# Patient Record
Sex: FEMALE | Race: WHITE | NOT HISPANIC OR LATINO
[De-identification: names, ages, dates, MRNs, and addresses within clinical notes are randomized per-mention and may not be internally consistent; named-entity substitution may affect disease eponyms.]

---

## 2018-10-02 ENCOUNTER — HEALTH MAINTENANCE LETTER (OUTPATIENT)
Age: 43
End: 2018-10-02

## 2018-10-04 ENCOUNTER — RADIANT APPOINTMENT (OUTPATIENT)
Dept: MAMMOGRAPHY | Facility: CLINIC | Age: 43
End: 2018-10-04
Attending: OBSTETRICS & GYNECOLOGY
Payer: COMMERCIAL

## 2018-10-04 ENCOUNTER — RADIANT APPOINTMENT (OUTPATIENT)
Dept: GENERAL RADIOLOGY | Facility: CLINIC | Age: 43
End: 2018-10-04
Attending: OBSTETRICS & GYNECOLOGY
Payer: COMMERCIAL

## 2018-10-04 ENCOUNTER — ONCOLOGY VISIT (OUTPATIENT)
Dept: ONCOLOGY | Facility: CLINIC | Age: 43
End: 2018-10-04
Attending: OBSTETRICS & GYNECOLOGY
Payer: COMMERCIAL

## 2018-10-04 VITALS
HEART RATE: 70 BPM | DIASTOLIC BLOOD PRESSURE: 71 MMHG | OXYGEN SATURATION: 99 % | TEMPERATURE: 98.4 F | SYSTOLIC BLOOD PRESSURE: 123 MMHG | WEIGHT: 222.3 LBS

## 2018-10-04 DIAGNOSIS — C54.1 ENDOMETRIAL CANCER (H): ICD-10-CM

## 2018-10-04 DIAGNOSIS — C54.1 ENDOMETRIAL CANCER (H): Primary | ICD-10-CM

## 2018-10-04 LAB
ANION GAP SERPL CALCULATED.3IONS-SCNC: 7 MMOL/L (ref 3–14)
BASOPHILS # BLD AUTO: 0 10E9/L (ref 0–0.2)
BASOPHILS NFR BLD AUTO: 0.5 %
BUN SERPL-MCNC: 15 MG/DL (ref 7–30)
CALCIUM SERPL-MCNC: 8.7 MG/DL (ref 8.5–10.1)
CHLORIDE SERPL-SCNC: 106 MMOL/L (ref 94–109)
CO2 SERPL-SCNC: 26 MMOL/L (ref 20–32)
CREAT SERPL-MCNC: 0.9 MG/DL (ref 0.52–1.04)
DIFFERENTIAL METHOD BLD: NORMAL
EOSINOPHIL # BLD AUTO: 0.3 10E9/L (ref 0–0.7)
EOSINOPHIL NFR BLD AUTO: 4.5 %
ERYTHROCYTE [DISTWIDTH] IN BLOOD BY AUTOMATED COUNT: 12.2 % (ref 10–15)
GFR SERPL CREATININE-BSD FRML MDRD: 69 ML/MIN/1.7M2
GLUCOSE SERPL-MCNC: 102 MG/DL (ref 70–99)
HCT VFR BLD AUTO: 42.7 % (ref 35–47)
HGB BLD-MCNC: 14.2 G/DL (ref 11.7–15.7)
IMM GRANULOCYTES # BLD: 0 10E9/L (ref 0–0.4)
IMM GRANULOCYTES NFR BLD: 0.3 %
LYMPHOCYTES # BLD AUTO: 2.2 10E9/L (ref 0.8–5.3)
LYMPHOCYTES NFR BLD AUTO: 33.8 %
MCH RBC QN AUTO: 30.1 PG (ref 26.5–33)
MCHC RBC AUTO-ENTMCNC: 33.3 G/DL (ref 31.5–36.5)
MCV RBC AUTO: 91 FL (ref 78–100)
MONOCYTES # BLD AUTO: 0.4 10E9/L (ref 0–1.3)
MONOCYTES NFR BLD AUTO: 5.4 %
NEUTROPHILS # BLD AUTO: 3.6 10E9/L (ref 1.6–8.3)
NEUTROPHILS NFR BLD AUTO: 55.5 %
NRBC # BLD AUTO: 0 10*3/UL
NRBC BLD AUTO-RTO: 0 /100
PLATELET # BLD AUTO: 280 10E9/L (ref 150–450)
POTASSIUM SERPL-SCNC: 4.2 MMOL/L (ref 3.4–5.3)
RBC # BLD AUTO: 4.72 10E12/L (ref 3.8–5.2)
SODIUM SERPL-SCNC: 138 MMOL/L (ref 133–144)
WBC # BLD AUTO: 6.5 10E9/L (ref 4–11)

## 2018-10-04 PROCEDURE — G0463 HOSPITAL OUTPT CLINIC VISIT: HCPCS | Mod: ZF

## 2018-10-04 PROCEDURE — 80048 BASIC METABOLIC PNL TOTAL CA: CPT | Performed by: OBSTETRICS & GYNECOLOGY

## 2018-10-04 PROCEDURE — 85025 COMPLETE CBC W/AUTO DIFF WBC: CPT | Performed by: OBSTETRICS & GYNECOLOGY

## 2018-10-04 PROCEDURE — 99205 OFFICE O/P NEW HI 60 MIN: CPT | Mod: ZP | Performed by: OBSTETRICS & GYNECOLOGY

## 2018-10-04 RX ORDER — ALBUTEROL SULFATE 90 UG/1
1-2 AEROSOL, METERED RESPIRATORY (INHALATION)
COMMUNITY
Start: 2018-04-04 | End: 2020-02-11

## 2018-10-04 RX ORDER — CLINDAMYCIN PHOSPHATE 900 MG/50ML
900 INJECTION, SOLUTION INTRAVENOUS
Status: CANCELLED | OUTPATIENT
Start: 2018-10-04

## 2018-10-04 RX ORDER — METOPROLOL SUCCINATE 25 MG/1
25 TABLET, EXTENDED RELEASE ORAL
COMMUNITY
Start: 2018-08-15 | End: 2019-04-25

## 2018-10-04 RX ORDER — SPIRONOLACTONE 50 MG/1
50 TABLET, FILM COATED ORAL DAILY
COMMUNITY
Start: 2018-08-14

## 2018-10-04 RX ORDER — BUPROPION HYDROCHLORIDE 300 MG/1
TABLET ORAL
COMMUNITY
Start: 2018-09-13 | End: 2019-08-08 | Stop reason: DRUGHIGH

## 2018-10-04 RX ORDER — ACETAMINOPHEN 325 MG/1
975 TABLET ORAL ONCE
Status: CANCELLED | OUTPATIENT
Start: 2018-10-04 | End: 2018-10-04

## 2018-10-04 RX ORDER — LORATADINE 10 MG/1
10 TABLET ORAL
COMMUNITY
Start: 2018-06-06 | End: 2021-01-29

## 2018-10-04 RX ORDER — PHENAZOPYRIDINE HYDROCHLORIDE 200 MG/1
200 TABLET, FILM COATED ORAL ONCE
Status: CANCELLED | OUTPATIENT
Start: 2018-10-04 | End: 2018-10-04

## 2018-10-04 RX ORDER — MULTIVITAMIN
1 CAPSULE ORAL
COMMUNITY
Start: 2018-04-04

## 2018-10-04 RX ORDER — GABAPENTIN 600 MG/1
100 TABLET ORAL PRN
COMMUNITY
Start: 2018-07-24 | End: 2021-02-19

## 2018-10-04 ASSESSMENT — PAIN SCALES - GENERAL: PAINLEVEL: NO PAIN (0)

## 2018-10-04 NOTE — PROGRESS NOTES
Consult Notes on Referred Patient    Date: 10/04/2018     Dr. 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 Given these findings she was subsequently sent to the [facility] Cancer Clinic for new patient consultation.     Brief History:  Meagan originally presented to clinic due to continuous vaginal bleeding, very light. She thought it was just her menses being continuous. US was done which found thickened endometrial lining. IUD was placed. Second US was done and IUD did not affect endometrial lining and IUD was malpositioned. Since then, she has noted continuous spotting. IUD was removed. Also of note, ovarian cysts were noted on second US. Subsequent endometrial biopsy was done which revealed grade 1 endometrial cancer. She has always had irregular menses and has never been able to get pregnant. Patient's  first and only pap smear on 10/10/2017. Had mammogram in 20's, will order additional mammogram for baseline. No history of prior colonoscopy. Started Wellbutrin this last year, mood has improved. Prior cigarette smoker, quit 2 months ago and switched to vape. Started again smoking cigarettes again but has not had one for 10 days. History of drug use, has been sober for 11 months. History of meth and alcohol abuse. No history of prior abdominal surgeries. No history of heart disease, lung disease or diabetes. Has never been able to get pregnant and dose not desire children.    18: pelvic US   IMPRESSION:  Peripheral follicular arrangement high within the ovaries suggesting polycystic ovarian syndrome.   Slightly thickened heterogeneous endometrium raising a concern of potential hyperplastic change.  18: pelvic US   1. Uterus is not  normal in appearance. The endometrium appears thickened   and vascular, and the IUD appears to be malpositioned.  Clinical   correlation is recommended.    2. Bilateral complex ovarian cysts are noted.  Follow up ultrasound in 4-6   weeks is recommended, consider saline infusion sonohysterogram for further   imaging of the endometrium.     9/17/18: endometrial biopsy  ENDOMETRIUM, BIOPSY:  1. FIGO Grade 1 (well differentiated) endometrioid carcinoma of      the endometrium  2. DNA mismatch repair enzyme immunohistochemistry studies:     All intact (see Amendment note and synoptic reporting)    Review of Systems:  Systemic           no weight changes; no fever; no chills; no night sweats; no appetite changes  Skin           no rashes, or lesions  Eye           no irritation; no changes in vision  Deny-Laryngeal           no dysphagia; no hoarseness   Pulmonary    no cough; no shortness of breath  Cardiovascular    no chest pain; no palpitations  Gastrointestinal    no diarrhea; no constipation; no abdominal pain; no changes in bowel  habits; no blood in stool  Genitourinary   no urinary frequency; no urinary urgency; no dysuria; no pain; no abnormal vaginal discharge; + abnormal vaginal bleeding  Breast   no breast discharge; no breast changes; no breast pain  Musculoskeletal    no myalgias; no arthralgias; no back pain  Psychiatric           no depressed mood; no anxiety    Hematologic           no tender lymph nodes; no noticeable swellings or lumps   Endocrine    no hot flashes; no heat/cold intolerance         Neurological   no tremor; no numbness and tingling; no headaches; no difficulty  sleeping    I have reviewed and addressed the patient's review of symptoms for today's visit.     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     Health Maintenance:  Health Maintenance Due   Topic Date Due     PHQ-2 Q1 YR  01/20/1989     TETANUS IMMUNIZATION (SYSTEM ASSIGNED)  01/20/1995     HIV SCREEN (SYSTEM ASSIGNED)  01/20/1995     PAP SCREENING Q3 YR (SYSTEM ASSIGNED)  01/20/1998     INFLUENZA VACCINE (1) 09/01/2018     Last Pap Smear: 10/10/17 Result: NIL, negative HPV  This was her first pap smear.     Last Mammogram: Done in 20's, will order mammogram for new baseline.     Last Colonoscopy: Not done    Current Medications:   has a current medication list which includes the following prescription(s): albuterol, bupropion, gabapentin, loratadine, melatonin, metoprolol succinate, multivitamin  s, and spironolactone.     Allergies:   Allergies   Allergen Reactions     Amoxicillin      Penicillin G       Social History:  Social History   Substance Use Topics     Smoking status: Former Smoker     Packs/day: 0.50     Types: Cigarettes     Smokeless tobacco: Never Used      Comment: uses vape pen about 3-4 times daily     Alcohol use No      Comment: 11 months sober      History   Drug Use No     Comment: 11 months sober     Family History:   The patient's family history is notable for:  Paternal aunts x2 - breast cancer, age less than 50  Mother's cousin on fathers side  Maternal aunt - breast cancer  No uterine or colon cancer.     Physical Exam:   /71  Pulse 70  Temp 98.4  F (36.9  C) (Oral)  Wt 100.8 kg (222 lb 4.8 oz)  SpO2 99%  There is no height or weight on file to calculate BMI.    General Appearance: healthy and alert, no distress     HEENT:  no thyromegaly, no palpable nodules or masses        Cardiovascular: regular rate and rhythm, no gallops, rubs or murmurs     Respiratory: lungs clear, no rales, rhonchi or wheezes, normal diaphragmatic excursion    Musculoskeletal: extremities non tender and without  edema    Skin: no lesions or rashes     Neurological: normal gait, no gross defects     Psychiatric: appropriate mood and affect                               Hematological: normal cervical, supraclavicular and inguinal lymph nodes     Gastrointestinal:       abdomen soft, non-tender, non-distended, no organomegaly or masses    Genitourinary: Lesion on L vulva, measuring 0.5x0.5 cm, did not remove lesions today, I will remove this in the OR and send to lab then. Vagina is smooth without nodularity or masses, small amount of brownish discharge.  Cervix appears normal and without lesions.  Bimanual exam reveal no masses, nodularity or fullness.  Recto-vaginal exam confirms these findings.      Assessment:  Meagan Morales is a 43 year old woman with a diagnosis of grade 1 endometrial cancer.      Plan:    1.)   Will plan for Plan for DaVinci assisted total laparoscopic hysterectomy, bilateral oophorectomy and salpingectomy, possible pelvic and paraaortic lymph node dissection. [place] Lymph nodes.  - surgical menopause reviewed with patient. Importance of weight bearing exercise, calcium and vitamin D. HRT can be an option if symptoms are unbearable.   - Reviewed signs and symptoms for when she should contact the clinic or seek additional care. Patient to contact the clinic with any questions or concerns in the interim.     2.) Genetic risk factors were assessed and the patient does not meet the qualifications for a referral.      3.) Labs and/or tests ordered include:  Chest xray. Mammogram.      4.) Health maintenance issues addressed today include: will order mammogram for baseline.     5.) Pre-op teaching was completed today.  Risks of surgery were discussed to include: bleeding, transfusion, infection, unintentional injury to surrounding organs/structures.    6.) Of note, patient has history of drug and alcohol abuse. (meth) She is 11 months sober, living in sober housing. She has discussed use of narcotics with  family and those she lives with. She understands risk for addiction when using narcotics.    Thank you for allowing us to participate in the care of your patient.       Sincerely,    Linh De Souza MD    Department of Ob/Gyn and Women's Health  Division of Gynecologic Oncology  [facility]  [phone]      No care team member to ANITA Hart Morgan Faulkner, am serving as a scribe to document services personally performed by Linh De Souza MD, based upon my observations and the provider's statements to me. All documentation has been reviewed by the aforementioned doctor prior to being entered into the official medical record.     I have reviewed the above note and agree with the scribe's notation as written.

## 2018-10-04 NOTE — MR AVS SNAPSHOT
After Visit Summary   10/4/2018    Meagan Morales    MRN: [id number]           Patient Information     Date Of Birth          1975        Visit Information        Provider Department      10/4/2018 9:00 AM Linh De Souza MD [facility]        Today's Diagnoses     Endometrial cancer (H)    -  1       Follow-ups after your visit        Your next 10 appointments already scheduled     Oct 24, 2018   Procedure with Linh De Souza MD   [facility], [place], Same Day Surgery (--)    [address] [id number] [phone]            Nov 08, 2018  2:00 PM CST   (Arrive by 1:45 PM)   Return Visit with Linh De Souza MD   [facility] ([facility])    [address]  [address]  [facility] [facility] [address] [phone]              Who to contact     If you have questions or need follow up information about today's clinic visit or your schedule please contact [facility] directly at [phone].  Normal or non-critical lab and imaging results will be communicated to you by Mobilisafehart, letter or phone within 4 business days after the clinic has received the results. If you do not hear from us within 7 days, please contact the clinic through Mobilisafehart or phone. If you have a critical or abnormal lab result, we will notify you by phone as soon as possible.  Submit refill requests through NearVerse or call your pharmacy and they will forward the refill request to us. Please allow 3 business days for your refill to be completed.          Additional Information About Your Visit        Mobilisafehart Information     NearVerse gives you secure access to your electronic health record. If you see a primary care provider, you can also send messages to your care team and make appointments. If you have questions, please call your primary care clinic.  If you do not have a primary care provider, please call [phone] and they will  assist you.        Care EveryWhere ID     This is your Care EveryWhere ID. This could be used by other organizations to access your [facility] medical records  [id number]        Your Vitals Were     Pulse Temperature Pulse Oximetry             70 98.4  F (36.9  C) (Oral) 99%          Blood Pressure from Last 3 Encounters:   10/04/18 123/71   02/20/13 134/69   05/29/12 135/87    Weight from Last 3 Encounters:   10/04/18 100.8 kg (222 lb 4.8 oz)              We Performed the Following     Mammo Screening digital (bilat)     Alexa-Operative Worksheet - DaVinci Total Laparoscopic Hysterectomy, bilateral, Salpingo-Oophorectomy, cystoscopy, possible open, possible lymph node dissection        Primary Care Provider    None Specified       No primary provider on file.        Equal Access to Services     ENE WHITAKER : Brendan Vásquez, waaxnicki luqadaha, qaybta kaalmada jeremy, lidia santillan . So [facility] [phone].    ATENCIÓN: Si habla español, tiene a maya disposición servicios gratuitos de asistencia lingüística. Llame al [phone].    We comply with applicable federal civil rights laws and [place] laws. We do not discriminate on the basis of race, color, national origin, age, disability, sex, sexual orientation, or gender identity.            Thank you!     Thank you for choosing [facility]  for your care. Our goal is always to provide you with excellent care. Hearing back from our patients is one way we can continue to improve our services. Please take a few minutes to complete the written survey that you may receive in the mail after your visit with us. Thank you!             Your Updated Medication List - Protect others around you: Learn how to safely use, store and throw away your medicines at www.disposemymeds.org.          This list is accurate as of 10/4/18 11:59 PM.  Always use your most recent med list.                   Brand Name Dispense  Instructions for use Diagnosis    albuterol 108 (90 Base) MCG/ACT inhaler    PROAIR HFA/PROVENTIL HFA/VENTOLIN HFA     Inhale 1-2 puffs into the lungs    Endometrial cancer (H)       buPROPion 300 MG 24 hr tablet    WELLBUTRIN XL     Take 300 mg by mouth    Endometrial cancer (H)       gabapentin 600 MG tablet    NEURONTIN     Take 600 mg by mouth    Endometrial cancer (H)       loratadine 10 MG tablet    CLARITIN     Take 10 mg by mouth    Endometrial cancer (H)       MELATONIN PO      Take 10 mg by mouth nightly as needed    Endometrial cancer (H)       metoprolol succinate 25 MG 24 hr tablet    TOPROL-XL     Take 25 mg by mouth    Endometrial cancer (H)       MULTIvitamin  S Caps      Take 1 capsule by mouth    Endometrial cancer (H)       spironolactone 50 MG tablet    ALDACTONE     Take 50 mg by mouth    Endometrial cancer (H)

## 2018-10-04 NOTE — LETTER
10/4/2018       RE: Meagan Morales  [address]  [facility] [address]     Dear Colleague,    Thank you for referring your patient, Meagan Morales, to the [facility]. Please see a copy of my visit note below.                            Consult Notes on Referred Patient    Date: 10/04/2018     Dr. 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 Given these findings she was subsequently sent to the Gynecologic Cancer Clinic for new patient consultation.     Brief History:  Meagan originally presented to clinic due to continuous vaginal bleeding, very light. She thought it was just her menses being continuous. US was done which found thickened endometrial lining. IUD was placed. Second US was done and IUD did not affect endometrial lining and IUD was malpositioned. Since then, she has noted continuous spotting. IUD was removed. Also of note, ovarian cysts were noted on second US. Subsequent endometrial biopsy was done which revealed grade 1 endometrial cancer. She has always had irregular menses and has never been able to get pregnant. Patient's  first and only pap smear on 10/10/2017. Had mammogram in 20's, will order additional mammogram for baseline. No history of prior colonoscopy. Started Wellbutrin this last year, mood has improved. Prior cigarette smoker, quit 2 months ago and switched to vape. Started again smoking cigarettes again but has not had one for 10 days. History of drug use, has been sober for 11 months. History of meth and alcohol abuse. No history of prior abdominal surgeries. No history of heart disease, lung disease or diabetes. Has never been able to get pregnant and dose not desire children.    18: pelvic US    IMPRESSION:  Peripheral follicular arrangement high within the ovaries suggesting polycystic ovarian syndrome.   Slightly thickened heterogeneous endometrium raising a concern of potential hyperplastic change.  9/17/18: pelvic US   1. Uterus is not normal in appearance. The endometrium appears thickened   and vascular, and the IUD appears to be malpositioned.  Clinical   correlation is recommended.    2. Bilateral complex ovarian cysts are noted.  Follow up ultrasound in 4-6   weeks is recommended, consider saline infusion sonohysterogram for further   imaging of the endometrium.     9/17/18: endometrial biopsy  ENDOMETRIUM, BIOPSY:  1. FIGO Grade 1 (well differentiated) endometrioid carcinoma of      the endometrium  2. DNA mismatch repair enzyme immunohistochemistry studies:     All intact (see Amendment note and synoptic reporting)    Review of Systems:  Systemic           no weight changes; no fever; no chills; no night sweats; no appetite changes  Skin           no rashes, or lesions  Eye           no irritation; no changes in vision  Deny-Laryngeal           no dysphagia; no hoarseness   Pulmonary    no cough; no shortness of breath  Cardiovascular    no chest pain; no palpitations  Gastrointestinal    no diarrhea; no constipation; no abdominal pain; no changes in bowel  habits; no blood in stool  Genitourinary   no urinary frequency; no urinary urgency; no dysuria; no pain; no abnormal vaginal discharge; + abnormal vaginal bleeding  Breast   no breast discharge; no breast changes; no breast pain  Musculoskeletal    no myalgias; no arthralgias; no back pain  Psychiatric           no depressed mood; no anxiety    Hematologic           no tender lymph nodes; no noticeable swellings or lumps   Endocrine    no hot flashes; no heat/cold intolerance         Neurological   no tremor; no numbness and tingling; no headaches; no difficulty  sleeping    I have reviewed and addressed the patient's review of symptoms for  today's visit.     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     Health Maintenance:  Health Maintenance Due   Topic Date Due     PHQ-2 Q1 YR  01/20/1989     TETANUS IMMUNIZATION (SYSTEM ASSIGNED)  01/20/1995     HIV SCREEN (SYSTEM ASSIGNED)  01/20/1995     PAP SCREENING Q3 YR (SYSTEM ASSIGNED)  01/20/1998     INFLUENZA VACCINE (1) 09/01/2018     Last Pap Smear: 10/10/17 Result: NIL, negative HPV  This was her first pap smear.     Last Mammogram: Done in 20's, will order mammogram for new baseline.     Last Colonoscopy: Not done    Current Medications:   has a current medication list which includes the following prescription(s): albuterol, bupropion, gabapentin, loratadine, melatonin, metoprolol succinate, multivitamin  s, and spironolactone.     Allergies:   Allergies   Allergen Reactions     Amoxicillin      Penicillin G       Social History:  Social History   Substance Use Topics     Smoking status: Former Smoker     Packs/day: 0.50     Types: Cigarettes     Smokeless tobacco: Never Used      Comment: uses vape pen about 3-4 times daily     Alcohol use No      Comment: 11 months sober      History   Drug Use No     Comment: 11 months sober     Family History:   The patient's family history is notable for:  Paternal aunts x2 - breast cancer, age less than 50  Mother's cousin on fathers side  Maternal aunt - breast cancer  No uterine or colon cancer.     Physical Exam:   /71  Pulse 70  Temp 98.4  F (36.9  C) (Oral)  Wt 100.8 kg (222 lb 4.8 oz)  SpO2 99%  There is no height or weight on file to calculate BMI.    General Appearance: healthy and alert, no distress     HEENT:  no thyromegaly, no palpable  nodules or masses        Cardiovascular: regular rate and rhythm, no gallops, rubs or murmurs     Respiratory: lungs clear, no rales, rhonchi or wheezes, normal diaphragmatic excursion    Musculoskeletal: extremities non tender and without edema    Skin: no lesions or rashes     Neurological: normal gait, no gross defects     Psychiatric: appropriate mood and affect                               Hematological: normal cervical, supraclavicular and inguinal lymph nodes     Gastrointestinal:       abdomen soft, non-tender, non-distended, no organomegaly or masses    Genitourinary: Lesion on L vulva, measuring 0.5x0.5 cm, did not remove lesions today, I will remove this in the OR and send to lab then. Vagina is smooth without nodularity or masses, small amount of brownish discharge.  Cervix appears normal and without lesions.  Bimanual exam reveal no masses, nodularity or fullness.  Recto-vaginal exam confirms these findings.      Assessment:  Meagan Morales is a 43 year old woman with a diagnosis of grade 1 endometrial cancer.      Plan:    1.)   Will plan for Plan for DaVinci assisted total laparoscopic hysterectomy, bilateral oophorectomy and salpingectomy, possible pelvic and paraaortic lymph node dissection. [place] Lymph nodes.  - surgical menopause reviewed with patient. Importance of weight bearing exercise, calcium and vitamin D. HRT can be an option if symptoms are unbearable.   - Reviewed signs and symptoms for when she should contact the clinic or seek additional care. Patient to contact the clinic with any questions or concerns in the interim.     2.) Genetic risk factors were assessed and the patient does not meet the qualifications for a referral.      3.) Labs and/or tests ordered include:  Chest xray. Mammogram.      4.) Health maintenance issues addressed today include: will order mammogram for baseline.     5.) Pre-op teaching was completed today.  Risks of surgery were discussed to include: bleeding,  transfusion, infection, unintentional injury to surrounding organs/structures.    6.) Of note, patient has history of drug and alcohol abuse. (meth) She is 11 months sober, living in sober housing. She has discussed use of narcotics with family and those she lives with. She understands risk for addiction when using narcotics.      I, Moreno Summers, am serving as a scribe to document services personally performed by Linh De Souza MD, based upon my observations and the provider's statements to me. All documentation has been reviewed by the aforementioned doctor prior to being entered into the official medical record.     I have reviewed the above note and agree with the scribe's notation as written.      MD HENRI Palumbo JUDITH

## 2018-10-04 NOTE — NURSING NOTE
Oncology Rooming Note    October 4, 2018 8:27 AM   Meagan Morales is a 43 year old female who presents for:    Chief Complaint   Patient presents with     Oncology Clinic Visit     New; Endometrial CA, newly dx     Initial Vitals: /71  Pulse 70  Temp 98.4  F (36.9  C) (Oral)  Wt 100.8 kg (222 lb 4.8 oz)  SpO2 99% There is no height or weight on file to calculate BMI. There is no height or weight on file to calculate BSA.  No Pain (0) Comment: Data Unavailable   No LMP recorded. Patient is not currently having periods (Reason: Irregular Periods).  Allergies reviewed: Yes  Medications reviewed: Yes    Medications: Medication refills not needed today.  Pharmacy name entered into Exterity: [facility] [phone] [facility] [address] [address] [facility] [address]    Clinical concerns:      10 minutes for nursing intake (face to face time)     Siri Guillaume CMA

## 2018-10-05 NOTE — NURSING NOTE
Pre Op Nurse Teaching Template    Relevant Diagnosis: endometrial cancer    Teaching Topic:  davinci removal of uterus tubes ovaries cervix, sentinel lymph node, possible lymph node dissection and open abdomen, cystoscopy, removal of left groin lesion    Person(s) involved in teaching :  Patient    Motivation Level:  Asks Questions:    Yes      Eager to Learn:     Yes     Cooperative:          Yes    Receptive (willing. Able to accept information):    Yes      Patient and those who are listed above demonstrates understanding of the following:   Reason for the appointment, diagnosis and treatment plan:   Yes   Knowledge of proper use of medications and conditions for which they are ordered (with special attention to potential side effects or drug interactions): Yes   Which situations necessitate calling provider and whom to contact: Yes         Nutritional needs and diet plan:  Yes      Pain management techniques:     Yes, Pain Scale   Diet:   Yes, [facility] Diet Instructions    Teaching Concerns addressed: Yes    Infection Prevention:  Patient and those who are listed above demonstrate understanding of the following:  Pre-Op CHG Bathing Instructions: Yes  Surgical procedure site care taught:   Yes   Signs and symptoms of infection taught: Yes       Instructional Materials Used/Given:  [facility] Before You Surgery Booklet  Showering or Bathing before Surgery Instructions & CHG Product  Hysterectomy Guidelines  Pain Assessment Tool   Home Care after Major Abdominal or Vaginal Surgery  Map  Accommodations Brochure  Phone numbers for [facility] and [facility]  Copy of Surgical Consent    Done Today:   Preop Visitnot needed   Tests Ordered:  Post Op Visit Scheduled:11/8  Comments:    Surgery date/time:10/24    Consent to file in medical records  .

## 2018-10-23 ENCOUNTER — ANESTHESIA EVENT (OUTPATIENT)
Dept: SURGERY | Facility: CLINIC | Age: 43
DRG: 740 | End: 2018-10-23
Payer: COMMERCIAL

## 2018-10-24 ENCOUNTER — HOSPITAL ENCOUNTER (INPATIENT)
Facility: CLINIC | Age: 43
LOS: 1 days | Discharge: HOME | DRG: 740 | End: 2018-10-24
Attending: OBSTETRICS & GYNECOLOGY | Admitting: OBSTETRICS & GYNECOLOGY
Payer: COMMERCIAL

## 2018-10-24 ENCOUNTER — SURGERY (OUTPATIENT)
Age: 43
End: 2018-10-24
Payer: COMMERCIAL

## 2018-10-24 ENCOUNTER — ANESTHESIA (OUTPATIENT)
Dept: SURGERY | Facility: CLINIC | Age: 43
DRG: 740 | End: 2018-10-24
Payer: COMMERCIAL

## 2018-10-24 VITALS
DIASTOLIC BLOOD PRESSURE: 64 MMHG | RESPIRATION RATE: 16 BRPM | OXYGEN SATURATION: 98 % | SYSTOLIC BLOOD PRESSURE: 118 MMHG | WEIGHT: 225.53 LBS | TEMPERATURE: 97.4 F | BODY MASS INDEX: 37.58 KG/M2 | HEIGHT: 65 IN

## 2018-10-24 DIAGNOSIS — C54.1 ENDOMETRIAL CANCER (H): ICD-10-CM

## 2018-10-24 DIAGNOSIS — Z90.710 S/P LAPAROSCOPIC HYSTERECTOMY: Primary | ICD-10-CM

## 2018-10-24 LAB
ABO + RH BLD: NORMAL
ABO + RH BLD: NORMAL
B-HCG SERPL-ACNC: <1 IU/L (ref 0–5)
BLD GP AB SCN SERPL QL: NORMAL
BLOOD BANK CMNT PATIENT-IMP: NORMAL
GLUCOSE BLDC GLUCOMTR-MCNC: 99 MG/DL (ref 70–99)
HCG UR QL: NEGATIVE
SPECIMEN EXP DATE BLD: NORMAL

## 2018-10-24 PROCEDURE — 0UT74ZZ RESECTION OF BILATERAL FALLOPIAN TUBES, PERCUTANEOUS ENDOSCOPIC APPROACH: ICD-10-PCS | Performed by: OBSTETRICS & GYNECOLOGY

## 2018-10-24 PROCEDURE — 25000125 ZZHC RX 250: Performed by: OBSTETRICS & GYNECOLOGY

## 2018-10-24 PROCEDURE — 25000565 ZZH ISOFLURANE, EA 15 MIN: Performed by: OBSTETRICS & GYNECOLOGY

## 2018-10-24 PROCEDURE — 25000128 H RX IP 250 OP 636: Performed by: OBSTETRICS & GYNECOLOGY

## 2018-10-24 PROCEDURE — 36000088 ZZH SURGERY LEVEL 8 EA 15 ADDTL MIN - UMMC: Performed by: OBSTETRICS & GYNECOLOGY

## 2018-10-24 PROCEDURE — 27210794 ZZH OR GENERAL SUPPLY STERILE: Performed by: OBSTETRICS & GYNECOLOGY

## 2018-10-24 PROCEDURE — 0UBMXZZ EXCISION OF VULVA, EXTERNAL APPROACH: ICD-10-PCS | Performed by: OBSTETRICS & GYNECOLOGY

## 2018-10-24 PROCEDURE — 00000155 ZZHCL STATISTIC H-CELL BLOCK W/STAIN: Performed by: OBSTETRICS & GYNECOLOGY

## 2018-10-24 PROCEDURE — 8E0W4CZ ROBOTIC ASSISTED PROCEDURE OF TRUNK REGION, PERCUTANEOUS ENDOSCOPIC APPROACH: ICD-10-PCS | Performed by: OBSTETRICS & GYNECOLOGY

## 2018-10-24 PROCEDURE — 88305 TISSUE EXAM BY PATHOLOGIST: CPT | Performed by: OBSTETRICS & GYNECOLOGY

## 2018-10-24 PROCEDURE — 25000128 H RX IP 250 OP 636: Performed by: ANESTHESIOLOGY

## 2018-10-24 PROCEDURE — 25000125 ZZHC RX 250: Performed by: NURSE ANESTHETIST, CERTIFIED REGISTERED

## 2018-10-24 PROCEDURE — 88331 PATH CONSLTJ SURG 1 BLK 1SPC: CPT | Performed by: OBSTETRICS & GYNECOLOGY

## 2018-10-24 PROCEDURE — 88341 IMHCHEM/IMCYTCHM EA ADD ANTB: CPT | Performed by: OBSTETRICS & GYNECOLOGY

## 2018-10-24 PROCEDURE — 88112 CYTOPATH CELL ENHANCE TECH: CPT | Performed by: OBSTETRICS & GYNECOLOGY

## 2018-10-24 PROCEDURE — 88332 PATH CONSLTJ SURG EA ADD BLK: CPT | Performed by: OBSTETRICS & GYNECOLOGY

## 2018-10-24 PROCEDURE — 86850 RBC ANTIBODY SCREEN: CPT | Performed by: OBSTETRICS & GYNECOLOGY

## 2018-10-24 PROCEDURE — 25000128 H RX IP 250 OP 636: Performed by: NURSE ANESTHETIST, CERTIFIED REGISTERED

## 2018-10-24 PROCEDURE — 25000128 H RX IP 250 OP 636: Performed by: STUDENT IN AN ORGANIZED HEALTH CARE EDUCATION/TRAINING PROGRAM

## 2018-10-24 PROCEDURE — 37000009 ZZH ANESTHESIA TECHNICAL FEE, EACH ADDTL 15 MIN: Performed by: OBSTETRICS & GYNECOLOGY

## 2018-10-24 PROCEDURE — 0UT94ZZ RESECTION OF UTERUS, PERCUTANEOUS ENDOSCOPIC APPROACH: ICD-10-PCS | Performed by: OBSTETRICS & GYNECOLOGY

## 2018-10-24 PROCEDURE — 25000132 ZZH RX MED GY IP 250 OP 250 PS 637: Performed by: STUDENT IN AN ORGANIZED HEALTH CARE EDUCATION/TRAINING PROGRAM

## 2018-10-24 PROCEDURE — 40000171 ZZH STATISTIC PRE-PROCEDURE ASSESSMENT III: Performed by: OBSTETRICS & GYNECOLOGY

## 2018-10-24 PROCEDURE — 71000027 ZZH RECOVERY PHASE 2 EACH 15 MINS: Performed by: OBSTETRICS & GYNECOLOGY

## 2018-10-24 PROCEDURE — 11400 EXC TR-EXT B9+MARG 0.5 CM<: CPT | Mod: GC | Performed by: OBSTETRICS & GYNECOLOGY

## 2018-10-24 PROCEDURE — C9290 INJ, BUPIVACAINE LIPOSOME: HCPCS | Performed by: STUDENT IN AN ORGANIZED HEALTH CARE EDUCATION/TRAINING PROGRAM

## 2018-10-24 PROCEDURE — 36415 COLL VENOUS BLD VENIPUNCTURE: CPT | Performed by: OBSTETRICS & GYNECOLOGY

## 2018-10-24 PROCEDURE — 88309 TISSUE EXAM BY PATHOLOGIST: CPT | Performed by: OBSTETRICS & GYNECOLOGY

## 2018-10-24 PROCEDURE — 81025 URINE PREGNANCY TEST: CPT | Performed by: ANESTHESIOLOGY

## 2018-10-24 PROCEDURE — 38572 LAPAROSCOPY LYMPHADENECTOMY: CPT | Mod: GC | Performed by: OBSTETRICS & GYNECOLOGY

## 2018-10-24 PROCEDURE — 12000001 ZZH R&B MED SURG/OB UMMC

## 2018-10-24 PROCEDURE — 88342 IMHCHEM/IMCYTCHM 1ST ANTB: CPT | Performed by: OBSTETRICS & GYNECOLOGY

## 2018-10-24 PROCEDURE — 86901 BLOOD TYPING SEROLOGIC RH(D): CPT | Performed by: OBSTETRICS & GYNECOLOGY

## 2018-10-24 PROCEDURE — 71000014 ZZH RECOVERY PHASE 1 LEVEL 2 FIRST HR: Performed by: OBSTETRICS & GYNECOLOGY

## 2018-10-24 PROCEDURE — 88307 TISSUE EXAM BY PATHOLOGIST: CPT | Performed by: OBSTETRICS & GYNECOLOGY

## 2018-10-24 PROCEDURE — 84702 CHORIONIC GONADOTROPIN TEST: CPT | Performed by: OBSTETRICS & GYNECOLOGY

## 2018-10-24 PROCEDURE — 0UT24ZZ RESECTION OF BILATERAL OVARIES, PERCUTANEOUS ENDOSCOPIC APPROACH: ICD-10-PCS | Performed by: OBSTETRICS & GYNECOLOGY

## 2018-10-24 PROCEDURE — 25000132 ZZH RX MED GY IP 250 OP 250 PS 637: Performed by: OBSTETRICS & GYNECOLOGY

## 2018-10-24 PROCEDURE — 71000015 ZZH RECOVERY PHASE 1 LEVEL 2 EA ADDTL HR: Performed by: OBSTETRICS & GYNECOLOGY

## 2018-10-24 PROCEDURE — 36000086 ZZH SURGERY LEVEL 8 1ST 30 MIN UMMC: Performed by: OBSTETRICS & GYNECOLOGY

## 2018-10-24 PROCEDURE — 86900 BLOOD TYPING SEROLOGIC ABO: CPT | Performed by: OBSTETRICS & GYNECOLOGY

## 2018-10-24 PROCEDURE — 37000008 ZZH ANESTHESIA TECHNICAL FEE, 1ST 30 MIN: Performed by: OBSTETRICS & GYNECOLOGY

## 2018-10-24 PROCEDURE — 07BD4ZZ EXCISION OF AORTIC LYMPHATIC, PERCUTANEOUS ENDOSCOPIC APPROACH: ICD-10-PCS | Performed by: OBSTETRICS & GYNECOLOGY

## 2018-10-24 PROCEDURE — 58573 TLH W/T/O UTERUS OVER 250 G: CPT | Mod: GC | Performed by: OBSTETRICS & GYNECOLOGY

## 2018-10-24 PROCEDURE — 00000146 ZZHCL STATISTIC GLUCOSE BY METER IP

## 2018-10-24 RX ORDER — ONDANSETRON 4 MG/1
4 TABLET, ORALLY DISINTEGRATING ORAL EVERY 30 MIN PRN
Status: DISCONTINUED | OUTPATIENT
Start: 2018-10-24 | End: 2018-10-24 | Stop reason: HOSPADM

## 2018-10-24 RX ORDER — PROPOFOL 10 MG/ML
INJECTION, EMULSION INTRAVENOUS PRN
Status: DISCONTINUED | OUTPATIENT
Start: 2018-10-24 | End: 2018-10-24

## 2018-10-24 RX ORDER — HEPARIN SODIUM 5000 [USP'U]/.5ML
INJECTION, SOLUTION INTRAVENOUS; SUBCUTANEOUS PRN
Status: DISCONTINUED | OUTPATIENT
Start: 2018-10-24 | End: 2018-10-24

## 2018-10-24 RX ORDER — ONDANSETRON 2 MG/ML
4 INJECTION INTRAMUSCULAR; INTRAVENOUS EVERY 30 MIN PRN
Status: DISCONTINUED | OUTPATIENT
Start: 2018-10-24 | End: 2018-10-24 | Stop reason: HOSPADM

## 2018-10-24 RX ORDER — BUPIVACAINE HYDROCHLORIDE 2.5 MG/ML
INJECTION, SOLUTION EPIDURAL; INFILTRATION; INTRACAUDAL PRN
Status: DISCONTINUED | OUTPATIENT
Start: 2018-10-24 | End: 2018-10-24

## 2018-10-24 RX ORDER — CLINDAMYCIN PHOSPHATE 900 MG/50ML
900 INJECTION, SOLUTION INTRAVENOUS
Status: COMPLETED | OUTPATIENT
Start: 2018-10-24 | End: 2018-10-24

## 2018-10-24 RX ORDER — OXYCODONE HYDROCHLORIDE 5 MG/1
5 TABLET ORAL
Status: DISCONTINUED | OUTPATIENT
Start: 2018-10-24 | End: 2018-10-24 | Stop reason: HOSPADM

## 2018-10-24 RX ORDER — IBUPROFEN 600 MG/1
600 TABLET, FILM COATED ORAL EVERY 6 HOURS PRN
Qty: 30 TABLET | Refills: 0 | Status: SHIPPED | OUTPATIENT
Start: 2018-10-24 | End: 2019-01-21

## 2018-10-24 RX ORDER — ONDANSETRON 2 MG/ML
INJECTION INTRAMUSCULAR; INTRAVENOUS PRN
Status: DISCONTINUED | OUTPATIENT
Start: 2018-10-24 | End: 2018-10-24

## 2018-10-24 RX ORDER — FENTANYL CITRATE 50 UG/ML
INJECTION, SOLUTION INTRAMUSCULAR; INTRAVENOUS PRN
Status: DISCONTINUED | OUTPATIENT
Start: 2018-10-24 | End: 2018-10-24

## 2018-10-24 RX ORDER — FLUMAZENIL 0.1 MG/ML
0.2 INJECTION, SOLUTION INTRAVENOUS
Status: DISCONTINUED | OUTPATIENT
Start: 2018-10-24 | End: 2018-10-24 | Stop reason: HOSPADM

## 2018-10-24 RX ORDER — HYDROMORPHONE HYDROCHLORIDE 1 MG/ML
.3-.5 INJECTION, SOLUTION INTRAMUSCULAR; INTRAVENOUS; SUBCUTANEOUS EVERY 5 MIN PRN
Status: DISCONTINUED | OUTPATIENT
Start: 2018-10-24 | End: 2018-10-24 | Stop reason: HOSPADM

## 2018-10-24 RX ORDER — ACETAMINOPHEN 325 MG/1
325 TABLET ORAL ONCE
Status: COMPLETED | OUTPATIENT
Start: 2018-10-24 | End: 2018-10-24

## 2018-10-24 RX ORDER — SODIUM CHLORIDE, SODIUM LACTATE, POTASSIUM CHLORIDE, CALCIUM CHLORIDE 600; 310; 30; 20 MG/100ML; MG/100ML; MG/100ML; MG/100ML
INJECTION, SOLUTION INTRAVENOUS CONTINUOUS
Status: DISCONTINUED | OUTPATIENT
Start: 2018-10-24 | End: 2018-10-24 | Stop reason: HOSPADM

## 2018-10-24 RX ORDER — NALOXONE HYDROCHLORIDE 0.4 MG/ML
.1-.4 INJECTION, SOLUTION INTRAMUSCULAR; INTRAVENOUS; SUBCUTANEOUS
Status: DISCONTINUED | OUTPATIENT
Start: 2018-10-24 | End: 2018-10-24 | Stop reason: HOSPADM

## 2018-10-24 RX ORDER — FENTANYL CITRATE 50 UG/ML
25-50 INJECTION, SOLUTION INTRAMUSCULAR; INTRAVENOUS
Status: DISCONTINUED | OUTPATIENT
Start: 2018-10-24 | End: 2018-10-24 | Stop reason: HOSPADM

## 2018-10-24 RX ORDER — DEXAMETHASONE SODIUM PHOSPHATE 4 MG/ML
INJECTION, SOLUTION INTRA-ARTICULAR; INTRALESIONAL; INTRAMUSCULAR; INTRAVENOUS; SOFT TISSUE PRN
Status: DISCONTINUED | OUTPATIENT
Start: 2018-10-24 | End: 2018-10-24

## 2018-10-24 RX ORDER — IBUPROFEN 600 MG/1
600 TABLET, FILM COATED ORAL ONCE
Status: COMPLETED | OUTPATIENT
Start: 2018-10-24 | End: 2018-10-24

## 2018-10-24 RX ORDER — ACETAMINOPHEN 325 MG/1
975 TABLET ORAL ONCE
Status: COMPLETED | OUTPATIENT
Start: 2018-10-24 | End: 2018-10-24

## 2018-10-24 RX ORDER — OXYCODONE HYDROCHLORIDE 5 MG/1
5 TABLET ORAL ONCE
Status: COMPLETED | OUTPATIENT
Start: 2018-10-24 | End: 2018-10-24

## 2018-10-24 RX ORDER — AMOXICILLIN 250 MG
1-2 CAPSULE ORAL 2 TIMES DAILY
Qty: 30 TABLET | Refills: 0 | Status: SHIPPED | OUTPATIENT
Start: 2018-10-24 | End: 2019-01-21

## 2018-10-24 RX ORDER — IBUPROFEN 200 MG
600 TABLET ORAL
Status: DISCONTINUED | OUTPATIENT
Start: 2018-10-24 | End: 2018-10-24 | Stop reason: HOSPADM

## 2018-10-24 RX ORDER — PHENAZOPYRIDINE HYDROCHLORIDE 200 MG/1
200 TABLET, FILM COATED ORAL ONCE
Status: COMPLETED | OUTPATIENT
Start: 2018-10-24 | End: 2018-10-24

## 2018-10-24 RX ORDER — OXYCODONE AND ACETAMINOPHEN 5; 325 MG/1; MG/1
1-2 TABLET ORAL EVERY 4 HOURS PRN
Qty: 15 TABLET | Refills: 0 | Status: SHIPPED | OUTPATIENT
Start: 2018-10-24 | End: 2018-11-02

## 2018-10-24 RX ORDER — LABETALOL HYDROCHLORIDE 5 MG/ML
10 INJECTION, SOLUTION INTRAVENOUS
Status: DISCONTINUED | OUTPATIENT
Start: 2018-10-24 | End: 2018-10-24 | Stop reason: HOSPADM

## 2018-10-24 RX ORDER — GABAPENTIN 300 MG/1
300 CAPSULE ORAL ONCE
Status: COMPLETED | OUTPATIENT
Start: 2018-10-24 | End: 2018-10-24

## 2018-10-24 RX ORDER — ACETAMINOPHEN 325 MG/1
975 TABLET ORAL ONCE
Status: DISCONTINUED | OUTPATIENT
Start: 2018-10-24 | End: 2018-10-24 | Stop reason: HOSPADM

## 2018-10-24 RX ADMIN — PROPOFOL 150 MG: 10 INJECTION, EMULSION INTRAVENOUS at 08:55

## 2018-10-24 RX ADMIN — ROCURONIUM BROMIDE 20 MG: 10 INJECTION INTRAVENOUS at 11:32

## 2018-10-24 RX ADMIN — BUPIVACAINE 20 ML: 13.3 INJECTION, SUSPENSION, LIPOSOMAL INFILTRATION at 08:45

## 2018-10-24 RX ADMIN — FENTANYL CITRATE 50 MCG: 50 INJECTION, SOLUTION INTRAMUSCULAR; INTRAVENOUS at 09:35

## 2018-10-24 RX ADMIN — OXYCODONE HYDROCHLORIDE 5 MG: 5 TABLET ORAL at 15:19

## 2018-10-24 RX ADMIN — ROCURONIUM BROMIDE 50 MG: 10 INJECTION INTRAVENOUS at 08:55

## 2018-10-24 RX ADMIN — FENTANYL CITRATE 50 MCG: 50 INJECTION, SOLUTION INTRAMUSCULAR; INTRAVENOUS at 08:55

## 2018-10-24 RX ADMIN — DEXAMETHASONE SODIUM PHOSPHATE 8 MG: 4 INJECTION, SOLUTION INTRA-ARTICULAR; INTRALESIONAL; INTRAMUSCULAR; INTRAVENOUS; SOFT TISSUE at 09:00

## 2018-10-24 RX ADMIN — SODIUM CHLORIDE, POTASSIUM CHLORIDE, SODIUM LACTATE AND CALCIUM CHLORIDE: 600; 310; 30; 20 INJECTION, SOLUTION INTRAVENOUS at 13:06

## 2018-10-24 RX ADMIN — PHENAZOPYRIDINE HYDROCHLORIDE 200 MG: 200 TABLET, FILM COATED ORAL at 07:10

## 2018-10-24 RX ADMIN — GENTAMICIN SULFATE 120 MG: 40 INJECTION, SOLUTION INTRAMUSCULAR; INTRAVENOUS at 09:00

## 2018-10-24 RX ADMIN — GABAPENTIN 300 MG: 300 CAPSULE ORAL at 07:10

## 2018-10-24 RX ADMIN — ONDANSETRON 4 MG: 2 INJECTION INTRAMUSCULAR; INTRAVENOUS at 15:18

## 2018-10-24 RX ADMIN — HEPARIN SODIUM 5000 UNITS: 10000 INJECTION, SOLUTION INTRAVENOUS; SUBCUTANEOUS at 09:15

## 2018-10-24 RX ADMIN — ROCURONIUM BROMIDE 20 MG: 10 INJECTION INTRAVENOUS at 10:05

## 2018-10-24 RX ADMIN — INDOCYANINE GREEN 4 ML: KIT INTRAVENOUS at 09:28

## 2018-10-24 RX ADMIN — FENTANYL CITRATE 50 MCG: 50 INJECTION, SOLUTION INTRAMUSCULAR; INTRAVENOUS at 12:39

## 2018-10-24 RX ADMIN — MIDAZOLAM 1 MG: 1 INJECTION INTRAMUSCULAR; INTRAVENOUS at 08:49

## 2018-10-24 RX ADMIN — CLINDAMYCIN PHOSPHATE 900 MG: 18 INJECTION, SOLUTION INTRAVENOUS at 09:00

## 2018-10-24 RX ADMIN — ROCURONIUM BROMIDE 20 MG: 10 INJECTION INTRAVENOUS at 09:28

## 2018-10-24 RX ADMIN — ACETAMINOPHEN 975 MG: 325 TABLET, FILM COATED ORAL at 07:09

## 2018-10-24 RX ADMIN — BUPIVACAINE HYDROCHLORIDE 20 ML: 2.5 INJECTION, SOLUTION EPIDURAL; INFILTRATION; INTRACAUDAL; PERINEURAL at 08:45

## 2018-10-24 RX ADMIN — HYDROMORPHONE HYDROCHLORIDE 0.5 MG: 1 INJECTION, SOLUTION INTRAMUSCULAR; INTRAVENOUS; SUBCUTANEOUS at 11:32

## 2018-10-24 RX ADMIN — ACETAMINOPHEN 325 MG: 325 TABLET, FILM COATED ORAL at 15:20

## 2018-10-24 RX ADMIN — Medication 20 MG: at 10:48

## 2018-10-24 RX ADMIN — ONDANSETRON 4 MG: 2 INJECTION INTRAMUSCULAR; INTRAVENOUS at 09:00

## 2018-10-24 RX ADMIN — FENTANYL CITRATE 50 MCG: 50 INJECTION INTRAMUSCULAR; INTRAVENOUS at 08:40

## 2018-10-24 RX ADMIN — Medication 0.4 MG: at 14:06

## 2018-10-24 RX ADMIN — Medication 0.3 MG: at 13:49

## 2018-10-24 RX ADMIN — IBUPROFEN 600 MG: 600 TABLET ORAL at 15:18

## 2018-10-24 RX ADMIN — SUGAMMADEX 200 MG: 100 INJECTION, SOLUTION INTRAVENOUS at 12:33

## 2018-10-24 RX ADMIN — MIDAZOLAM HYDROCHLORIDE 1 MG: 2 INJECTION, SOLUTION INTRAMUSCULAR; INTRAVENOUS at 08:40

## 2018-10-24 RX ADMIN — SODIUM CHLORIDE, POTASSIUM CHLORIDE, SODIUM LACTATE AND CALCIUM CHLORIDE: 600; 310; 30; 20 INJECTION, SOLUTION INTRAVENOUS at 08:49

## 2018-10-24 RX ADMIN — FENTANYL CITRATE 50 MCG: 50 INJECTION, SOLUTION INTRAMUSCULAR; INTRAVENOUS at 09:25

## 2018-10-24 RX ADMIN — FENTANYL CITRATE 50 MCG: 50 INJECTION, SOLUTION INTRAMUSCULAR; INTRAVENOUS at 10:53

## 2018-10-24 ASSESSMENT — LIFESTYLE VARIABLES: TOBACCO_USE: 1

## 2018-10-24 ASSESSMENT — PAIN DESCRIPTION - DESCRIPTORS: DESCRIPTORS: ACHING;DISCOMFORT

## 2018-10-24 ASSESSMENT — ENCOUNTER SYMPTOMS: SEIZURES: 0

## 2018-10-24 NOTE — OR NURSING
Alie Padron CRNA stated in the brief Dr De Souza stated she would like heparin or lovenox after block.  Alie stated she would take care of this after patient in the OR.  She also stated she would start the gentamycin.

## 2018-10-24 NOTE — IP AVS SNAPSHOT
MRN:[id number]                      After Visit Summary   10/24/2018    Meagan Morales    MRN: [id number]           Thank you!     Thank you for choosing [facility] for your care. Our goal is always to provide you with excellent care. Hearing back from our patients is one way we can continue to improve our services. Please take a few minutes to complete the written survey that you may receive in the mail after you visit with us. Thank you!        Patient Information     Date Of Birth          1975        About your hospital stay     You were admitted on:  October 24, 2018 You last received care in the:  Same Day Surgery [facility]    You were discharged on:  October 24, 2018       Who to Call     For medical emergencies, please call 911.  For non-urgent questions about your medical care, please call your primary care provider or clinic, [phone]  For questions related to your surgery, please call your surgery clinic        Attending Provider     Provider Specialty    Linh De Souza MD Oncology       Primary Care Provider Office Phone # Fax #    Aundrea Max [phone]      After Care Instructions     Discharge Instructions       GENERAL POST-OPERATIVE  PATIENT INSTRUCTIONS    ?    FOLLOW-UP:    Call Surgeon if you have:    Temperature greater than 100.4    Persistent nausea and vomiting    Severe uncontrolled pain    Redness, tenderness, or signs of infection (pain, swelling, redness, odor or green/yellow discharge around the site)    Difficulty breathing, headache or visual disturbances    Hives    Persistent dizziness or light-headedness    Extreme fatigue    Any other questions or concerns you may have after discharge    In an emergency, call 911 or go to an Emergency Department at a nearby hospital    WOUND CARE INSTRUCTIONS: Keep a dry clean dressing on the wound if there is drainage. The initial bandage may be removed after 24 hours. Once the wound has quit  draining you may leave it open to air. If clothing rubs against the wound or causes irritation and the wound is not draining you may cover it with a dry dressing during the daytime. Try to keep the wound dry and avoid ointments on the wound unless directed to do so. If the wound becomes bright red and painful or starts to drain infected material that is not clear, please contact your physician immediately.    1. You may shower 48 hrs after surgery    2. No soaking in the tub    DIET: There are no dietary restrictions. You may eat any foods that you can tolerate. It is a good idea to eat a high fiber diet and take in plenty of fluids to prevent constipation. If you do become constipated you may want to take a mild laxative or take ducolax tablets on a daily basis until your bowel habits are regular. Constipation can be very uncomfortable, along with straining, after recent surgery.    ACTIVITY: You are encouraged to cough and deep breath or use your incentive spirometer if you were given one, every 15-30 minutes when awake. This will help prevent respiratory complications and low grade fevers post-operatively if you had a general anesthetic. You may want to hug a pillow when coughing and sneezing to add additional support to the surgical area, if you had abdominal or chest surgery, which will decrease pain during these times.    1. No heavy lifting >20lbs or strenuous exercise for six-eight weeks. No exercise in which you are using core muscles (yoga, pilates, swimming, weight lifting)    2. You may walk as much as you wish. You are encouraged to increase your activity each day after surgery. Stairs are okay.    3. Nothing per vagina for eight weeks. No tampons, no intercourse, no douching. You can expect some light vaginal spotting and discharge for up to six weeks. If bleeding becomes heavy, please contact the office.    MEDICATIONS: Try to take narcotic medications and anti-inflammatory medications, such as  tylenol, ibuprofen, naprosyn, etc., with food. This will minimize stomach upset from the medication. Should you develop nausea and vomiting from the pain medication, or develop a rash, please discontinue the medication and contact your physician. You should not drive, make important decisions, or operate machinery when taking narcotic pain medication.    OTHER: Patients are often constipated after general anesthesia and surgery. The patient should continue to take stool softeners (for example, Senokot-S) for the next six weeks and consume adequate amounts of water. If the patient remains constipated or unable to pass stool, please try one or all of the following measures:    1. Milk of Magnesia 30cc twice a day as needed by mouth    2. Metamucil 2 tablespoons in 12 ounces of fluid    3. Dulcolax oral or suppositories    4. Prunes or prune juice    5. Miralax daily    ?    QUESTIONS: Please feel free to call your physician or the hospital  if you have any questions, and they will be glad to assist you.            Discharge Instructions       Pelvic Rest. No tampons, douching or intercourse for  6  weeks.                  Your next 10 appointments already scheduled     Nov 08, 2018  2:00 PM CST   (Arrive by 1:45 PM)   Return Visit with Linh De Souza MD   [facility] ([facility])    [address] [place] [address]  [address] [place] [id number] [phone]              Further instructions from your care team       [facility]  Same-Day Surgery   Adult Discharge Orders & Instructions     For 24 hours after surgery    1. Get plenty of rest.  A responsible adult must stay with you for at least 24 hours after you leave the hospital.   2. Do not drive or use heavy equipment.  If you have weakness or tingling, don't drive or use heavy equipment until this feeling goes away.  3. Do not drink alcohol.  4. Avoid strenuous or risky  activities.  Ask for help when climbing stairs.   5. You may feel lightheaded.  IF so, sit for a few minutes before standing.  Have someone help you get up.   6. If you have nausea (feel sick to your stomach): Drink only clear liquids such as apple juice, ginger ale, broth or 7-Up.  Rest may also help.  Be sure to drink enough fluids.  Move to a regular diet as you feel able.  7. You may have a slight fever. Call the doctor if your fever is over 100 F (37.7 C) (taken under the tongue) or lasts longer than 24 hours.  8. You may have a dry mouth, a sore throat, muscle aches or trouble sleeping.  These should go away after 24 hours.  9. Do not make important or legal decisions.   Call your doctor for any of the followin.  Signs of infection (fever, growing tenderness at the surgery site, a large amount of drainage or bleeding, severe pain, foul-smelling drainage, redness, swelling).    2. It has been over 8 to 10 hours since surgery and you are still not able to urinate (pass water).    3.  Headache for over 24 hours.      To contact a doctor, call Dr De Souza's office at [phone]-- Gynecology-Oncology or:    x   [phone] and ask for the resident on call for   Oncology/GYN (answered 24 hours a day)  x   Emergency Department:    [facility]: [phone]       (TTY for hearing impaired: [phone])             Tips for taking pain medications  To get the best pain relief possible , remember these points:      Take pain medications as directed, before pain becomes severe      Pain medication can upset your stomach: taking it with food may help      Constipation is a common side effect of pain medication. Drink plenty of  Fluids      Eat foods high in fiber. Take a stool softener  if recommended by your doctor or  Pharmacist.        Do not drink alcohol, drive or operate machinery while taking pain medications.      Ask about other ways to control pain, such as with heat, ice or  "relaxation.                Additional Information     If you use hormonal birth control (such as the pill, patch, ring or implants): You'll need a second form of birth control for 7 days (condoms, a diaphragm or contraceptive foam). While in the hospital, you received a medicine called Bridion. Your normal birth control will not work as well for a week after taking this medicine.          Pending Results     Date and Time Order Name Status Description    10/24/2018 0947 Cytology non gyn In process     10/24/2018 0927 Surgical pathology exam Preliminary             Admission Information     Date & Time Provider Department Dept. Phone    10/24/2018 Linh De Souza MD Same Day Surgery [facility] [phone]      Your Vitals Were     Blood Pressure Temperature Respirations Height Weight Pulse Oximetry    118/71 98.5  F (36.9  C) (Oral) 16 1.645 m (5' 4.75\") 102.3 kg (225 lb 8.5 oz) 97%    BMI (Body Mass Index)                   37.82 kg/m2           [employer] Information     [facility] gives you secure access to your electronic health record. If you see a primary care provider, you can also send messages to your care team and make appointments. If you have questions, please call your primary care clinic.  If you do not have a primary care provider, please call [phone] and they will assist you.        Care EveryWhere ID     This is your Care EveryWhere ID. This could be used by other organizations to access your [facility] medical records  [id number]        Equal Access to Services     ENE WHITAKER : Hadii elisabeth Vásquez, waaxda luqadaha, qaybta kaalmada jeremy, lidia talley. So [facility] [phone].    ATENCIÓN: Si habla español, tiene a maya disposición servicios gratuitos de asistencia lingüística. Llame al [phone].    We comply with applicable federal civil rights laws and [place] laws. We do not discriminate on the basis of race, color, national origin, age, " disability, sex, sexual orientation, or gender identity.               Review of your medicines      START taking        Dose / Directions    ibuprofen 600 MG tablet   Commonly known as:  ADVIL/MOTRIN   Used for:  S/P laparoscopic hysterectomy        Dose:  600 mg   Take 1 tablet (600 mg) by mouth every 6 hours as needed for pain (mild)   Quantity:  30 tablet   Refills:  0       oxyCODONE-acetaminophen 5-325 MG per tablet   Commonly known as:  PERCOCET   Used for:  S/P laparoscopic hysterectomy        Dose:  1-2 tablet   Take 1-2 tablets by mouth every 4 hours as needed for pain (moderate to severe)   Quantity:  15 tablet   Refills:  0       senna-docusate 8.6-50 MG per tablet   Commonly known as:  SENOKOT-S;PERICOLACE   Used for:  S/P laparoscopic hysterectomy        Dose:  1-2 tablet   Take 1-2 tablets by mouth 2 times daily Take while on oral narcotics to prevent or treat constipation.   Quantity:  30 tablet   Refills:  0         CONTINUE these medicines which have NOT CHANGED        Dose / Directions    albuterol 108 (90 Base) MCG/ACT inhaler   Commonly known as:  PROAIR HFA/PROVENTIL HFA/VENTOLIN HFA   Used for:  Endometrial cancer (H)        Dose:  1-2 puff   Inhale 1-2 puffs into the lungs   Refills:  0       buPROPion 300 MG 24 hr tablet   Commonly known as:  WELLBUTRIN XL   Used for:  Endometrial cancer (H)        Dose:  300 mg   Take 300 mg by mouth   Refills:  0       gabapentin 600 MG tablet   Commonly known as:  NEURONTIN   Used for:  Endometrial cancer (H)        Dose:  600 mg   Take 600 mg by mouth   Refills:  0       loratadine 10 MG tablet   Commonly known as:  CLARITIN   Used for:  Endometrial cancer (H)        Dose:  10 mg   Take 10 mg by mouth   Refills:  0       MELATONIN PO   Used for:  Endometrial cancer (H)        Dose:  10 mg   Take 10 mg by mouth nightly as needed   Refills:  0       metoprolol succinate 25 MG 24 hr tablet   Commonly known as:  TOPROL-XL   Used for:  Endometrial cancer (H)  "       Dose:  25 mg   Take 25 mg by mouth   Refills:  0       MULTIvitamin  S Caps   Used for:  Endometrial cancer (H)        Dose:  1 capsule   Take 1 capsule by mouth   Refills:  0       spironolactone 50 MG tablet   Commonly known as:  ALDACTONE   Used for:  Endometrial cancer (H)        Dose:  50 mg   Take 50 mg by mouth   Refills:  0            Where to get your medicines      These medications were sent to [facility] - [place], [place] - [address]  [address] [address]     Phone:  [phone]     ibuprofen 600 MG tablet    senna-docusate 8.6-50 MG per tablet         Some of these will need a paper prescription and others can be bought over the counter. Ask your nurse if you have questions.     Bring a paper prescription for each of these medications     oxyCODONE-acetaminophen 5-325 MG per tablet                Protect others around you: Learn how to safely use, store and throw away your medicines at www.disposemymeds.org.        Information about your nerve block     Today you received a block to numb the nerves near your surgery site.    This is a block using local anesthetic or \"numbing\" medication injected around the nerves to anesthetize or \"numb\" the area supplied by those nerves. This block is injected into the muscle layer near your surgical site. The type of anesthesia (Exparel) your anesthesia team used to numb your abdomen may give you relief for up to 72 hours.     Diet: There are no diet restrictions, but you should drink plenty of fluids, unless you are on a fluid-restricted diet.     Activity: If your surgical site is an arm or leg you should be careful with your affected limb, since it is possible to injure your limb without being aware of it due to the numbing. Until full feeling returns, you should guard against bumping or hitting your limb, and avoid extreme hot or cold temperatures on the skin.    Pain Medication: As the block wears off, the " feeling will return as a tingling or prickly sensation near your surgical site. You will experience more discomfort from your incisions as the feeling returns. You may want to take a pain pill (a narcotic or Tylenol if this was prescribed by your surgeon) when you start to experience mild pain, before the pain becomes more severe. If your pain medications do not control your pain, you should notify your surgeon. If you are taking narcotics for pain management, do not drink alcohol, drive a car, or perform hazardous activities.  If you have questions or concerns you may call your surgeon at the number provided with your discharge instructions.     Call your surgeon if you experience blurry vision, ringing in the ears or metallic taste in your mouth.         Information about OPIOIDS     PRESCRIPTION OPIOIDS: WHAT YOU NEED TO KNOW   We gave you an opioid (narcotic) pain medicine. It is important to manage your pain, but opioids are not always the best choice. You should first try all the other options your care team gave you. Take this medicine for as short a time (and as few doses) as possible.    Some activities can increase your pain, such as bandage changes or therapy sessions. It may help to take your pain medicine 30 to 60 minutes before these activities. Reduce your stress by getting enough sleep, working on hobbies you enjoy and practicing relaxation or meditation. Talk to your care team about ways to manage your pain beyond prescription opioids.    These medicines have risks:    DO NOT drive when on new or higher doses of pain medicine. These medicines can affect your alertness and reaction times, and you could be arrested for driving under the influence (DUI). If you need to use opioids long-term, talk to your care team about driving.    DO NOT operate heavy machinery    DO NOT do any other dangerous activities while taking these medicines.    DO NOT drink any alcohol while taking these medicines.     If the  opioid prescribed includes acetaminophen, DO NOT take with any other medicines that contain acetaminophen. Read all labels carefully. Look for the word  acetaminophen  or  Tylenol.  Ask your pharmacist if you have questions or are unsure.    You can get addicted to pain medicines, especially if you have a history of addiction (chemical, alcohol or substance dependence). Talk to your care team about ways to reduce this risk.    All opioids tend to cause constipation. Drink plenty of water and eat foods that have a lot of fiber, such as fruits, vegetables, prune juice, apple juice and high-fiber cereal. Take a laxative (Miralax, milk of magnesia, Colace, Senna) if you don t move your bowels at least every other day. Other side effects include upset stomach, sleepiness, dizziness, throwing up, tolerance (needing more of the medicine to have the same effect), physical dependence and slowed breathing.    Store your pills in a secure place, locked if possible. We will not replace any lost or stolen medicine. If you don t finish your medicine, please throw away (dispose) as directed by your pharmacist. The [place] Pollution Control Agency has more information about safe disposal: https://www.pca.[facility].[place].us/living-green/managing-unwanted-medications             Medication List: This is a list of all your medications and when to take them. Check marks below indicate your daily home schedule. Keep this list as a reference.      Medications           Morning Afternoon Evening Bedtime As Needed    albuterol 108 (90 Base) MCG/ACT inhaler   Commonly known as:  PROAIR HFA/PROVENTIL HFA/VENTOLIN HFA   Inhale 1-2 puffs into the lungs                                buPROPion 300 MG 24 hr tablet   Commonly known as:  WELLBUTRIN XL   Take 300 mg by mouth                                gabapentin 600 MG tablet   Commonly known as:  NEURONTIN   Take 600 mg by mouth                                ibuprofen 600 MG tablet   Commonly  known as:  ADVIL/MOTRIN   Take 1 tablet (600 mg) by mouth every 6 hours as needed for pain (mild)   Last time this was given:  600 mg on 10/24/2018  3:18 PM                                loratadine 10 MG tablet   Commonly known as:  CLARITIN   Take 10 mg by mouth                                MELATONIN PO   Take 10 mg by mouth nightly as needed                                metoprolol succinate 25 MG 24 hr tablet   Commonly known as:  TOPROL-XL   Take 25 mg by mouth                                MULTIvitamin  S Caps   Take 1 capsule by mouth                                oxyCODONE-acetaminophen 5-325 MG per tablet   Commonly known as:  PERCOCET   Take 1-2 tablets by mouth every 4 hours as needed for pain (moderate to severe)                                senna-docusate 8.6-50 MG per tablet   Commonly known as:  SENOKOT-S;PERICOLACE   Take 1-2 tablets by mouth 2 times daily Take while on oral narcotics to prevent or treat constipation.                                spironolactone 50 MG tablet   Commonly known as:  ALDACTONE   Take 50 mg by mouth

## 2018-10-24 NOTE — ANESTHESIA PROCEDURE NOTES
Peripheral Nerve Block Procedure Note    Staff:     Anesthesiologist:  SANFORD EVANS    Resident/CRNA:  WILFREDO YI    Block performed by resident/CRNA in the presence of a teaching physician    Location: Pre-op  Procedure Start/Stop TImes:      10/24/2018 8:40 AM     10/24/2018 8:47 AM    patient identified, IV checked, site marked, risks and benefits discussed, informed consent, monitors and equipment checked, pre-op evaluation, at physician/surgeon's request and post-op pain management      Correct Patient: Yes      Correct Position: Yes      Correct Site: Yes      Correct Procedure: Yes      Correct Laterality:  Yes    Site Marked:  Yes  Procedure details:     Procedure:  Quadratus lumborum    ASA:  2    Diagnosis:  Post op pain control    Laterality:  Bilateral    Position:  Supine    Sterile Prep: chloraprep, patient draped, mask and sterile gloves      Needle:  Short bevel    Needle gauge:  21    Needle length (inches):  3.13    Ultrasound: Yes      Ultrasound used to identify targeted nerve, plexus, or vascular structure and placed a needle adjacent to it      Permanent Image entered into patiient's record      Abnormal pain on injection: No      Blood Aspirated: No      Paresthesias:  No    Bleeding at site: No      Test dose negative for signs of intravascular injection: Yes      Bolus via:  Needle    Infusion Method:  Single Shot    Complications:  None  Assessment/Narrative:     Injection made incrementally with aspirations every (mL):  5

## 2018-10-24 NOTE — ANESTHESIA POSTPROCEDURE EVALUATION
Anesthesia POST Procedure Evaluation    Patient: Meagan Morales   MRN:     [id number] Gender:   female   Age:    43 year old :      1975        Preoperative Diagnosis: Endometrial Cancer    Procedure(s):  DaVinci Assisted Total Laparoscopic Hysterectomy, Bilateral Salpingo-Oophorectomy,   [place] Pelvic Lymph Node Dissection  Cystoscopy  Excision of Left Vulvar Lesion  Dissection of Pelvic Lymph Nodes   Postop Comments: No value filed.       Anesthesia Type:  General    Reportable Event: NO     PAIN: Uncomplicated   Sign Out status: Comfortable, Well controlled pain     PONV: No PONV   Sign Out status:  No Nausea or Vomiting     Neuro/Psych: Uneventful perioperative course   Sign Out Status: Preoperative baseline; Age appropriate mentation     Airway/Resp.: Uneventful perioperative course   Sign Out Status: Non labored breathing, age appropriate RR; Resp. Status within EXPECTED Parameters     CV: Uneventful perioperative course   Sign Out status: Appropriate BP and perfusion indices; Appropriate HR/Rhythm     Disposition:   Sign Out in:  PACU  Disposition:  Phase II; Home  Recovery Course: Uneventful  Follow-Up: Not required           Last Anesthesia Record Vitals:  CRNA VITALS  10/24/2018 1231 - 10/24/2018 1331      10/24/2018             NIBP: 133/71    Pulse: 80    SpO2: 98 %    Resp Rate (observed): 16          Last PACU/Preop Vitals:  Vitals:    10/24/18 1424 10/24/18 1442 10/24/18 1500   BP: 104/65 111/68 118/71   Resp: 16 16 16   Temp: 36.9  C (98.5  F)     SpO2: 97%           Electronically Signed By: Karina Santiago MD, 2018, 3:11 PM

## 2018-10-24 NOTE — BRIEF OP NOTE
Brief Operative Note   Name: Meagan Morales  MRN: [id number]  : 1975  Date of Surgery: 10/24/2018    Pre-operative Diagnosis: grade 1 endometrial cancer   Post-operative Diagnosis: Same  Procedure(s): Removal of vulvar nodule. [place] lymph node dissection, robotic assisted laparoscopic total hysterectomy, bilateral salpingoophorectomy, cystoscopy; bilateral pelvic and paraaortic lymph node dissection.     Surgeon: Linh De Souza MD   Assistants: Bienvenido Morrell MD Fellow, Tiffanie Sinha MD PGY4, Rosaline Burden MS4    Anesthesia: GETA, TAPS block  EBL: 100 mL   Urine Output: 200 mL clear urine   Fluids: 1000 mL crystalloid    Specimens:   Vulvar nodule.   Bilateral sentinel lymph nodes   Uterus, cervix, bilateral fallopian tubes and ovaries  Bilateral pelvic and paraaortic lymph nodes    Complications: None apparent.  Indications: Ms Morales is a 43 year old G0 who has had about 1 year of abnormal uterine bleeding. Initial attempts to control this with an IUD failed, so EMB was performed, showing grade 1 endometrial adenocarcinoma. Hysterectomy with BSO and staging as needed was recommended.   Findings: On bimanual exam, small mobile uterus without adnexal masses. At laparoscopic entry no trauma to underlying structures. 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 Uterus had significant adhesions posteriorly as well as between both adnexae, the pelvic side wall, and the posterior cul de sac. No small bowel adhesions. The ovaries, especially on the right, were adherent to the deep pelvic sidewall requiring significant lysis of adhesions. Both ureters were identified in the retroperitoneum prior to hysterectomy. On cystoscopy bilateral ureteral jets were seen, no evidence of any bladder lesions or injury.

## 2018-10-24 NOTE — OR NURSING
Paged GYN/Onc to se the pt. She voided 150 ml urine with no bladder scannable residual. GYN ONC discharged the pt

## 2018-10-24 NOTE — DISCHARGE INSTRUCTIONS
[facility]  Same-Day Surgery   Adult Discharge Orders & Instructions     For 24 hours after surgery    1. Get plenty of rest.  A responsible adult must stay with you for at least 24 hours after you leave the hospital.   2. Do not drive or use heavy equipment.  If you have weakness or tingling, don't drive or use heavy equipment until this feeling goes away.  3. Do not drink alcohol.  4. Avoid strenuous or risky activities.  Ask for help when climbing stairs.   5. You may feel lightheaded.  IF so, sit for a few minutes before standing.  Have someone help you get up.   6. If you have nausea (feel sick to your stomach): Drink only clear liquids such as apple juice, ginger ale, broth or 7-Up.  Rest may also help.  Be sure to drink enough fluids.  Move to a regular diet as you feel able.  7. You may have a slight fever. Call the doctor if your fever is over 100 F (37.7 C) (taken under the tongue) or lasts longer than 24 hours.  8. You may have a dry mouth, a sore throat, muscle aches or trouble sleeping.  These should go away after 24 hours.  9. Do not make important or legal decisions.   Call your doctor for any of the followin.  Signs of infection (fever, growing tenderness at the surgery site, a large amount of drainage or bleeding, severe pain, foul-smelling drainage, redness, swelling).    2. It has been over 8 to 10 hours since surgery and you are still not able to urinate (pass water).    3.  Headache for over 24 hours.      To contact a doctor, call Dr De Souza's office at [phone]-- Gynecology-Oncology or:    x   [phone] and ask for the resident on call for   Oncology/GYN (answered 24 hours a day)  x   Emergency Department:    [facility]: [phone]       (TTY for hearing impaired: [phone])             Tips for taking pain medications  To get the best pain relief possible , remember these points:      Take pain medications as directed, before pain  becomes severe      Pain medication can upset your stomach: taking it with food may help      Constipation is a common side effect of pain medication. Drink plenty of  Fluids      Eat foods high in fiber. Take a stool softener  if recommended by your doctor or  Pharmacist.        Do not drink alcohol, drive or operate machinery while taking pain medications.      Ask about other ways to control pain, such as with heat, ice or relaxation.

## 2018-10-24 NOTE — IP AVS SNAPSHOT
Same Day Surgery [address] [address]    Phone:  [phone]                                       After Visit Summary   10/24/2018    Meagan Morales    MRN: [id number]           After Visit Summary Signature Page     I have received my discharge instructions, and my questions have been answered. I have discussed any challenges I see with this plan with the nurse or doctor.    ..........................................................................................................................................  Patient/Patient Representative Signature      ..........................................................................................................................................  Patient Representative Print Name and Relationship to Patient    ..................................................               ................................................  Date                                   Time    ..........................................................................................................................................  Reviewed by Signature/Title    ...................................................              ..............................................  Date                                               Time          22EPIC Rev 08/18

## 2018-10-24 NOTE — ANESTHESIA CARE TRANSFER NOTE
Patient: Meagan Morales    Procedure(s):  DaVinci Assisted Total Laparoscopic Hysterectomy, Bilateral Salpingo-Oophorectomy,   [place] Pelvic Lymph Node Dissection  Cystoscopy  Excision of Left Vulvar Lesion  Dissection of Pelvic Lymph Nodes    Diagnosis: Endometrial Cancer   Diagnosis Additional Information: No value filed.    Anesthesia Type:   No value filed.     Note:  Airway :Face Mask  Patient transferred to:PACU  Comments: Pt denies pain or nausea. Report given to RN. Handoff Report: Identifed the Patient, Identified the Reponsible Provider, Reviewed the pertinent medical history, Discussed the surgical course, Reviewed Intra-OP anesthesia mangement and issues during anesthesia, Set expectations for post-procedure period and Allowed opportunity for questions and acknowledgement of understanding      Vitals: (Last set prior to Anesthesia Care Transfer)    CRNA VITALS  10/24/2018 1231 - 10/24/2018 1305      10/24/2018             NIBP: 133/71    Pulse: 80    SpO2: 98 %    Resp Rate (observed): 16                Electronically Signed By: CORTES Orozco CRNA  October 24, 2018  1:05 PM

## 2018-10-24 NOTE — ANESTHESIA PREPROCEDURE EVALUATION
)Anesthesia Pre-Procedure Evaluation    Patient: Meagan Morales   MRN:     [id number] Gender:   female   Age:    43 year old :      1975        Preoperative Diagnosis: Endometrial Cancer    Procedure(s):  DaVinci Assisted Total Laparoscopic Hysterectomy, Bilateral Salpingo-Oophorectomy,   Possible Open, Possible Lymph Node Dissection  Cystoscopy       Past Medical History:   Diagnosis Date     Chickenpox     as a child     Depression      Endometrial cancer (H)      Hypertension     since her 30's     Infertility, female      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)      PCOS (polycystic ovarian syndrome)      STD (female)     in her 20's     No past surgical history on file.    Anesthesia Evaluation     . Pt has not had prior anesthetic            ROS/MED HX    ENT/Pulmonary:     (+)tobacco use, Past use , . .    Neurologic:  - neg neurologic ROS    (-) seizures and CVA   Cardiovascular:     (+) hypertension----. : . . . :. .       METS/Exercise Tolerance:     Hematologic:         Musculoskeletal:         GI/Hepatic:  - neg GI/hepatic ROS      (-) liver disease   Renal/Genitourinary:  - ROS Renal section negative    (-) renal disease   Endo: Comment: PCOS        Psychiatric: Comment: H/o methamphetamine abuse, none for several years        Infectious Disease:         Malignancy:   (+) Malignancy History of Other  Other CA endometrial cancer status post         Other:                         PHYSICAL EXAM:   Mental Status/Neuro: A/A/O   Airway: Mallampati: I  Mouth/Opening: Full  TM distance: > 6 cm  Neck ROM: Full   Respiratory: Auscultation: CTAB     Resp. Rate: Normal     Resp. Effort: Normal      CV: Rhythm: Regular  Rate: Age appropriate  Heart: Normal Sounds   Comments:      Dental: Normal                Lab Results   Component Value Date    WBC 6.5 10/04/2018    HGB 14.2 10/04/2018    HCT 42.7 10/04/2018     10/04/2018     10/04/2018    POTASSIUM 4.2 10/04/2018    CHLORIDE 106 10/04/2018     "CO2 26 10/04/2018    BUN 15 10/04/2018    CR 0.90 10/04/2018     (H) 10/04/2018    SIDDHARTHA 8.7 10/04/2018    HCG Negative 10/24/2018       Preop Vitals  BP Readings from Last 3 Encounters:   10/24/18 120/56   10/04/18 123/71   02/20/13 134/69    Pulse Readings from Last 3 Encounters:   10/04/18 70   02/20/13 87   05/29/12 91      Resp Readings from Last 3 Encounters:   10/24/18 20    SpO2 Readings from Last 3 Encounters:   10/24/18 98%   10/04/18 99%   02/20/13 99%      Temp Readings from Last 1 Encounters:   10/24/18 36.6  C (97.8  F) (Oral)    Ht Readings from Last 1 Encounters:   10/24/18 1.645 m (5' 4.75\")      Wt Readings from Last 1 Encounters:   10/24/18 102.3 kg (225 lb 8.5 oz)    Estimated body mass index is 37.82 kg/(m^2) as calculated from the following:    Height as of this encounter: 1.645 m (5' 4.75\").    Weight as of this encounter: 102.3 kg (225 lb 8.5 oz).     LDA:       Assessment:   ASA SCORE: 2    NPO Status: > 6 hours since completed Solid Foods; > 2 hours since completed Clear Liquids   Documentation: H&P complete; Preop Testing complete; Consents complete   Proceeding: Proceed without further delay  Tobacco Use:  NO Active use of Tobacco/UNKNOWN Tobacco use status     Plan:   Anes. Type:  General   Pre-Induction: Midazolam IV; Acetaminophen PO   Induction:  IV (Standard)   Airway: Oral ETT   Access/Monitoring: PIV; 2nd PIV   Maintenance: Balanced   Emergence: Procedure Site   Logistics: Observation/Admission     Postop Pain/Sedation Strategy:  Standard-Options: Opioids PRN     PONV Management:  Adult Risk Factors: Female, Non-Smoker, Postop Opioids  Prevention: Ondansetron; Dexamethasone     CONSENT: Direct conversation   Plan and risks discussed with: Patient                            Karina Santiago MD  "

## 2018-10-25 ENCOUNTER — CARE COORDINATION (OUTPATIENT)
Dept: ONCOLOGY | Facility: CLINIC | Age: 43
End: 2018-10-25

## 2018-10-29 ENCOUNTER — TELEPHONE (OUTPATIENT)
Dept: ANESTHESIOLOGY | Facility: CLINIC | Age: 43
End: 2018-10-29

## 2018-10-29 NOTE — TELEPHONE ENCOUNTER
"[facility] PHONE CALL NOTE   Patient POD #5 s/p Davinci-assisted total laparoscopic hysterectomy, BSO, LN disection, excision left vulvar lesion, cystoscopy on 10/24/2018 . Preoperatively bilateral quadratus lumborum single shot nerve block injections performed with bupivacaine 0.25% 20 mL + long acting bupivacaine (Exparel) 1.3% 20 mL administration.   Interval History: Pt called today to report right thigh numbness that may have been present since surgery, but that she really noticed last night due to her restless leg syndrome \"acting up.\" This morning she has noticed persistent numbness affecting anterior, medial and lateral right thigh, so she decided to call.  Patient reports adequate postop pain control.  Denies LE weakness, paresthesia, and is ambulating without difficulty.        ASSESSMENT/PLAN  Meagan Morales is a 43 year old female POD #5 s/p Davinci-assisted total laparoscopic hysterectomy, BSO, LN disection, excision left vulvar lesion, cystoscopy on 10/24/2018 and single- shot injection bilateralquadratus lumborum (QL) nerve block single shot injections for postop pain control. Pt discharged from hospital on day of surgery 10/24/2018.  Currently reporting persistent right anterolateral and medial thigh numbness, not affecting posterior thigh.  Numbness not affecting ability to weight-bear/ambulate per patient report. Most likely side effect related to QL blockade with long-acting local anesthetic L2-3 distribution.    Recommendation:  - Patient instructed to continue to monitor numbness and not to hesitate contacting [facility] immediately if any progression of numbness or if numbness affects her leg strength.    - patient has phone number to call [facility] and should not hesitate to call if questions or concerns  - discussed plan with attending anesthesiologist (LATHA Jang MD)  For questions or concerns please contact the [facility] ([facility]).  [facility] Contact Info  Call " [phone], to page enter the ID# [id number], then leave call back number.  Someone will call you back.  If your call is not returned, call [phone] and ask the hospital  to contact the backup staff MD for CORTES Villafana CNP  October 29, 2018  11:22 AM

## 2018-10-31 ENCOUNTER — TELEPHONE (OUTPATIENT)
Dept: ONCOLOGY | Facility: CLINIC | Age: 43
End: 2018-10-31

## 2018-10-31 DIAGNOSIS — G47.9 SLEEP DISORDER: Primary | ICD-10-CM

## 2018-10-31 RX ORDER — TRAZODONE HYDROCHLORIDE 50 MG/1
50 TABLET, FILM COATED ORAL
Qty: 14 TABLET | Refills: 0 | Status: SHIPPED | OUTPATIENT
Start: 2018-10-31 | End: 2018-12-06

## 2018-11-01 ENCOUNTER — TELEPHONE (OUTPATIENT)
Dept: ONCOLOGY | Facility: CLINIC | Age: 43
End: 2018-11-01

## 2018-11-01 LAB — COPATH REPORT: NORMAL

## 2018-11-02 ENCOUNTER — ONCOLOGY VISIT (OUTPATIENT)
Dept: ONCOLOGY | Facility: CLINIC | Age: 43
End: 2018-11-02
Attending: NURSE PRACTITIONER
Payer: COMMERCIAL

## 2018-11-02 VITALS
SYSTOLIC BLOOD PRESSURE: 118 MMHG | HEIGHT: 65 IN | HEART RATE: 86 BPM | OXYGEN SATURATION: 98 % | DIASTOLIC BLOOD PRESSURE: 78 MMHG | RESPIRATION RATE: 16 BRPM | WEIGHT: 226 LBS | TEMPERATURE: 97.7 F | BODY MASS INDEX: 37.65 KG/M2

## 2018-11-02 DIAGNOSIS — N30.00 ACUTE CYSTITIS WITHOUT HEMATURIA: ICD-10-CM

## 2018-11-02 DIAGNOSIS — R10.32 ABDOMINAL PAIN, LEFT LOWER QUADRANT: ICD-10-CM

## 2018-11-02 DIAGNOSIS — N93.9 VAGINAL BLEEDING: ICD-10-CM

## 2018-11-02 DIAGNOSIS — R10.31 RLQ ABDOMINAL PAIN: ICD-10-CM

## 2018-11-02 DIAGNOSIS — Z90.710 S/P LAPAROSCOPIC HYSTERECTOMY: Primary | ICD-10-CM

## 2018-11-02 LAB
ALBUMIN UR-MCNC: 30 MG/DL
APPEARANCE UR: ABNORMAL
BACTERIA #/AREA URNS HPF: ABNORMAL /HPF
BILIRUB UR QL STRIP: NEGATIVE
CAOX CRY #/AREA URNS HPF: ABNORMAL /HPF
COLOR UR AUTO: YELLOW
GLUCOSE UR STRIP-MCNC: NEGATIVE MG/DL
HGB UR QL STRIP: NEGATIVE
HYALINE CASTS #/AREA URNS LPF: 1 /LPF (ref 0–2)
KETONES UR STRIP-MCNC: NEGATIVE MG/DL
LEUKOCYTE ESTERASE UR QL STRIP: ABNORMAL
MUCOUS THREADS #/AREA URNS LPF: PRESENT /LPF
NITRATE UR QL: POSITIVE
PH UR STRIP: 5 PH (ref 5–7)
RBC #/AREA URNS AUTO: 2 /HPF (ref 0–2)
SOURCE: ABNORMAL
SP GR UR STRIP: 1.03 (ref 1–1.03)
SQUAMOUS #/AREA URNS AUTO: 1 /HPF (ref 0–1)
UROBILINOGEN UR STRIP-MCNC: 0 MG/DL (ref 0–2)
WBC #/AREA URNS AUTO: 15 /HPF (ref 0–5)

## 2018-11-02 PROCEDURE — 87186 SC STD MICRODIL/AGAR DIL: CPT | Performed by: NURSE PRACTITIONER

## 2018-11-02 PROCEDURE — G0463 HOSPITAL OUTPT CLINIC VISIT: HCPCS | Mod: ZF

## 2018-11-02 PROCEDURE — 81001 URINALYSIS AUTO W/SCOPE: CPT | Performed by: NURSE PRACTITIONER

## 2018-11-02 PROCEDURE — 87088 URINE BACTERIA CULTURE: CPT | Performed by: NURSE PRACTITIONER

## 2018-11-02 PROCEDURE — 87086 URINE CULTURE/COLONY COUNT: CPT | Performed by: NURSE PRACTITIONER

## 2018-11-02 PROCEDURE — 99214 OFFICE O/P EST MOD 30 MIN: CPT | Mod: ZP | Performed by: NURSE PRACTITIONER

## 2018-11-02 RX ORDER — OXYCODONE AND ACETAMINOPHEN 5; 325 MG/1; MG/1
1 TABLET ORAL EVERY 6 HOURS PRN
Qty: 5 TABLET | Refills: 0 | Status: SHIPPED | OUTPATIENT
Start: 2018-11-02 | End: 2018-11-07

## 2018-11-02 RX ORDER — SULFAMETHOXAZOLE/TRIMETHOPRIM 800-160 MG
1 TABLET ORAL 2 TIMES DAILY
Qty: 14 TABLET | Refills: 0 | Status: SHIPPED | OUTPATIENT
Start: 2018-11-02 | End: 2018-12-06

## 2018-11-02 ASSESSMENT — PAIN SCALES - GENERAL: PAINLEVEL: NO PAIN (0)

## 2018-11-02 NOTE — NURSING NOTE
"Oncology Rooming Note    November 2, 2018 2:45 PM   Meagan Morales is a 43 year old female who presents for:    Chief Complaint   Patient presents with     Oncology Clinic Visit     Return Endometrial Ca     Initial Vitals: /78  Pulse 86  Temp 97.7  F (36.5  C) (Oral)  Resp 16  Ht 1.645 m (5' 4.75\")  Wt 102.5 kg (226 lb)  SpO2 98%  Breastfeeding? No  BMI 37.9 kg/m2 Estimated body mass index is 37.9 kg/(m^2) as calculated from the following:    Height as of this encounter: 1.645 m (5' 4.75\").    Weight as of this encounter: 102.5 kg (226 lb). Body surface area is 2.16 meters squared.  No Pain (0) Comment: Data Unavailable   No LMP recorded. Patient has had a hysterectomy.  Allergies reviewed: Yes  Medications reviewed: Yes    Medications: Medication refills not needed today.  Pharmacy name entered into [facility]: [facility] [address] [place] [address] [address] AT [address]    Clinical concerns: sharp pain below belly button     6 minutes for nursing intake (face to face time)     Dionne Schultz CMA              "

## 2018-11-02 NOTE — MR AVS SNAPSHOT
After Visit Summary   11/2/2018    Meagan Morales    MRN: [id number]           Patient Information     Date Of Birth          1975        Visit Information        Provider Department      11/2/2018 2:40 PM Shoshana David APRN CNP [facility]        Today's Diagnoses     S/P laparoscopic hysterectomy    -  1    RLQ abdominal pain        Abdominal pain, left lower quadrant        Acute cystitis without hematuria        Vaginal bleeding           Follow-ups after your visit        Your next 10 appointments already scheduled     Nov 08, 2018  2:00 PM CST   (Arrive by 1:45 PM)   Return Visit with Linh De Souza MD   [facility] ([facility])    [address]  [address]  [place] [address] [phone]              Who to contact     If you have questions or need follow up information about today's clinic visit or your schedule please contact [facility] directly at [phone].  Normal or non-critical lab and imaging results will be communicated to you by Sapling Learning[employer], letter or phone within 4 business days after the clinic has received the results. If you do not hear from us within 7 days, please contact the clinic through Stubmatict or phone. If you have a critical or abnormal lab result, we will notify you by phone as soon as possible.  Submit refill requests through iFollo or call your pharmacy and they will forward the refill request to us. Please allow 3 business days for your refill to be completed.          Additional Information About Your Visit        MyChart Information     iFollo gives you secure access to your electronic health record. If you see a primary care provider, you can also send messages to your care team and make appointments. If you have questions, please call your primary care clinic.  If you do not have a primary care provider, please call [phone] and they will assist  "you.        Care EveryWhere ID     This is your Care EveryWhere ID. This could be used by other organizations to access your [facility] medical records  [id number]        Your Vitals Were     Pulse Temperature Respirations Height Pulse Oximetry Breastfeeding?    86 97.7  F (36.5  C) (Oral) 16 1.645 m (5' 4.75\") 98% No    BMI (Body Mass Index)                   37.9 kg/m2            Blood Pressure from Last 3 Encounters:   11/02/18 118/78   10/24/18 118/64   10/04/18 123/71    Weight from Last 3 Encounters:   11/02/18 102.5 kg (226 lb)   10/24/18 102.3 kg (225 lb 8.5 oz)   10/04/18 100.8 kg (222 lb 4.8 oz)              We Performed the Following     UA with Microscopic reflex to Culture     Urine Culture Aerobic Bacterial          Today's Medication Changes          These changes are accurate as of 11/2/18  4:46 PM.  If you have any questions, ask your nurse or doctor.               Start taking these medicines.        Dose/Directions    sulfamethoxazole-trimethoprim 800-160 MG per tablet   Commonly known as:  BACTRIM DS/SEPTRA DS   Used for:  Acute cystitis without hematuria   Started by:  Shoshana David APRN CNP        Dose:  1 tablet   Take 1 tablet by mouth 2 times daily   Quantity:  14 tablet   Refills:  0         These medicines have changed or have updated prescriptions.        Dose/Directions    oxyCODONE-acetaminophen 5-325 MG per tablet   Commonly known as:  PERCOCET   This may have changed:    - how much to take  - when to take this   Used for:  S/P laparoscopic hysterectomy   Changed by:  Shoshana David APRN CNP        Dose:  1 tablet   Take 1 tablet by mouth every 6 hours as needed for pain (moderate to severe)   Quantity:  5 tablet   Refills:  0            Where to get your medicines      These medications [facility] to [id number] - [facility] [place] [address] AT [address]  [address], [facility] [address]     Phone:  [phone] "     sulfamethoxazole-trimethoprim 800-160 MG per tablet         Some of these will need a paper prescription and others can be bought over the counter.  Ask your nurse if you have questions.     Bring a paper prescription for each of these medications     oxyCODONE-acetaminophen 5-325 MG per tablet               Information about OPIOIDS     PRESCRIPTION OPIOIDS: WHAT YOU NEED TO KNOW   We gave you an opioid (narcotic) pain medicine. It is important to manage your pain, but opioids are not always the best choice. You should first try all the other options your care team gave you. Take this medicine for as short a time (and as few doses) as possible.    Some activities can increase your pain, such as bandage changes or therapy sessions. It may help to take your pain medicine 30 to 60 minutes before these activities. Reduce your stress by getting enough sleep, working on hobbies you enjoy and practicing relaxation or meditation. Talk to your care team about ways to manage your pain beyond prescription opioids.    These medicines have risks:    DO NOT drive when on new or higher doses of pain medicine. These medicines can affect your alertness and reaction times, and you could be arrested for driving under the influence (DUI). If you need to use opioids long-term, talk to your care team about driving.    DO NOT operate heavy machinery    DO NOT do any other dangerous activities while taking these medicines.    DO NOT drink any alcohol while taking these medicines.     If the opioid prescribed includes acetaminophen, DO NOT take with any other medicines that contain acetaminophen. Read all labels carefully. Look for the word  acetaminophen  or  Tylenol.  Ask your pharmacist if you have questions or are unsure.    You can get addicted to pain medicines, especially if you have a history of addiction (chemical, alcohol or substance dependence). Talk to your care team about ways to reduce this risk.    All opioids tend to  cause constipation. Drink plenty of water and eat foods that have a lot of fiber, such as fruits, vegetables, prune juice, apple juice and high-fiber cereal. Take a laxative (Miralax, milk of magnesia, Colace, Senna) if you don t move your bowels at least every other day. Other side effects include upset stomach, sleepiness, dizziness, throwing up, tolerance (needing more of the medicine to have the same effect), physical dependence and slowed breathing.    Store your pills in a secure place, locked if possible. We will not replace any lost or stolen medicine. If you don t finish your medicine, please throw away (dispose) as directed by your pharmacist. The [place] Pollution Control Agency has more information about safe disposal: [email or web address]/living-[email or web address]-unwanted-medications         Primary Care Provider Office Phone # Fax #    Aundrea Max [phone]       [facility] [address] [address]        Equal Access to Services     ENE WHITAKER : Hadii aad ku hadasho Soomaali, waaxda luqadaha, qaybta kaalmada adeegyada, lidia santillan . So [facility] [phone].    ATENCIÓN: Si habla español, tiene a maya disposición servicios gratuitos de asistencia lingüística. [facility] [phone].    We comply with applicable federal civil rights laws and [place] laws. We do not discriminate on the basis of race, color, national origin, age, disability, sex, sexual orientation, or gender identity.            Thank you!     Thank you for choosing [facility]  for your care. Our goal is always to provide you with excellent care. Hearing back from our patients is one way we can continue to improve our services. Please take a few minutes to complete the written survey that you may receive in the mail after your visit with us. Thank you!             Your Updated Medication List - Protect others around you: Learn how to safely  use, store and throw away your medicines at www.disposemymeds.org.          This list is accurate as of 11/2/18  4:46 PM.  Always use your most recent med list.                   Brand Name Dispense Instructions for use Diagnosis    albuterol 108 (90 Base) MCG/ACT inhaler    PROAIR HFA/PROVENTIL HFA/VENTOLIN HFA     Inhale 1-2 puffs into the lungs    Endometrial cancer (H)       buPROPion 300 MG 24 hr tablet    WELLBUTRIN XL     Take 300 mg by mouth    Endometrial cancer (H)       gabapentin 600 MG tablet    NEURONTIN     Take 600 mg by mouth    Endometrial cancer (H)       ibuprofen 600 MG tablet    ADVIL/MOTRIN    30 tablet    Take 1 tablet (600 mg) by mouth every 6 hours as needed for pain (mild)    S/P laparoscopic hysterectomy       loratadine 10 MG tablet    CLARITIN     Take 10 mg by mouth    Endometrial cancer (H)       MELATONIN PO      Take 10 mg by mouth nightly as needed    Endometrial cancer (H)       metoprolol succinate 25 MG 24 hr tablet    TOPROL-XL     Take 25 mg by mouth    Endometrial cancer (H)       MULTIvitamin  S Caps      Take 1 capsule by mouth    Endometrial cancer (H)       oxyCODONE-acetaminophen 5-325 MG per tablet    PERCOCET    5 tablet    Take 1 tablet by mouth every 6 hours as needed for pain (moderate to severe)    S/P laparoscopic hysterectomy       senna-docusate 8.6-50 MG per tablet    SENOKOT-S;PERICOLACE    30 tablet    Take 1-2 tablets by mouth 2 times daily Take while on oral narcotics to prevent or treat constipation.    S/P laparoscopic hysterectomy       spironolactone 50 MG tablet    ALDACTONE     Take 50 mg by mouth    Endometrial cancer (H)       sulfamethoxazole-trimethoprim 800-160 MG per tablet    BACTRIM DS/SEPTRA DS    14 tablet    Take 1 tablet by mouth 2 times daily    Acute cystitis without hematuria       traZODone 50 MG tablet    DESYREL    14 tablet    Take 1 tablet (50 mg) by mouth nightly as needed for sleep    Sleep disorder

## 2018-11-02 NOTE — PROGRESS NOTES
Gynecologic Oncology Follow-Up Visit    RE: Meagan Morales  MRN: [id number]  : 1975  Date of Visit: 2018    CC: Meagan Morales is a 43 year old  female with endometrial cancer s/p robotic assisted TLH-BSO, sentinel lymph node dissection, bilateral pelvic and para-aortic lymph node dissection, and cystoscopy on 10/24/18. She presents today for an acute visit regarding abdominal pain.    HPI: Meagan comes to the clinic accompanied by her mother Claribel. She is POD #9 and reports she has been following post-op restrictions. She had been doing well until  two days ago- she was walking up the stairs and stopped to twist at the waist suddenly- she felt a severe pain inferior to her umbilicus that she describes as a tearing, ripping, burning sensation. It resolved, but since then she has had discomfort in this region with position changes, worse with sitting. The pain was severe enough that she did take a tablet of Percocet once two days ago and then once again yesterday- has otherwise been scheduling ibuprofen and Tylenol. She has been watching her position changes carefully and feels this has been helpful as well. Her pain has been improving daily since it began two days ago, has not taken any Percocet today. She also notes that over the past two days she has noticed a drop or two of red blood in the toilet with voiding and has felt a strange vaginal sensation and pressure. Notes mild pink vaginal drainage only with wiping, not needing a pad. Denies fevers, chills, dysuria, flank pain, vomiting, large gushes of vaginal fluid, problems with her bowels, a bulge in her abdomen, concerns about her incision sites, shortness of breath, or chest pain.    Oncology History:  Meagan originally presented to clinic due to continuous vaginal bleeding, very light. She thought it was just her menses being continuous. US was done which found thickened endometrial lining. IUD was placed. Second US was done and IUD did not affect  endometrial lining and IUD was malpositioned. Since then, she has noted continuous spotting. IUD was removed. Also of note, ovarian cysts were noted on second US. Subsequent endometrial biopsy was done which revealed grade 1 endometrial cancer. She has always had irregular menses and has never been able to get pregnant. Patient's  first and only pap smear on 10/10/2017. Had mammogram in 20's, will order additional mammogram for baseline. No history of prior colonoscopy. Started Wellbutrin this last year, mood has improved. Prior cigarette smoker, quit 2 months ago and switched to vape. Started again smoking cigarettes again but has not had one for 10 days. History of drug use, has been sober for 11 months. History of meth and alcohol abuse. No history of prior abdominal surgeries. No history of heart disease, lung disease or diabetes. Has never been able to get pregnant and dose not desire children.     6/29/18: pelvic US   IMPRESSION:  Peripheral follicular arrangement high within the ovaries suggesting polycystic ovarian syndrome.   Slightly thickened heterogeneous endometrium raising a concern of potential hyperplastic change.  9/17/18: pelvic US   1. Uterus is not normal in appearance. The endometrium appears thickened   and vascular, and the IUD appears to be malpositioned.  Clinical   correlation is recommended.    2. Bilateral complex ovarian cysts are noted.  Follow up ultrasound in 4-6   weeks is recommended, consider saline infusion sonohysterogram for further   imaging of the endometrium.      9/17/18: endometrial biopsy  ENDOMETRIUM, BIOPSY:  1. FIGO Grade 1 (well differentiated) endometrioid carcinoma of      the endometrium  2. DNA mismatch repair enzyme immunohistochemistry studies:     All intact (see Amendment note and synoptic reporting)    10/24/18: Robotic assisted TLH-BSO, sentinel lymph node dissection, BPPALND, cystoscopy        Past Medical History:   Diagnosis Date     Chickenpox     as a  child     Depression      Endometrial cancer (H)      Hypertension     since her 30's     Infertility, female      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)      PCOS (polycystic ovarian syndrome)      STD (female)     in her 20's     Past Surgical History:   Procedure Laterality Date     CYSTOSCOPY N/A 10/24/2018    Procedure: Cystoscopy;  Surgeon: Linh De Souza MD;  Location: [facility] OR     DAVINCI HYSTERECTOMY TOTAL, BILATERAL SALPINGO-OOPHORECTOMY, NODE DISSECTION, COMBINED N/A 10/24/2018    Procedure: DaVinci Assisted Total Laparoscopic Hysterectomy, Bilateral Salpingo-Oophorectomy, lymph node dissection;  Surgeon: Linh De Souza MD;  Location: [facility] OR     EXCISE LESION VULVA Left 10/24/2018    Procedure: Excision of Left Vulvar Lesion;  Surgeon: Linh De Souza MD;  Location[facility]     No family history on file.  Social History     Social History     Marital status: Single     Spouse name: N/A     Number of children: N/A     Years of education: N/A     Social History Main Topics     Smoking status: Former Smoker     Packs/day: 0.50     Types: Cigarettes     Smokeless tobacco: Never Used      Comment: uses vape pen about 3-4 times daily     Alcohol use No      Comment: 11 months sober      Drug use: No      Comment: 11 months sober. lives at sober housing     Sexual activity: Not on file     Other Topics Concern     Not on file     Social History Narrative       Current Outpatient Prescriptions   Medication     albuterol (PROAIR HFA/PROVENTIL HFA/VENTOLIN HFA) 108 (90 Base) MCG/ACT inhaler     buPROPion (WELLBUTRIN XL) 300 MG 24 hr tablet     gabapentin (NEURONTIN) 600 MG tablet     ibuprofen (ADVIL/MOTRIN) 600 MG tablet     loratadine (CLARITIN) 10 MG tablet     MELATONIN PO     metoprolol succinate (TOPROL-XL) 25 MG 24 hr tablet     Multiple Vitamin (MULTIVITAMIN  S) CAPS     oxyCODONE-acetaminophen (PERCOCET) 5-325 MG per tablet     senna-docusate (SENOKOT-S;PERICOLACE) 8.6-50 MG per tablet      "spironolactone (ALDACTONE) 50 MG tablet     traZODone (DESYREL) 50 MG tablet     No current facility-administered medications for this visit.      Allergies   Allergen Reactions     Amoxicillin      Penicillin G        ROS  10 point ROS negative other than the symptoms noted above in the HPI.      Physical Exam:    /78  Pulse 86  Temp 97.7  F (36.5  C) (Oral)  Resp 16  Ht 1.645 m (5' 4.75\")  Wt 102.5 kg (226 lb)  SpO2 98%  Breastfeeding? No  BMI 37.9 kg/m2    CONSTITUTIONAL: Alert non-toxic appearing female in no acute distress  HEAD: Normocephalic, atraumatic  RESPIRATORY: Lungs clear to auscultation, respiratory effort unlabored  CV: Regular rate and rhythm, S1S2, no clicks, murmurs, rubs, or gallops; bilateral lower extremities without edema, dorsalis pedis pulses 2+ bilaterally  GASTROINTESTINAL: Normoactive bowel sounds x4 quadrants, abdomen soft, non-distended, and non-tender to palpation without masses or organomegaly; unable to reproduce her pain on exam; laparoscopy sites CDI without erythema, edema, warmth, or drainage; no palpable or visible hernia with valsalva  GENITOURINARY: No CVA tenderness. External genitalia and urethral meatus pink without lesions, masses, or excoriation. Gentle speculum exam reveals vagina pink with scant amount of serosanguineous fluid in vaginal vault, vaginal cuff appears intact. On gentle digital exam vaginal cuff is intact to palpation.   LYMPHATIC: Inguinal lymph nodes without lymphadenopathy  NEUROLOGIC: Grossly intact, normal gait  MUSCULOSKELETAL: Moves all extremities, no obvious muscle wasting  SKIN: Appropriate color for race, warm and dry, no rashes or lesions to unclothed skin  PSYCHIATRIC: Pleasant and interactive, affect bright, makes appropriate eye contact, thought process linear      Data:  UA pending    Assessment/Plan:  1.) Acute post-operative abdominal pain in RLQ and LLQ: Unclear etiology, potentially muscular strain vs hernia vs UTI. UA " pending. Reviewed case with Dr. Eastman- recommends CT of the abdomen and pelvis to assess for herniation if her pain persists or fails to improve. She was fitted for an abdominal binder and after placement she reported that her discomfort had improved. She is to monitor her symptoms over the weekend- given that her pain has been improving, she would like to hold off on imaging at this time which I think is reasonable. If her pain worsens or if she develops fevers, chills, vomiting, worsening pain, worsening bleeding, or concerns she should be seen over the weekend. She will call on Monday 11/5/18 to update our team on how she is feeling. To wear abdominal binder. Percocet 1 tab q6h PRN, #5, refilled- she is sober and would like to avoid taking further Percocet if needed, however, she only has half a tab left for the weekend. [place]  reviewed. Continue to follow post-operative restrictions. To follow up via phone call on 11/5 and for post op with Dr. De Souza on 11/8.  2.) Patient verbalized understanding of and agreement with plan.    CORTES Arteaga, VERA-C  Division of Gynecologic Oncology  [facility]  Pager: [phone]    ADDENDUM:  UA resulted, concerning for infection with positive nitrites, LE, and WBC. Culture pending. Given recent surgery, will treat with Bactrim DS 1 tab BID x7 days. Called Meagan to discuss medication and side effects. We also discussed s/sxs pyelonephritis and when to seek further care.    CORTES Arteaga, VERA-C  Gynecologic Oncology  [facility]  Pager: [phone]

## 2018-11-02 NOTE — LETTER
2018       RE: Meagan Morales  [address] [facility] [address]     Dear Colleague,    Thank you for referring your patient, Meagan Morales, to the [facility]. Please see a copy of my visit note below.    Gynecologic Oncology Follow-Up Visit    RE: Meagan Morales  MRN: [id number]  : 1975  Date of Visit: 2018    CC: Meagan Morales is a 43 year old  female with endometrial cancer s/p robotic assisted TLH-BSO, sentinel lymph node dissection, bilateral pelvic and para-aortic lymph node dissection, and cystoscopy on 10/24/18. She presents today for an acute visit regarding abdominal pain.    HPI: Meagan comes to the clinic accompanied by her mother Claribel. She is POD #9 and reports she has been following post-op restrictions. She had been doing well until  two days ago- she was walking up the stairs and stopped to twist at the waist suddenly- she felt a severe pain inferior to her umbilicus that she describes as a tearing, ripping, burning sensation. It resolved, but since then she has had discomfort in this region with position changes, worse with sitting. The pain was severe enough that she did take a tablet of Percocet once two days ago and then once again yesterday- has otherwise been scheduling ibuprofen and Tylenol. She has been watching her position changes carefully and feels this has been helpful as well. Her pain has been improving daily since it began two days ago, has not taken any Percocet today. She also notes that over the past two days she has noticed a drop or two of red blood in the toilet with voiding and has felt a strange vaginal sensation and pressure. Notes mild pink vaginal drainage only with wiping, not needing a pad. Denies fevers, chills, dysuria, flank pain, vomiting, large gushes of vaginal fluid, problems with her bowels, a bulge in her abdomen, concerns about her incision sites, shortness of breath, or chest pain.    Oncology History:  Meagan originally  presented to clinic due to continuous vaginal bleeding, very light. She thought it was just her menses being continuous. US was done which found thickened endometrial lining. IUD was placed. Second US was done and IUD did not affect endometrial lining and IUD was malpositioned. Since then, she has noted continuous spotting. IUD was removed. Also of note, ovarian cysts were noted on second US. Subsequent endometrial biopsy was done which revealed grade 1 endometrial cancer. She has always had irregular menses and has never been able to get pregnant. Patient's  first and only pap smear on 10/10/2017. Had mammogram in 20's, will order additional mammogram for baseline. No history of prior colonoscopy. Started Wellbutrin this last year, mood has improved. Prior cigarette smoker, quit 2 months ago and switched to vape. Started again smoking cigarettes again but has not had one for 10 days. History of drug use, has been sober for 11 months. History of meth and alcohol abuse. No history of prior abdominal surgeries. No history of heart disease, lung disease or diabetes. Has never been able to get pregnant and dose not desire children.     6/29/18: pelvic US   IMPRESSION:  Peripheral follicular arrangement high within the ovaries suggesting polycystic ovarian syndrome.   Slightly thickened heterogeneous endometrium raising a concern of potential hyperplastic change.  9/17/18: pelvic US   1. Uterus is not normal in appearance. The endometrium appears thickened   and vascular, and the IUD appears to be malpositioned.  Clinical   correlation is recommended.    2. Bilateral complex ovarian cysts are noted.  Follow up ultrasound in 4-6   weeks is recommended, consider saline infusion sonohysterogram for further   imaging of the endometrium.      9/17/18: endometrial biopsy  ENDOMETRIUM, BIOPSY:  1. FIGO Grade 1 (well differentiated) endometrioid carcinoma of      the endometrium  2. DNA mismatch repair enzyme  immunohistochemistry studies:     All intact (see Amendment note and synoptic reporting)    10/24/18: Robotic assisted TLH-BSO, sentinel lymph node dissection, BPPALND, cystoscopy        Past Medical History:   Diagnosis Date     Chickenpox     as a child     Depression      Endometrial cancer (H)      Hypertension     since her 30's     Infertility, female      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)      PCOS (polycystic ovarian syndrome)      STD (female)     in her 20's     Past Surgical History:   Procedure Laterality Date     CYSTOSCOPY N/A 10/24/2018    Procedure: Cystoscopy;  Surgeon: Linh De Souza MD;  Location: [facility] OR     DAVINCI HYSTERECTOMY TOTAL, BILATERAL SALPINGO-OOPHORECTOMY, NODE DISSECTION, COMBINED N/A 10/24/2018    Procedure: DaVinci Assisted Total Laparoscopic Hysterectomy, Bilateral Salpingo-Oophorectomy, lymph node dissection;  Surgeon: Linh De Souza MD;  Location: [facility] OR     EXCISE LESION VULVA Left 10/24/2018    Procedure: Excision of Left Vulvar Lesion;  Surgeon: Linh De Souza MD;  Location: [facility] OR     No family history on file.  Social History     Social History     Marital status: Single     Spouse name: N/A     Number of children: N/A     Years of education: N/A     Social History Main Topics     Smoking status: Former Smoker     Packs/day: 0.50     Types: Cigarettes     Smokeless tobacco: Never Used      Comment: uses vape pen about 3-4 times daily     Alcohol use No      Comment: 11 months sober      Drug use: No      Comment: 11 months sober. lives at sober housing     Sexual activity: Not on file     Other Topics Concern     Not on file     Social History Narrative       Current Outpatient Prescriptions   Medication     albuterol (PROAIR HFA/PROVENTIL HFA/VENTOLIN HFA) 108 (90 Base) MCG/ACT inhaler     buPROPion (WELLBUTRIN XL) 300 MG 24 hr tablet     gabapentin (NEURONTIN) 600 MG tablet     ibuprofen (ADVIL/MOTRIN) 600 MG tablet     loratadine (CLARITIN) 10 MG  "tablet     MELATONIN PO     metoprolol succinate (TOPROL-XL) 25 MG 24 hr tablet     Multiple Vitamin (MULTIVITAMIN  S) CAPS     oxyCODONE-acetaminophen (PERCOCET) 5-325 MG per tablet     senna-docusate (SENOKOT-S;PERICOLACE) 8.6-50 MG per tablet     spironolactone (ALDACTONE) 50 MG tablet     traZODone (DESYREL) 50 MG tablet     No current facility-administered medications for this visit.      Allergies   Allergen Reactions     Amoxicillin      Penicillin G        ROS  10 point ROS negative other than the symptoms noted above in the HPI.      Physical Exam:    /78  Pulse 86  Temp 97.7  F (36.5  C) (Oral)  Resp 16  Ht 1.645 m (5' 4.75\")  Wt 102.5 kg (226 lb)  SpO2 98%  Breastfeeding? No  BMI 37.9 kg/m2    CONSTITUTIONAL: Alert non-toxic appearing female in no acute distress  HEAD: Normocephalic, atraumatic  RESPIRATORY: Lungs clear to auscultation, respiratory effort unlabored  CV: Regular rate and rhythm, S1S2, no clicks, murmurs, rubs, or gallops; bilateral lower extremities without edema, dorsalis pedis pulses 2+ bilaterally  GASTROINTESTINAL: Normoactive bowel sounds x4 quadrants, abdomen soft, non-distended, and non-tender to palpation without masses or organomegaly; unable to reproduce her pain on exam; laparoscopy sites CDI without erythema, edema, warmth, or drainage; no palpable or visible hernia with valsalva  GENITOURINARY: No CVA tenderness. External genitalia and urethral meatus pink without lesions, masses, or excoriation. Gentle speculum exam reveals vagina pink with scant amount of serosanguineous fluid in vaginal vault, vaginal cuff appears intact. On gentle digital exam vaginal cuff is intact to palpation.   LYMPHATIC: Inguinal lymph nodes without lymphadenopathy  NEUROLOGIC: Grossly intact, normal gait  MUSCULOSKELETAL: Moves all extremities, no obvious muscle wasting  SKIN: Appropriate color for race, warm and dry, no rashes or lesions to unclothed skin  PSYCHIATRIC: Pleasant and " interactive, affect bright, makes appropriate eye contact, thought process linear      Data:  UA pending    Assessment/Plan:  1.) Acute post-operative abdominal pain in RLQ and LLQ: Unclear etiology, potentially muscular strain vs hernia vs UTI. UA pending. Reviewed case with Dr. Eastman- recommends CT of the abdomen and pelvis to assess for herniation if her pain persists or fails to improve. She was fitted for an abdominal binder and after placement she reported that her discomfort had improved. She is to monitor her symptoms over the weekend- given that her pain has been improving, she would like to hold off on imaging at this time which I think is reasonable. If her pain worsens or if she develops fevers, chills, vomiting, worsening pain, worsening bleeding, or concerns she should be seen over the weekend. She will call on Monday 11/5/18 to update our team on how she is feeling. To wear abdominal binder. Percocet 1 tab q6h PRN, #5, refilled- she is sober and would like to avoid taking further Percocet if needed, however, she only has half a tab left for the weekend. MN  reviewed. Continue to follow post-operative restrictions. To follow up via phone call on 11/5 and for post op with Dr. De Souza on 11/8.  2.) Patient verbalized understanding of and agreement with plan.    CORTES Arteaga, VERA-ABBEY  Division of Gynecologic Oncology  [facility]  Pager: [phone]    ADDENDUM:  UA resulted, concerning for infection with positive nitrites, LE, and WBC. Culture pending. Given recent surgery, will treat with Bactrim DS 1 tab BID x7 days. Called eMagan to discuss medication and side effects. We also discussed s/sxs pyelonephritis and when to seek further care.    CORTES Arteaga, VERA-C  Gynecologic Oncology  [facility]  Pager: [phone]

## 2018-11-03 LAB
BACTERIA SPEC CULT: ABNORMAL
Lab: ABNORMAL
SPECIMEN SOURCE: ABNORMAL

## 2018-11-05 ENCOUNTER — TELEPHONE (OUTPATIENT)
Dept: ONCOLOGY | Facility: CLINIC | Age: 43
End: 2018-11-05

## 2018-11-05 DIAGNOSIS — N93.9 VAGINAL BLEEDING: ICD-10-CM

## 2018-11-05 DIAGNOSIS — R10.32 ABDOMINAL PAIN, LEFT LOWER QUADRANT: ICD-10-CM

## 2018-11-05 DIAGNOSIS — R10.31 ABDOMINAL PAIN, RIGHT LOWER QUADRANT: ICD-10-CM

## 2018-11-05 DIAGNOSIS — C54.1 ENDOMETRIAL CANCER (H): Primary | ICD-10-CM

## 2018-11-05 LAB — COPATH REPORT: NORMAL

## 2018-11-05 NOTE — TELEPHONE ENCOUNTER
Called to check in on Meagan- she started her abx for UTI. Had been feeling better the past two days, but then started to be more active since her pain improved, now having more abdominal pain and notes episodes of vaginal bleeding (no clots, not saturating pads). Given recent robotic TLH-BSO, will obtain CT AP to rule out hematoma/herniation/fluid collection/dehiscence. Meagan verbalized understanding of and agreement with plan.  CORTES Arteaga, FNP-C  Gynecologic Oncology  [facility]  Pager: [phone]

## 2018-11-05 NOTE — OP NOTE
Gynecologic Oncology Operative Report    2018  Meagan Morales  [id number]  Name: Meagan Morales  MRN: [id number]  : 1975  Date of Surgery: 10/24/2018     Pre-operative Diagnosis: grade 1 endometrial cancer   Post-operative Diagnosis: Same, 5.7 cm tumor  Procedure(s): Removal of vulvar nodule. [place] lymph node dissection, robotic assisted laparoscopic total hysterectomy, bilateral salpingoophorectomy, cystoscopy; bilateral pelvic and paraaortic lymph node dissection.      Surgeon: Linh De Souza MD   Assistants: Bienvenido Morrell MD Fellow, Tiffanie Sinha MD PGY4, Rosaline Burden MS4     Anesthesia: GETA, TAPS block  EBL: 100 mL   Urine Output: 200 mL clear urine   Fluids: 1000 mL crystalloid     Specimens:   Vulvar nodule.   Bilateral sentinel lymph nodes   Uterus, cervix, bilateral fallopian tubes and ovaries  Bilateral pelvic and paraaortic lymph nodes     Complications: None apparent.  Indications: Ms Morales is a 43 year old G0 who has had about 1 year of abnormal uterine bleeding. Initial attempts to control this with an IUD failed, so EMB was performed, showing grade 1 endometrial adenocarcinoma. Hysterectomy with BSO and staging as needed was recommended.   Findings: On bimanual exam, small mobile uterus without adnexal masses. At laparoscopic entry no trauma to underlying structures. 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 Uterus had significant adhesions posteriorly as well as between both adnexae, the pelvic side wall, and the posterior cul de sac. No small bowel adhesions. The ovaries, especially on the right, were adherent to the deep pelvic sidewall requiring significant lysis of adhesions. Both ureters were identified in the retroperitoneum prior to hysterectomy. On cystoscopy bilateral ureteral jets were seen, no evidence of any bladder lesions or injury. Right vulva with small raised lesion on labia majora that patient wanted  removed to increase in size.     PROCEDURE:   Consent was reviewed with the patient in the preoperative setting and confirmed. She received prophylactic antibiotics. In addition, she received heparin 5000 U for venous thrombosis prevention. The patient was transferred to the operating room and placed in dorsal supine position. General anesthetic was obtained in the usual manner without noted difficulties. The patient was then positioned onto Curtis stirrups and an exam under anesthesia was performed with findings as described above.  The patient was prepped and draped for the above-mentioned procedure and Rush catheter was then placed under sterile techniques.  Timeout was called at which point the patient's name, procedure and operative site was confirmed by the operative team. Initially, the instruments for the vaginal manipulator were positioned, a speculum was placed in the vagina. The anterior lip of the cervix was grasped, the uterus sounded to  9 cm and cervix was serially dilated.  ICG 4 ml total was injected into the 3 oclock and 9 oclock cervix position. The VCare vaginal manipulator was then inserted and the vaginal balloon was then inserted to obtain intraabdominal pressure.     Attention was then turned to the upper abdomen. Initially, a stab incision was made into the umbilicus. The abdomen was insufflated with an opening pressure of 5 mmHg. The Veress needle was removed. Sites for the da Giselle laparoscopic ports were demarcated, total of 5, initiating with this midline incision above the umbilicus and positioned in a semicircular fashion around the upper abdomen. The initial midline 8 mm port was inserted without difficulties, the left 2 lateral 8 mm da Giselle ports and the right 8 mm assistant port were all under direct visualization without any injury noted to underlying structures. At this point, the patient was placed into steep Trendelenburg. The pelvis was inspected as well as the upper abdomen  with findings as noted above. Pelvic washings were obtained and sent for cytology. Bowels were packed up into the upper abdomen with gentle traction. At this point, the da Giselle was docked onto the ports, all appropriate instruments were inserted.     Attention was then turned to the pelvis. Sharp dissection was required to identify the adenxa bilaterally due to dense adhesions between the posterior uterus and the pelvis and the adnexa to the pelvic sidewalls. Meticulous dissection was performed to free the ovaries and FT's up due to the dense adhesions. The round ligaments were identified bilaterally. They were divided using cautery and the retroperitoneal space was entered by dissecting the peritoneum lateral and cephalad to the IP ligaments. The ureters were identified and a defect was made underneath the IP ligament and above the ureter. The IP ligament was serially cauterized and then divided sharply.  The spaces were then opened and the sentinel lymph nodes identified along the external iliac artery and these were sent as separate specimens. The rest of the peritoneum was skeletonized down to the level of the uterine arteries which were then cauterized and divided.  The bladder flap was created using cautery down to just below the level of the uterine manipulator cup. The rest of the parametrial tissue was dissected off of the uterus serially cauterized and divided sharply. A colpotomy was made on the anterior side and carried around to the posterior side of the uterine manipulator cup using the electrocautery. The uterus was removed through the vagina and sent for frozen section pathology which revealed  A 5.7 cm tumor, grade 1 therefore the decision was made to proceed with a full pelvic and paraaortic lymph node dissection.      Given the frozen section results, we proceeded with a lymph node disesction.  The peritoneum over the common iliac artery was identified on the right hand side. It was tented up. An  incision was made over this to just above the bifurcation of the aorta. The ureter was moved laterally and the lilliam tissue overlying the IVC was elevated. The lilliam tissue was dissected away from the artery and blunt dissection was used to clear the lilliam tissue from the vein. Vascular bridges were broken using cautery. The most caudad portion of the lilliam packet was elevated towards cephalad, and vascular and lymphatic bridges were broken using cautery. The dissection was carried further cephalad to approximately 2 cm above the bifurcation of the IVC. The tissue at this point was thinned bluntly and then bridges broken using cautery. Attention was then turned to the para-aortic lymph node dissection on the left hand side. The ureter was identified and swept laterally. The BOGDAN was also identified and preserved. The lilliam tissue was dissected away from the ureter bluntly and away from the left common iliac and aorta bluntly and with cautery. The lilliam tissue was lifted towards the anterior abdominal wall and vascular and lymphatic pedicles were broken using cautery. The nodes were placed in separate EndoCatch bags and placed in the abdomen for retrieval later.     Next, we performed the right pelvic lymph node dissection. At this point, peritoneum overlying the right external iliac artery was incised. The borders of the pelvic lymph node dissection included cephalad bifurcation of the common iliac artery, caudad to the circumflex iliac vein, medially the ureter and lateral to genitofemoral nerve. Lymph nodes overlying the iliac vessels towards the external iliac artery and vein, were elevated and resected and additional lymph nodes within the obturator space were removed above the level of the nerve without injury to the nerve. Hemostasis was obtained. Attention was turned towards the left. In a similar manner, the left pelvic lymph node dissection was performed with the borders extending from the common iliac  artery to the deep circumflex iliac vein, medially the ureter and laterally genitofemoral nerve. We were able to retract the ureter again medially out of harm's way.  In addition, we removed the lymph nodes within the obturator space above the level of the nerve without injury to the nerve.  The right and left pelvic lymph nodes were placed in separate EndoCatch bags and marked for retrieval later.    The vaginal cuff was then closed using 2 overlapping v lock sutures of 2-0 vicryl.   Next, we performed cystoscopy using 30-degree angled scope and noted normal bladder mucosa, no evidence of foreign body and brisk efflux from the bilateral ureteral orifices. At this point, the vaginal balloon was removed from the vagina. We closed the fascia on the 12 mm incisions using the Rodolfo-Bryant device. All laparoscopic instruments and ports were now removed and CO2 allowed to escape from the abdomen. Skin was reapproximated with 4-0 Vicryl sutures and Dermabond applied.     Sponge, lap and needle counts were reported as correct x2 and instrument count was correct x1.     Linh De Souza MD    Department of Ob/Gyn and Women's Health  Division of Gynecologic Oncology  [facility]  [phone]

## 2018-11-06 ENCOUNTER — RADIANT APPOINTMENT (OUTPATIENT)
Dept: CT IMAGING | Facility: CLINIC | Age: 43
End: 2018-11-06
Attending: NURSE PRACTITIONER
Payer: COMMERCIAL

## 2018-11-06 DIAGNOSIS — N93.9 VAGINAL BLEEDING: ICD-10-CM

## 2018-11-06 DIAGNOSIS — R10.32 ABDOMINAL PAIN, LEFT LOWER QUADRANT: ICD-10-CM

## 2018-11-06 DIAGNOSIS — R10.31 ABDOMINAL PAIN, RIGHT LOWER QUADRANT: ICD-10-CM

## 2018-11-06 DIAGNOSIS — C54.1 ENDOMETRIAL CANCER (H): ICD-10-CM

## 2018-11-06 RX ORDER — IOPAMIDOL 755 MG/ML
135 INJECTION, SOLUTION INTRAVASCULAR ONCE
Status: COMPLETED | OUTPATIENT
Start: 2018-11-06 | End: 2018-11-06

## 2018-11-06 RX ADMIN — IOPAMIDOL 135 ML: 755 INJECTION, SOLUTION INTRAVASCULAR at 14:23

## 2018-11-06 NOTE — DISCHARGE INSTRUCTIONS

## 2018-11-06 NOTE — TELEPHONE ENCOUNTER
Pt left message on voice mail  Update on condition per pt  Feeling better on Friday and Saturday but pain was bad again on Sunday though not as bad as it was last Wednesday and Thursday.    Pt wonders on plan

## 2018-11-07 ENCOUNTER — RADIANT APPOINTMENT (OUTPATIENT)
Dept: CT IMAGING | Facility: CLINIC | Age: 43
End: 2018-11-07
Attending: OBSTETRICS & GYNECOLOGY
Payer: COMMERCIAL

## 2018-11-07 ENCOUNTER — TELEPHONE (OUTPATIENT)
Dept: ONCOLOGY | Facility: CLINIC | Age: 43
End: 2018-11-07

## 2018-11-07 DIAGNOSIS — R93.89 ABNORMAL CAT SCAN: ICD-10-CM

## 2018-11-07 DIAGNOSIS — C54.1 ENDOMETRIAL CANCER (H): ICD-10-CM

## 2018-11-07 DIAGNOSIS — Z90.710 S/P LAPAROSCOPIC HYSTERECTOMY: ICD-10-CM

## 2018-11-07 DIAGNOSIS — R93.89 ABNORMAL CAT SCAN: Primary | ICD-10-CM

## 2018-11-07 LAB
ALBUMIN SERPL-MCNC: 2.6 G/DL (ref 3.4–5)
ALP SERPL-CCNC: 59 U/L (ref 40–150)
ALT SERPL W P-5'-P-CCNC: 40 U/L (ref 0–50)
ANION GAP SERPL CALCULATED.3IONS-SCNC: 7 MMOL/L (ref 3–14)
AST SERPL W P-5'-P-CCNC: 20 U/L (ref 0–45)
BASOPHILS # BLD AUTO: 0 10E9/L (ref 0–0.2)
BASOPHILS NFR BLD AUTO: 0.4 %
BILIRUB SERPL-MCNC: 0.1 MG/DL (ref 0.2–1.3)
BUN SERPL-MCNC: 13 MG/DL (ref 7–30)
CALCIUM SERPL-MCNC: 8.3 MG/DL (ref 8.5–10.1)
CHLORIDE SERPL-SCNC: 103 MMOL/L (ref 94–109)
CO2 SERPL-SCNC: 27 MMOL/L (ref 20–32)
CREAT SERPL-MCNC: 1.07 MG/DL (ref 0.52–1.04)
DIFFERENTIAL METHOD BLD: ABNORMAL
EOSINOPHIL # BLD AUTO: 0.2 10E9/L (ref 0–0.7)
EOSINOPHIL NFR BLD AUTO: 4.2 %
ERYTHROCYTE [DISTWIDTH] IN BLOOD BY AUTOMATED COUNT: 11.5 % (ref 10–15)
GFR SERPL CREATININE-BSD FRML MDRD: 56 ML/MIN/1.7M2
GLUCOSE SERPL-MCNC: 70 MG/DL (ref 70–99)
HCT VFR BLD AUTO: 34.1 % (ref 35–47)
HGB BLD-MCNC: 11.1 G/DL (ref 11.7–15.7)
IMM GRANULOCYTES # BLD: 0 10E9/L (ref 0–0.4)
IMM GRANULOCYTES NFR BLD: 0.2 %
LYMPHOCYTES # BLD AUTO: 1.7 10E9/L (ref 0.8–5.3)
LYMPHOCYTES NFR BLD AUTO: 31.9 %
MCH RBC QN AUTO: 29.4 PG (ref 26.5–33)
MCHC RBC AUTO-ENTMCNC: 32.6 G/DL (ref 31.5–36.5)
MCV RBC AUTO: 90 FL (ref 78–100)
MONOCYTES # BLD AUTO: 0.5 10E9/L (ref 0–1.3)
MONOCYTES NFR BLD AUTO: 8.7 %
NEUTROPHILS # BLD AUTO: 2.9 10E9/L (ref 1.6–8.3)
NEUTROPHILS NFR BLD AUTO: 54.6 %
NRBC # BLD AUTO: 0 10*3/UL
NRBC BLD AUTO-RTO: 0 /100
PLATELET # BLD AUTO: 340 10E9/L (ref 150–450)
POTASSIUM SERPL-SCNC: 4 MMOL/L (ref 3.4–5.3)
PROT SERPL-MCNC: 6.4 G/DL (ref 6.8–8.8)
RBC # BLD AUTO: 3.78 10E12/L (ref 3.8–5.2)
SODIUM SERPL-SCNC: 136 MMOL/L (ref 133–144)
WBC # BLD AUTO: 5.3 10E9/L (ref 4–11)

## 2018-11-07 RX ORDER — OXYCODONE AND ACETAMINOPHEN 5; 325 MG/1; MG/1
1 TABLET ORAL EVERY 6 HOURS PRN
Qty: 5 TABLET | Refills: 0 | Status: SHIPPED | OUTPATIENT
Start: 2018-11-07 | End: 2018-12-06

## 2018-11-07 RX ORDER — IOPAMIDOL 755 MG/ML
135 INJECTION, SOLUTION INTRAVASCULAR ONCE
Status: COMPLETED | OUTPATIENT
Start: 2018-11-07 | End: 2018-11-07

## 2018-11-07 RX ADMIN — IOPAMIDOL 135 ML: 755 INJECTION, SOLUTION INTRAVASCULAR at 15:39

## 2018-11-07 NOTE — PROGRESS NOTES
Consult Notes on Referred Patient    Date: 2018     Dr. 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    Today: Pain below umbilicus, not constant but pain has been present since last week. Eating well but full fast, drinking fine. Urinating frequently. Had UCx with >100,000 col E. Coli. Having mild bleeding with wiping. Did not want to take pain meds, given percocet for pain with movement. Out of ibuprofen 600 mg tablets. Right leg numbness on thigh    Brief History:  Meagan originally presented to clinic due to continuous vaginal bleeding, very light. She thought it was just her menses being continuous. US was done which found thickened endometrial lining. IUD was placed. Second US was done and IUD did not affect endometrial lining and IUD was malpositioned. Since then, she has noted continuous spotting. IUD was removed. Also of note, ovarian cysts were noted on second US. Subsequent endometrial biopsy was done which revealed grade 1 endometrial cancer. She has always had irregular menses and has never been able to get pregnant. Patient's  first and only pap smear on 10/10/2017. Had mammogram in 20's, will order additional mammogram for baseline. No history of prior colonoscopy. Started Wellbutrin this last year, mood has improved. Prior cigarette smoker, quit 2 months ago and switched to vape. Started again smoking cigarettes again but has not had one for 10 days. History of drug use, has been sober for 11 months. History of meth and alcohol abuse. No history of prior abdominal surgeries. No history of heart disease, lung disease or diabetes. Has never been able  to get pregnant and dose not desire children.    6/29/18: pelvic US   IMPRESSION:  Peripheral follicular arrangement high within the ovaries suggesting polycystic ovarian syndrome.   Slightly thickened heterogeneous endometrium raising a concern of potential hyperplastic change.  9/17/18: pelvic US   1. Uterus is not normal in appearance. The endometrium appears thickened   and vascular, and the IUD appears to be malpositioned.  Clinical   correlation is recommended.    2. Bilateral complex ovarian cysts are noted.  Follow up ultrasound in 4-6   weeks is recommended, consider saline infusion sonohysterogram for further   imaging of the endometrium.     9/17/18: endometrial biopsy  ENDOMETRIUM, BIOPSY:  1. FIGO Grade 1 (well differentiated) endometrioid carcinoma of      the endometrium  2. DNA mismatch repair enzyme immunohistochemistry studies:     All intact (see Amendment note and synoptic reporting)      10/24/2018: DaVinci Assisted Total Laparoscopic Hysterectomy, Bilateral Salpingo-Oophorectomy, lymph node dissection, Excision of Left Vulvar Lesion   Pelvic Washing:   Rare atypical cells present.   Patient Name: REJI DEJESUS   MR#: [id number]   Specimen #: [id number]   Collected: 10/24/2018   Received: 10/24/2018   Reported: 11/1/2018 14:10   Ordering Phy(s): HEMANTH SAMANIEGO     For improved result formatting, select 'View Enhanced Report Format' under    Linked Documents section.     Original Report Follows Addendum    IMMUNOPATHOLOGY-Addendum   Status: Signed Out   Date Ordered:11/5/2018   Date Reported:11/5/2018 13:43   Signed Out By: Marjorie Whitman M.D., PhD, [facility]     INTERPRETATION:   D. UTERUS, CERVIX, BILATERAL OVARIES AND FALLOPIAN TUBES:   - This addendum reports on MMR IHC status. The original diagnosis remains   unchanged     Report Name: Endometrium Biomarkers        Status: Submitted   Part(s) Involved:   D: Uterus, cervix, bilateral ovaries and fallopian tubes     Synoptic Report:      TEST(S) PERFORMED     Test(s) Performed:         - Mismatch Repair       Immunohistochemistry (IHC) Testing for Mismatch Repair (MMR) Proteins:           - MLH1           - MSH2           - MSH6           - PMS2           - Background nonneoplastic tissue / internal control with intact   nuclear           expression         MLH1 Expression:             - Intact nuclear expression         MSH2 Expression:             - Intact nuclear expression         MSH6 Expression:             - Intact nuclear expression         PMS2 Expression:             - Intact nuclear expression       Immunohistochemistry (IHC) Interpretation:           - No loss of nuclear expression of MMR proteins: low probability   of           microsatellite instability-high (MSI-H)     COMMENTS    Testing was performed on block D9     2017 Dia AJCC 8th Edition CAP Annual Release     ORIGINAL REPORT:     SPECIMEN(S):   A: Left vulvar lesion   B: Left pelvic sentinel lymph node   C: Right pelvic sentinel lymph node   D: Uterus, cervix, bilateral ovaries and fallopian tubes   E: Portion of left ovary   F: Para-aortic lymph nodes   G: Left pelvic lymph nodes   H: Right pelvic lymph nodes     FINAL DIAGNOSIS:   A. LEFT VULVAR LESION, BIOPSY:   - Intradermal nevus     B. LEFT PELVIC SENTINEL LYMPH NODE, EXCISION:   - One reactive lymph node, negative for malignancy (0/1)     C. RIGHT PELVIC SENTINEL LYMPH NODE, EXCISION:   - Six reactive lymph nodes, negative for malignancy (0/6)     D. UTERUS, CERVIX, BILATERAL OVARIES AND FALLOPIAN TUBES, HYSTERECTOMY   WITH BILATERAL SALPINGO-OOPHORECTOMY:   - Endometrial endometrioid adenocarcinoma, FIGO grade 1   - Atypical endometrial hyperplasia   - Myometrium with no significant histologic abnormality   - Chronic cervicitis with microglandular hyperplasia   - Uterine serosal fibrous adhesions   - Ovaries with cortical hyperplasia   - Metastatic endometrial adenocarcinoma to the left fallopian tube   - Right  fallopian tube with no significant histologic abnormality     E. PORTION OF LEFT OVARY, EXCISION:   - Benign cortical inclusion cysts     F. PARA-AORTIC LYMPH NODES, EXCISION:   - Four reactive lymph nodes, negative for malignancy (0/4)   - Benign glandular inclusions consistent with endosalpingiosis     G. LEFT PELVIC LYMPH NODES, EXCISION:   - Eight reactive lymph nodes, negative for malignancy (0/8)     H. RIGHT PELVIC LYMPH NODES, EXCISION:   - Nine reactive lymph nodes, negative for malignancy (0/9)   - Benign glandular inclusions consistent with endosalpingiosis     COMMENT:   The final diagnosis confirms the interpretation provided intraoperatively.   The tumor seen in the left fallopian tube involves the wall (invading the   mucosa and submucosa) and appears free floating in the lumen.   Tumor Site:         - Endometrium - Anterior and posterior     Histologic Type:         - Endometrioid carcinoma, NOS     Histologic Grade:         - FIGO grade 1     Tumor Size: 5.7 Centimeters (cm)     Tumor Extent       Myometrial Invasion:           - Not identified       Adenomyosis:           - Not identified       Uterine Serosa Involvement:           - Not identified       Lower Uterine Segment Involvement:           - Not identified       Cervical Stromal Involvement:           - Not identified       Other Tissue / Organ Involvement:           - Left fallopian tube       Peritoneal Ascitic Fluid:           - Results pending     Accessory Findings       Lymphovascular Invasion:           - Not identified    11/6/2018  IMPRESSION:   1. Surgical changes of hysterectomy, bilateral salpingo-oophorectomy,  and pelvic lymph node dissection with extensive fat stranding and  fluid within the low pelvis extending superiorly along the iliac  vasculature, right greater than left, potentially within normal  postsurgical limits, however, slightly more than expected for  postsurgical change.   1a. The ureters are not well  evaluated on this single phase exam and  there is no hydronephrosis or hydroureter, however, the amount of  fluid in the pelvis does raise the question of ureteral injury. If  there is clinical concern, consider obtaining a CT urogram.  1b. An area of irregular rim enhancement in the low pelvis may  represent a normal postsurgical vaginal cuff, though, again, this is  slightly more conspicuous than usual and dehiscence or a developing  abscess may have a similar appearance.  2. 6.8 x 4.1 x 5.6 cm right pelvic sidewall seroma versus lymphocele.  3. Soft tissue nodular thickening anteriorly to the right psoas muscle  and along the right lateral conal fascia, indeterminate, cannot rule  out metastatic foci. Attention on follow-up studies.  4. Cholelithiasis without evidence of cholecystitis.     11/7/2018  CT abd with contrast CT urogram  IMPRESSION:   1. Postsurgical changes of hysterectomy, bilateral  salpingo-oophorectomy, and iliac lymph node dissection with fluid  collections within the pelvis extending along the iliac vasculature.  a. The right pelvic sidewall fluid collection is not significantly  changed while the left pelvic sidewall fluid collection has enlarged  in comparison to the 11/6/2018 CT. The differential remains a seroma  versus a lymphocele.  b. No evidence of contrast extravasation to suggest a ureteral injury.  c. Redemonstration of soft tissue nodular thickening along the right  lateral conal fascia and anteriorly to the right psoas muscle and  right psoas muscle. Attention on follow-up is recommended.  2. Cholelithiasis without evidence of cholecystitis.     Review of Systems:  Systemic           no weight changes; no fever; no chills; no night sweats; no appetite changes  Skin           no rashes, or lesions  Eye           no irritation; no changes in vision  Deny-Laryngeal           no dysphagia; no hoarseness   Pulmonary    no cough; no shortness of breath  Cardiovascular    no chest pain; no  palpitations  Gastrointestinal    no diarrhea; no constipation; no abdominal pain; no changes in bowel  habits; no blood in stool  Genitourinary   no urinary frequency; no urinary urgency; no dysuria; no pain; no abnormal vaginal discharge; + abnormal vaginal bleeding  Breast   no breast discharge; no breast changes; no breast pain  Musculoskeletal    no myalgias; no arthralgias; no back pain  Psychiatric           no depressed mood; no anxiety    Hematologic           no tender lymph nodes; no noticeable swellings or lumps   Endocrine    no hot flashes; no heat/cold intolerance         Neurological   no tremor; no numbness and tingling; no headaches; no difficulty  sleeping    I have reviewed and addressed the patient's review of symptoms for today's visit.     Past Medical History:  Past Medical History:   Diagnosis Date     Chickenpox     as a child     Depression      Endometrial cancer (H)      Hypertension     since her 30's     Infertility, female      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)      PCOS (polycystic ovarian syndrome)      STD (female)     in her 20's      Past Surgical History:  No history of prior surgeries.     Health Maintenance:  Health Maintenance Due   Topic Date Due     PHQ-2 Q1 YR  01/20/1989     HIV SCREEN (SYSTEM ASSIGNED)  01/20/1995     PAP SCREENING Q3 YR (SYSTEM ASSIGNED)  01/20/1998     Last Pap Smear: 10/10/17 Result: NIL, negative HPV  This was her first pap smear.     Last Mammogram: Done in 20's, will order mammogram for new baseline.     Last Colonoscopy: Not done    Current Medications:   has a current medication list which includes the following prescription(s): albuterol, bupropion, gabapentin, ibuprofen, loratadine, melatonin, metoprolol succinate, multivitamin  s, oxycodone-acetaminophen, senna-docusate, spironolactone, sulfamethoxazole-trimethoprim, and trazodone.     Allergies:   Allergies   Allergen Reactions     Amoxicillin      Penicillin G       Social  "History:  Social History   Substance Use Topics     Smoking status: Former Smoker     Packs/day: 0.50     Types: Cigarettes     Smokeless tobacco: Never Used      Comment: uses vape pen about 3-4 times daily     Alcohol use No      Comment: 11 months sober      History   Drug Use No     Comment: 11 months sober. lives at sober housing     Family History:   The patient's family history is notable for:  Paternal aunts x2 - breast cancer, age less than 50  Mother's cousin on fathers side  Maternal aunt - breast cancer  No uterine or colon cancer.     Physical Exam:   /56  Pulse 63  Temp 97.6  F (36.4  C) (Oral)  Ht 1.645 m (5' 4.76\")  Wt 102.6 kg (226 lb 4.8 oz)  SpO2 98%  BMI 37.93 kg/m2  Body mass index is 37.93 kg/(m^2).    General Appearance: healthy and alert, no distress     HEENT:  no thyromegaly, no palpable nodules or masses        Cardiovascular: regular rate and rhythm, no gallops, rubs or murmurs     Respiratory: lungs clear, no rales, rhonchi or wheezes, normal diaphragmatic excursion    Musculoskeletal: extremities non tender and without edema    Skin: no lesions or rashes     Neurological: normal gait, no gross defects     Psychiatric: appropriate mood and affect                               Hematological: normal cervical, supraclavicular and inguinal lymph nodes     Gastrointestinal:       abdomen soft, non-tender, non-distended, no organomegaly or masses    Genitourinary:   -Nl BUS, left vulvar lesion excision site healing well, vaginal vault well lubricated, no evidence of cuff dehiscence, minimal sanguinous drainage. BME: cuff intact, no nodules or masses.    Assessment:  Meagan Morales is a 43 year old woman with a diagnosis of Stage IIIa, grade 1 endometrioid adenocarcinoma of the endometrium due to fallopian tube metastasis.     Plan:    1.)   Stage IIIa endometrioid adenocarcinoma of the endometrium: Based on final pathology being Stage IIIa I would recommend adjuvant therapy to " include 6 cycles of taxol and carboplatin. I would not add radiation as all lymph nodes are negative, she does not have LVSI or deep invasion and this is a low grade tumor. Risks, benefits, and alternatives to chemotherapy discussed with the patient.  Rx ibuprofen 600 mg tablets. Rx percocet 5 tabs due to continued abdominal pain. Will not prescribe any more narcotics and patient is aware of this.  Maren to talk with patient re chemotherapy and side effects in detail  Risks, benefits, and alternatives to chemotherapy discussed with the patient.  Discussed right thigh numbness and most likely due to LND around genitofemoral nerve and should improved with time.     2.) Genetic risk factors were assessed and the patient does not meet the qualifications for a referral-[facility] for [place] negative     3.) Labs and/or tests ordered include:   Pre chemotherapy     4.) Health maintenance issues addressed today include: will order mammogram for baseline.     5.) Of note, patient has history of drug and alcohol abuse. (meth) She is 11 months sober, living in sober housing. She has discussed use of narcotics with family and those she lives with. She understands risk for addiction when using narcotics.    Thank you for allowing us to participate in the care of your patient.       Sincerely,    Linh De Souza MD    Department of Ob/Gyn and Women's Health  Division of Gynecologic Oncology  [facility]  [phone]    Of a 40 minute appointment, more than 50% was spent in counseling the patient.    CC  Patient Care Team:  Aundrea Max MD as PCP - ANITA Torres

## 2018-11-07 NOTE — TELEPHONE ENCOUNTER
11/7  144pm  Spoke with pt pain is no better but no worse.  Pain is tolerable as long as she is not moving a lot.  Pain is more severe with position changes  Discussed with pt the need for blood work and CT urogram.  Reviewed CT results with pt and why follow up tests were needed    Pt reports understanding, appt scheduled today at 420 pm      Pt is also requesting a refill of #5 more pain pills  Will check with NP and drop off at pharmacy if possible

## 2018-11-07 NOTE — DISCHARGE INSTRUCTIONS

## 2018-11-08 ENCOUNTER — CARE COORDINATION (OUTPATIENT)
Dept: ONCOLOGY | Facility: CLINIC | Age: 43
End: 2018-11-08

## 2018-11-08 ENCOUNTER — ONCOLOGY VISIT (OUTPATIENT)
Dept: ONCOLOGY | Facility: CLINIC | Age: 43
End: 2018-11-08
Attending: OBSTETRICS & GYNECOLOGY
Payer: COMMERCIAL

## 2018-11-08 VITALS
HEART RATE: 63 BPM | BODY MASS INDEX: 37.7 KG/M2 | WEIGHT: 226.3 LBS | SYSTOLIC BLOOD PRESSURE: 109 MMHG | TEMPERATURE: 97.6 F | HEIGHT: 65 IN | DIASTOLIC BLOOD PRESSURE: 56 MMHG | OXYGEN SATURATION: 98 %

## 2018-11-08 DIAGNOSIS — C54.1 ENDOMETRIAL CANCER (H): Primary | ICD-10-CM

## 2018-11-08 DIAGNOSIS — G89.18 ACUTE POST-OPERATIVE PAIN: ICD-10-CM

## 2018-11-08 DIAGNOSIS — R79.89 LOW SERUM CALCIUM: ICD-10-CM

## 2018-11-08 DIAGNOSIS — E88.09 SERUM ALBUMIN DECREASED: ICD-10-CM

## 2018-11-08 DIAGNOSIS — L65.9 ALOPECIA: Primary | ICD-10-CM

## 2018-11-08 PROCEDURE — 99214 OFFICE O/P EST MOD 30 MIN: CPT | Mod: 24 | Performed by: OBSTETRICS & GYNECOLOGY

## 2018-11-08 PROCEDURE — G0463 HOSPITAL OUTPT CLINIC VISIT: HCPCS | Mod: ZF

## 2018-11-08 RX ORDER — IBUPROFEN 600 MG/1
600 TABLET, FILM COATED ORAL EVERY 6 HOURS PRN
Qty: 30 TABLET | Refills: 1 | Status: SHIPPED | OUTPATIENT
Start: 2018-11-08 | End: 2018-11-16

## 2018-11-08 ASSESSMENT — PAIN SCALES - GENERAL: PAINLEVEL: NO PAIN (0)

## 2018-11-08 NOTE — PATIENT INSTRUCTIONS
Call your Care Coordinator with chills and/or temperature greater then or equal to 100.4, uncontrolled nausea and vomiting, diarrhea, constipation, dizziness, light headedness, shortness of breath, chest pain. unexplained bruising, bleeding that does not stop with 10 minutes of pressure or any new/concerning symptoms and questions or concerns.  Monday- Friday    If after hours, weekends or holidays call [phone] or  the [facility] at [phone] and ask for (GYN/ONC) resident on call.    Your  Care Coordinator is  Maren HERNANDEZ RN, OCN  Gynecologic Cancer   Office [phone]  Pager [phone] #[phone]    Plan 6 cycles if chemotherapy  Taxol/Carboplatin once every 3 weeks for 6 cycles.

## 2018-11-08 NOTE — NURSING NOTE
"Oncology Rooming Note    November 8, 2018 2:26 PM   Meagan Morales is a 43 year old female who presents for:    No chief complaint on file.    Initial Vitals: /56  Pulse 63  Temp 97.6  F (36.4  C) (Oral)  Ht 1.645 m (5' 4.76\")  Wt 102.6 kg (226 lb 4.8 oz)  SpO2 98%  BMI 37.93 kg/m2 Estimated body mass index is 37.93 kg/(m^2) as calculated from the following:    Height as of this encounter: 1.645 m (5' 4.76\").    Weight as of this encounter: 102.6 kg (226 lb 4.8 oz). Body surface area is 2.17 meters squared.  No Pain (0) Comment: pain in bladder, with certain movements   No LMP recorded. Patient has had a hysterectomy.  Allergies reviewed: Yes  Medications reviewed: Yes    Medications: Medication refills not needed today.  Pharmacy name entered into [facility]: [facility] [address] [place] [address] [address] AT [address]    Clinical concerns: Patient requested a prescription for Ibuprofen yesterday, and was told it would be put taken care of today. Nolvia was notified.    7 minutes for nursing intake (face to face time)     Rosa Leon MA                "

## 2018-11-08 NOTE — LETTER
2018       RE: Meagan Morales  [address]  [facility][note 3] [id number]     Dear Colleague,    Thank you for referring your patient, Meagan Morales, to the [facility]. Please see a copy of my visit note below.                            Consult Notes on Referred Patient    Date: 2018     Dr. 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    Today: Pain below umbilicus, not constant but pain has been present since last week. Eating well but full fast, drinking fine. Urinating frequently. Had UCx with >100,000 col E. Coli. Having mild bleeding with wiping. Did not want to take pain meds, given percocet for pain with movement. Out of ibuprofen 600 mg tablets. Right leg numbness on thigh    Brief History:  Meagan originally presented to clinic due to continuous vaginal bleeding, very light. She thought it was just her menses being continuous. US was done which found thickened endometrial lining. IUD was placed. Second US was done and IUD did not affect endometrial lining and IUD was malpositioned. Since then, she has noted continuous spotting. IUD was removed. Also of note, ovarian cysts were noted on second US. Subsequent endometrial biopsy was done which revealed grade 1 endometrial cancer. She has always had irregular menses and has never been able to get pregnant. Patient's  first and only pap smear on 10/10/2017. Had mammogram in 20's, will order additional mammogram for baseline. No history of prior colonoscopy. Started Wellbutrin this last year, mood has improved. Prior cigarette smoker, quit 2 months ago and switched to vape. Started again smoking cigarettes again but  has not had one for 10 days. History of drug use, has been sober for 11 months. History of meth and alcohol abuse. No history of prior abdominal surgeries. No history of heart disease, lung disease or diabetes. Has never been able to get pregnant and dose not desire children.    6/29/18: pelvic US   IMPRESSION:  Peripheral follicular arrangement high within the ovaries suggesting polycystic ovarian syndrome.   Slightly thickened heterogeneous endometrium raising a concern of potential hyperplastic change.  9/17/18: pelvic US   1. Uterus is not normal in appearance. The endometrium appears thickened   and vascular, and the IUD appears to be malpositioned.  Clinical   correlation is recommended.    2. Bilateral complex ovarian cysts are noted.  Follow up ultrasound in 4-6   weeks is recommended, consider saline infusion sonohysterogram for further   imaging of the endometrium.     9/17/18: endometrial biopsy  ENDOMETRIUM, BIOPSY:  1. FIGO Grade 1 (well differentiated) endometrioid carcinoma of      the endometrium  2. DNA mismatch repair enzyme immunohistochemistry studies:     All intact (see Amendment note and synoptic reporting)      10/24/2018: DaVinci Assisted Total Laparoscopic Hysterectomy, Bilateral Salpingo-Oophorectomy, lymph node dissection, Excision of Left Vulvar Lesion   Pelvic Washing:   Rare atypical cells present.   Patient Name: REJI DEJESUS   MR#: [id number]   Specimen #: [id number]   Collected: 10/24/2018   Received: 10/24/2018   Reported: 11/1/2018 14:10   Ordering Phy(s): HEMANTH SAMANIEGO     For improved result formatting, select 'View Enhanced Report Format' under    Linked Documents section.     Original Report Follows Addendum    IMMUNOPATHOLOGY-Addendum   Status: Signed Out   Date Ordered:11/5/2018   Date Reported:11/5/2018 13:43   Signed Out By: Marjorie Whitman M.D., PhD, [facility]     INTERPRETATION:   D. UTERUS, CERVIX, BILATERAL OVARIES AND FALLOPIAN TUBES:   - This addendum  reports on MMR IHC status. The original diagnosis remains   unchanged     Report Name: Endometrium Biomarkers        Status: Submitted   Part(s) Involved:   D: Uterus, cervix, bilateral ovaries and fallopian tubes     Synoptic Report:     TEST(S) PERFORMED     Test(s) Performed:         - Mismatch Repair       Immunohistochemistry (IHC) Testing for Mismatch Repair (MMR) Proteins:           - MLH1           - MSH2           - MSH6           - PMS2           - Background nonneoplastic tissue / internal control with intact   nuclear           expression         MLH1 Expression:             - Intact nuclear expression         MSH2 Expression:             - Intact nuclear expression         MSH6 Expression:             - Intact nuclear expression         PMS2 Expression:             - Intact nuclear expression       Immunohistochemistry (IHC) Interpretation:           - No loss of nuclear expression of MMR proteins: low probability   of           microsatellite instability-high (MSI-H)     COMMENTS    Testing was performed on block D9 2017 June AJCC 8th Edition CAP Annual Release     ORIGINAL REPORT:     SPECIMEN(S):   A: Left vulvar lesion   B: Left pelvic sentinel lymph node   C: Right pelvic sentinel lymph node   D: Uterus, cervix, bilateral ovaries and fallopian tubes   E: Portion of left ovary   F: Para-aortic lymph nodes   G: Left pelvic lymph nodes   H: Right pelvic lymph nodes     FINAL DIAGNOSIS:   A. LEFT VULVAR LESION, BIOPSY:   - Intradermal nevus     B. LEFT PELVIC SENTINEL LYMPH NODE, EXCISION:   - One reactive lymph node, negative for malignancy (0/1)     C. RIGHT PELVIC SENTINEL LYMPH NODE, EXCISION:   - Six reactive lymph nodes, negative for malignancy (0/6)     D. UTERUS, CERVIX, BILATERAL OVARIES AND FALLOPIAN TUBES, HYSTERECTOMY   WITH BILATERAL SALPINGO-OOPHORECTOMY:   - Endometrial endometrioid adenocarcinoma, FIGO grade 1   - Atypical endometrial hyperplasia   - Myometrium with no significant  histologic abnormality   - Chronic cervicitis with microglandular hyperplasia   - Uterine serosal fibrous adhesions   - Ovaries with cortical hyperplasia   - Metastatic endometrial adenocarcinoma to the left fallopian tube   - Right fallopian tube with no significant histologic abnormality     E. PORTION OF LEFT OVARY, EXCISION:   - Benign cortical inclusion cysts     F. PARA-AORTIC LYMPH NODES, EXCISION:   - Four reactive lymph nodes, negative for malignancy (0/4)   - Benign glandular inclusions consistent with endosalpingiosis     G. LEFT PELVIC LYMPH NODES, EXCISION:   - Eight reactive lymph nodes, negative for malignancy (0/8)     H. RIGHT PELVIC LYMPH NODES, EXCISION:   - Nine reactive lymph nodes, negative for malignancy (0/9)   - Benign glandular inclusions consistent with endosalpingiosis     COMMENT:   The final diagnosis confirms the interpretation provided intraoperatively.   The tumor seen in the left fallopian tube involves the wall (invading the   mucosa and submucosa) and appears free floating in the lumen.   Tumor Site:         - Endometrium - Anterior and posterior     Histologic Type:         - Endometrioid carcinoma, NOS     Histologic Grade:         - FIGO grade 1     Tumor Size: 5.7 Centimeters (cm)     Tumor Extent       Myometrial Invasion:           - Not identified       Adenomyosis:           - Not identified       Uterine Serosa Involvement:           - Not identified       Lower Uterine Segment Involvement:           - Not identified       Cervical Stromal Involvement:           - Not identified       Other Tissue / Organ Involvement:           - Left fallopian tube       Peritoneal Ascitic Fluid:           - Results pending     Accessory Findings       Lymphovascular Invasion:           - Not identified    11/6/2018  IMPRESSION:   1. Surgical changes of hysterectomy, bilateral salpingo-oophorectomy,  and pelvic lymph node dissection with extensive fat stranding and  fluid within the low  pelvis extending superiorly along the iliac  vasculature, right greater than left, potentially within normal  postsurgical limits, however, slightly more than expected for  postsurgical change.   1a. The ureters are not well evaluated on this single phase exam and  there is no hydronephrosis or hydroureter, however, the amount of  fluid in the pelvis does raise the question of ureteral injury. If  there is clinical concern, consider obtaining a CT urogram.  1b. An area of irregular rim enhancement in the low pelvis may  represent a normal postsurgical vaginal cuff, though, again, this is  slightly more conspicuous than usual and dehiscence or a developing  abscess may have a similar appearance.  2. 6.8 x 4.1 x 5.6 cm right pelvic sidewall seroma versus lymphocele.  3. Soft tissue nodular thickening anteriorly to the right psoas muscle  and along the right lateral conal fascia, indeterminate, cannot rule  out metastatic foci. Attention on follow-up studies.  4. Cholelithiasis without evidence of cholecystitis.     11/7/2018  CT abd with contrast CT urogram  IMPRESSION:   1. Postsurgical changes of hysterectomy, bilateral  salpingo-oophorectomy, and iliac lymph node dissection with fluid  collections within the pelvis extending along the iliac vasculature.  a. The right pelvic sidewall fluid collection is not significantly  changed while the left pelvic sidewall fluid collection has enlarged  in comparison to the 11/6/2018 CT. The differential remains a seroma  versus a lymphocele.  b. No evidence of contrast extravasation to suggest a ureteral injury.  c. Redemonstration of soft tissue nodular thickening along the right  lateral conal fascia and anteriorly to the right psoas muscle and  right psoas muscle. Attention on follow-up is recommended.  2. Cholelithiasis without evidence of cholecystitis.     Review of Systems:  Systemic           no weight changes; no fever; no chills; no night sweats; no appetite  changes  Skin           no rashes, or lesions  Eye           no irritation; no changes in vision  Deny-Laryngeal           no dysphagia; no hoarseness   Pulmonary    no cough; no shortness of breath  Cardiovascular    no chest pain; no palpitations  Gastrointestinal    no diarrhea; no constipation; no abdominal pain; no changes in bowel  habits; no blood in stool  Genitourinary   no urinary frequency; no urinary urgency; no dysuria; no pain; no abnormal vaginal discharge; + abnormal vaginal bleeding  Breast   no breast discharge; no breast changes; no breast pain  Musculoskeletal    no myalgias; no arthralgias; no back pain  Psychiatric           no depressed mood; no anxiety    Hematologic           no tender lymph nodes; no noticeable swellings or lumps   Endocrine    no hot flashes; no heat/cold intolerance         Neurological   no tremor; no numbness and tingling; no headaches; no difficulty  sleeping    I have reviewed and addressed the patient's review of symptoms for today's visit.     Past Medical History:  Past Medical History:   Diagnosis Date     Chickenpox     as a child     Depression      Endometrial cancer (H)      Hypertension     since her 30's     Infertility, female      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)      PCOS (polycystic ovarian syndrome)      STD (female)     in her 20's      Past Surgical History:  No history of prior surgeries.     Health Maintenance:  Health Maintenance Due   Topic Date Due     PHQ-2 Q1 YR  01/20/1989     HIV SCREEN (SYSTEM ASSIGNED)  01/20/1995     PAP SCREENING Q3 YR (SYSTEM ASSIGNED)  01/20/1998     Last Pap Smear: 10/10/17 Result: NIL, negative HPV  This was her first pap smear.     Last Mammogram: Done in 20's, will order mammogram for new baseline.     Last Colonoscopy: Not done    Current Medications:   has a current medication list which includes the following prescription(s): albuterol, bupropion, gabapentin, ibuprofen, loratadine, melatonin, metoprolol  "succinate, multivitamin  s, oxycodone-acetaminophen, senna-docusate, spironolactone, sulfamethoxazole-trimethoprim, and trazodone.     Allergies:   Allergies   Allergen Reactions     Amoxicillin      Penicillin G       Social History:  Social History   Substance Use Topics     Smoking status: Former Smoker     Packs/day: 0.50     Types: Cigarettes     Smokeless tobacco: Never Used      Comment: uses vape pen about 3-4 times daily     Alcohol use No      Comment: 11 months sober      History   Drug Use No     Comment: 11 months sober. lives at sober housing     Family History:   The patient's family history is notable for:  Paternal aunts x2 - breast cancer, age less than 50  Mother's cousin on fathers side  Maternal aunt - breast cancer  No uterine or colon cancer.     Physical Exam:   /56  Pulse 63  Temp 97.6  F (36.4  C) (Oral)  Ht 1.645 m (5' 4.76\")  Wt 102.6 kg (226 lb 4.8 oz)  SpO2 98%  BMI 37.93 kg/m2  Body mass index is 37.93 kg/(m^2).    General Appearance: healthy and alert, no distress     HEENT:  no thyromegaly, no palpable nodules or masses        Cardiovascular: regular rate and rhythm, no gallops, rubs or murmurs     Respiratory: lungs clear, no rales, rhonchi or wheezes, normal diaphragmatic excursion    Musculoskeletal: extremities non tender and without edema    Skin: no lesions or rashes     Neurological: normal gait, no gross defects     Psychiatric: appropriate mood and affect                               Hematological: normal cervical, supraclavicular and inguinal lymph nodes     Gastrointestinal:       abdomen soft, non-tender, non-distended, no organomegaly or masses    Genitourinary:   -Nl BUS, left vulvar lesion excision site healing well, vaginal vault well lubricated, no evidence of cuff dehiscence, minimal sanguinous drainage. BME: cuff intact, no nodules or masses.    Assessment:  Meagan Morales is a 43 year old woman with a diagnosis of Stage IIIa, grade 1 endometrioid " adenocarcinoma of the endometrium due to fallopian tube metastasis.     Plan:    1.)   Stage IIIa endometrioid adenocarcinoma of the endometrium: Based on final pathology being Stage IIIa I would recommend adjuvant therapy to include 6 cycles of taxol and carboplatin. I would not add radiation as all lymph nodes are negative, she does not have LVSI or deep invasion and this is a low grade tumor. Risks, benefits, and alternatives to chemotherapy discussed with the patient.  Rx ibuprofen 600 mg tablets. Rx percocet 5 tabs due to continued abdominal pain. Will not prescribe any more narcotics and patient is aware of this.  Maren to talk with patient re chemotherapy and side effects in detail  Risks, benefits, and alternatives to chemotherapy discussed with the patient.  Discussed right thigh numbness and most likely due to LND around genitofemoral nerve and should improved with time.     2.) Genetic risk factors were assessed and the patient does not meet the qualifications for a referral-[facility] for [place] negative     3.) Labs and/or tests ordered include:   Pre chemotherapy     4.) Health maintenance issues addressed today include: will order mammogram for baseline.     5.) Of note, patient has history of drug and alcohol abuse. (meth) She is 11 months sober, living in sober housing. She has discussed use of narcotics with family and those she lives with. She understands risk for addiction when using narcotics.    Thank you for allowing us to participate in the care of your patient.       Sincerely,    Linh De Souza MD    Department of Ob/Gyn and Women's Health  Division of Gynecologic Oncology  [facility]  [phone]    Of a 40 minute appointment, more than 50% was spent in counseling the patient.    CC  Patient Care Team:  Aundrea Max MD as PCP - ANITA Torres

## 2018-11-08 NOTE — MR AVS SNAPSHOT
After Visit Summary   11/8/2018    Meagan Morales    MRN: [id number]           Patient Information     Date Of Birth          1975        Visit Information        Provider Department      11/8/2018 2:00 PM Linh De Souza MD [facility]        Today's Diagnoses     Endometrial cancer (H)    -  1    Acute post-operative pain        Low serum calcium        Serum albumin decreased          Care Instructions        Call your Care Coordinator with chills and/or temperature greater then or equal to 100.4, uncontrolled nausea and vomiting, diarrhea, constipation, dizziness, light headedness, shortness of breath, chest pain. unexplained bruising, bleeding that does not stop with 10 minutes of pressure or any new/concerning symptoms and questions or concerns.  Monday- Friday    If after hours, weekends or holidays call [phone] or  the [facility] at [phone] and ask for (GYN/ONC) resident on call.    Your  Care Coordinator is  Maren HERNANDEZ RN, OCN  Gynecologic Cancer   Office [phone]  Pager [phone] #[phone]    Plan 6 cycles if chemotherapy  Taxol/Carboplatin once every 3 weeks for 6 cycles.          Follow-ups after your visit        Who to contact     If you have questions or need follow up information about today's clinic visit or your schedule please contact [facility] directly at [phone].  Normal or non-critical lab and imaging results will be communicated to you by MyChart, letter or phone within 4 business days after the clinic has received the results. If you do not hear from us within 7 days, please contact the clinic through MyChart or phone. If you have a critical or abnormal lab result, we will notify you by phone as soon as possible.  Submit refill requests through Next Jump or call your pharmacy and they will forward the refill request to us. Please allow 3 business days for your refill to be completed.          Additional  "Information About Your Visit        MyChart Information     [email or web address] gives you secure access to your electronic health record. If you see a primary care provider, you can also send messages to your care team and make appointments. If you have questions, please call your primary care clinic.  If you do not have a primary care provider, please call [phone] and they will assist you.        Care EveryWhere ID     This is your Care EveryWhere ID. This could be used by other organizations to access your [facility] medical records  [id number]        Your Vitals Were     Pulse Temperature Height Pulse Oximetry BMI (Body Mass Index)       63 97.6  F (36.4  C) (Oral) 1.645 m (5' 4.76\") 98% 37.93 kg/m2        Blood Pressure from Last 3 Encounters:   11/08/18 109/56   11/02/18 118/78   10/24/18 118/64    Weight from Last 3 Encounters:   11/08/18 102.6 kg (226 lb 4.8 oz)   11/02/18 102.5 kg (226 lb)   10/24/18 102.3 kg (225 lb 8.5 oz)              We Performed the Following     Calcium ionized          Today's Medication Changes          These changes are accurate as of 11/8/18  4:42 PM.  If you have any questions, ask your nurse or doctor.               Start taking these medicines.        Dose/Directions    oxyCODONE 5 mg   Commonly known as:  ROXICODONE   Used for:  Acute post-operative pain   Started by:  Linh De Souza MD        Dose:  5 mg   Take 1 tablet (5 mg) by mouth every 4 hours as needed for moderate to severe pain   Quantity:  5 tablet   Refills:  0         These medicines have changed or have updated prescriptions.        Dose/Directions    * ibuprofen 600 MG tablet   Commonly known as:  ADVIL/MOTRIN   This may have changed:  Another medication with the same name was added. Make sure you understand how and when to take each.   Used for:  S/P laparoscopic hysterectomy   Changed by:  Linh De Souza MD        Dose:  600 mg   Take 1 tablet (600 mg) by mouth every 6 hours as needed for pain (mild) "   Quantity:  30 tablet   Refills:  0       * ibuprofen 600 MG tablet   Commonly known as:  ADVIL/MOTRIN   This may have changed:  You were already taking a medication with the same name, and this prescription was added. Make sure you understand how and when to take each.   Used for:  Endometrial cancer (H), Acute post-operative pain   Changed by:  Linh De Souza MD        Dose:  600 mg   Take 1 tablet (600 mg) by mouth every 6 hours as needed for moderate pain   Quantity:  30 tablet   Refills:  1       * Notice:  This list has 2 medication(s) that are the same as other medications prescribed for you. Read the directions carefully, and ask your doctor or other care provider to review them with you.         Where to get your medicines      These medications were sent to [facility] [address] [facility] [address] AT [address]  [address] [facility] [address]     Phone:  [phone]     ibuprofen 600 MG tablet         Some of these will need a paper prescription and others can be bought over the counter.  Ask your nurse if you have questions.     Bring a paper prescription for each of these medications     oxyCODONE 5 mg               Information about OPIOIDS     PRESCRIPTION OPIOIDS: WHAT YOU NEED TO KNOW   We gave you an opioid (narcotic) pain medicine. It is important to manage your pain, but opioids are not always the best choice. You should first try all the other options your care team gave you. Take this medicine for as short a time (and as few doses) as possible.    Some activities can increase your pain, such as bandage changes or therapy sessions. It may help to take your pain medicine 30 to 60 minutes before these activities. Reduce your stress by getting enough sleep, working on hobbies you enjoy and practicing relaxation or meditation. Talk to your care team about ways to manage your pain beyond prescription opioids.    These medicines  have risks:    DO NOT drive when on new or higher doses of pain medicine. These medicines can affect your alertness and reaction times, and you could be arrested for driving under the influence (DUI). If you need to use opioids long-term, talk to your care team about driving.    DO NOT operate heavy machinery    DO NOT do any other dangerous activities while taking these medicines.    DO NOT drink any alcohol while taking these medicines.     If the opioid prescribed includes acetaminophen, DO NOT take with any other medicines that contain acetaminophen. Read all labels carefully. Look for the word  acetaminophen  or  Tylenol.  Ask your pharmacist if you have questions or are unsure.    You can get addicted to pain medicines, especially if you have a history of addiction (chemical, alcohol or substance dependence). Talk to your care team about ways to reduce this risk.    All opioids tend to cause constipation. Drink plenty of water and eat foods that have a lot of fiber, such as fruits, vegetables, prune juice, apple juice and high-fiber cereal. Take a laxative (Miralax, milk of magnesia, Colace, Senna) if you don t move your bowels at least every other day. Other side effects include upset stomach, sleepiness, dizziness, throwing up, tolerance (needing more of the medicine to have the same effect), physical dependence and slowed breathing.    Store your pills in a secure place, locked if possible. We will not replace any lost or stolen medicine. If you don t finish your medicine, please throw away (dispose) as directed by your pharmacist. The [place] Pollution Control Agency has more information about safe disposal: https://www.pca.[facility].[place]/living-green/managing-unwanted-medications         Primary Care Provider Office Phone # Fax #    Aundrea Max [phone]       [facility] [address] [facility] [address]        Equal Access to Services     ENE WHITAKER AH: Brendan stanley  melina Vásquez, teri nicholsonsheliaha, beckie kaloida luna, lidia sunilin hayaamiri raineybrendon lanifredo laSundeepviviana mayank. So [facility] [phone].    ATENCIÓN: Si yesenia roberts, tiene a maya disposición servicios gratuitos de asistencia lingüística. Sapna al [phone].    We comply with applicable federal civil rights laws and [place] laws. We do not discriminate on the basis of race, color, national origin, age, disability, sex, sexual orientation, or gender identity.            Thank you!     Thank you for choosing [facility]  for your care. Our goal is always to provide you with excellent care. Hearing back from our patients is one way we can continue to improve our services. Please take a few minutes to complete the written survey that you may receive in the mail after your visit with us. Thank you!             Your Updated Medication List - Protect others around you: Learn how to safely use, store and throw away your medicines at www.disposemymeds.org.          This list is accurate as of 11/8/18  4:42 PM.  Always use your most recent med list.                   Brand Name Dispense Instructions for use Diagnosis    albuterol 108 (90 Base) MCG/ACT inhaler    PROAIR HFA/PROVENTIL HFA/VENTOLIN HFA     Inhale 1-2 puffs into the lungs    Endometrial cancer (H)       buPROPion 300 MG 24 hr tablet    WELLBUTRIN XL     Take 300 mg by mouth    Endometrial cancer (H)       gabapentin 600 MG tablet    NEURONTIN     Take 600 mg by mouth    Endometrial cancer (H)       * ibuprofen 600 MG tablet    ADVIL/MOTRIN    30 tablet    Take 1 tablet (600 mg) by mouth every 6 hours as needed for pain (mild)    S/P laparoscopic hysterectomy       * ibuprofen 600 MG tablet    ADVIL/MOTRIN    30 tablet    Take 1 tablet (600 mg) by mouth every 6 hours as needed for moderate pain    Endometrial cancer (H), Acute post-operative pain       loratadine 10 MG tablet    CLARITIN     Take 10 mg by mouth    Endometrial cancer (H)        MELATONIN PO      Take 10 mg by mouth nightly as needed    Endometrial cancer (H)       metoprolol succinate 25 MG 24 hr tablet    TOPROL-XL     Take 25 mg by mouth    Endometrial cancer (H)       MULTIvitamin  S Caps      Take 1 capsule by mouth    Endometrial cancer (H)       oxyCODONE 5 mg    ROXICODONE    5 tablet    Take 1 tablet (5 mg) by mouth every 4 hours as needed for moderate to severe pain    Acute post-operative pain       oxyCODONE-acetaminophen 5-325 MG per tablet    PERCOCET    5 tablet    Take 1 tablet by mouth every 6 hours as needed for pain (moderate to severe)    S/P laparoscopic hysterectomy       senna-docusate 8.6-50 MG per tablet    SENOKOT-S;PERICOLACE    30 tablet    Take 1-2 tablets by mouth 2 times daily Take while on oral narcotics to prevent or treat constipation.    S/P laparoscopic hysterectomy       spironolactone 50 MG tablet    ALDACTONE     Take 50 mg by mouth    Endometrial cancer (H)       sulfamethoxazole-trimethoprim 800-160 MG per tablet    BACTRIM DS/SEPTRA DS    14 tablet    Take 1 tablet by mouth 2 times daily    Acute cystitis without hematuria       traZODone 50 MG tablet    DESYREL    14 tablet    Take 1 tablet (50 mg) by mouth nightly as needed for sleep    Sleep disorder       * Notice:  This list has 2 medication(s) that are the same as other medications prescribed for you. Read the directions carefully, and ask your doctor or other care provider to review them with you.

## 2018-11-09 NOTE — PROGRESS NOTES
[facility] GYN-Oncology Teaching Note    Relevant Diagnosis:  Endometrial cancer IIIA     Teaching Topic: Taxol carboplatin for 6 cycles.  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  Would like to start next week late Friday or early the following week.       Patient and Family demonstrates understanding of the following:  Reason for the appointment, diagnosis and treatment plan:  Yes  Knowledge of proper use of medications and conditions for which they are ordered (with special attention to potential side effects or drug interactions):  Yes  Which situations necessitate calling provider and whom to contact:  Yes    Teaching Concerns:  Yes.    Instructional Materials Used/Given:  Phone numbers for [facility] Given to Patient  Implanted Port Booklet  Chemotherapy Packet and Cards  Disease Packet  cancer information Handbook, Eating HInts, Chemo and You and TLC booklet.     Time spent teaching with patient:  60 minutes  Maren HERNANDEZ RN, OCN  Care Coordinator   Gynecologic Cancer   Office [phone]  Pager [phone] #[phone]

## 2018-11-13 NOTE — TELEPHONE ENCOUNTER
Spoke with NP who reviewed her medication record and history   NP would consider prescribing trazadone if it was ok with her psychiatrist  NP does not want to give pt anything that will interact with her psychiatry medications  Suggested that pt call psychiatrist and ask if trazadone would be ok and cause no problems or interactions and have that MS call our office. In the meantime NP suggests pt try OTC unisom or benadryl    Spoke with pt informed on the above she will call psychiatrist,  Pt will try the OTC prep, pt asked if hydroxyzine would work in place of benadryl and NP agreed this would be ok also

## 2018-11-13 NOTE — TELEPHONE ENCOUNTER
10/31  Pt calling with questions  Pt was given percocet at discharge from surgery, is taking this only at night (1 tab) to help her sleep.  Pt states she is taking OTC tylenol and ibuprofen during the day and this is working ok.  Does not really want to take a narcotic given pt history of dependence and the fact that she lives in a sober house.  Would prefer not to even have these meds available in the house in case someone might steal them.  Pt is really looking for something that could help her sleep, legs are very restless,right leg still a little numb feeling( talked to anesthesia about this).    Pt states she spoke with therapist and sponsor and they agree that if  Pt could get something else for sleep and avoid the narcotic this would be best.  Pt states she does have melatonin at home but had stopped this before surgery as it was not helping and she was taking more than prescribed so she would prefer not to go back to that.   I will check with NP and get back to pt

## 2018-11-13 NOTE — TELEPHONE ENCOUNTER
209 pm  Psychiatrist called office and stated that trazadone would be ok with pt current medication.    Pt sees psychiatrist next week and will discuss with that doctor next week if the pt should continue on the trazadone  NP will prescribe 1 weeks worth to get her to this appt    Pt notified and reports understanding    Pt also notes that she went out yesterday and today now has occassional stabbing pain in low abdomen when she bends over   This is not constant and really only happens wnen bending  Pt will monitor and call if this worsens

## 2018-11-14 NOTE — TELEPHONE ENCOUNTER
11/1  Pt left message on voice mail  Pain has continued and more constant  Stabbing and tearing type feeling below umbilicus and goes down from there  Feels like a pulled muscle  Pt did note pink discharge when wiping   Pain happens on any sort of movement now  The only time pain does not have pain is when she is sitting or standing but not moving  Any kinds of movement causes sharp pain  Offered appt with NP for evaluation on 11/2 n at 240 pm

## 2018-11-15 RX ORDER — PROCHLORPERAZINE MALEATE 10 MG
10 TABLET ORAL EVERY 6 HOURS PRN
Qty: 30 TABLET | Refills: 5 | Status: SHIPPED | OUTPATIENT
Start: 2018-11-15 | End: 2019-06-04

## 2018-11-15 RX ORDER — MEPERIDINE HYDROCHLORIDE 25 MG/ML
25 INJECTION INTRAMUSCULAR; INTRAVENOUS; SUBCUTANEOUS EVERY 30 MIN PRN
Status: CANCELLED | OUTPATIENT
Start: 2018-11-16

## 2018-11-15 RX ORDER — DIPHENHYDRAMINE HYDROCHLORIDE 50 MG/ML
50 INJECTION INTRAMUSCULAR; INTRAVENOUS
Status: CANCELLED
Start: 2018-11-16

## 2018-11-15 RX ORDER — EPINEPHRINE 0.3 MG/.3ML
0.3 INJECTION SUBCUTANEOUS EVERY 5 MIN PRN
Status: CANCELLED | OUTPATIENT
Start: 2018-11-16

## 2018-11-15 RX ORDER — DIPHENHYDRAMINE HCL 25 MG
50 CAPSULE ORAL ONCE
Status: CANCELLED
Start: 2018-11-16

## 2018-11-15 RX ORDER — EPINEPHRINE 1 MG/ML
0.3 INJECTION, SOLUTION INTRAMUSCULAR; SUBCUTANEOUS EVERY 5 MIN PRN
Status: CANCELLED | OUTPATIENT
Start: 2018-11-16

## 2018-11-15 RX ORDER — LORAZEPAM 2 MG/ML
1 INJECTION INTRAMUSCULAR EVERY 6 HOURS PRN
Status: CANCELLED
Start: 2018-11-16

## 2018-11-15 RX ORDER — ALBUTEROL SULFATE 90 UG/1
1-2 AEROSOL, METERED RESPIRATORY (INHALATION)
Status: CANCELLED
Start: 2018-11-16

## 2018-11-15 RX ORDER — PALONOSETRON 0.05 MG/ML
0.25 INJECTION, SOLUTION INTRAVENOUS ONCE
Status: CANCELLED
Start: 2018-11-16

## 2018-11-15 RX ORDER — ONDANSETRON 8 MG/1
8 TABLET, FILM COATED ORAL EVERY 8 HOURS PRN
Qty: 20 TABLET | Refills: 1 | Status: SHIPPED | OUTPATIENT
Start: 2018-11-15 | End: 2019-06-04

## 2018-11-15 RX ORDER — SODIUM CHLORIDE 9 MG/ML
1000 INJECTION, SOLUTION INTRAVENOUS CONTINUOUS PRN
Status: CANCELLED
Start: 2018-11-16

## 2018-11-15 RX ORDER — ALBUTEROL SULFATE 0.83 MG/ML
2.5 SOLUTION RESPIRATORY (INHALATION)
Status: CANCELLED | OUTPATIENT
Start: 2018-11-16

## 2018-11-15 RX ORDER — METHYLPREDNISOLONE SODIUM SUCCINATE 125 MG/2ML
125 INJECTION, POWDER, LYOPHILIZED, FOR SOLUTION INTRAMUSCULAR; INTRAVENOUS
Status: CANCELLED
Start: 2018-11-16

## 2018-11-16 ENCOUNTER — TELEPHONE (OUTPATIENT)
Dept: ONCOLOGY | Facility: CLINIC | Age: 43
End: 2018-11-16

## 2018-11-16 ENCOUNTER — APPOINTMENT (OUTPATIENT)
Dept: LAB | Facility: CLINIC | Age: 43
End: 2018-11-16
Attending: OBSTETRICS & GYNECOLOGY
Payer: COMMERCIAL

## 2018-11-16 ENCOUNTER — INFUSION THERAPY VISIT (OUTPATIENT)
Dept: ONCOLOGY | Facility: CLINIC | Age: 43
End: 2018-11-16
Attending: OBSTETRICS & GYNECOLOGY
Payer: COMMERCIAL

## 2018-11-16 VITALS
BODY MASS INDEX: 37.95 KG/M2 | HEART RATE: 84 BPM | WEIGHT: 226.4 LBS | DIASTOLIC BLOOD PRESSURE: 83 MMHG | SYSTOLIC BLOOD PRESSURE: 131 MMHG | OXYGEN SATURATION: 96 % | TEMPERATURE: 98.2 F | RESPIRATION RATE: 16 BRPM

## 2018-11-16 DIAGNOSIS — C54.1 ENDOMETRIAL CANCER (H): Primary | ICD-10-CM

## 2018-11-16 LAB
ALBUMIN SERPL-MCNC: 3.5 G/DL (ref 3.4–5)
ALP SERPL-CCNC: 61 U/L (ref 40–150)
ALT SERPL W P-5'-P-CCNC: 42 U/L (ref 0–50)
ANION GAP SERPL CALCULATED.3IONS-SCNC: 7 MMOL/L (ref 3–14)
AST SERPL W P-5'-P-CCNC: 25 U/L (ref 0–45)
BASOPHILS # BLD AUTO: 0 10E9/L (ref 0–0.2)
BASOPHILS NFR BLD AUTO: 0.5 %
BILIRUB SERPL-MCNC: 0.2 MG/DL (ref 0.2–1.3)
BUN SERPL-MCNC: 26 MG/DL (ref 7–30)
CA-I BLD-MCNC: 4.9 MG/DL (ref 4.4–5.2)
CALCIUM SERPL-MCNC: 9 MG/DL (ref 8.5–10.1)
CANCER AG125 SERPL-ACNC: 35 U/ML (ref 0–30)
CHLORIDE SERPL-SCNC: 105 MMOL/L (ref 94–109)
CO2 SERPL-SCNC: 26 MMOL/L (ref 20–32)
CREAT SERPL-MCNC: 0.79 MG/DL (ref 0.52–1.04)
DIFFERENTIAL METHOD BLD: NORMAL
EOSINOPHIL # BLD AUTO: 0.4 10E9/L (ref 0–0.7)
EOSINOPHIL NFR BLD AUTO: 5.9 %
ERYTHROCYTE [DISTWIDTH] IN BLOOD BY AUTOMATED COUNT: 11.6 % (ref 10–15)
GFR SERPL CREATININE-BSD FRML MDRD: 80 ML/MIN/1.7M2
GLUCOSE SERPL-MCNC: 102 MG/DL (ref 70–99)
HCT VFR BLD AUTO: 38 % (ref 35–47)
HGB BLD-MCNC: 12.6 G/DL (ref 11.7–15.7)
IMM GRANULOCYTES # BLD: 0 10E9/L (ref 0–0.4)
IMM GRANULOCYTES NFR BLD: 0.2 %
LYMPHOCYTES # BLD AUTO: 1.8 10E9/L (ref 0.8–5.3)
LYMPHOCYTES NFR BLD AUTO: 29.6 %
MAGNESIUM SERPL-MCNC: 2.2 MG/DL (ref 1.6–2.3)
MCH RBC QN AUTO: 29.1 PG (ref 26.5–33)
MCHC RBC AUTO-ENTMCNC: 33.2 G/DL (ref 31.5–36.5)
MCV RBC AUTO: 88 FL (ref 78–100)
MONOCYTES # BLD AUTO: 0.5 10E9/L (ref 0–1.3)
MONOCYTES NFR BLD AUTO: 7.4 %
NEUTROPHILS # BLD AUTO: 3.4 10E9/L (ref 1.6–8.3)
NEUTROPHILS NFR BLD AUTO: 56.4 %
NRBC # BLD AUTO: 0 10*3/UL
NRBC BLD AUTO-RTO: 0 /100
PLATELET # BLD AUTO: 348 10E9/L (ref 150–450)
POTASSIUM SERPL-SCNC: 4 MMOL/L (ref 3.4–5.3)
PROT SERPL-MCNC: 7.6 G/DL (ref 6.8–8.8)
RBC # BLD AUTO: 4.33 10E12/L (ref 3.8–5.2)
SODIUM SERPL-SCNC: 138 MMOL/L (ref 133–144)
WBC # BLD AUTO: 6.1 10E9/L (ref 4–11)

## 2018-11-16 PROCEDURE — 25000125 ZZHC RX 250: Mod: ZF | Performed by: OBSTETRICS & GYNECOLOGY

## 2018-11-16 PROCEDURE — 96415 CHEMO IV INFUSION ADDL HR: CPT

## 2018-11-16 PROCEDURE — 82330 ASSAY OF CALCIUM: CPT | Performed by: OBSTETRICS & GYNECOLOGY

## 2018-11-16 PROCEDURE — 96413 CHEMO IV INFUSION 1 HR: CPT

## 2018-11-16 PROCEDURE — 96375 TX/PRO/DX INJ NEW DRUG ADDON: CPT

## 2018-11-16 PROCEDURE — 96417 CHEMO IV INFUS EACH ADDL SEQ: CPT

## 2018-11-16 PROCEDURE — G0463 HOSPITAL OUTPT CLINIC VISIT: HCPCS | Mod: 25

## 2018-11-16 PROCEDURE — 85025 COMPLETE CBC W/AUTO DIFF WBC: CPT | Performed by: OBSTETRICS & GYNECOLOGY

## 2018-11-16 PROCEDURE — 80053 COMPREHEN METABOLIC PANEL: CPT | Performed by: OBSTETRICS & GYNECOLOGY

## 2018-11-16 PROCEDURE — 83735 ASSAY OF MAGNESIUM: CPT | Performed by: OBSTETRICS & GYNECOLOGY

## 2018-11-16 PROCEDURE — 25000128 H RX IP 250 OP 636: Mod: ZF | Performed by: OBSTETRICS & GYNECOLOGY

## 2018-11-16 PROCEDURE — 86304 IMMUNOASSAY TUMOR CA 125: CPT | Performed by: OBSTETRICS & GYNECOLOGY

## 2018-11-16 PROCEDURE — 25000132 ZZH RX MED GY IP 250 OP 250 PS 637: Mod: ZF | Performed by: OBSTETRICS & GYNECOLOGY

## 2018-11-16 PROCEDURE — 40000556 ZZH STATISTIC PERIPHERAL IV START W US GUIDANCE: Mod: ZF

## 2018-11-16 RX ORDER — PALONOSETRON 0.05 MG/ML
0.25 INJECTION, SOLUTION INTRAVENOUS ONCE
Status: COMPLETED | OUTPATIENT
Start: 2018-11-16 | End: 2018-11-16

## 2018-11-16 RX ORDER — DIPHENHYDRAMINE HCL 25 MG
50 CAPSULE ORAL ONCE
Status: COMPLETED | OUTPATIENT
Start: 2018-11-16 | End: 2018-11-16

## 2018-11-16 RX ADMIN — DEXAMETHASONE SODIUM PHOSPHATE 20 MG: 10 INJECTION, SOLUTION INTRAMUSCULAR; INTRAVENOUS at 09:55

## 2018-11-16 RX ADMIN — PACLITAXEL 380 MG: 6 INJECTION, SOLUTION INTRAVENOUS at 10:31

## 2018-11-16 RX ADMIN — DIPHENHYDRAMINE HYDROCHLORIDE 50 MG: 25 CAPSULE ORAL at 09:42

## 2018-11-16 RX ADMIN — FAMOTIDINE 40 MG: 10 INJECTION INTRAVENOUS at 09:45

## 2018-11-16 RX ADMIN — PALONOSETRON HYDROCHLORIDE 0.25 MG: 0.25 INJECTION INTRAVENOUS at 09:43

## 2018-11-16 RX ADMIN — CARBOPLATIN 815 MG: 10 INJECTION, SOLUTION INTRAVENOUS at 13:58

## 2018-11-16 RX ADMIN — SODIUM CHLORIDE 250 ML: 9 INJECTION, SOLUTION INTRAVENOUS at 09:42

## 2018-11-16 ASSESSMENT — PAIN SCALES - GENERAL
PAINLEVEL: NO PAIN (0)
PAINLEVEL: NO PAIN (0)

## 2018-11-16 NOTE — MR AVS SNAPSHOT
After Visit Summary   11/16/2018    Meagan Morales    MRN: [id number]           Patient Information     Date Of Birth          1975        Visit Information        Provider Department      11/16/2018 9:00 AM  23 ATC;  ONCOLOGY INFUSION [facility]        Today's Diagnoses     Endometrial cancer (H)    -  1      Care Instructions    Contact Numbers    [facility] Main Line: [phone]  [facility] Triage and After Hours Nurse Line:  [phone]    Please call the [facility] nurse triage or the after hours nurse line if you experience a temperature greater than or equal to 100.5, shaking chills, have uncontrolled nausea, vomiting and/or diarrhea, dizziness, lightheadedness, shortness of breath, chest pain, bleeding, unexplained bruising, or if you have any other new/concerning symptoms, questions or concerns.     If you are having any concerning symptoms or wish to speak to a provider before your next infusion visit, please call your care coordinator or triage to notify them so we can adequately serve you.     If you need a refill on a narcotic prescription or other medication, please call triage before your infusion appointment.           November 2018 Sunday Monday Tuesday Wednesday Thursday Friday Saturday                       1     2     [facility] RETURN    2:25 PM   (40 min.)   Shoshana David APRN CNP   [facility] 3       4     5     6     CT ABDOMEN PELVIS WWO    2:00 PM   (20 min.)   CT1   [facility] CT 7     CT UROGRAM WWO    3:30 PM   (20 min.)   CT2   [facility] CT     LAB    3:45 PM   (15 min.)    LAB   [facility] Lab 8     [facility] RETURN    1:45 PM   (20 min.)   Linh De Souza MD   [facility] 9     10       11     12     13     14     15     16     [facility] DRAW    8:15 AM   (15 min.)    [facility] LAB DRAW   [facility] Lab Draw     [facility] ONC INFUSION 360    9:00 AM   (360 min.)    ONCOLOGY  INFUSION   [facility] 17       18     19     20     21     22     23     24       25     26     27     28     29     30                     December 2018 Sunday Monday Tuesday Wednesday Thursday Friday Saturday                                 1       2     3     4     5     6     [facility] LAB DRAW    6:30 AM   (15 min.)   [facility] DRAW   [facility] Lab Draw     [facility] RETURN ACTIVE TREATMENT    6:45 AM   (40 min.)   Virgen Gallagher [facility]N CNP   [facility]    8:00 AM   (360 min.)   [facility] ONCOLOGY INFUSION   [facility] 7     8       9     10     11     12     13     14     15       16     17     18     19     20     21     22       23     24     25     26     27     [facility] DRAW    8:30 AM   (15 min.)   [facility] DRAW   [facility] Lab Draw     [facility] RETURN ACTIVE TREATMENT    8:45 AM   (40 min.)   Virgen Gallagher [facility]N CNP   [facility]   10:00 AM   (360 min.)   [facility] ONCOLOGY INFUSION   [facility] 28     29       30     31                                           Lab Results:  Recent Results (from the past 12 hour(s))   Calcium ionized whole blood    Collection Time: 11/16/18  9:02 AM   Result Value Ref Range    Calcium Ionized Whole Blood 4.9 4.4 - 5.2 mg/dL   CBC with platelets differential    Collection Time: 11/16/18  9:02 AM   Result Value Ref Range    WBC 6.1 4.0 - 11.0 10e9/L    RBC Count 4.33 3.8 - 5.2 10e12/L    Hemoglobin 12.6 11.7 - 15.7 g/dL    Hematocrit 38.0 35.0 - 47.0 %    MCV 88 78 - 100 fl    MCH 29.1 26.5 - 33.0 pg    MCHC 33.2 31.5 - 36.5 g/dL    RDW 11.6 10.0 - 15.0 %    Platelet Count 348 150 - 450 10e9/L    Diff Method Automated Method     % Neutrophils 56.4 %    % Lymphocytes 29.6 %    % Monocytes 7.4 %    % Eosinophils 5.9 %    % Basophils 0.5 %    % Immature Granulocytes 0.2 %    Nucleated RBCs 0 0 /100     Absolute Neutrophil 3.4 1.6 - 8.3 10e9/L    Absolute Lymphocytes 1.8 0.8 - 5.3 10e9/L    Absolute Monocytes 0.5 0.0 - 1.3 10e9/L    Absolute Eosinophils 0.4 0.0 - 0.7 10e9/L    Absolute Basophils 0.0 0.0 - 0.2 10e9/L    Abs Immature Granulocytes 0.0 0 - 0.4 10e9/L    Absolute Nucleated RBC 0.0    Comprehensive metabolic panel    Collection Time: 11/16/18  9:02 AM   Result Value Ref Range    Sodium 138 133 - 144 mmol/L    Potassium 4.0 3.4 - 5.3 mmol/L    Chloride 105 94 - 109 mmol/L    Carbon Dioxide 26 20 - 32 mmol/L    Anion Gap 7 3 - 14 mmol/L    Glucose 102 (H) 70 - 99 mg/dL    Urea Nitrogen 26 7 - 30 mg/dL    Creatinine 0.79 0.52 - 1.04 mg/dL    GFR Estimate 80 >60 mL/min/1.7m2    GFR Estimate If Black >90 >60 mL/min/1.7m2    Calcium 9.0 8.5 - 10.1 mg/dL    Bilirubin Total 0.2 0.2 - 1.3 mg/dL    Albumin 3.5 3.4 - 5.0 g/dL    Protein Total 7.6 6.8 - 8.8 g/dL    Alkaline Phosphatase 61 40 - 150 U/L    ALT 42 0 - 50 U/L    AST 25 0 - 45 U/L   Magnesium    Collection Time: 11/16/18  9:02 AM   Result Value Ref Range    Magnesium 2.2 1.6 - 2.3 mg/dL               Follow-ups after your visit        Your next 10 appointments already scheduled     Dec 06, 2018  6:30 AM CST   [facility] LAB DRAW   [facility] Lab Draw ([facility])    [address]  [address]  [place] [address] [phone]            Dec 06, 2018  7:00 AM CST   (Arrive by 6:45 AM)   Return Active Treatment with CORTES Prater CNP   [facility] ([facility])    [address]  [address]  [place] [address] [phone]            Dec 06, 2018  8:00 AM CST   Infusion 360 with  ONCOLOGY INFUSION,  [facility] ([facility])    [address]  [address]  [place] [address] [phone]            Dec 27, 2018  8:30 AM [facility] Lab Draw with PERRY  [facility] LAB DRAW   [facility] Lab Draw ([facility])    [address]  [address]  [place] [address] [phone]            Dec 27, 2018  9:00 AM CST   (Arrive by 8:45 AM)   Return Active Treatment with CORTES Prater CNP   [facility] ([facility])    [address]  [address]  [place] [address] [phone]            Dec 27, 2018 10:00 AM CST   Infusion 360 with UC ONCOLOGY INFUSION, UC 12 ATC   [facility] ([facility])    [address]  [address]  [place] [address] [phone]              Who to contact     If you have questions or need follow up information about today's clinic visit or your schedule please contact [facility] CANCER [facility] directly at [phone].  Normal or non-critical lab and imaging results will be communicated to you by [employer], letter or phone within 4 business days after the clinic has received the results. If you do not hear from us within 7 days, please contact the clinic through [employer] or phone. If you have a critical or abnormal lab result, we will notify you by phone as soon as possible.  Submit refill requests through [employer] or call your pharmacy and they will forward the refill request to us. Please allow 3 business days for your refill to be completed.          Additional Information About Your Visit        [employer] Information     [employer] gives you secure access to your electronic health record. If you see a primary care provider, you can also send messages to your care team and make appointments. If you have questions, please call your primary care clinic.  If you do not have a primary care provider, please call [phone] and they will assist you.        Care EveryWhere ID     This is your Care EveryWhere ID. This could be used by other organizations to access your [facility]  records  [id number]        Your Vitals Were     Pulse Temperature Respirations Pulse Oximetry BMI (Body Mass Index)       77 98.1  F (36.7  C) (Oral) 18 95% 37.95 kg/m2        Blood Pressure from Last 3 Encounters:   11/16/18 117/66   11/08/18 109/56   11/02/18 118/78    Weight from Last 3 Encounters:   11/16/18 102.7 kg (226 lb 6.4 oz)   11/08/18 102.6 kg (226 lb 4.8 oz)   11/02/18 102.5 kg (226 lb)              We Performed the Following          Calcium ionized whole blood     CBC with platelets differential     Comprehensive metabolic panel     Magnesium          Today's Medication Changes          These changes are accurate as of 11/16/18 10:58 AM.  If you have any questions, ask your nurse or doctor.               Start taking these medicines.        Dose/Directions    ondansetron 8 MG tablet   Commonly known as:  ZOFRAN   Used for:  Endometrial cancer (H)        Dose:  8 mg   Take 1 tablet (8 mg) by mouth every 8 hours as needed for nausea (Did not start until 3 days after chemo.)   Quantity:  20 tablet   Refills:  1       prochlorperazine 10 MG tablet   Commonly known as:  COMPAZINE   Used for:  Endometrial cancer (H)        Dose:  10 mg   Take 1 tablet (10 mg) by mouth every 6 hours as needed (nausea/vomiting)   Quantity:  30 tablet   Refills:  5            Where to get your medicines      These medications were sent to [facility] [address] [facility]  [address] [address]    Hours:  TRANSPLANT PHONE NUMBER [phone] Phone:  [phone]     ondansetron 8 MG tablet    prochlorperazine 10 MG tablet                Primary Care Provider Office Phone # Fax #    Aundrea Max [phone]       [address] [address]        Equal Access to Services     ENE WHITAKER AH: Brendan Vásquez, teri mason, qaismael luna, lidia palumbo  laghada talley. So [facility] [phone].    ATENCIÓN: Si stephla armando, tiene a maya disposición servicios gratuitos de asistencia lingüística. Sapna cohen [phone].    We comply with applicable federal civil rights laws and [place] laws. We do not discriminate on the basis of race, color, national origin, age, disability, sex, sexual orientation, or gender identity.            Thank you!     Thank you for choosing [facility]  for your care. Our goal is always to provide you with excellent care. Hearing back from our patients is one way we can continue to improve our services. Please take a few minutes to complete the written survey that you may receive in the mail after your visit with us. Thank you!             Your Updated Medication List - Protect others around you: Learn how to safely use, store and throw away your medicines at www.disposemymeds.org.          This list is accurate as of 11/16/18 10:58 AM.  Always use your most recent med list.                   Brand Name Dispense Instructions for use Diagnosis    albuterol 108 (90 Base) MCG/ACT inhaler    PROAIR HFA/PROVENTIL HFA/VENTOLIN HFA     Inhale 1-2 puffs into the lungs    Endometrial cancer (H)       buPROPion 300 MG 24 hr tablet    WELLBUTRIN XL     Take 300 mg by mouth    Endometrial cancer (H)       gabapentin 600 MG tablet    NEURONTIN     Take 600 mg by mouth    Endometrial cancer (H)       ibuprofen 600 MG tablet    ADVIL/MOTRIN    30 tablet    Take 1 tablet (600 mg) by mouth every 6 hours as needed for pain (mild)    S/P laparoscopic hysterectomy       loratadine 10 MG tablet    CLARITIN     Take 10 mg by mouth    Endometrial cancer (H)       MELATONIN PO      Take 10 mg by mouth nightly as needed    Endometrial cancer (H)       metoprolol succinate 25 MG 24 hr tablet    TOPROL-XL     Take 25 mg by mouth    Endometrial cancer (H)       MULTIvitamin  S Caps      Take 1 capsule by mouth    Endometrial cancer (H)       ondansetron 8 MG  tablet    ZOFRAN    20 tablet    Take 1 tablet (8 mg) by mouth every 8 hours as needed for nausea (Did not start until 3 days after chemo.)    Endometrial cancer (H)       oxyCODONE 5 mg    ROXICODONE    5 tablet    Take 1 tablet (5 mg) by mouth every 4 hours as needed for moderate to severe pain    Acute post-operative pain       oxyCODONE-acetaminophen 5-325 MG per tablet    PERCOCET    5 tablet    Take 1 tablet by mouth every 6 hours as needed for pain (moderate to severe)    S/P laparoscopic hysterectomy       prochlorperazine 10 MG tablet    COMPAZINE    30 tablet    Take 1 tablet (10 mg) by mouth every 6 hours as needed (nausea/vomiting)    Endometrial cancer (H)       senna-docusate 8.6-50 MG per tablet    SENOKOT-S;PERICOLACE    30 tablet    Take 1-2 tablets by mouth 2 times daily Take while on oral narcotics to prevent or treat constipation.    S/P laparoscopic hysterectomy       spironolactone 50 MG tablet    ALDACTONE     Take 50 mg by mouth    Endometrial cancer (H)       sulfamethoxazole-trimethoprim 800-160 MG per tablet    BACTRIM DS/SEPTRA DS    14 tablet    Take 1 tablet by mouth 2 times daily    Acute cystitis without hematuria       traZODone 50 MG tablet    DESYREL    14 tablet    Take 1 tablet (50 mg) by mouth nightly as needed for sleep    Sleep disorder

## 2018-11-16 NOTE — PROGRESS NOTES
Infusion Nursing Note:  Meagan Morales presents today for Cycle 1 Day 1 Taxol/Carboplatin.    Patient seen by provider today: No   present during visit today: Not Applicable.    Note: Patient new to oncology infusion room and is receiving Taxol/Carboplatin for the first time. Pt oriented to infusion room and call light. New patient teaching done previously. Pt received new pt folder prior to coming to infusion. Writer reinforced chemotherapy teaching/side effects and schedule. Copy of AVS reviewed with patient. Pt instructed to call care coordinator, triage (or MD on call if after hours/weekends) with chills/temp >=100.5, questions/concerns. Pt stated understanding of plan.     Patient in good spirits today and ready to start chemo. Spent adequate time answering all questions regarding side effects of each chemo. Pt has been experiencing some mild intermittent lower abdominal pain post DEL. Dr. Ann aware per note 11/8/18.    Taxol rates as follows:  999cc/hr for 13ccs  10cc/hr for 5 min  25cc/hr for 5 min  50cc/hr for 5 min  100cc/hr for 5 min  204cc/hr for remainder of infusion (Rate stated on bag/MAR)      Intravenous Access:  Peripheral IV placed.    Treatment Conditions:  Lab Results   Component Value Date    HGB 12.6 11/16/2018     Lab Results   Component Value Date    WBC 6.1 11/16/2018      Lab Results   Component Value Date    ANEU 3.4 11/16/2018     Lab Results   Component Value Date     11/16/2018      Lab Results   Component Value Date     11/16/2018                   Lab Results   Component Value Date    POTASSIUM 4.0 11/16/2018           Lab Results   Component Value Date    MAG 2.2 11/16/2018            Lab Results   Component Value Date    CR 0.79 11/16/2018                   Lab Results   Component Value Date    SIDDHARTHA 9.0 11/16/2018                Lab Results   Component Value Date    BILITOTAL 0.2 11/16/2018           Lab Results   Component Value Date    ALBUMIN 3.5  11/16/2018                    Lab Results   Component Value Date    ALT 42 11/16/2018           Lab Results   Component Value Date    AST 25 11/16/2018       Results reviewed, labs MET treatment parameters, ok to proceed with treatment.  AUC Carboplatin dose double-checked with Alondra Naranjo. Discrepancy in dosing noted. Pharmacist paged BETTIE and orders reviewed and verified. Note placed by Alondra Naranjo in orders.    Post Infusion Assessment:  Patient tolerated infusion without incident. ~1155 when checking on patient, she stated she had when felt like a hot flash 5 minutes prior. Stopped the infusion and notified Araceli Cobos RN. Pt stated she has been experiencing hot flashes off and on since her surgery. Due to the delayed response, lack of facial flushing observed, and short duration of symptom, explained to pt that his was probably due to her recent DEL. Restarted chemo after assessing patient. Vs taken - stable.  Patient observed for first 5 minutes into Taxol infusion.  Blood return noted pre and post infusion.  Site patent and intact, free from redness, edema or discomfort.  No evidence of extravasations.  Access discontinued per protocol.    Discharge Plan:   Prescription refills given for Compazine and Zofran (Take-Home meds). Pharmacy brought meds to patient along with teaching.  Discharge instructions reviewed with: Patient and mother.  Patient and/or family verbalized understanding of discharge instructions and all questions answered.  Copy of AVS reviewed with patient and/or family.  Patient will return 12/6/18 for next appointment.  Patient discharged in stable condition accompanied by: mother.  Departure Mode: Ambulatory.  Face to Face time: 10 minutes.    Yuliya Collins RN

## 2018-11-16 NOTE — NURSING NOTE
Chief Complaint   Patient presents with     Blood Draw     IV placement with blood draw and vitals     Labs drawn via IV placed by Shannon Villagran in left arm

## 2018-11-16 NOTE — PATIENT INSTRUCTIONS
Contact Numbers    [facility] Main Line: [phone]  [facility] Triage and After Hours Nurse Line:  [phone]    Please call the [facility] nurse triage or the after hours nurse line if you experience a temperature greater than or equal to 100.5, shaking chills, have uncontrolled nausea, vomiting and/or diarrhea, dizziness, lightheadedness, shortness of breath, chest pain, bleeding, unexplained bruising, or if you have any other new/concerning symptoms, questions or concerns.     If you are having any concerning symptoms or wish to speak to a provider before your next infusion visit, please call your care coordinator or triage to notify them so we can adequately serve you.     If you need a refill on a narcotic prescription or other medication, please call triage before your infusion appointment.           November 2018 Sunday Monday Tuesday Wednesday Thursday Friday Saturday                       1     2     [facility] RETURN    2:25 PM   (40 min.)   Shoshana David APRN CNP   [facility] 3       4     5     6     CT ABDOMEN PELVIS WWO    2:00 PM   (20 min.)   UCCT1   [facility] CT 7     CT UROGRAM WWO    3:30 PM   (20 min.)   UCCT2   [facility] CT     LAB    3:45 PM   (15 min.)    LAB   [facility] Lab 8     UMP RETURN    1:45 PM   (20 min.)   Linh De Souza MD   [facility] 9     10       11     12     13     14     15     16     [facility] LAB DRAW    8:15 AM   (15 min.)    [facility] LAB DRAW   [facility] Lab Draw     [facility] ONC INFUSION 360    9:00 AM   (360 min.)    ONCOLOGY INFUSION   [facility] 17       18     19     20     21     22     23     24       25     26     27     28     29 30 December 2018 Sunday Monday Tuesday Wednesday Thursday Friday Saturday                                 1       2     3     4     5     6     [facility] LAB DRAW    6:30 AM   (15 min.)   [facility] DRAW   [facility]  [facility] Lab Draw     [facility] RETURN ACTIVE TREATMENT    6:45 AM   (40 min.)   Virgen Gallagher APRN CNP   [facility] ONC INFUSION 360    8:00 AM   (360 min.)    ONCOLOGY INFUSION   [facility] 7     8       9     10     11     12     13     14     15       16     17     18     19     20     21     22       23     24     25     26     27     [facility] LAB DRAW    8:30 AM   (15 min.)    [facility] LAB DRAW   [facility] Lab Draw     [facility] RETURN ACTIVE TREATMENT    8:45 AM   (40 min.)   Virgen Gallagher APRN CNP   [facility] ONC INFUSION 360   10:00 AM   (360 min.)    ONCOLOGY INFUSION   [facility] 28     29       30     31                                           Lab Results:  Recent Results (from the past 12 hour(s))   Calcium ionized whole blood    Collection Time: 11/16/18  9:02 AM   Result Value Ref Range    Calcium Ionized Whole Blood 4.9 4.4 - 5.2 mg/dL   CBC with platelets differential    Collection Time: 11/16/18  9:02 AM   Result Value Ref Range    WBC 6.1 4.0 - 11.0 10e9/L    RBC Count 4.33 3.8 - 5.2 10e12/L    Hemoglobin 12.6 11.7 - 15.7 g/dL    Hematocrit 38.0 35.0 - 47.0 %    MCV 88 78 - 100 fl    MCH 29.1 26.5 - 33.0 pg    MCHC 33.2 31.5 - 36.5 g/dL    RDW 11.6 10.0 - 15.0 %    Platelet Count 348 150 - 450 10e9/L    Diff Method Automated Method     % Neutrophils 56.4 %    % Lymphocytes 29.6 %    % Monocytes 7.4 %    % Eosinophils 5.9 %    % Basophils 0.5 %    % Immature Granulocytes 0.2 %    Nucleated RBCs 0 0 /100    Absolute Neutrophil 3.4 1.6 - 8.3 10e9/L    Absolute Lymphocytes 1.8 0.8 - 5.3 10e9/L    Absolute Monocytes 0.5 0.0 - 1.3 10e9/L    Absolute Eosinophils 0.4 0.0 - 0.7 10e9/L    Absolute Basophils 0.0 0.0 - 0.2 10e9/L    Abs Immature Granulocytes 0.0 0 - 0.4 10e9/L    Absolute Nucleated RBC 0.0    Comprehensive metabolic panel    Collection Time: 11/16/18  9:02 AM   Result Value  Ref Range    Sodium 138 133 - 144 mmol/L    Potassium 4.0 3.4 - 5.3 mmol/L    Chloride 105 94 - 109 mmol/L    Carbon Dioxide 26 20 - 32 mmol/L    Anion Gap 7 3 - 14 mmol/L    Glucose 102 (H) 70 - 99 mg/dL    Urea Nitrogen 26 7 - 30 mg/dL    Creatinine 0.79 0.52 - 1.04 mg/dL    GFR Estimate 80 >60 mL/min/1.7m2    GFR Estimate If Black >90 >60 mL/min/1.7m2    Calcium 9.0 8.5 - 10.1 mg/dL    Bilirubin Total 0.2 0.2 - 1.3 mg/dL    Albumin 3.5 3.4 - 5.0 g/dL    Protein Total 7.6 6.8 - 8.8 g/dL    Alkaline Phosphatase 61 40 - 150 U/L    ALT 42 0 - 50 U/L    AST 25 0 - 45 U/L   Magnesium    Collection Time: 11/16/18  9:02 AM   Result Value Ref Range    Magnesium 2.2 1.6 - 2.3 mg/dL

## 2018-11-21 NOTE — TELEPHONE ENCOUNTER
MEDICATION APPEAL APPROVED    Medication: Ondansetron - approved  Authorization Effective Date: 10/20/2018  Authorization Expiration Date: 11/20/2019  Approved Dose/Quantity: 8mg 20/7  Reference #: [id number]   Insurance Company: [employer] [phone] Fax [phone]  Expected CoPay: $1     CoPay Card Available:      Foundation Assistance Needed:    Which Pharmacy is filling the prescription (Not needed for infusion/clinic administered):

## 2018-12-05 ASSESSMENT — ENCOUNTER SYMPTOMS
FEVER: 0
NERVOUS/ANXIOUS: 0
BRUISES/BLEEDS EASILY: 0
TROUBLE SWALLOWING: 0
COUGH: 0
INSOMNIA: 0
MUSCLE CRAMPS: 0
PANIC: 0
HEMOPTYSIS: 0
STIFFNESS: 0
POOR WOUND HEALING: 0
SPEECH CHANGE: 0
BREAST MASS: 0
WHEEZING: 0
BLOOD IN STOOL: 0
DIFFICULTY URINATING: 0
SKIN CHANGES: 0
INCREASED ENERGY: 0
SLEEP DISTURBANCES DUE TO BREATHING: 0
NAUSEA: 0
COUGH DISTURBING SLEEP: 0
JAUNDICE: 0
HOT FLASHES: 0
CONSTIPATION: 0
EXERCISE INTOLERANCE: 0
BOWEL INCONTINENCE: 0
VOMITING: 0
EYE WATERING: 0
CHILLS: 0
MUSCLE WEAKNESS: 0
SORE THROAT: 0
HALLUCINATIONS: 0
POSTURAL DYSPNEA: 0
DISTURBANCES IN COORDINATION: 0
NUMBNESS: 0
FATIGUE: 0
WEAKNESS: 0
LIGHT-HEADEDNESS: 0
WEIGHT GAIN: 0
BACK PAIN: 0
SYNCOPE: 0
WEIGHT LOSS: 0
TREMORS: 0
HYPOTENSION: 0
ABDOMINAL PAIN: 0
NECK MASS: 0
NECK PAIN: 0
NIGHT SWEATS: 0
DIARRHEA: 0
EYE REDNESS: 0
TINGLING: 0
PALPITATIONS: 0
RECTAL PAIN: 0
DIZZINESS: 0
SMELL DISTURBANCE: 0
EXTREMITY NUMBNESS: 0
JOINT SWELLING: 0
MYALGIAS: 0
SNORES LOUDLY: 0
PARALYSIS: 0
DYSURIA: 0
ALTERED TEMPERATURE REGULATION: 0
SINUS CONGESTION: 0
HEARTBURN: 0
DEPRESSION: 0
SPUTUM PRODUCTION: 0
HEADACHES: 0
LEG PAIN: 0
TASTE DISTURBANCE: 0
FLANK PAIN: 0
TACHYCARDIA: 0
SINUS PAIN: 0
SWOLLEN GLANDS: 0
DYSPNEA ON EXERTION: 0
BREAST PAIN: 0
SHORTNESS OF BREATH: 0
HOARSE VOICE: 0
LEG SWELLING: 0
BLOATING: 0
MEMORY LOSS: 0
DECREASED APPETITE: 0
POLYDIPSIA: 0
DOUBLE VISION: 0
EYE PAIN: 0
LOSS OF CONSCIOUSNESS: 0
SEIZURES: 0
ORTHOPNEA: 0
POLYPHAGIA: 0
RESPIRATORY PAIN: 0
ARTHRALGIAS: 0
HEMATURIA: 0
NAIL CHANGES: 0
DECREASED LIBIDO: 0
RECTAL BLEEDING: 0
DECREASED CONCENTRATION: 0
EYE IRRITATION: 0
CLAUDICATION: 0

## 2018-12-05 NOTE — PROGRESS NOTES
Follow Up Notes on Referred Patient    Date: 2018         RE: Meagan Morales  : 1975  VY: 2018      Meagan Morales is a 43 year old woman with a diagnosis of stage IIIA grade 1 endometrioid adenocarcinoma.   She is here today for follow up and disease management.     Brief History:  Meagan originally presented to clinic due to continuous vaginal bleeding, very light. She thought it was just her menses being continuous. US was done which found thickened endometrial lining. IUD was placed. Second US was done and IUD did not affect endometrial lining and IUD was malpositioned. Since then, she has noted continuous spotting. IUD was removed. Also of note, ovarian cysts were noted on second US. Subsequent endometrial biopsy was done which revealed grade 1 endometrial cancer. She has always had irregular menses and has never been able to get pregnant. Patient's  first and only pap smear on 10/10/2017. Had mammogram in 20's, will order additional mammogram for baseline. No history of prior colonoscopy. Started Wellbutrin this last year, mood has improved. Prior cigarette smoker, quit 2 months ago and switched to vape. Started again smoking cigarettes again but has not had one for 10 days. History of drug use, has been sober for 11 months. History of meth and alcohol abuse. No history of prior abdominal surgeries. No history of heart disease, lung disease or diabetes. Has never been able to get pregnant and dose not desire children.     18: pelvic US IMPRESSION:  Peripheral follicular arrangement high within the ovaries suggesting polycystic ovarian syndrome. Slightly thickened heterogeneous endometrium raising a concern of potential hyperplastic change.  18: pelvic US   1. Uterus is not normal in appearance. The endometrium appears thickened and vascular, and the IUD appears to be malpositioned.  Clinical correlation is recommended.    2. Bilateral complex ovarian cysts are noted.  Follow  up ultrasound in 4-6 weeks is recommended, consider saline infusion sonohysterogram for further imaging of the endometrium.      9/17/18: endometrial biopsy  ENDOMETRIUM, BIOPSY:  1. FIGO Grade 1 (well differentiated) endometrioid carcinoma of      the endometrium  2. DNA mismatch repair enzyme immunohistochemistry studies:     All intact (see Amendment note and synoptic reporting)     10/24/2018: DaVinci Assisted Total Laparoscopic Hysterectomy, Bilateral Salpingo-Oophorectomy, lymph node dissection, Excision of Left Vulvar Lesion  Pelvic Washing:   Rare atypical cells present.     IMMUNOPATHOLOGY-Addendum     TEST(S) PERFORMED     Test(s) Performed:         - Mismatch Repair Immunohistochemistry (IHC) Testing for Mismatch Repair (MMR) Proteins:         MLH1 Expression:             - Intact nuclear expression         MSH2 Expression:             - Intact nuclear expression         MSH6 Expression:             - Intact nuclear expression         PMS2 Expression:             - Intact nuclear expression       Immunohistochemistry (IHC) Interpretation:           - No loss of nuclear expression of MMR proteins: low probability of microsatellite instability-high (MSI-H)     ORIGINAL REPORT:     SPECIMEN(S):   A: Left vulvar lesion   B: Left pelvic sentinel lymph node   C: Right pelvic sentinel lymph node   D: Uterus, cervix, bilateral ovaries and fallopian tubes   E: Portion of left ovary   F: Para-aortic lymph nodes   G: Left pelvic lymph nodes   H: Right pelvic lymph nodes     FINAL DIAGNOSIS:   A. LEFT VULVAR LESION, BIOPSY:   - Intradermal nevus     B. LEFT PELVIC SENTINEL LYMPH NODE, EXCISION:   - One reactive lymph node, negative for malignancy (0/1)     C. RIGHT PELVIC SENTINEL LYMPH NODE, EXCISION:   - Six reactive lymph nodes, negative for malignancy (0/6)     D. UTERUS, CERVIX, BILATERAL OVARIES AND FALLOPIAN TUBES, HYSTERECTOMY WITH BILATERAL SALPINGO-OOPHORECTOMY:   - Endometrial endometrioid adenocarcinoma,  FIGO grade 1   - Atypical endometrial hyperplasia   - Myometrium with no significant histologic abnormality   - Chronic cervicitis with microglandular hyperplasia   - Uterine serosal fibrous adhesions   - Ovaries with cortical hyperplasia   - Metastatic endometrial adenocarcinoma to the left fallopian tube   - Right fallopian tube with no significant histologic abnormality     E. PORTION OF LEFT OVARY, EXCISION:   - Benign cortical inclusion cysts     F. PARA-AORTIC LYMPH NODES, EXCISION:   - Four reactive lymph nodes, negative for malignancy (0/4)   - Benign glandular inclusions consistent with endosalpingiosis     G. LEFT PELVIC LYMPH NODES, EXCISION:   - Eight reactive lymph nodes, negative for malignancy (0/8)     H. RIGHT PELVIC LYMPH NODES, EXCISION:   - Nine reactive lymph nodes, negative for malignancy (0/9)   - Benign glandular inclusions consistent with endosalpingiosis     COMMENT:   The final diagnosis confirms the interpretation provided intraoperatively. The tumor seen in the left fallopian tube involves the wall (invading the mucosa and submucosa) and appears free floating in the lumen.   Tumor Site:         - Endometrium - Anterior and posterior     Histologic Type:         - Endometrioid carcinoma, NOS     Histologic Grade:         - FIGO grade 1     Tumor Size: 5.7 Centimeters (cm)     Tumor Extent       Myometrial Invasion:           - Not identified       Adenomyosis:           - Not identified       Uterine Serosa Involvement:           - Not identified       Lower Uterine Segment Involvement:           - Not identified       Cervical Stromal Involvement:           - Not identified       Other Tissue / Organ Involvement:           - Left fallopian tube       Peritoneal Ascitic Fluid:           - Results pending     Accessory Findings       Lymphovascular Invasion:           - Not identified     11/6/18: CT ap IMPRESSION:   1. Surgical changes of hysterectomy, bilateral salpingo-oophorectomy, and  pelvic lymph node dissection with extensive fat stranding and fluid within the low pelvis extending superiorly along the iliac vasculature, right greater than left, potentially within normal postsurgical limits, however, slightly more than expected for postsurgical change.   1a. The ureters are not well evaluated on this single phase exam and there is no hydronephrosis or hydroureter, however, the amount of fluid in the pelvis does raise the question of ureteral injury. If there is clinical concern, consider obtaining a CT urogram.  1b. An area of irregular rim enhancement in the low pelvis may represent a normal postsurgical vaginal cuff, though, again, this is slightly more conspicuous than usual and dehiscence or a developing abscess may have a similar appearance.  2. 6.8 x 4.1 x 5.6 cm right pelvic sidewall seroma versus lymphocele.  3. Soft tissue nodular thickening anteriorly to the right psoas muscle and along the right lateral conal fascia, indeterminate, cannot rule out metastatic foci. Attention on follow-up studies.  4. Cholelithiasis without evidence of cholecystitis.      11/7/18: CT abd with contrast CT urogram IMPRESSION:   1. Postsurgical changes of hysterectomy, bilateral salpingo-oophorectomy, and iliac lymph node dissection with fluid  collections within the pelvis extending along the iliac vasculature.  a. The right pelvic sidewall fluid collection is not significantly changed while the left pelvic sidewall fluid collection has enlarged  in comparison to the 11/6/2018 CT. The differential remains a seroma versus a lymphocele.  b. No evidence of contrast extravasation to suggest a ureteral injury.  c. Redemonstration of soft tissue nodular thickening along the right lateral conal fascia and anteriorly to the right psoas muscle and right psoas muscle. Attention on follow-up is recommended.  2. Cholelithiasis without evidence of cholecystitis.    Plan: adjuvant therapy to include 6 cycles of taxol  "and carboplatin; would not add radiation as all lymph nodes are negative, she does not have LVSI or deep invasion and this is a low grade tumor. Rx percocet 5 tabs due to continued abdominal pain. Will not prescribe any more narcotics and patient is aware of this.    11/16/18: Cycle #1 Paclitaxel/Carboplatin.   35.  12/6/18: Cycle #2 Paclitaxel/Carboplatin.  pending.           Today she comes to clinic and reports she tolerated her first cycle well; she denies nausea and was taking miralax for about 3 days \"just in case\". She denies any vaginal bleeding, no changes in her bowel or bladder habits, no nausea/emesis, no lower extremity edema, and no difficulties eating or sleeping. She denies any abdominal discomfort/bloating, no fevers or chills, and no chest pain or shortness of breath. She continues to take Gabapentin 2100 mg for her restless legs. She reports the numbness/pain she was having in her right leg is improving. The discomfort she had in her abdomen has also resolved; she does still notice swelling. She has been living in a sober house for the past 1.5 years and was recently asked if she would be interested in being the  in a few months when the current one leaves. She will be out of town 1/17-1/20 for her birthday. She does not need any medications refilled and feels ready for her infusion today.           Review of Systems     Constitutional:  Negative for fever, chills, weight loss, weight gain, fatigue, decreased appetite, night sweats, recent stressors, height gain, height loss, post-operative complications, incisional pain, hallucinations, increased energy, hyperactivity and confused.   HENT:  Negative for ear pain, hearing loss, tinnitus, nosebleeds, trouble swallowing, hoarse voice, mouth sores, sore throat, ear discharge, tooth pain, gum tenderness, taste disturbance, smell disturbance, hearing aid, bleeding gums, dry mouth, sinus pain, sinus congestion and neck mass.  "   Eyes:  Negative for double vision, pain, redness, eye pain, decreased vision, eye watering, eye bulging, eye dryness, flashing lights, spots, floaters, strabismus, tunnel vision, jaundice and eye irritation.   Respiratory:   Negative for cough, hemoptysis, sputum production, shortness of breath, wheezing, sleep disturbances due to breathing, snores loudly, respiratory pain, dyspnea on exertion, cough disturbing sleep and postural dyspnea.    Cardiovascular:  Positive for hypertension. Negative for chest pain, dyspnea on exertion, palpitations, orthopnea, claudication, leg swelling, fingers/toes turn blue, hypotension, syncope, history of heart murmur, chest pain on exertion, chest pain at rest, pacemaker, few scattered varicosities, leg pain, sleep disturbances due to breathing, tachycardia, light-headedness, exercise intolerance and edema.   Gastrointestinal:  Negative for heartburn, nausea, vomiting, abdominal pain, diarrhea, constipation, blood in stool, melena, rectal pain, bloating, hemorrhoids, bowel incontinence, jaundice, rectal bleeding, coffee ground emesis and change in stool.   Genitourinary:  Negative for bladder incontinence, dysuria, urgency, hematuria, flank pain, vaginal discharge, difficulty urinating, genital sores, dyspareunia, decreased libido, nocturia, voiding less frequently, arousal difficulty, abnormal vaginal bleeding, excessive menstruation, menstrual changes, hot flashes, vaginal dryness and postmenopausal bleeding.   Musculoskeletal:  Negative for myalgias, back pain, joint swelling, arthralgias, stiffness, muscle cramps, neck pain, bone pain, muscle weakness and fracture.   Skin:  Negative for nail changes, itching, poor wound healing, rash, hair changes, skin changes, acne, warts, poor wound healing, scarring, flaky skin, Raynaud's phenomenon, sensitivity to sunlight and skin thickening.   Neurological:  Negative for dizziness, tingling, tremors, speech change, seizures, loss of  consciousness, weakness, light-headedness, numbness, headaches, disturbances in coordination, extremity numbness, memory loss, difficulty walking and paralysis.   Endo/Heme:  Negative for anemia, swollen glands and bruises/bleeds easily.   Psychiatric/Behavioral:  Negative for depression, hallucinations, memory loss, decreased concentration, mood swings and panic attacks.    Breast:  Negative for breast discharge, breast mass, breast pain and nipple retraction.   Endocrine:  Negative for altered temperature regulation, polyphagia, polydipsia, unwanted hair growth and change in facial hair.          Past Medical History:    Past Medical History:   Diagnosis Date     Chickenpox     as a child     Depression      Endometrial cancer (H)      Hypertension     since her 30's     Infertility, female      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)      PCOS (polycystic ovarian syndrome)      STD (female)     in her 20's         Past Surgical History:    Past Surgical History:   Procedure Laterality Date     CYSTOSCOPY N/A 10/24/2018    Procedure: Cystoscopy;  Surgeon: Linh De Souza MD;  Location: [facility] OR     DAVINCI HYSTERECTOMY TOTAL, BILATERAL SALPINGO-OOPHORECTOMY, NODE DISSECTION, COMBINED N/A 10/24/2018    Procedure: DaVinci Assisted Total Laparoscopic Hysterectomy, Bilateral Salpingo-Oophorectomy, lymph node dissection;  Surgeon: Linh De Souza MD;  Location: [facility] OR     EXCISE LESION VULVA Left 10/24/2018    Procedure: Excision of Left Vulvar Lesion;  Surgeon: Linh De Souza MD;  Location: [facility] OR         Health Maintenance Due   Topic Date Due     PHQ-2 Q1 YR  01/20/1989     HIV SCREEN (SYSTEM ASSIGNED)  01/20/1995     PAP SCREENING Q3 YR (SYSTEM ASSIGNED)  01/20/1998       Current Medications:     Current Outpatient Prescriptions   Medication Sig Dispense Refill     buPROPion (WELLBUTRIN XL) 300 MG 24 hr tablet Take 300 mg by mouth       gabapentin (NEURONTIN) 600 MG tablet Take 600 mg by mouth        ibuprofen (ADVIL/MOTRIN) 600 MG tablet Take 1 tablet (600 mg) by mouth every 6 hours as needed for pain (mild) 30 tablet 0     loratadine (CLARITIN) 10 MG tablet Take 10 mg by mouth       MELATONIN PO Take 10 mg by mouth nightly as needed       metoprolol succinate (TOPROL-XL) 25 MG 24 hr tablet Take 25 mg by mouth       Multiple Vitamin (MULTIVITAMIN  S) CAPS Take 1 capsule by mouth       prochlorperazine (COMPAZINE) 10 MG tablet Take 1 tablet (10 mg) by mouth every 6 hours as needed (nausea/vomiting) 30 tablet 5     senna-docusate (SENOKOT-S;PERICOLACE) 8.6-50 MG per tablet Take 1-2 tablets by mouth 2 times daily Take while on oral narcotics to prevent or treat constipation. 30 tablet 0     spironolactone (ALDACTONE) 50 MG tablet Take 50 mg by mouth       albuterol (PROAIR HFA/PROVENTIL HFA/VENTOLIN HFA) 108 (90 Base) MCG/ACT inhaler Inhale 1-2 puffs into the lungs       ondansetron (ZOFRAN) 8 MG tablet Take 1 tablet (8 mg) by mouth every 8 hours as needed for nausea (Did not start until 3 days after chemo.) (Patient not taking: Reported on 12/6/2018) 20 tablet 1     oxyCODONE (ROXICODONE) 5 mg Take 1 tablet (5 mg) by mouth every 4 hours as needed for moderate to severe pain (Patient not taking: Reported on 11/16/2018) 5 tablet 0     oxyCODONE-acetaminophen (PERCOCET) 5-325 MG per tablet Take 1 tablet by mouth every 6 hours as needed for pain (moderate to severe) (Patient not taking: Reported on 11/16/2018) 5 tablet 0     sulfamethoxazole-trimethoprim (BACTRIM DS/SEPTRA DS) 800-160 MG per tablet Take 1 tablet by mouth 2 times daily (Patient not taking: Reported on 11/16/2018) 14 tablet 0     traZODone (DESYREL) 50 MG tablet Take 1 tablet (50 mg) by mouth nightly as needed for sleep (Patient not taking: Reported on 11/16/2018) 14 tablet 0         Allergies:        Allergies   Allergen Reactions     Amoxicillin      Penicillin G         Social History:     Social History   Substance Use Topics     Smoking status:  Former Smoker     Packs/day: 0.50     Types: Cigarettes     Smokeless tobacco: Never Used      Comment: uses vape pen about 3-4 times daily     Alcohol use No      Comment: 11 months sober        History   Drug Use No     Comment: 11 months sober. lives at sober housing         Family History:     The patient's family history is notable for:    No family history on file.      Physical Exam:     /85 (BP Location: Right arm, Patient Position: Sitting, Cuff Size: Adult Regular)  Pulse 69  Temp 98.3  F (36.8  C) (Oral)  Resp 16  Wt 102.9 kg (226 lb 12.8 oz)  SpO2 97%  BMI 38.02 kg/m2  Body mass index is 38.02 kg/(m^2).    General Appearance: healthy and alert, no distress     HEENT: no thyromegaly, no palpable nodules or masses        Cardiovascular: regular rate and rhythm, no gallops, rubs or murmurs     Respiratory: lungs clear, no rales, rhonchi or wheezes, normal diaphragmatic excursion    Musculoskeletal: extremities non tender and without edema    Skin: no lesions or rashes     Neurological: normal gait, no gross defects     Psychiatric: appropriate mood and affect                               Hematological: normal cervical, supraclavicular and inguinal lymph nodes     Gastrointestinal:       abdomen soft, non-tender, non-distended    Genitourinary: deferred      Assessment:    Meagan Morales is a 43 year old woman with a diagnosis of stage IIIA grade 1 endometrioid adenocarcinoma.   She is here today for follow up and disease management.     30 minutes were spent with this patient, over 50% of that time was spent in symptom management, treatment planning and in counseling and coordination of care.      Plan:     1.)        Ok to proceed with planned chemotherapy pending labs are WNL. She will return in 3 weeks for her next cycle. Discussed importance of eating smaller meals more often, adequate hydration, and pacing activities. Reviewed signs and symptoms for when she should contact the clinic or seek  additional care. Patient to contact the clinic with any questions or concerns in the interim. Answered all of her questions to the best of my ability.      2.) Genetic risk factors were assessed and her MMR was intact.    3.) Labs and/or tests ordered include:  Chemo labs. .      4.) Health maintenance issues addressed today include annual health maintenance and non-gynecologic issues with PCP.    CORTES Gant, WHNP-BC, ANP-BC  Women's Health Nurse Practitioner  Adult Nurse Pracitioner  Division of Gynecologic Oncology          CC  Patient Care Team:  Aundrea Max MD as PCP - General  SELF, REFERRED

## 2018-12-06 ENCOUNTER — INFUSION THERAPY VISIT (OUTPATIENT)
Dept: ONCOLOGY | Facility: CLINIC | Age: 43
End: 2018-12-06
Attending: NURSE PRACTITIONER
Payer: COMMERCIAL

## 2018-12-06 VITALS
HEART RATE: 69 BPM | WEIGHT: 226.8 LBS | SYSTOLIC BLOOD PRESSURE: 132 MMHG | BODY MASS INDEX: 38.02 KG/M2 | TEMPERATURE: 98.3 F | DIASTOLIC BLOOD PRESSURE: 85 MMHG | RESPIRATION RATE: 16 BRPM | OXYGEN SATURATION: 97 %

## 2018-12-06 DIAGNOSIS — G25.81 RESTLESS LEGS SYNDROME: ICD-10-CM

## 2018-12-06 DIAGNOSIS — C54.1 ENDOMETRIAL CANCER (H): Primary | ICD-10-CM

## 2018-12-06 DIAGNOSIS — Z51.11 ENCOUNTER FOR ANTINEOPLASTIC CHEMOTHERAPY: ICD-10-CM

## 2018-12-06 DIAGNOSIS — I15.9 SECONDARY HYPERTENSION: ICD-10-CM

## 2018-12-06 LAB
ALBUMIN SERPL-MCNC: 3.4 G/DL (ref 3.4–5)
ALP SERPL-CCNC: 66 U/L (ref 40–150)
ALT SERPL W P-5'-P-CCNC: 25 U/L (ref 0–50)
ANION GAP SERPL CALCULATED.3IONS-SCNC: 9 MMOL/L (ref 3–14)
AST SERPL W P-5'-P-CCNC: 13 U/L (ref 0–45)
BASOPHILS # BLD AUTO: 0 10E9/L (ref 0–0.2)
BASOPHILS NFR BLD AUTO: 0.7 %
BILIRUB SERPL-MCNC: 0.2 MG/DL (ref 0.2–1.3)
BUN SERPL-MCNC: 20 MG/DL (ref 7–30)
CALCIUM SERPL-MCNC: 8.8 MG/DL (ref 8.5–10.1)
CANCER AG125 SERPL-ACNC: 8 U/ML (ref 0–30)
CHLORIDE SERPL-SCNC: 108 MMOL/L (ref 94–109)
CO2 SERPL-SCNC: 25 MMOL/L (ref 20–32)
CREAT SERPL-MCNC: 0.85 MG/DL (ref 0.52–1.04)
DIFFERENTIAL METHOD BLD: NORMAL
EOSINOPHIL # BLD AUTO: 0.2 10E9/L (ref 0–0.7)
EOSINOPHIL NFR BLD AUTO: 3.4 %
ERYTHROCYTE [DISTWIDTH] IN BLOOD BY AUTOMATED COUNT: 12.3 % (ref 10–15)
GFR SERPL CREATININE-BSD FRML MDRD: 74 ML/MIN/1.7M2
GLUCOSE SERPL-MCNC: 120 MG/DL (ref 70–99)
HCT VFR BLD AUTO: 36.4 % (ref 35–47)
HGB BLD-MCNC: 12.3 G/DL (ref 11.7–15.7)
IMM GRANULOCYTES # BLD: 0 10E9/L (ref 0–0.4)
IMM GRANULOCYTES NFR BLD: 0.4 %
LYMPHOCYTES # BLD AUTO: 1.8 10E9/L (ref 0.8–5.3)
LYMPHOCYTES NFR BLD AUTO: 32.1 %
MAGNESIUM SERPL-MCNC: 2.1 MG/DL (ref 1.6–2.3)
MCH RBC QN AUTO: 29.6 PG (ref 26.5–33)
MCHC RBC AUTO-ENTMCNC: 33.8 G/DL (ref 31.5–36.5)
MCV RBC AUTO: 88 FL (ref 78–100)
MONOCYTES # BLD AUTO: 0.5 10E9/L (ref 0–1.3)
MONOCYTES NFR BLD AUTO: 8.5 %
NEUTROPHILS # BLD AUTO: 3 10E9/L (ref 1.6–8.3)
NEUTROPHILS NFR BLD AUTO: 54.9 %
NRBC # BLD AUTO: 0 10*3/UL
NRBC BLD AUTO-RTO: 0 /100
PLATELET # BLD AUTO: 247 10E9/L (ref 150–450)
POTASSIUM SERPL-SCNC: 3.9 MMOL/L (ref 3.4–5.3)
PROT SERPL-MCNC: 7.4 G/DL (ref 6.8–8.8)
RBC # BLD AUTO: 4.16 10E12/L (ref 3.8–5.2)
SODIUM SERPL-SCNC: 142 MMOL/L (ref 133–144)
WBC # BLD AUTO: 5.5 10E9/L (ref 4–11)

## 2018-12-06 PROCEDURE — 25000128 H RX IP 250 OP 636: Mod: ZF | Performed by: NURSE PRACTITIONER

## 2018-12-06 PROCEDURE — 40000556 ZZH STATISTIC PERIPHERAL IV START W US GUIDANCE: Mod: ZF

## 2018-12-06 PROCEDURE — 99214 OFFICE O/P EST MOD 30 MIN: CPT | Mod: ZP | Performed by: NURSE PRACTITIONER

## 2018-12-06 PROCEDURE — 25000132 ZZH RX MED GY IP 250 OP 250 PS 637: Mod: ZF | Performed by: NURSE PRACTITIONER

## 2018-12-06 PROCEDURE — G0463 HOSPITAL OUTPT CLINIC VISIT: HCPCS | Mod: ZF

## 2018-12-06 PROCEDURE — 86304 IMMUNOASSAY TUMOR CA 125: CPT | Performed by: NURSE PRACTITIONER

## 2018-12-06 PROCEDURE — 96417 CHEMO IV INFUS EACH ADDL SEQ: CPT

## 2018-12-06 PROCEDURE — 83735 ASSAY OF MAGNESIUM: CPT | Performed by: NURSE PRACTITIONER

## 2018-12-06 PROCEDURE — 80053 COMPREHEN METABOLIC PANEL: CPT | Performed by: NURSE PRACTITIONER

## 2018-12-06 PROCEDURE — 85025 COMPLETE CBC W/AUTO DIFF WBC: CPT | Performed by: NURSE PRACTITIONER

## 2018-12-06 PROCEDURE — 96415 CHEMO IV INFUSION ADDL HR: CPT

## 2018-12-06 PROCEDURE — 96413 CHEMO IV INFUSION 1 HR: CPT

## 2018-12-06 PROCEDURE — 36415 COLL VENOUS BLD VENIPUNCTURE: CPT

## 2018-12-06 PROCEDURE — 25000125 ZZHC RX 250: Mod: ZF | Performed by: NURSE PRACTITIONER

## 2018-12-06 PROCEDURE — 96375 TX/PRO/DX INJ NEW DRUG ADDON: CPT

## 2018-12-06 RX ORDER — DIPHENHYDRAMINE HCL 25 MG
50 CAPSULE ORAL ONCE
Status: COMPLETED | OUTPATIENT
Start: 2018-12-06 | End: 2018-12-06

## 2018-12-06 RX ORDER — DIPHENHYDRAMINE HCL 25 MG
50 CAPSULE ORAL ONCE
Status: CANCELLED
Start: 2018-12-06

## 2018-12-06 RX ORDER — ALBUTEROL SULFATE 90 UG/1
1-2 AEROSOL, METERED RESPIRATORY (INHALATION)
Status: CANCELLED
Start: 2018-12-06

## 2018-12-06 RX ORDER — PALONOSETRON 0.05 MG/ML
0.25 INJECTION, SOLUTION INTRAVENOUS ONCE
Status: COMPLETED | OUTPATIENT
Start: 2018-12-06 | End: 2018-12-06

## 2018-12-06 RX ORDER — EPINEPHRINE 1 MG/ML
0.3 INJECTION, SOLUTION INTRAMUSCULAR; SUBCUTANEOUS EVERY 5 MIN PRN
Status: CANCELLED | OUTPATIENT
Start: 2018-12-06

## 2018-12-06 RX ORDER — LORAZEPAM 2 MG/ML
1 INJECTION INTRAMUSCULAR EVERY 6 HOURS PRN
Status: CANCELLED
Start: 2018-12-06

## 2018-12-06 RX ORDER — DIPHENHYDRAMINE HYDROCHLORIDE 50 MG/ML
50 INJECTION INTRAMUSCULAR; INTRAVENOUS
Status: CANCELLED
Start: 2018-12-06

## 2018-12-06 RX ORDER — PALONOSETRON 0.05 MG/ML
0.25 INJECTION, SOLUTION INTRAVENOUS ONCE
Status: CANCELLED
Start: 2018-12-06

## 2018-12-06 RX ORDER — SODIUM CHLORIDE 9 MG/ML
1000 INJECTION, SOLUTION INTRAVENOUS CONTINUOUS PRN
Status: CANCELLED
Start: 2018-12-06

## 2018-12-06 RX ORDER — METHYLPREDNISOLONE SODIUM SUCCINATE 125 MG/2ML
125 INJECTION, POWDER, LYOPHILIZED, FOR SOLUTION INTRAMUSCULAR; INTRAVENOUS
Status: CANCELLED
Start: 2018-12-06

## 2018-12-06 RX ORDER — EPINEPHRINE 0.3 MG/.3ML
0.3 INJECTION SUBCUTANEOUS EVERY 5 MIN PRN
Status: CANCELLED | OUTPATIENT
Start: 2018-12-06

## 2018-12-06 RX ORDER — MEPERIDINE HYDROCHLORIDE 25 MG/ML
25 INJECTION INTRAMUSCULAR; INTRAVENOUS; SUBCUTANEOUS EVERY 30 MIN PRN
Status: CANCELLED | OUTPATIENT
Start: 2018-12-06

## 2018-12-06 RX ORDER — ALBUTEROL SULFATE 0.83 MG/ML
2.5 SOLUTION RESPIRATORY (INHALATION)
Status: CANCELLED | OUTPATIENT
Start: 2018-12-06

## 2018-12-06 RX ADMIN — DIPHENHYDRAMINE HYDROCHLORIDE 50 MG: 25 CAPSULE ORAL at 07:54

## 2018-12-06 RX ADMIN — SODIUM CHLORIDE 250 ML: 9 INJECTION, SOLUTION INTRAVENOUS at 08:22

## 2018-12-06 RX ADMIN — CARBOPLATIN 770 MG: 10 INJECTION, SOLUTION INTRAVENOUS at 12:12

## 2018-12-06 RX ADMIN — PACLITAXEL 380 MG: 6 INJECTION, SOLUTION INTRAVENOUS at 08:52

## 2018-12-06 RX ADMIN — FAMOTIDINE 40 MG: 10 INJECTION INTRAVENOUS at 08:24

## 2018-12-06 RX ADMIN — PALONOSETRON HYDROCHLORIDE 0.25 MG: 0.25 INJECTION INTRAVENOUS at 08:23

## 2018-12-06 RX ADMIN — DEXAMETHASONE SODIUM PHOSPHATE 20 MG: 10 INJECTION, SOLUTION INTRAMUSCULAR; INTRAVENOUS at 08:22

## 2018-12-06 ASSESSMENT — PAIN SCALES - GENERAL: PAINLEVEL: NO PAIN (0)

## 2018-12-06 ASSESSMENT — ENCOUNTER SYMPTOMS: HYPERTENSION: 1

## 2018-12-06 NOTE — MR AVS SNAPSHOT
After Visit Summary   12/6/2018    Meagan Morales    MRN: [id number]           Patient Information     Date Of Birth          1975        Visit Information        Provider Department      12/6/2018 7:00 AM Virgen Gallagher APRN CNP [facility]        Today's Diagnoses     Endometrial cancer (H)    -  1    Encounter for antineoplastic chemotherapy        Restless legs syndrome        Secondary hypertension           Follow-ups after your visit        Your next 10 appointments already scheduled     Dec 27, 2018  8:30 AM CST   [facility] with [facility]   [facility] ([facility])    [address] [place] [address]  [address]  [facility] [id number] [phone]            Dec 27, 2018  9:00 AM CST   (Arrive by 8:45 AM)   Return Active Treatment with CORTES Prater CNP   [facility] ([facility])    [address] [place] [address]  [address]  [facility] [id number] [phone]            Dec 27, 2018 10:00 AM CST   Infusion 360 with  ONCOLOGY INFUSION, UC 12 ATC   [facility] ([facility])    [address] [place] [address]  [address]  [facility] [id number] [phone]              Who to contact     If you have questions or need follow up information about today's clinic visit or your schedule please contact [facility] directly at [phone].  Normal or non-critical lab and imaging results will be communicated to you by MyChart, letter or phone within 4 business days after the clinic has received the results. If you do not hear from us within 7 days, please contact the clinic through MyChart or phone. If you have a critical or abnormal lab result, we will notify you by phone as soon as possible.  Submit refill requests through videoNEXT or call your pharmacy and they will forward the refill request to us. Please allow 3  business days for your refill to be completed.          Additional Information About Your Visit        MyChart Information     [facility] gives you secure access to your electronic health record. If you see a primary care provider, you can also send messages to your care team and make appointments. If you have questions, please call your primary care clinic.  If you do not have a primary care provider, please call [phone] and they will assist you.        Care EveryWhere ID     This is your Care EveryWhere ID. This could be used by other organizations to access your [facility] medical records  [id number]        Your Vitals Were     Pulse Temperature Respirations Pulse Oximetry BMI (Body Mass Index)       69 98.3  F (36.8  C) (Oral) 16 97% 38.02 kg/m2        Blood Pressure from Last 3 Encounters:   12/06/18 132/85   11/16/18 131/83   11/08/18 109/56    Weight from Last 3 Encounters:   12/06/18 102.9 kg (226 lb 12.8 oz)   11/16/18 102.7 kg (226 lb 6.4 oz)   11/08/18 102.6 kg (226 lb 4.8 oz)              Today, you had the following     No orders found for display       Primary Care Provider Office Phone # Fax #    Aundrea Max [phone]       [address] [address]        Equal Access to Services     ENE WHITAKER : Hadii elisabeth stanley hadasho Sojoe, waaxda luqadaha, qaybta kaalmada adeegyada, lidia santillan . So [facility] [phone].    ATENCIÓN: Si habla español, tiene a maya disposición servicios gratuitos de asistencia lingüística. Llame al [phone].    We comply with applicable federal civil rights laws and [place] laws. We do not discriminate on the basis of race, color, national origin, age, disability, sex, sexual orientation, or gender identity.            Thank you!     Thank you for choosing [facility]  for your care. Our goal is always to provide you with excellent care. Hearing back from our patients is one  way we can continue to improve our services. Please take a few minutes to complete the written survey that you may receive in the mail after your visit with us. Thank you!             Your Updated Medication List - Protect others around you: Learn how to safely use, store and throw away your medicines at www.disposemymeds.org.          This list is accurate as of 12/6/18  8:11 AM.  Always use your most recent med list.                   Brand Name Dispense Instructions for use Diagnosis    albuterol 108 (90 Base) MCG/ACT inhaler    PROAIR HFA/PROVENTIL HFA/VENTOLIN HFA     Inhale 1-2 puffs into the lungs    Endometrial cancer (H)       buPROPion 300 MG 24 hr tablet    WELLBUTRIN XL     Take 300 mg by mouth    Endometrial cancer (H)       gabapentin 600 MG tablet    NEURONTIN     Take 600 mg by mouth    Endometrial cancer (H)       ibuprofen 600 MG tablet    ADVIL/MOTRIN    30 tablet    Take 1 tablet (600 mg) by mouth every 6 hours as needed for pain (mild)    S/P laparoscopic hysterectomy       loratadine 10 MG tablet    CLARITIN     Take 10 mg by mouth    Endometrial cancer (H)       MELATONIN PO      Take 10 mg by mouth nightly as needed    Endometrial cancer (H)       metoprolol succinate ER 25 MG 24 hr tablet    TOPROL-XL     Take 25 mg by mouth    Endometrial cancer (H)       multivitamin capsule      Take 1 capsule by mouth    Endometrial cancer (H)       ondansetron 8 MG tablet    ZOFRAN    20 tablet    Take 1 tablet (8 mg) by mouth every 8 hours as needed for nausea (Did not start until 3 days after chemo.)    Endometrial cancer (H)       prochlorperazine 10 MG tablet    COMPAZINE    30 tablet    Take 1 tablet (10 mg) by mouth every 6 hours as needed (nausea/vomiting)    Endometrial cancer (H)       senna-docusate 8.6-50 MG tablet    SENOKOT-S/PERICOLACE    30 tablet    Take 1-2 tablets by mouth 2 times daily Take while on oral narcotics to prevent or treat constipation.    S/P laparoscopic hysterectomy        spironolactone 50 MG tablet    ALDACTONE     Take 50 mg by mouth    Endometrial cancer (H)

## 2018-12-06 NOTE — LETTER
2018       RE: Meagan Morales  [address] [place] [id number]     Dear Colleague,    Thank you for referring your patient, Meagan Morales, to the [facility]. Please see a copy of my visit note below.                Follow Up Notes on Referred Patient    Date: 2018         RE: Meagan Morales  : 1975  VY: 2018      Meagan Morales is a 43 year old woman with a diagnosis of stage IIIA grade 1 endometrioid adenocarcinoma.   She is here today for follow up and disease management.     Brief History:  Meagan originally presented to clinic due to continuous vaginal bleeding, very light. She thought it was just her menses being continuous. US was done which found thickened endometrial lining. IUD was placed. Second US was done and IUD did not affect endometrial lining and IUD was malpositioned. Since then, she has noted continuous spotting. IUD was removed. Also of note, ovarian cysts were noted on second US. Subsequent endometrial biopsy was done which revealed grade 1 endometrial cancer. She has always had irregular menses and has never been able to get pregnant. Patient's  first and only pap smear on 10/10/2017. Had mammogram in 20's, will order additional mammogram for baseline. No history of prior colonoscopy. Started Wellbutrin this last year, mood has improved. Prior cigarette smoker, quit 2 months ago and switched to vape. Started again smoking cigarettes again but has not had one for 10 days. History of drug use, has been sober for 11 months. History of meth and alcohol abuse. No history of prior abdominal surgeries. No history of heart disease, lung disease or diabetes. Has never been able to get pregnant and dose not desire children.     18: pelvic US IMPRESSION:  Peripheral follicular arrangement high within the ovaries suggesting polycystic ovarian syndrome. Slightly thickened heterogeneous endometrium raising a concern of potential hyperplastic change.  18:  pelvic US   1. Uterus is not normal in appearance. The endometrium appears thickened and vascular, and the IUD appears to be malpositioned.  Clinical correlation is recommended.    2. Bilateral complex ovarian cysts are noted.  Follow up ultrasound in 4-6 weeks is recommended, consider saline infusion sonohysterogram for further imaging of the endometrium.      9/17/18: endometrial biopsy  ENDOMETRIUM, BIOPSY:  1. FIGO Grade 1 (well differentiated) endometrioid carcinoma of      the endometrium  2. DNA mismatch repair enzyme immunohistochemistry studies:     All intact (see Amendment note and synoptic reporting)     10/24/2018: DaVinci Assisted Total Laparoscopic Hysterectomy, Bilateral Salpingo-Oophorectomy, lymph node dissection, Excision of Left Vulvar Lesion  Pelvic Washing:   Rare atypical cells present.     IMMUNOPATHOLOGY-Addendum     TEST(S) PERFORMED     Test(s) Performed:         - Mismatch Repair Immunohistochemistry (IHC) Testing for Mismatch Repair (MMR) Proteins:         MLH1 Expression:             - Intact nuclear expression         MSH2 Expression:             - Intact nuclear expression         MSH6 Expression:             - Intact nuclear expression         PMS2 Expression:             - Intact nuclear expression       Immunohistochemistry (IHC) Interpretation:           - No loss of nuclear expression of MMR proteins: low probability of microsatellite instability-high (MSI-H)     ORIGINAL REPORT:     SPECIMEN(S):   A: Left vulvar lesion   B: Left pelvic sentinel lymph node   C: Right pelvic sentinel lymph node   D: Uterus, cervix, bilateral ovaries and fallopian tubes   E: Portion of left ovary   F: Para-aortic lymph nodes   G: Left pelvic lymph nodes   H: Right pelvic lymph nodes     FINAL DIAGNOSIS:   A. LEFT VULVAR LESION, BIOPSY:   - Intradermal nevus     B. LEFT PELVIC SENTINEL LYMPH NODE, EXCISION:   - One reactive lymph node, negative for malignancy (0/1)     C. RIGHT PELVIC SENTINEL LYMPH  NODE, EXCISION:   - Six reactive lymph nodes, negative for malignancy (0/6)     D. UTERUS, CERVIX, BILATERAL OVARIES AND FALLOPIAN TUBES, HYSTERECTOMY WITH BILATERAL SALPINGO-OOPHORECTOMY:   - Endometrial endometrioid adenocarcinoma, FIGO grade 1   - Atypical endometrial hyperplasia   - Myometrium with no significant histologic abnormality   - Chronic cervicitis with microglandular hyperplasia   - Uterine serosal fibrous adhesions   - Ovaries with cortical hyperplasia   - Metastatic endometrial adenocarcinoma to the left fallopian tube   - Right fallopian tube with no significant histologic abnormality     E. PORTION OF LEFT OVARY, EXCISION:   - Benign cortical inclusion cysts     F. PARA-AORTIC LYMPH NODES, EXCISION:   - Four reactive lymph nodes, negative for malignancy (0/4)   - Benign glandular inclusions consistent with endosalpingiosis     G. LEFT PELVIC LYMPH NODES, EXCISION:   - Eight reactive lymph nodes, negative for malignancy (0/8)     H. RIGHT PELVIC LYMPH NODES, EXCISION:   - Nine reactive lymph nodes, negative for malignancy (0/9)   - Benign glandular inclusions consistent with endosalpingiosis     COMMENT:   The final diagnosis confirms the interpretation provided intraoperatively. The tumor seen in the left fallopian tube involves the wall (invading the mucosa and submucosa) and appears free floating in the lumen.   Tumor Site:         - Endometrium - Anterior and posterior     Histologic Type:         - Endometrioid carcinoma, NOS     Histologic Grade:         - FIGO grade 1     Tumor Size: 5.7 Centimeters (cm)     Tumor Extent       Myometrial Invasion:           - Not identified       Adenomyosis:           - Not identified       Uterine Serosa Involvement:           - Not identified       Lower Uterine Segment Involvement:           - Not identified       Cervical Stromal Involvement:           - Not identified       Other Tissue / Organ Involvement:           - Left fallopian tube        Peritoneal Ascitic Fluid:           - Results pending     Accessory Findings       Lymphovascular Invasion:           - Not identified     11/6/18: CT ap IMPRESSION:   1. Surgical changes of hysterectomy, bilateral salpingo-oophorectomy, and pelvic lymph node dissection with extensive fat stranding and fluid within the low pelvis extending superiorly along the iliac vasculature, right greater than left, potentially within normal postsurgical limits, however, slightly more than expected for postsurgical change.   1a. The ureters are not well evaluated on this single phase exam and there is no hydronephrosis or hydroureter, however, the amount of fluid in the pelvis does raise the question of ureteral injury. If there is clinical concern, consider obtaining a CT urogram.  1b. An area of irregular rim enhancement in the low pelvis may represent a normal postsurgical vaginal cuff, though, again, this is slightly more conspicuous than usual and dehiscence or a developing abscess may have a similar appearance.  2. 6.8 x 4.1 x 5.6 cm right pelvic sidewall seroma versus lymphocele.  3. Soft tissue nodular thickening anteriorly to the right psoas muscle and along the right lateral conal fascia, indeterminate, cannot rule out metastatic foci. Attention on follow-up studies.  4. Cholelithiasis without evidence of cholecystitis.      11/7/18: CT abd with contrast CT urogram IMPRESSION:   1. Postsurgical changes of hysterectomy, bilateral salpingo-oophorectomy, and iliac lymph node dissection with fluid  collections within the pelvis extending along the iliac vasculature.  a. The right pelvic sidewall fluid collection is not significantly changed while the left pelvic sidewall fluid collection has enlarged  in comparison to the 11/6/2018 CT. The differential remains a seroma versus a lymphocele.  b. No evidence of contrast extravasation to suggest a ureteral injury.  c. Redemonstration of soft tissue nodular thickening along the  "right lateral conal fascia and anteriorly to the right psoas muscle and right psoas muscle. Attention on follow-up is recommended.  2. Cholelithiasis without evidence of cholecystitis.    Plan: adjuvant therapy to include 6 cycles of taxol and carboplatin; would not add radiation as all lymph nodes are negative, she does not have LVSI or deep invasion and this is a low grade tumor. Rx percocet 5 tabs due to continued abdominal pain. Will not prescribe any more narcotics and patient is aware of this.    11/16/18: Cycle #1 Paclitaxel/Carboplatin.   35.  12/6/18: Cycle #2 Paclitaxel/Carboplatin.  pending.           Today she comes to clinic and reports she tolerated her first cycle well; she denies nausea and was taking miralax for about 3 days \"just in case\". She denies any vaginal bleeding, no changes in her bowel or bladder habits, no nausea/emesis, no lower extremity edema, and no difficulties eating or sleeping. She denies any abdominal discomfort/bloating, no fevers or chills, and no chest pain or shortness of breath. She continues to take Gabapentin 2100 mg for her restless legs. She reports the numbness/pain she was having in her right leg is improving. The discomfort she had in her abdomen has also resolved; she does still notice swelling. She has been living in a sober house for the past 1.5 years and was recently asked if she would be interested in being the  in a few months when the current one leaves. She will be out of town 1/17-1/20 for her birthday. She does not need any medications refilled and feels ready for her infusion today.           Review of Systems     Constitutional:  Negative for fever, chills, weight loss, weight gain, fatigue, decreased appetite, night sweats, recent stressors, height gain, height loss, post-operative complications, incisional pain, hallucinations, increased energy, hyperactivity and confused.   HENT:  Negative for ear pain, hearing loss, tinnitus, " nosebleeds, trouble swallowing, hoarse voice, mouth sores, sore throat, ear discharge, tooth pain, gum tenderness, taste disturbance, smell disturbance, hearing aid, bleeding gums, dry mouth, sinus pain, sinus congestion and neck mass.    Eyes:  Negative for double vision, pain, redness, eye pain, decreased vision, eye watering, eye bulging, eye dryness, flashing lights, spots, floaters, strabismus, tunnel vision, jaundice and eye irritation.   Respiratory:   Negative for cough, hemoptysis, sputum production, shortness of breath, wheezing, sleep disturbances due to breathing, snores loudly, respiratory pain, dyspnea on exertion, cough disturbing sleep and postural dyspnea.    Cardiovascular:  Positive for hypertension. Negative for chest pain, dyspnea on exertion, palpitations, orthopnea, claudication, leg swelling, fingers/toes turn blue, hypotension, syncope, history of heart murmur, chest pain on exertion, chest pain at rest, pacemaker, few scattered varicosities, leg pain, sleep disturbances due to breathing, tachycardia, light-headedness, exercise intolerance and edema.   Gastrointestinal:  Negative for heartburn, nausea, vomiting, abdominal pain, diarrhea, constipation, blood in stool, melena, rectal pain, bloating, hemorrhoids, bowel incontinence, jaundice, rectal bleeding, coffee ground emesis and change in stool.   Genitourinary:  Negative for bladder incontinence, dysuria, urgency, hematuria, flank pain, vaginal discharge, difficulty urinating, genital sores, dyspareunia, decreased libido, nocturia, voiding less frequently, arousal difficulty, abnormal vaginal bleeding, excessive menstruation, menstrual changes, hot flashes, vaginal dryness and postmenopausal bleeding.   Musculoskeletal:  Negative for myalgias, back pain, joint swelling, arthralgias, stiffness, muscle cramps, neck pain, bone pain, muscle weakness and fracture.   Skin:  Negative for nail changes, itching, poor wound healing, rash, hair  changes, skin changes, acne, warts, poor wound healing, scarring, flaky skin, Raynaud's phenomenon, sensitivity to sunlight and skin thickening.   Neurological:  Negative for dizziness, tingling, tremors, speech change, seizures, loss of consciousness, weakness, light-headedness, numbness, headaches, disturbances in coordination, extremity numbness, memory loss, difficulty walking and paralysis.   Endo/Heme:  Negative for anemia, swollen glands and bruises/bleeds easily.   Psychiatric/Behavioral:  Negative for depression, hallucinations, memory loss, decreased concentration, mood swings and panic attacks.    Breast:  Negative for breast discharge, breast mass, breast pain and nipple retraction.   Endocrine:  Negative for altered temperature regulation, polyphagia, polydipsia, unwanted hair growth and change in facial hair.          Past Medical History:    Past Medical History:   Diagnosis Date     Chickenpox     as a child     Depression      Endometrial cancer (H)      Hypertension     since her 30's     Infertility, female      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)      PCOS (polycystic ovarian syndrome)      STD (female)     in her 20's         Past Surgical History:    Past Surgical History:   Procedure Laterality Date     CYSTOSCOPY N/A 10/24/2018    Procedure: Cystoscopy;  Surgeon: Linh De Souza MD;  Location: [facility] OR     DAVINCI HYSTERECTOMY TOTAL, BILATERAL SALPINGO-OOPHORECTOMY, NODE DISSECTION, COMBINED N/A 10/24/2018    Procedure: DaVinci Assisted Total Laparoscopic Hysterectomy, Bilateral Salpingo-Oophorectomy, lymph node dissection;  Surgeon: Linh De Souza MD;  [facility]: [facility]     EXCISE LESION VULVA Left 10/24/2018    Procedure: Excision of Left Vulvar Lesion;  Surgeon: Linh De Souza MD;  Location:  OR         Health Maintenance Due   Topic Date Due     PHQ-2 Q1 YR  01/20/1989     HIV SCREEN (SYSTEM ASSIGNED)  01/20/1995     PAP SCREENING Q3 YR (SYSTEM ASSIGNED)  01/20/1998        Current Medications:     Current Outpatient Prescriptions   Medication Sig Dispense Refill     buPROPion (WELLBUTRIN XL) 300 MG 24 hr tablet Take 300 mg by mouth       gabapentin (NEURONTIN) 600 MG tablet Take 600 mg by mouth       ibuprofen (ADVIL/MOTRIN) 600 MG tablet Take 1 tablet (600 mg) by mouth every 6 hours as needed for pain (mild) 30 tablet 0     loratadine (CLARITIN) 10 MG tablet Take 10 mg by mouth       MELATONIN PO Take 10 mg by mouth nightly as needed       metoprolol succinate (TOPROL-XL) 25 MG 24 hr tablet Take 25 mg by mouth       Multiple Vitamin (MULTIVITAMIN  S) CAPS Take 1 capsule by mouth       prochlorperazine (COMPAZINE) 10 MG tablet Take 1 tablet (10 mg) by mouth every 6 hours as needed (nausea/vomiting) 30 tablet 5     senna-docusate (SENOKOT-S;PERICOLACE) 8.6-50 MG per tablet Take 1-2 tablets by mouth 2 times daily Take while on oral narcotics to prevent or treat constipation. 30 tablet 0     spironolactone (ALDACTONE) 50 MG tablet Take 50 mg by mouth       albuterol (PROAIR HFA/PROVENTIL HFA/VENTOLIN HFA) 108 (90 Base) MCG/ACT inhaler Inhale 1-2 puffs into the lungs       ondansetron (ZOFRAN) 8 MG tablet Take 1 tablet (8 mg) by mouth every 8 hours as needed for nausea (Did not start until 3 days after chemo.) (Patient not taking: Reported on 12/6/2018) 20 tablet 1     oxyCODONE (ROXICODONE) 5 mg Take 1 tablet (5 mg) by mouth every 4 hours as needed for moderate to severe pain (Patient not taking: Reported on 11/16/2018) 5 tablet 0     oxyCODONE-acetaminophen (PERCOCET) 5-325 MG per tablet Take 1 tablet by mouth every 6 hours as needed for pain (moderate to severe) (Patient not taking: Reported on 11/16/2018) 5 tablet 0     sulfamethoxazole-trimethoprim (BACTRIM DS/SEPTRA DS) 800-160 MG per tablet Take 1 tablet by mouth 2 times daily (Patient not taking: Reported on 11/16/2018) 14 tablet 0     traZODone (DESYREL) 50 MG tablet Take 1 tablet (50 mg) by mouth nightly as needed for  sleep (Patient not taking: Reported on 11/16/2018) 14 tablet 0         Allergies:        Allergies   Allergen Reactions     Amoxicillin      Penicillin G         Social History:     Social History   Substance Use Topics     Smoking status: Former Smoker     Packs/day: 0.50     Types: Cigarettes     Smokeless tobacco: Never Used      Comment: uses vape pen about 3-4 times daily     Alcohol use No      Comment: 11 months sober        History   Drug Use No     Comment: 11 months sober. lives at sober housing         Family History:     The patient's family history is notable for:    No family history on file.      Physical Exam:     /85 (BP Location: Right arm, Patient Position: Sitting, Cuff Size: Adult Regular)  Pulse 69  Temp 98.3  F (36.8  C) (Oral)  Resp 16  Wt 102.9 kg (226 lb 12.8 oz)  SpO2 97%  BMI 38.02 kg/m2  Body mass index is 38.02 kg/(m^2).    General Appearance: healthy and alert, no distress     HEENT: no thyromegaly, no palpable nodules or masses        Cardiovascular: regular rate and rhythm, no gallops, rubs or murmurs     Respiratory: lungs clear, no rales, rhonchi or wheezes, normal diaphragmatic excursion    Musculoskeletal: extremities non tender and without edema    Skin: no lesions or rashes     Neurological: normal gait, no gross defects     Psychiatric: appropriate mood and affect                               Hematological: normal cervical, supraclavicular and inguinal lymph nodes     Gastrointestinal:       abdomen soft, non-tender, non-distended    Genitourinary: deferred      Assessment:    Meagan Morales is a 43 year old woman with a diagnosis of stage IIIA grade 1 endometrioid adenocarcinoma.   She is here today for follow up and disease management.     30 minutes were spent with this patient, over 50% of that time was spent in symptom management, treatment planning and in counseling and coordination of care.      Plan:     1.)        Ok to proceed with planned chemotherapy  pending labs are WNL. She will return in 3 weeks for her next cycle. Discussed importance of eating smaller meals more often, adequate hydration, and pacing activities. Reviewed signs and symptoms for when she should contact the clinic or seek additional care. Patient to contact the clinic with any questions or concerns in the interim. Answered all of her questions to the best of my ability.      2.) Genetic risk factors were assessed and her MMR was intact.    3.) Labs and/or tests ordered include:  Chemo labs. .      4.) Health maintenance issues addressed today include annual health maintenance and non-gynecologic issues with PCP.    CORTES Gant, WHNP-BC, ANP-BC  Women's Health Nurse Practitioner  Adult Nurse Pracitioner  Division of Gynecologic Oncology          CC  Patient Care Team:  Aundrea Max MD as PCP - General  SELF, REFERRED    Again, thank you for allowing me to participate in the care of your patient.      Sincerely,    CORTES Prater CNP

## 2018-12-06 NOTE — PROGRESS NOTES
Infusion Nursing Note:  Meagan Morales presents today for C2 Taxol-Carboplatin.    Patient seen by provider today: Yes: Virgen Gallagher NP    Treatment Conditions:  Lab Results   Component Value Date    HGB 12.3 12/06/2018     Lab Results   Component Value Date    WBC 5.5 12/06/2018      Lab Results   Component Value Date    ANEU 3.0 12/06/2018     Lab Results   Component Value Date     12/06/2018      Lab Results   Component Value Date     12/06/2018                   Lab Results   Component Value Date    POTASSIUM 3.9 12/06/2018           Lab Results   Component Value Date    MAG 2.1 12/06/2018            Lab Results   Component Value Date    CR 0.85 12/06/2018                   Lab Results   Component Value Date    SIDDHARTHA 8.8 12/06/2018                Lab Results   Component Value Date    BILITOTAL 0.2 12/06/2018           Lab Results   Component Value Date    ALBUMIN 3.4 12/06/2018                    Lab Results   Component Value Date    ALT 25 12/06/2018           Lab Results   Component Value Date    AST 13 12/06/2018       Results reviewed, labs MET treatment parameters, ok to proceed with treatment.    Intravenous Access:  Peripheral IV placed by Vascular Access.  Access dc'd at time of discharge.      Note:   Results reviewed, copy given to patient.  Proceed with treatment.    Copy of AVS given to patient. + Blood return from PIV pre and post infusion.  Tolerated infusion without incident. No Prescriptions filled today.   D/C in care of self.  Pt will return C3 Taxol-Carboplatin/provider.       Ramona Ness RN

## 2018-12-06 NOTE — PATIENT INSTRUCTIONS
Contact Numbers  [facility]: [phone]    After Hours:  [phone]  Triage: [phone]    Please call the [facility] Triage line if you experience a temperature greater than or equal to 100.5, shaking chills, have uncontrolled nausea, vomiting and/or diarrhea, dizziness, shortness of breath, chest pain, bleeding, unexplained bruising, or if you have any other new/concerning symptoms, questions or concerns.     If it is after hours, weekends, or holidays, please call the main hospital  at  [phone] and ask to speak to the Oncology doctor on call.     If you are having any concerning symptoms or wish to speak to a provider before your next infusion visit, please call your care coordinator or triage to notify them so we can adequately serve you.     If you need a refill on a narcotic prescription or other medication, please call triage before your infusion appointment.

## 2018-12-06 NOTE — NURSING NOTE
"Oncology Rooming Note    December 6, 2018 6:59 AM   Meagan Morales is a 43 year old female who presents for:    Chief Complaint   Patient presents with     Blood Draw     Labs drawn via  by RN. VS taken.      Oncology Clinic Visit     Return; Endometrial CA,Active Tx     Initial Vitals: /85 (BP Location: Right arm, Patient Position: Sitting, Cuff Size: Adult Regular)  Pulse 69  Temp 98.3  F (36.8  C) (Oral)  Resp 16  Wt 102.9 kg (226 lb 12.8 oz)  SpO2 97%  BMI 38.02 kg/m2 Estimated body mass index is 38.02 kg/(m^2) as calculated from the following:    Height as of 11/8/18: 1.645 m (5' 4.76\").    Weight as of this encounter: 102.9 kg (226 lb 12.8 oz). Body surface area is 2.17 meters squared.  No Pain (0) Comment: Data Unavailable   No LMP recorded. Patient has had a hysterectomy.  Allergies reviewed: Yes  Medications reviewed: Yes    Medications: Medication refills not needed today.  Pharmacy name entered into [employer]: [id number] - [place] [phone] AT [facility] [address] [facility] [address] [facility] [address]    Clinical concerns: Patient is concerned about Lymphedema, and her Right leg is still numb. Patient would like to discuss options for treating/helping this.  Elliott was notified.     7 minutes for nursing intake (face to face time)     Rosa Leon MA              "

## 2018-12-06 NOTE — NURSING NOTE
Chief Complaint   Patient presents with     Blood Draw     Labs drawn via  by RN. VS taken.      Labs drawn via venipuncture. Vital signs taken. Checked into next appointment.     Taylor Juarez RN

## 2018-12-06 NOTE — MR AVS SNAPSHOT
After Visit Summary   12/6/2018    Meagan Morales    MRN: [id number]           Patient Information     Date Of Birth          1975        Visit Information        Provider Department      12/6/2018 8:00 AM UC 14 ATC;  ONCOLOGY INFUSION [facility]        Today's Diagnoses     Endometrial cancer (H)    -  1      Care Instructions    Contact Numbers  [facility]: [phone]    After Hours:  [phone]  Triage: [phone]    Please call the [facility] Triage line if you experience a temperature greater than or equal to 100.5, shaking chills, have uncontrolled nausea, vomiting and/or diarrhea, dizziness, shortness of breath, chest pain, bleeding, unexplained bruising, or if you have any other new/concerning symptoms, questions or concerns.     If it is after hours, weekends, or holidays, please call the main hospital  at  [phone] and ask to speak to the Oncology doctor on call.     If you are having any concerning symptoms or wish to speak to a provider before your next infusion visit, please call your care coordinator or triage to notify them so we can adequately serve you.     If you need a refill on a narcotic prescription or other medication, please call triage before your infusion appointment.             Follow-ups after your visit        Your next 10 appointments already scheduled     Dec 27, 2018  8:30 AM CST   [facility]   [facility] Lab Draw ([facility])    [address] [place] [address]  [address]  [facility] [id number] [phone]            Dec 27, 2018  9:00 AM CST   (Arrive by 8:45 AM)   Return Active Treatment with CORTES Prater CNP   [facility] ([facility])    [address]  [address]  [facility] [address] [phone]            Dec 27, 2018 10:00 AM CST   Infusion 360 with  ONCOLOGY INFUSION,  12 ATC   ROMAN  Problem: Swallowing Difficulty (NC-1.1)  Goal: Minimize aspiration risk  Outcome: Progressing [facility] ([facility])    [address]  [address]  [facility] [address] [phone]              Who to contact     If you have questions or need follow up information about today's clinic visit or your schedule please contact [facility] directly at [phone].  Normal or non-critical lab and imaging results will be communicated to you by MyChart, letter or phone within 4 business days after the clinic has received the results. If you do not hear from us within 7 days, please contact the clinic through Hyperichart or phone. If you have a critical or abnormal lab result, we will notify you by phone as soon as possible.  Submit refill requests through Mgv or call your pharmacy and they will forward the refill request to us. Please allow 3 business days for your refill to be completed.          Additional Information About Your Visit        HypericharWakie/Budist Information     Mgv gives you secure access to your electronic health record. If you see a primary care provider, you can also send messages to your care team and make appointments. If you have questions, please call your primary care clinic.  If you do not have a primary care provider, please call [phone] and they will assist you.        Care EveryWhere ID     This is your Care EveryWhere ID. This could be used by other organizations to access your [place] medical records  [id number]         Blood Pressure from Last 3 Encounters:   12/06/18 132/85   11/16/18 131/83   11/08/18 109/56    Weight from Last 3 Encounters:   12/06/18 102.9 kg (226 lb 12.8 oz)   11/16/18 102.7 kg (226 lb 6.4 oz)   11/08/18 102.6 kg (226 lb 4.8 oz)              We Performed the Following          CBC with platelets differential     Comprehensive metabolic panel     Magnesium        Primary Care Provider Office Phone # Fax #    Aundrea Max [phone]       [address]  [facility] [address]        Equal Access to Services     ENE WHITAKER : Hadii aad ku hadchatoyousuf Vásquez, watamda marlon, qalidia conte. So [facility] [phone].    ATENCIÓN: Si habla español, tiene a maya disposición servicios gratuitos de asistencia lingüística. Sapna al [phone].    We comply with applicable federal civil rights laws and [place] laws. We do not discriminate on the basis of race, color, national origin, age, disability, sex, sexual orientation, or gender identity.            Thank you!     Thank you for choosing [facility]  for your care. Our goal is always to provide you with excellent care. Hearing back from our patients is one way we can continue to improve our services. Please take a few minutes to complete the written survey that you may receive in the mail after your visit with us. Thank you!             Your Updated Medication List - Protect others around you: Learn how to safely use, store and throw away your medicines at www.disposemymeds.org.          This list is accurate as of 12/6/18  8:15 AM.  Always use your most recent med list.                   Brand Name Dispense Instructions for use Diagnosis    albuterol 108 (90 Base) MCG/ACT inhaler    PROAIR HFA/PROVENTIL HFA/VENTOLIN HFA     Inhale 1-2 puffs into the lungs    Endometrial cancer (H)       buPROPion 300 MG 24 hr tablet    WELLBUTRIN XL     Take 300 mg by mouth    Endometrial cancer (H)       gabapentin 600 MG tablet    NEURONTIN     Take 600 mg by mouth    Endometrial cancer (H)       ibuprofen 600 MG tablet    ADVIL/MOTRIN    30 tablet    Take 1 tablet (600 mg) by mouth every 6 hours as needed for pain (mild)    S/P laparoscopic hysterectomy       loratadine 10 MG tablet    CLARITIN     Take 10 mg by mouth    Endometrial cancer (H)       MELATONIN PO      Take 10 mg by mouth nightly as needed    Endometrial cancer (H)       metoprolol  succinate ER 25 MG 24 hr tablet    TOPROL-XL     Take 25 mg by mouth    Endometrial cancer (H)       multivitamin capsule      Take 1 capsule by mouth    Endometrial cancer (H)       ondansetron 8 MG tablet    ZOFRAN    20 tablet    Take 1 tablet (8 mg) by mouth every 8 hours as needed for nausea (Did not start until 3 days after chemo.)    Endometrial cancer (H)       prochlorperazine 10 MG tablet    COMPAZINE    30 tablet    Take 1 tablet (10 mg) by mouth every 6 hours as needed (nausea/vomiting)    Endometrial cancer (H)       senna-docusate 8.6-50 MG tablet    SENOKOT-S/PERICOLACE    30 tablet    Take 1-2 tablets by mouth 2 times daily Take while on oral narcotics to prevent or treat constipation.    S/P laparoscopic hysterectomy       spironolactone 50 MG tablet    ALDACTONE     Take 50 mg by mouth    Endometrial cancer (H)

## 2018-12-26 PROBLEM — R97.1 ELEVATED CA-125: Status: ACTIVE | Noted: 2018-12-26

## 2018-12-26 NOTE — PROGRESS NOTES
Follow Up Notes on Referred Patient    Date: 2018         RE: Meagan Morales  : 1975  VY: 2018        Meagan Morales is a 43 year old woman with a diagnosis of  stage IIIA grade 1 endometrioid adenocarcinoma.   She is here today for follow up and disease management.      Brief History:  Meagan originally presented to clinic due to continuous vaginal bleeding, very light. She thought it was just her menses being continuous. US was done which found thickened endometrial lining. IUD was placed. Second US was done and IUD did not affect endometrial lining and IUD was malpositioned. Since then, she has noted continuous spotting. IUD was removed. Also of note, ovarian cysts were noted on second US. Subsequent endometrial biopsy was done which revealed grade 1 endometrial cancer. She has always had irregular menses and has never been able to get pregnant. Patient's  first and only pap smear on 10/10/2017. Had mammogram in 20's, will order additional mammogram for baseline. No history of prior colonoscopy. Started Wellbutrin this last year, mood has improved. Prior cigarette smoker, quit 2 months ago and switched to vape. Started again smoking cigarettes again but has not had one for 10 days. History of drug use, has been sober for 11 months. History of meth and alcohol abuse. No history of prior abdominal surgeries. No history of heart disease, lung disease or diabetes. Has never been able to get pregnant and dose not desire children.     18: pelvic US IMPRESSION:  Peripheral follicular arrangement high within the ovaries suggesting polycystic ovarian syndrome. Slightly thickened heterogeneous endometrium raising a concern of potential hyperplastic change.  18: pelvic US   1. Uterus is not normal in appearance. The endometrium appears thickened and vascular, and the IUD appears to be malpositioned.  Clinical correlation is recommended.    2. Bilateral complex ovarian cysts are  noted.  Follow up ultrasound in 4-6 weeks is recommended, consider saline infusion sonohysterogram for further imaging of the endometrium.      9/17/18: endometrial biopsy  ENDOMETRIUM, BIOPSY:  1. FIGO Grade 1 (well differentiated) endometrioid carcinoma of      the endometrium  2. DNA mismatch repair enzyme immunohistochemistry studies:     All intact (see Amendment note and synoptic reporting)     10/24/2018: DaVinci Assisted Total Laparoscopic Hysterectomy, Bilateral Salpingo-Oophorectomy, lymph node dissection, Excision of Left Vulvar Lesion  Pelvic Washing:   Rare atypical cells present.     IMMUNOPATHOLOGY-Addendum     TEST(S) PERFORMED     Test(s) Performed:         - Mismatch Repair Immunohistochemistry (IHC) Testing for Mismatch Repair (MMR) Proteins:         MLH1 Expression:             - Intact nuclear expression         MSH2 Expression:             - Intact nuclear expression         MSH6 Expression:             - Intact nuclear expression         PMS2 Expression:             - Intact nuclear expression       Immunohistochemistry (IHC) Interpretation:           - No loss of nuclear expression of MMR proteins: low probability of microsatellite instability-high (MSI-H)     ORIGINAL REPORT:     SPECIMEN(S):   A: Left vulvar lesion   B: Left pelvic sentinel lymph node   C: Right pelvic sentinel lymph node   D: Uterus, cervix, bilateral ovaries and fallopian tubes   E: Portion of left ovary   F: Para-aortic lymph nodes   G: Left pelvic lymph nodes   H: Right pelvic lymph nodes     FINAL DIAGNOSIS:   A. LEFT VULVAR LESION, BIOPSY:   - Intradermal nevus     B. LEFT PELVIC SENTINEL LYMPH NODE, EXCISION:   - One reactive lymph node, negative for malignancy (0/1)     C. RIGHT PELVIC SENTINEL LYMPH NODE, EXCISION:   - Six reactive lymph nodes, negative for malignancy (0/6)     D. UTERUS, CERVIX, BILATERAL OVARIES AND FALLOPIAN TUBES, HYSTERECTOMY WITH BILATERAL SALPINGO-OOPHORECTOMY:   - Endometrial endometrioid  adenocarcinoma, FIGO grade 1   - Atypical endometrial hyperplasia   - Myometrium with no significant histologic abnormality   - Chronic cervicitis with microglandular hyperplasia   - Uterine serosal fibrous adhesions   - Ovaries with cortical hyperplasia   - Metastatic endometrial adenocarcinoma to the left fallopian tube   - Right fallopian tube with no significant histologic abnormality     E. PORTION OF LEFT OVARY, EXCISION:   - Benign cortical inclusion cysts     F. PARA-AORTIC LYMPH NODES, EXCISION:   - Four reactive lymph nodes, negative for malignancy (0/4)   - Benign glandular inclusions consistent with endosalpingiosis     G. LEFT PELVIC LYMPH NODES, EXCISION:   - Eight reactive lymph nodes, negative for malignancy (0/8)     H. RIGHT PELVIC LYMPH NODES, EXCISION:   - Nine reactive lymph nodes, negative for malignancy (0/9)   - Benign glandular inclusions consistent with endosalpingiosis     COMMENT:   The final diagnosis confirms the interpretation provided intraoperatively. The tumor seen in the left fallopian tube involves the wall (invading the mucosa and submucosa) and appears free floating in the lumen.   Tumor Site:         - Endometrium - Anterior and posterior     Histologic Type:         - Endometrioid carcinoma, NOS     Histologic Grade:         - FIGO grade 1     Tumor Size: 5.7 Centimeters (cm)     Tumor Extent       Myometrial Invasion:           - Not identified       Adenomyosis:           - Not identified       Uterine Serosa Involvement:           - Not identified       Lower Uterine Segment Involvement:           - Not identified       Cervical Stromal Involvement:           - Not identified       Other Tissue / Organ Involvement:           - Left fallopian tube       Peritoneal Ascitic Fluid:           - Results pending     Accessory Findings       Lymphovascular Invasion:           - Not identified     11/6/18: CT ap IMPRESSION:   1. Surgical changes of hysterectomy, bilateral  salpingo-oophorectomy, and pelvic lymph node dissection with extensive fat stranding and fluid within the low pelvis extending superiorly along the iliac vasculature, right greater than left, potentially within normal postsurgical limits, however, slightly more than expected for postsurgical change.   1a. The ureters are not well evaluated on this single phase exam and there is no hydronephrosis or hydroureter, however, the amount of fluid in the pelvis does raise the question of ureteral injury. If there is clinical concern, consider obtaining a CT urogram.  1b. An area of irregular rim enhancement in the low pelvis may represent a normal postsurgical vaginal cuff, though, again, this is slightly more conspicuous than usual and dehiscence or a developing abscess may have a similar appearance.  2. 6.8 x 4.1 x 5.6 cm right pelvic sidewall seroma versus lymphocele.  3. Soft tissue nodular thickening anteriorly to the right psoas muscle and along the right lateral conal fascia, indeterminate, cannot rule out metastatic foci. Attention on follow-up studies.  4. Cholelithiasis without evidence of cholecystitis.      11/7/18: CT abd with contrast CT urogram IMPRESSION:   1. Postsurgical changes of hysterectomy, bilateral salpingo-oophorectomy, and iliac lymph node dissection with fluid  collections within the pelvis extending along the iliac vasculature.  a. The right pelvic sidewall fluid collection is not significantly changed while the left pelvic sidewall fluid collection has enlarged  in comparison to the 11/6/2018 CT. The differential remains a seroma versus a lymphocele.  b. No evidence of contrast extravasation to suggest a ureteral injury.  c. Redemonstration of soft tissue nodular thickening along the right lateral conal fascia and anteriorly to the right psoas muscle and right psoas muscle. Attention on follow-up is recommended.  2. Cholelithiasis without evidence of cholecystitis.     Plan: adjuvant therapy to  "include 6 cycles of taxol and carboplatin; would not add radiation as all lymph nodes are negative, she does not have LVSI or deep invasion and this is a low grade tumor. Rx percocet 5 tabs due to continued abdominal pain. Will not prescribe any more narcotics and patient is aware of this.     11/16/18: Cycle #1 Paclitaxel/Carboplatin.   35.  12/6/18: Cycle #2 Paclitaxel/Carboplatin.  8.  12/27/18: Cycle #3 Paclitaxel/Carboplatin.  pending.           Today she comes to clinic feeling generally well. She has noticed that after walking about 7 minutes or so she develops some discomfort in her left thigh; this resolves with rest. She also reports that if she puts her legs up and crosses her legs that she will have an \"indent\" in her lower leg which lasts about 3 hours and she is concerned about lymphedema. She states she was working out prior to her treatment so she feels as though she is in shape and is wondering why she is feeling this way. She continues to take Gabapentin for her restless legs and recently had this increased to 600 mg am and afternoon and 900 mg at night. She is hoping this will help with some hot flashes and sleep. She has only needed to take her antiemetics a few times and does not need any medications refilled. She continues to reside at a sober house; She is not on probation so does not have regular drug tests done; but states they can still do one if there is cause.           Review of Systems:    Systemic           no weight changes; no fever; no chills; no night sweats; no appetite changes  Skin           no rashes, or lesions  Eye           no irritation; no changes in vision  Deny-Laryngeal           no dysphagia; no hoarseness   Pulmonary    no cough; no shortness of breath  Cardiovascular    no chest pain; no palpitations; + HTN  Gastrointestinal    no diarrhea; no constipation; no abdominal pain; no changes in bowel habits; no blood in stool  Genitourinary   no urinary " frequency; no urinary urgency; no dysuria; no pain; no abnormal vaginal discharge; no abnormal vaginal bleeding  Breast    no breast discharge; no breast changes; no breast pain  Musculoskeletal    no myalgias; no arthralgias; no back pain  Psychiatric           no depressed mood; no anxiety    Hematologic               no tender lymph nodes; no noticeable swellings or lumps   Endocrine    + hot flashes; no heat/cold intolerance         Neurological   no tremor; + numbness and tingling; no headaches; no difficulty sleeping      Past Medical History:    Past Medical History:   Diagnosis Date     Chickenpox     as a child     Depression      Endometrial cancer (H)      Hypertension     since her 30's     Infertility, female      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)      PCOS (polycystic ovarian syndrome)      STD (female)     in her 20's         Past Surgical History:    Past Surgical History:   Procedure Laterality Date     CYSTOSCOPY N/A 10/24/2018    Procedure: Cystoscopy;  Surgeon: Linh De Souza MD;  Location: [facility] OR     DAVINCI HYSTERECTOMY TOTAL, BILATERAL SALPINGO-OOPHORECTOMY, NODE DISSECTION, COMBINED N/A 10/24/2018    Procedure: DaVinci Assisted Total Laparoscopic Hysterectomy, Bilateral Salpingo-Oophorectomy, lymph node dissection;  Surgeon: Linh De Souza MD;  Location: [facility] OR     EXCISE LESION VULVA Left 10/24/2018    Procedure: Excision of Left Vulvar Lesion;  Surgeon: Linh De Souza MD;  Location: [facility] OR         Health Maintenance Due   Topic Date Due     PHQ-2 Q1 YR  01/20/1989     HIV SCREEN (SYSTEM ASSIGNED)  01/20/1995     PAP SCREENING Q3 YR (SYSTEM ASSIGNED)  01/20/1998       Current Medications:     Current Outpatient Medications   Medication Sig Dispense Refill     buPROPion (WELLBUTRIN XL) 300 MG 24 hr tablet Take 300 mg by mouth       gabapentin (NEURONTIN) 600 MG tablet Take 600 mg by mouth 600mg in AM, 600mg in the afternoon, 900mg in the evening       ibuprofen  "(ADVIL/MOTRIN) 600 MG tablet Take 1 tablet (600 mg) by mouth every 6 hours as needed for pain (mild) 30 tablet 0     loratadine (CLARITIN) 10 MG tablet Take 10 mg by mouth       MELATONIN PO Take 10 mg by mouth nightly as needed       metoprolol succinate (TOPROL-XL) 25 MG 24 hr tablet Take 25 mg by mouth       Multiple Vitamin (MULTIVITAMIN  S) CAPS Take 1 capsule by mouth       ondansetron (ZOFRAN) 8 MG tablet Take 1 tablet (8 mg) by mouth every 8 hours as needed for nausea (Did not start until 3 days after chemo.) 20 tablet 1     prochlorperazine (COMPAZINE) 10 MG tablet Take 1 tablet (10 mg) by mouth every 6 hours as needed (nausea/vomiting) 30 tablet 5     senna-docusate (SENOKOT-S;PERICOLACE) 8.6-50 MG per tablet Take 1-2 tablets by mouth 2 times daily Take while on oral narcotics to prevent or treat constipation. 30 tablet 0     spironolactone (ALDACTONE) 50 MG tablet Take 50 mg by mouth       albuterol (PROAIR HFA/PROVENTIL HFA/VENTOLIN HFA) 108 (90 Base) MCG/ACT inhaler Inhale 1-2 puffs into the lungs           Allergies:        Allergies   Allergen Reactions     Amoxicillin      Penicillin G         Social History:     Social History     Tobacco Use     Smoking status: Former Smoker     Packs/day: 0.50     Types: Cigarettes     Smokeless tobacco: Never Used     Tobacco comment: uses vape pen about 3-4 times daily   Substance Use Topics     Alcohol use: No     Comment: 11 months sober        History   Drug Use No     Comment: 11 months sober. lives at sober housing         Family History:     The patient's family history is notable for:    No family history on file.      Physical Exam:     /70 (BP Location: Left arm, Patient Position: Sitting, Cuff Size: Adult Regular)   Pulse 65   Temp 97.9  F (36.6  C) (Oral)   Resp 16   Ht 1.645 m (5' 4.76\")   Wt 103.3 kg (227 lb 11.2 oz)   SpO2 98%   BMI 38.17 kg/m    Body mass index is 38.17 kg/m .    General Appearance: healthy, no distress; restless   "   HEENT: no thyromegaly, no palpable nodules or masses        Cardiovascular: regular rate and rhythm, no gallops, rubs or murmurs     Respiratory: lungs clear, no rales, rhonchi or wheezes, normal diaphragmatic excursion    Musculoskeletal: Upper and lower extremities non tender and without edema    Skin: no lesions or rashes     Neurological: normal gait, no gross defects     Psychiatric: Restless, unable to sit still                               Hematological: normal cervical, supraclavicular lymph nodes     Gastrointestinal:       abdomen soft, non-tender, non-distended    Genitourinary: deferred      Assessment:    Meagan Morales is a 43 year old woman with a diagnosis of stage IIIA grade 1 endometrioid adenocarcinoma.   She is here today for follow up and disease management.     30 minutes were spent with this patient, over 50% of that time was spent in symptom management, treatment planning and in counseling and coordination of care.      Plan:     1.)        Ok to proceed with planned chemotherapy pending labs are WNL. She will return for her next cycle after she returns from her trip. Reviewed signs and symptoms for when she should contact the clinic or seek additional care. Patient to contact the clinic with any questions or concerns in the interim.     2.) Genetic risk factors were assessed and her MMR was intact.     3.) Labs and/or tests ordered include:  Chemo labs. .      4.) Health maintenance issues addressed today include annual health maintenance and non-gynecologic issues with PCP.    5.)        Discussed seeing Lymphedema and Cancer rehab; she will look into her insurance coverage for cancer rehab and let us know. She was given a handout on cancer rehab.     Addendum: she contacted her insurance company and would like to proceed with cancer rehab. Order placed.       CORTES Gant, WHNP-BC, ANP-BC  Women's Health Nurse Practitioner  Adult Nurse Pracitioner  Division of Gynecologic  Oncology        CC  Patient Care Team:  Aundrea Max MD as PCP - General  SELF, REFERRED

## 2018-12-27 ENCOUNTER — CARE COORDINATION (OUTPATIENT)
Dept: ONCOLOGY | Facility: CLINIC | Age: 43
End: 2018-12-27

## 2018-12-27 ENCOUNTER — ONCOLOGY VISIT (OUTPATIENT)
Dept: ONCOLOGY | Facility: CLINIC | Age: 43
End: 2018-12-27
Attending: NURSE PRACTITIONER
Payer: COMMERCIAL

## 2018-12-27 ENCOUNTER — APPOINTMENT (OUTPATIENT)
Dept: LAB | Facility: CLINIC | Age: 43
End: 2018-12-27
Attending: NURSE PRACTITIONER
Payer: COMMERCIAL

## 2018-12-27 VITALS
OXYGEN SATURATION: 98 % | WEIGHT: 227.7 LBS | DIASTOLIC BLOOD PRESSURE: 70 MMHG | BODY MASS INDEX: 37.94 KG/M2 | HEART RATE: 65 BPM | SYSTOLIC BLOOD PRESSURE: 127 MMHG | RESPIRATION RATE: 16 BRPM | TEMPERATURE: 97.9 F | HEIGHT: 65 IN

## 2018-12-27 DIAGNOSIS — C54.1 ENDOMETRIAL CANCER (H): Primary | ICD-10-CM

## 2018-12-27 DIAGNOSIS — R97.1 ELEVATED CA-125: ICD-10-CM

## 2018-12-27 DIAGNOSIS — Z51.11 ENCOUNTER FOR ANTINEOPLASTIC CHEMOTHERAPY: ICD-10-CM

## 2018-12-27 LAB
ALBUMIN SERPL-MCNC: 3.4 G/DL (ref 3.4–5)
ALP SERPL-CCNC: 65 U/L (ref 40–150)
ALT SERPL W P-5'-P-CCNC: 24 U/L (ref 0–50)
ANION GAP SERPL CALCULATED.3IONS-SCNC: 6 MMOL/L (ref 3–14)
AST SERPL W P-5'-P-CCNC: 14 U/L (ref 0–45)
BASOPHILS # BLD AUTO: 0 10E9/L (ref 0–0.2)
BASOPHILS NFR BLD AUTO: 0.7 %
BILIRUB SERPL-MCNC: 0.1 MG/DL (ref 0.2–1.3)
BUN SERPL-MCNC: 15 MG/DL (ref 7–30)
CALCIUM SERPL-MCNC: 8.9 MG/DL (ref 8.5–10.1)
CANCER AG125 SERPL-ACNC: 7 U/ML (ref 0–30)
CHLORIDE SERPL-SCNC: 108 MMOL/L (ref 94–109)
CO2 SERPL-SCNC: 26 MMOL/L (ref 20–32)
CREAT SERPL-MCNC: 0.84 MG/DL (ref 0.52–1.04)
DIFFERENTIAL METHOD BLD: NORMAL
EOSINOPHIL # BLD AUTO: 0.1 10E9/L (ref 0–0.7)
EOSINOPHIL NFR BLD AUTO: 2.3 %
ERYTHROCYTE [DISTWIDTH] IN BLOOD BY AUTOMATED COUNT: 13.2 % (ref 10–15)
GFR SERPL CREATININE-BSD FRML MDRD: 86 ML/MIN/{1.73_M2}
GLUCOSE SERPL-MCNC: 83 MG/DL (ref 70–99)
HCT VFR BLD AUTO: 37.7 % (ref 35–47)
HGB BLD-MCNC: 12.4 G/DL (ref 11.7–15.7)
IMM GRANULOCYTES # BLD: 0 10E9/L (ref 0–0.4)
IMM GRANULOCYTES NFR BLD: 0.2 %
LYMPHOCYTES # BLD AUTO: 1.6 10E9/L (ref 0.8–5.3)
LYMPHOCYTES NFR BLD AUTO: 35.1 %
MAGNESIUM SERPL-MCNC: 2.1 MG/DL (ref 1.6–2.3)
MCH RBC QN AUTO: 29.4 PG (ref 26.5–33)
MCHC RBC AUTO-ENTMCNC: 32.9 G/DL (ref 31.5–36.5)
MCV RBC AUTO: 89 FL (ref 78–100)
MONOCYTES # BLD AUTO: 0.4 10E9/L (ref 0–1.3)
MONOCYTES NFR BLD AUTO: 8.6 %
NEUTROPHILS # BLD AUTO: 2.3 10E9/L (ref 1.6–8.3)
NEUTROPHILS NFR BLD AUTO: 53.1 %
NRBC # BLD AUTO: 0 10*3/UL
NRBC BLD AUTO-RTO: 0 /100
PLATELET # BLD AUTO: 252 10E9/L (ref 150–450)
POTASSIUM SERPL-SCNC: 3.9 MMOL/L (ref 3.4–5.3)
PROT SERPL-MCNC: 7.6 G/DL (ref 6.8–8.8)
RBC # BLD AUTO: 4.22 10E12/L (ref 3.8–5.2)
SODIUM SERPL-SCNC: 141 MMOL/L (ref 133–144)
WBC # BLD AUTO: 4.4 10E9/L (ref 4–11)

## 2018-12-27 PROCEDURE — 25000132 ZZH RX MED GY IP 250 OP 250 PS 637: Mod: ZF | Performed by: NURSE PRACTITIONER

## 2018-12-27 PROCEDURE — G0463 HOSPITAL OUTPT CLINIC VISIT: HCPCS | Mod: ZF

## 2018-12-27 PROCEDURE — 96413 CHEMO IV INFUSION 1 HR: CPT

## 2018-12-27 PROCEDURE — 25000128 H RX IP 250 OP 636: Mod: ZF | Performed by: NURSE PRACTITIONER

## 2018-12-27 PROCEDURE — 86304 IMMUNOASSAY TUMOR CA 125: CPT | Performed by: NURSE PRACTITIONER

## 2018-12-27 PROCEDURE — 96375 TX/PRO/DX INJ NEW DRUG ADDON: CPT

## 2018-12-27 PROCEDURE — 99214 OFFICE O/P EST MOD 30 MIN: CPT | Mod: 24 | Performed by: NURSE PRACTITIONER

## 2018-12-27 PROCEDURE — 25000125 ZZHC RX 250: Mod: ZF | Performed by: NURSE PRACTITIONER

## 2018-12-27 PROCEDURE — 85025 COMPLETE CBC W/AUTO DIFF WBC: CPT | Performed by: NURSE PRACTITIONER

## 2018-12-27 PROCEDURE — 96415 CHEMO IV INFUSION ADDL HR: CPT

## 2018-12-27 PROCEDURE — 83735 ASSAY OF MAGNESIUM: CPT | Performed by: NURSE PRACTITIONER

## 2018-12-27 PROCEDURE — 80053 COMPREHEN METABOLIC PANEL: CPT | Performed by: NURSE PRACTITIONER

## 2018-12-27 PROCEDURE — 96417 CHEMO IV INFUS EACH ADDL SEQ: CPT

## 2018-12-27 PROCEDURE — 40000141 ZZH STATISTIC PERIPHERAL IV START W/O US GUIDANCE: Mod: ZF

## 2018-12-27 RX ORDER — MEPERIDINE HYDROCHLORIDE 25 MG/ML
25 INJECTION INTRAMUSCULAR; INTRAVENOUS; SUBCUTANEOUS EVERY 30 MIN PRN
Status: CANCELLED | OUTPATIENT
Start: 2018-12-27

## 2018-12-27 RX ORDER — DIPHENHYDRAMINE HYDROCHLORIDE 50 MG/ML
50 INJECTION INTRAMUSCULAR; INTRAVENOUS
Status: CANCELLED
Start: 2018-12-27

## 2018-12-27 RX ORDER — ALBUTEROL SULFATE 90 UG/1
1-2 AEROSOL, METERED RESPIRATORY (INHALATION)
Status: CANCELLED
Start: 2018-12-27

## 2018-12-27 RX ORDER — DIPHENHYDRAMINE HCL 25 MG
50 CAPSULE ORAL ONCE
Status: COMPLETED | OUTPATIENT
Start: 2018-12-27 | End: 2018-12-27

## 2018-12-27 RX ORDER — PALONOSETRON 0.05 MG/ML
0.25 INJECTION, SOLUTION INTRAVENOUS ONCE
Status: CANCELLED
Start: 2018-12-27

## 2018-12-27 RX ORDER — PALONOSETRON 0.05 MG/ML
0.25 INJECTION, SOLUTION INTRAVENOUS ONCE
Status: COMPLETED | OUTPATIENT
Start: 2018-12-27 | End: 2018-12-27

## 2018-12-27 RX ORDER — EPINEPHRINE 1 MG/ML
0.3 INJECTION, SOLUTION INTRAMUSCULAR; SUBCUTANEOUS EVERY 5 MIN PRN
Status: CANCELLED | OUTPATIENT
Start: 2018-12-27

## 2018-12-27 RX ORDER — SODIUM CHLORIDE 9 MG/ML
1000 INJECTION, SOLUTION INTRAVENOUS CONTINUOUS PRN
Status: CANCELLED
Start: 2018-12-27

## 2018-12-27 RX ORDER — ALBUTEROL SULFATE 0.83 MG/ML
2.5 SOLUTION RESPIRATORY (INHALATION)
Status: CANCELLED | OUTPATIENT
Start: 2018-12-27

## 2018-12-27 RX ORDER — LORAZEPAM 2 MG/ML
1 INJECTION INTRAMUSCULAR EVERY 6 HOURS PRN
Status: CANCELLED
Start: 2018-12-27

## 2018-12-27 RX ORDER — METHYLPREDNISOLONE SODIUM SUCCINATE 125 MG/2ML
125 INJECTION, POWDER, LYOPHILIZED, FOR SOLUTION INTRAMUSCULAR; INTRAVENOUS
Status: CANCELLED
Start: 2018-12-27

## 2018-12-27 RX ORDER — EPINEPHRINE 0.3 MG/.3ML
0.3 INJECTION SUBCUTANEOUS EVERY 5 MIN PRN
Status: CANCELLED | OUTPATIENT
Start: 2018-12-27

## 2018-12-27 RX ORDER — DIPHENHYDRAMINE HCL 25 MG
50 CAPSULE ORAL ONCE
Status: CANCELLED
Start: 2018-12-27

## 2018-12-27 RX ADMIN — CARBOPLATIN 780 MG: 10 INJECTION, SOLUTION INTRAVENOUS at 14:31

## 2018-12-27 RX ADMIN — PALONOSETRON HYDROCHLORIDE 0.25 MG: 0.25 INJECTION INTRAVENOUS at 10:36

## 2018-12-27 RX ADMIN — PACLITAXEL 380 MG: 6 INJECTION, SOLUTION INTRAVENOUS at 11:16

## 2018-12-27 RX ADMIN — DEXAMETHASONE SODIUM PHOSPHATE 20 MG: 10 INJECTION, SOLUTION INTRAMUSCULAR; INTRAVENOUS at 10:41

## 2018-12-27 RX ADMIN — DIPHENHYDRAMINE HYDROCHLORIDE 50 MG: 25 CAPSULE ORAL at 10:35

## 2018-12-27 RX ADMIN — FAMOTIDINE 40 MG: 10 INJECTION INTRAVENOUS at 10:39

## 2018-12-27 RX ADMIN — SODIUM CHLORIDE 250 ML: 9 INJECTION, SOLUTION INTRAVENOUS at 10:20

## 2018-12-27 ASSESSMENT — PAIN SCALES - GENERAL: PAINLEVEL: NO PAIN (0)

## 2018-12-27 ASSESSMENT — MIFFLIN-ST. JEOR: SCORE: 1694.91

## 2018-12-27 NOTE — PROGRESS NOTES
Pt here for Day 1, Cycle 3 Taxol/Carboplatin.  Met with Hannah Gallagher before infusion.    WBC: 4.4   Hgb: 12.4   Plt: 252   ANC: 2.3   K:3.9  M.1  Creatinine: 0.84    Labs within treatment parameters.  Labs reviewed with patient.     + Blood return from PIV pre and post chemotherapy.  Pt tolerated infusion without incident.  No Prescriptions filled today.      Copy of AVS given to patient.  Pt will return 19 for next appointment. D/C with self to home.     Administrations This Visit     0.9% sodium chloride BOLUS     Admin Date  2018 Action  New Bag Dose  250 mL Route  Intravenous Administered By  Alie Quesada RN          dexamethasone (DECADRON) 20 mg in sodium chloride 0.9 % intermittent infusion     Admin Date  2018 Action  New Bag Dose  20 mg Rate  200 mL/hr Route  Intravenous Administered By  Alie Quesada RN          diphenhydrAMINE (BENADRYL) capsule 50 mg     Admin Date  2018 Action  Given Dose  50 mg Route  Oral Administered By  Alie Quesada RN          famotidine (PEPCID) injection 40 mg     Admin Date  2018 Action  Given Dose  40 mg Route  Intravenous Administered By  Alie Quesada RN          PACLitaxel (TAXOL) 380 mg in sodium chloride 0.9 % 613 mL CHEMOTHERAPY     Admin Date  2018 Action  New Bag Dose  380 mg Rate  204.3 mL/hr Route  Intravenous Administered By  Alie Quesada RN          palonosetron (ALOXI) injection 0.25 mg     Admin Date  2018 Action  Given Dose  0.25 mg Route  Intravenous Administered By  Alie Quesada RN

## 2018-12-27 NOTE — PATIENT INSTRUCTIONS
Gynecologic Cancer Clinic RN Coordinators  Maren Velazquez RN   Marissa Loyola RN     [facility] [place] Main Number: [phone]  [facility] [place] Triage: [phone]    Call the main number as listed above to speak to triage if you are experiencing chills and/or temperature greater than or equal to 100.5, uncontrolled nausea/vomiting, diarrhea, constipation, dizziness, shortness of breath, chest pain, bleeding, unexplained bruising, or any new/concerning symptoms, questions/concerns.     If you are having any concerning symptoms or wish to speak to a provider before your next infusion visit, please call triage to notify them so we can adequately serve you.       If after hours, weekends, or holidays, call main hospital  at [phone] and ask for GYN  / ONC doctor on call.

## 2018-12-27 NOTE — LETTER
2018       RE: Meagan Morales  [address] [facility] [address]     Dear Colleague,    Thank you for referring your patient, Meagan Morales, to the [facility]. Please see a copy of my visit note below.                Follow Up Notes on Referred Patient    Date: 2018         RE: Meagan Morales  : 1975  VY: 2018        Meagan Morales is a 43 year old woman with a diagnosis of  stage IIIA grade 1 endometrioid adenocarcinoma.   She is here today for follow up and disease management.      Brief History:  Meagan originally presented to clinic due to continuous vaginal bleeding, very light. She thought it was just her menses being continuous. US was done which found thickened endometrial lining. IUD was placed. Second US was done and IUD did not affect endometrial lining and IUD was malpositioned. Since then, she has noted continuous spotting. IUD was removed. Also of note, ovarian cysts were noted on second US. Subsequent endometrial biopsy was done which revealed grade 1 endometrial cancer. She has always had irregular menses and has never been able to get pregnant. Patient's  first and only pap smear on 10/10/2017. Had mammogram in 20's, will order additional mammogram for baseline. No history of prior colonoscopy. Started Wellbutrin this last year, mood has improved. Prior cigarette smoker, quit 2 months ago and switched to vape. Started again smoking cigarettes again but has not had one for 10 days. History of drug use, has been sober for 11 months. History of meth and alcohol abuse. No history of prior abdominal surgeries. No history of heart disease, lung disease or diabetes. Has never been able to get pregnant and dose not desire children.     18: pelvic US IMPRESSION:  Peripheral follicular arrangement high within the ovaries suggesting polycystic ovarian syndrome. Slightly thickened heterogeneous endometrium raising a concern of potential hyperplastic  change.  9/17/18: pelvic US   1. Uterus is not normal in appearance. The endometrium appears thickened and vascular, and the IUD appears to be malpositioned.  Clinical correlation is recommended.    2. Bilateral complex ovarian cysts are noted.  Follow up ultrasound in 4-6 weeks is recommended, consider saline infusion sonohysterogram for further imaging of the endometrium.      9/17/18: endometrial biopsy  ENDOMETRIUM, BIOPSY:  1. FIGO Grade 1 (well differentiated) endometrioid carcinoma of      the endometrium  2. DNA mismatch repair enzyme immunohistochemistry studies:     All intact (see Amendment note and synoptic reporting)     10/24/2018: DaVinci Assisted Total Laparoscopic Hysterectomy, Bilateral Salpingo-Oophorectomy, lymph node dissection, Excision of Left Vulvar Lesion  Pelvic Washing:   Rare atypical cells present.     IMMUNOPATHOLOGY-Addendum     TEST(S) PERFORMED     Test(s) Performed:         - Mismatch Repair Immunohistochemistry (IHC) Testing for Mismatch Repair (MMR) Proteins:         MLH1 Expression:             - Intact nuclear expression         MSH2 Expression:             - Intact nuclear expression         MSH6 Expression:             - Intact nuclear expression         PMS2 Expression:             - Intact nuclear expression       Immunohistochemistry (IHC) Interpretation:           - No loss of nuclear expression of MMR proteins: low probability of microsatellite instability-high (MSI-H)     ORIGINAL REPORT:     SPECIMEN(S):   A: Left vulvar lesion   B: Left pelvic sentinel lymph node   C: Right pelvic sentinel lymph node   D: Uterus, cervix, bilateral ovaries and fallopian tubes   E: Portion of left ovary   F: Para-aortic lymph nodes   G: Left pelvic lymph nodes   H: Right pelvic lymph nodes     FINAL DIAGNOSIS:   A. LEFT VULVAR LESION, BIOPSY:   - Intradermal nevus     B. LEFT PELVIC SENTINEL LYMPH NODE, EXCISION:   - One reactive lymph node, negative for malignancy (0/1)     C. RIGHT  PELVIC SENTINEL LYMPH NODE, EXCISION:   - Six reactive lymph nodes, negative for malignancy (0/6)     D. UTERUS, CERVIX, BILATERAL OVARIES AND FALLOPIAN TUBES, HYSTERECTOMY WITH BILATERAL SALPINGO-OOPHORECTOMY:   - Endometrial endometrioid adenocarcinoma, FIGO grade 1   - Atypical endometrial hyperplasia   - Myometrium with no significant histologic abnormality   - Chronic cervicitis with microglandular hyperplasia   - Uterine serosal fibrous adhesions   - Ovaries with cortical hyperplasia   - Metastatic endometrial adenocarcinoma to the left fallopian tube   - Right fallopian tube with no significant histologic abnormality     E. PORTION OF LEFT OVARY, EXCISION:   - Benign cortical inclusion cysts     F. PARA-AORTIC LYMPH NODES, EXCISION:   - Four reactive lymph nodes, negative for malignancy (0/4)   - Benign glandular inclusions consistent with endosalpingiosis     G. LEFT PELVIC LYMPH NODES, EXCISION:   - Eight reactive lymph nodes, negative for malignancy (0/8)     H. RIGHT PELVIC LYMPH NODES, EXCISION:   - Nine reactive lymph nodes, negative for malignancy (0/9)   - Benign glandular inclusions consistent with endosalpingiosis     COMMENT:   The final diagnosis confirms the interpretation provided intraoperatively. The tumor seen in the left fallopian tube involves the wall (invading the mucosa and submucosa) and appears free floating in the lumen.   Tumor Site:         - Endometrium - Anterior and posterior     Histologic Type:         - Endometrioid carcinoma, NOS     Histologic Grade:         - FIGO grade 1     Tumor Size: 5.7 Centimeters (cm)     Tumor Extent       Myometrial Invasion:           - Not identified       Adenomyosis:           - Not identified       Uterine Serosa Involvement:           - Not identified       Lower Uterine Segment Involvement:           - Not identified       Cervical Stromal Involvement:           - Not identified       Other Tissue / Organ Involvement:           - Left  fallopian tube       Peritoneal Ascitic Fluid:           - Results pending     Accessory Findings       Lymphovascular Invasion:           - Not identified     11/6/18: CT ap IMPRESSION:   1. Surgical changes of hysterectomy, bilateral salpingo-oophorectomy, and pelvic lymph node dissection with extensive fat stranding and fluid within the low pelvis extending superiorly along the iliac vasculature, right greater than left, potentially within normal postsurgical limits, however, slightly more than expected for postsurgical change.   1a. The ureters are not well evaluated on this single phase exam and there is no hydronephrosis or hydroureter, however, the amount of fluid in the pelvis does raise the question of ureteral injury. If there is clinical concern, consider obtaining a CT urogram.  1b. An area of irregular rim enhancement in the low pelvis may represent a normal postsurgical vaginal cuff, though, again, this is slightly more conspicuous than usual and dehiscence or a developing abscess may have a similar appearance.  2. 6.8 x 4.1 x 5.6 cm right pelvic sidewall seroma versus lymphocele.  3. Soft tissue nodular thickening anteriorly to the right psoas muscle and along the right lateral conal fascia, indeterminate, cannot rule out metastatic foci. Attention on follow-up studies.  4. Cholelithiasis without evidence of cholecystitis.      11/7/18: CT abd with contrast CT urogram IMPRESSION:   1. Postsurgical changes of hysterectomy, bilateral salpingo-oophorectomy, and iliac lymph node dissection with fluid  collections within the pelvis extending along the iliac vasculature.  a. The right pelvic sidewall fluid collection is not significantly changed while the left pelvic sidewall fluid collection has enlarged  in comparison to the 11/6/2018 CT. The differential remains a seroma versus a lymphocele.  b. No evidence of contrast extravasation to suggest a ureteral injury.  c. Redemonstration of soft tissue nodular  "thickening along the right lateral conal fascia and anteriorly to the right psoas muscle and right psoas muscle. Attention on follow-up is recommended.  2. Cholelithiasis without evidence of cholecystitis.     Plan: adjuvant therapy to include 6 cycles of taxol and carboplatin; would not add radiation as all lymph nodes are negative, she does not have LVSI or deep invasion and this is a low grade tumor. Rx percocet 5 tabs due to continued abdominal pain. Will not prescribe any more narcotics and patient is aware of this.     11/16/18: Cycle #1 Paclitaxel/Carboplatin.   35.  12/6/18: Cycle #2 Paclitaxel/Carboplatin.  8.  12/27/18: Cycle #3 Paclitaxel/Carboplatin.  pending.           Today she comes to clinic feeling generally well. She has noticed that after walking about 7 minutes or so she develops some discomfort in her left thigh; this resolves with rest. She also reports that if she puts her legs up and crosses her legs that she will have an \"indent\" in her lower leg which lasts about 3 hours and she is concerned about lymphedema. She states she was working out prior to her treatment so she feels as though she is in shape and is wondering why she is feeling this way. She continues to take Gabapentin for her restless legs and recently had this increased to 600 mg am and afternoon and 900 mg at night. She is hoping this will help with some hot flashes and sleep. She has only needed to take her antiemetics a few times and does not need any medications refilled. She continues to reside at a sober house; She is not on probation so does not have regular drug tests done; but states they can still do one if there is cause.           Review of Systems:    Systemic           no weight changes; no fever; no chills; no night sweats; no appetite changes  Skin           no rashes, or lesions  Eye           no irritation; no changes in vision  Deny-Laryngeal           no dysphagia; no hoarseness   Pulmonary "    no cough; no shortness of breath  Cardiovascular    no chest pain; no palpitations; + HTN  Gastrointestinal    no diarrhea; no constipation; no abdominal pain; no changes in bowel habits; no blood in stool  Genitourinary   no urinary frequency; no urinary urgency; no dysuria; no pain; no abnormal vaginal discharge; no abnormal vaginal bleeding  Breast    no breast discharge; no breast changes; no breast pain  Musculoskeletal    no myalgias; no arthralgias; no back pain  Psychiatric           no depressed mood; no anxiety    Hematologic               no tender lymph nodes; no noticeable swellings or lumps   Endocrine    + hot flashes; no heat/cold intolerance         Neurological   no tremor; + numbness and tingling; no headaches; no difficulty sleeping      Past Medical History:    Past Medical History:   Diagnosis Date     Chickenpox     as a child     Depression      Endometrial cancer (H)      Hypertension     since her 30's     Infertility, female      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)      PCOS (polycystic ovarian syndrome)      STD (female)     in her 20's         Past Surgical History:    Past Surgical History:   Procedure Laterality Date     CYSTOSCOPY N/A 10/24/2018    Procedure: Cystoscopy;  Surgeon: Linh De Souza MD;  Location: [facility] OR     DAVINCI HYSTERECTOMY TOTAL, BILATERAL SALPINGO-OOPHORECTOMY, NODE DISSECTION, COMBINED N/A 10/24/2018    Procedure: DaVinci Assisted Total Laparoscopic Hysterectomy, Bilateral Salpingo-Oophorectomy, lymph node dissection;  Surgeon: Linh De Souza MD;  Location: [facility] OR     EXCISE LESION VULVA Left 10/24/2018    Procedure: Excision of Left Vulvar Lesion;  Surgeon: Linh De Souza MD;  Location: [facility] OR         Health Maintenance Due   Topic Date Due     PHQ-2 Q1 YR  01/20/1989     HIV SCREEN (SYSTEM ASSIGNED)  01/20/1995     PAP SCREENING Q3 YR (SYSTEM ASSIGNED)  01/20/1998       Current Medications:     Current Outpatient Medications   Medication Sig  Dispense Refill     buPROPion (WELLBUTRIN XL) 300 MG 24 hr tablet Take 300 mg by mouth       gabapentin (NEURONTIN) 600 MG tablet Take 600 mg by mouth 600mg in AM, 600mg in the afternoon, 900mg in the evening       ibuprofen (ADVIL/MOTRIN) 600 MG tablet Take 1 tablet (600 mg) by mouth every 6 hours as needed for pain (mild) 30 tablet 0     loratadine (CLARITIN) 10 MG tablet Take 10 mg by mouth       MELATONIN PO Take 10 mg by mouth nightly as needed       metoprolol succinate (TOPROL-XL) 25 MG 24 hr tablet Take 25 mg by mouth       Multiple Vitamin (MULTIVITAMIN  S) CAPS Take 1 capsule by mouth       ondansetron (ZOFRAN) 8 MG tablet Take 1 tablet (8 mg) by mouth every 8 hours as needed for nausea (Did not start until 3 days after chemo.) 20 tablet 1     prochlorperazine (COMPAZINE) 10 MG tablet Take 1 tablet (10 mg) by mouth every 6 hours as needed (nausea/vomiting) 30 tablet 5     senna-docusate (SENOKOT-S;PERICOLACE) 8.6-50 MG per tablet Take 1-2 tablets by mouth 2 times daily Take while on oral narcotics to prevent or treat constipation. 30 tablet 0     spironolactone (ALDACTONE) 50 MG tablet Take 50 mg by mouth       albuterol (PROAIR HFA/PROVENTIL HFA/VENTOLIN HFA) 108 (90 Base) MCG/ACT inhaler Inhale 1-2 puffs into the lungs           Allergies:        Allergies   Allergen Reactions     Amoxicillin      Penicillin G         Social History:     Social History     Tobacco Use     Smoking status: Former Smoker     Packs/day: 0.50     Types: Cigarettes     Smokeless tobacco: Never Used     Tobacco comment: uses vape pen about 3-4 times daily   Substance Use Topics     Alcohol use: No     Comment: 11 months sober        History   Drug Use No     Comment: 11 months sober. lives at sober housing         Family History:     The patient's family history is notable for:    No family history on file.      Physical Exam:     /70 (BP Location: Left arm, Patient Position: Sitting, Cuff Size: Adult Regular)   Pulse 65  "  Temp 97.9  F (36.6  C) (Oral)   Resp 16   Ht 1.645 m (5' 4.76\")   Wt 103.3 kg (227 lb 11.2 oz)   SpO2 98%   BMI 38.17 kg/m     Body mass index is 38.17 kg/m .    General Appearance: healthy, no distress; restless     HEENT: no thyromegaly, no palpable nodules or masses        Cardiovascular: regular rate and rhythm, no gallops, rubs or murmurs     Respiratory: lungs clear, no rales, rhonchi or wheezes, normal diaphragmatic excursion    Musculoskeletal: Upper and lower extremities non tender and without edema    Skin: no lesions or rashes     Neurological: normal gait, no gross defects     Psychiatric: Restless, unable to sit still                               Hematological: normal cervical, supraclavicular lymph nodes     Gastrointestinal:       abdomen soft, non-tender, non-distended    Genitourinary: deferred      Assessment:    Meagan Morales is a 43 year old woman with a diagnosis of stage IIIA grade 1 endometrioid adenocarcinoma.   She is here today for follow up and disease management.     30 minutes were spent with this patient, over 50% of that time was spent in symptom management, treatment planning and in counseling and coordination of care.      Plan:     1.)        Ok to proceed with planned chemotherapy pending labs are WNL. She will return for her next cycle after she returns from her trip. Reviewed signs and symptoms for when she should contact the clinic or seek additional care. Patient to contact the clinic with any questions or concerns in the interim.     2.) Genetic risk factors were assessed and her MMR was intact.     3.) Labs and/or tests ordered include:  Chemo labs. .      4.) Health maintenance issues addressed today include annual health maintenance and non-gynecologic issues with PCP.    5.)        Discussed seeing Lymphedema and Cancer rehab; she will look into her insurance coverage for cancer rehab and let us know. She was given a handout on cancer rehab.       [employer]" CORTES Gallagher, WHNP-BC, ANP-BC  Women's Health Nurse Practitioner  Adult Nurse Pracitioner  Division of Gynecologic Oncology        CC  Patient Care Team:  Aundrea Max MD as PCP - General

## 2018-12-27 NOTE — NURSING NOTE
"Oncology Rooming Note    December 27, 2018 9:14 AM   Meagan Morales is a 43 year old female who presents for:    Chief Complaint   Patient presents with     Blood Draw     Labs drawn via piv by RN in lab. VS taken.      Oncology Clinic Visit     Return: Endometrial cancer      Initial Vitals: /70 (BP Location: Left arm, Patient Position: Sitting, Cuff Size: Adult Regular)   Pulse 65   Temp 97.9  F (36.6  C) (Oral)   Resp 16   Ht 1.645 m (5' 4.76\")   Wt 103.3 kg (227 lb 11.2 oz)   SpO2 98%   BMI 38.17 kg/m   Estimated body mass index is 38.17 kg/m  as calculated from the following:    Height as of this encounter: 1.645 m (5' 4.76\").    Weight as of this encounter: 103.3 kg (227 lb 11.2 oz). Body surface area is 2.17 meters squared.  No Pain (0) Comment: Data Unavailable   No LMP recorded. Patient has had a hysterectomy.  Allergies reviewed: Yes  Medications reviewed: Yes    Medications: Medication refills not needed today.  Pharmacy name entered into [employer]: [facility] [id number] [id number] [address] AT [facility] [address] [facility] [address]    Clinical concerns: Pt complains pain and swelling in the left leg.  [facility] Gallagher was notified.    5 minutes for nursing intake (face to face time)     Mackenzie Flores CMA              "

## 2018-12-27 NOTE — NURSING NOTE
Chief Complaint   Patient presents with     Blood Draw     Labs drawn via piv by RN in lab. VS taken.      Labs drawn via peripheral IV. Vital signs taken. Checked into next appointment.     Taylor Juarez RN

## 2018-12-28 NOTE — PROGRESS NOTES
Care Coordinator Note  Reviewed treatment plan and appts.  Offered support faitgue is an issue will start cancer Rehab if insurance would approve.       Patient verbalized back understanding of the above information discussed.   Face to face time spent with patient 5.  Maren HERNANDEZ RN, OCN  Care Coordinator   Gynecologic Cancer   Office [phone]  Pager [phone]

## 2019-01-08 ENCOUNTER — ANCILLARY PROCEDURE (OUTPATIENT)
Dept: ULTRASOUND IMAGING | Facility: CLINIC | Age: 44
End: 2019-01-08
Payer: COMMERCIAL

## 2019-01-08 DIAGNOSIS — T81.89XA LYMPHOCELE AFTER SURGICAL PROCEDURE: ICD-10-CM

## 2019-01-08 DIAGNOSIS — C54.1 ENDOMETRIAL CANCER (H): ICD-10-CM

## 2019-01-08 DIAGNOSIS — I89.8 LYMPHOCELE AFTER SURGICAL PROCEDURE: ICD-10-CM

## 2019-01-08 DIAGNOSIS — R22.43 LOCALIZED SWELLING OF BOTH LOWER LEGS: ICD-10-CM

## 2019-01-08 DIAGNOSIS — M79.652 PAIN OF LEFT THIGH: ICD-10-CM

## 2019-01-08 DIAGNOSIS — C54.1 ENDOMETRIAL CANCER (H): Primary | ICD-10-CM

## 2019-01-09 ENCOUNTER — ANCILLARY PROCEDURE (OUTPATIENT)
Dept: CT IMAGING | Facility: CLINIC | Age: 44
End: 2019-01-09
Payer: COMMERCIAL

## 2019-01-09 DIAGNOSIS — C54.1 ENDOMETRIAL CANCER (H): ICD-10-CM

## 2019-01-09 DIAGNOSIS — I89.8 LYMPHOCELE AFTER SURGICAL PROCEDURE: ICD-10-CM

## 2019-01-09 DIAGNOSIS — T81.89XA LYMPHOCELE AFTER SURGICAL PROCEDURE: ICD-10-CM

## 2019-01-09 DIAGNOSIS — M79.652 PAIN OF LEFT THIGH: ICD-10-CM

## 2019-01-09 RX ORDER — IOPAMIDOL 755 MG/ML
135 INJECTION, SOLUTION INTRAVASCULAR ONCE
Status: COMPLETED | OUTPATIENT
Start: 2019-01-09 | End: 2019-01-09

## 2019-01-09 RX ADMIN — IOPAMIDOL 135 ML: 755 INJECTION, SOLUTION INTRAVASCULAR at 09:59

## 2019-01-09 NOTE — DISCHARGE INSTRUCTIONS

## 2019-01-10 ENCOUNTER — ONCOLOGY VISIT (OUTPATIENT)
Dept: ONCOLOGY | Facility: CLINIC | Age: 44
End: 2019-01-10
Attending: OBSTETRICS & GYNECOLOGY
Payer: COMMERCIAL

## 2019-01-10 VITALS
RESPIRATION RATE: 16 BRPM | SYSTOLIC BLOOD PRESSURE: 114 MMHG | HEIGHT: 65 IN | BODY MASS INDEX: 37.65 KG/M2 | HEART RATE: 87 BPM | DIASTOLIC BLOOD PRESSURE: 70 MMHG | OXYGEN SATURATION: 100 % | TEMPERATURE: 97.2 F | WEIGHT: 226 LBS

## 2019-01-10 DIAGNOSIS — M79.605 PAIN OF LEFT LOWER EXTREMITY: Primary | ICD-10-CM

## 2019-01-10 PROCEDURE — G0463 HOSPITAL OUTPT CLINIC VISIT: HCPCS | Mod: ZF

## 2019-01-10 PROCEDURE — 99214 OFFICE O/P EST MOD 30 MIN: CPT | Mod: 24 | Performed by: OBSTETRICS & GYNECOLOGY

## 2019-01-10 RX ORDER — IBUPROFEN 600 MG/1
600 TABLET, FILM COATED ORAL EVERY 6 HOURS PRN
Qty: 30 TABLET | Refills: 1 | Status: SHIPPED | OUTPATIENT
Start: 2019-01-10 | End: 2020-01-10

## 2019-01-10 RX ORDER — POLYETHYLENE GLYCOL 3350 17 G/17G
1 POWDER, FOR SOLUTION ORAL DAILY
COMMUNITY
End: 2019-06-04

## 2019-01-10 ASSESSMENT — PAIN SCALES - GENERAL: PAINLEVEL: NO PAIN (0)

## 2019-01-10 ASSESSMENT — ENCOUNTER SYMPTOMS
SMELL DISTURBANCE: 0
RESPIRATORY PAIN: 0
RECTAL PAIN: 0
NIGHT SWEATS: 0
JOINT SWELLING: 0
BLOOD IN STOOL: 0
TASTE DISTURBANCE: 0
SHORTNESS OF BREATH: 0
DIFFICULTY URINATING: 0
DECREASED CONCENTRATION: 0
DIZZINESS: 0
MUSCLE CRAMPS: 0
SINUS CONGESTION: 0
HEARTBURN: 0
TINGLING: 0
POLYDIPSIA: 0
WEIGHT LOSS: 0
ORTHOPNEA: 0
DOUBLE VISION: 0
EYE IRRITATION: 0
EYE WATERING: 0
NAUSEA: 0
FLANK PAIN: 0
SEIZURES: 0
PARALYSIS: 0
BREAST PAIN: 0
FEVER: 0
NECK MASS: 0
SWOLLEN GLANDS: 0
TACHYCARDIA: 0
WEAKNESS: 0
EXERCISE INTOLERANCE: 0
HOARSE VOICE: 0
LEG SWELLING: 0
SPEECH CHANGE: 0
EYE REDNESS: 0
SYNCOPE: 0
HEMOPTYSIS: 0
HALLUCINATIONS: 0
CHILLS: 0
SKIN CHANGES: 0
SNORES LOUDLY: 0
POOR WOUND HEALING: 0
VOMITING: 0
DECREASED LIBIDO: 0
COUGH DISTURBING SLEEP: 0
TREMORS: 0
RECTAL BLEEDING: 0
BLOATING: 0
HOT FLASHES: 0
MUSCLE WEAKNESS: 0
WHEEZING: 0
HYPERTENSION: 1
INSOMNIA: 0
COUGH: 0
HEADACHES: 0
SPUTUM PRODUCTION: 0
ABDOMINAL PAIN: 0
DISTURBANCES IN COORDINATION: 0
EYE PAIN: 0
DIARRHEA: 0
POLYPHAGIA: 0
DECREASED APPETITE: 0
BACK PAIN: 0
BREAST MASS: 0
ALTERED TEMPERATURE REGULATION: 0
SINUS PAIN: 0
MEMORY LOSS: 0
NAIL CHANGES: 0
JAUNDICE: 0
SORE THROAT: 0
BRUISES/BLEEDS EASILY: 0
WEIGHT GAIN: 0
ARTHRALGIAS: 0
CLAUDICATION: 0
TROUBLE SWALLOWING: 0
PALPITATIONS: 0
LIGHT-HEADEDNESS: 0
MYALGIAS: 0
NUMBNESS: 0
HEMATURIA: 0
LEG PAIN: 0
PANIC: 0
NERVOUS/ANXIOUS: 0
FATIGUE: 0
STIFFNESS: 0
DYSURIA: 0
SLEEP DISTURBANCES DUE TO BREATHING: 0
NECK PAIN: 0
CONSTIPATION: 0
POSTURAL DYSPNEA: 0
EXTREMITY NUMBNESS: 0
DYSPNEA ON EXERTION: 0
HYPOTENSION: 0
DEPRESSION: 0
BOWEL INCONTINENCE: 0
INCREASED ENERGY: 0
LOSS OF CONSCIOUSNESS: 0

## 2019-01-10 ASSESSMENT — MIFFLIN-ST. JEOR: SCORE: 1687.04

## 2019-01-10 NOTE — NURSING NOTE
"Oncology Rooming Note    January 10, 2019 3:23 PM   Meagan Morales is a 43 year old female who presents for:    Chief Complaint   Patient presents with     Oncology Clinic Visit     Return Endometrial Ca; Left leg pain with ankle swelling     Initial Vitals: /70   Pulse 87   Temp 97.2  F (36.2  C) (Oral)   Resp 16   Ht 1.645 m (5' 4.75\")   Wt 102.5 kg (226 lb)   SpO2 100%   BMI 37.90 kg/m   Estimated body mass index is 37.9 kg/m  as calculated from the following:    Height as of this encounter: 1.645 m (5' 4.75\").    Weight as of this encounter: 102.5 kg (226 lb). Body surface area is 2.16 meters squared.  No Pain (0) Comment: Data Unavailable   No LMP recorded. Patient has had a hysterectomy.  Allergies reviewed: Yes  Medications reviewed: Yes    Medications: Medication refills not needed today.  Pharmacy name entered into [employer]: [facility] [address] [place] [address] [address] AT [address]    Clinical concerns: Left leg pain with some ankle swelling; going on for weeks.     6 minutes for nursing intake (face to face time)     Dionne Schultz CMA              "

## 2019-01-10 NOTE — PROGRESS NOTES
Follow Up Notes on Referred Patient    Date: 2018         RE: Meagan Morales  : 1975  VY: 2018      Meagan Morales is a 43 year old woman with a diagnosis of stage IIIA grade 1 endometrioid adenocarcinoma.   She is here today for follow up for acute left leg pain.     Brief History:  Meagan originally presented to clinic due to continuous vaginal bleeding, very light. She thought it was just her menses being continuous. US was done which found thickened endometrial lining. IUD was placed. Second US was done and IUD did not affect endometrial lining and IUD was malpositioned. Since then, she has noted continuous spotting. IUD was removed. Also of note, ovarian cysts were noted on second US. Subsequent endometrial biopsy was done which revealed grade 1 endometrial cancer. She has always had irregular menses and has never been able to get pregnant. Patient's  first and only pap smear on 10/10/2017. Had mammogram in 20's, will order additional mammogram for baseline. No history of prior colonoscopy. Started Wellbutrin this last year, mood has improved. Prior cigarette smoker, quit 2 months ago and switched to vape. Started again smoking cigarettes again but has not had one for 10 days. History of drug use, has been sober for 11 months. History of meth and alcohol abuse. No history of prior abdominal surgeries. No history of heart disease, lung disease or diabetes. Has never been able to get pregnant and dose not desire children.     18: pelvic US IMPRESSION:  Peripheral follicular arrangement high within the ovaries suggesting polycystic ovarian syndrome. Slightly thickened heterogeneous endometrium raising a concern of potential hyperplastic change.  18: pelvic US   1. Uterus is not normal in appearance. The endometrium appears thickened and vascular, and the IUD appears to be malpositioned.  Clinical correlation is recommended.    2. Bilateral complex ovarian cysts are  noted.  Follow up ultrasound in 4-6 weeks is recommended, consider saline infusion sonohysterogram for further imaging of the endometrium.      9/17/18: endometrial biopsy  ENDOMETRIUM, BIOPSY:  1. FIGO Grade 1 (well differentiated) endometrioid carcinoma of      the endometrium  2. DNA mismatch repair enzyme immunohistochemistry studies:     All intact (see Amendment note and synoptic reporting)     10/24/2018: DaVinci Assisted Total Laparoscopic Hysterectomy, Bilateral Salpingo-Oophorectomy, lymph node dissection, Excision of Left Vulvar Lesion  Pelvic Washing:   Rare atypical cells present.     IMMUNOPATHOLOGY-Addendum     TEST(S) PERFORMED     Test(s) Performed:         - Mismatch Repair Immunohistochemistry (IHC) Testing for Mismatch Repair (MMR) Proteins:         MLH1 Expression:             - Intact nuclear expression         MSH2 Expression:             - Intact nuclear expression         MSH6 Expression:             - Intact nuclear expression         PMS2 Expression:             - Intact nuclear expression       Immunohistochemistry (IHC) Interpretation:           - No loss of nuclear expression of MMR proteins: low probability of microsatellite instability-high (MSI-H)     ORIGINAL REPORT:     SPECIMEN(S):   A: Left vulvar lesion   B: Left pelvic sentinel lymph node   C: Right pelvic sentinel lymph node   D: Uterus, cervix, bilateral ovaries and fallopian tubes   E: Portion of left ovary   F: Para-aortic lymph nodes   G: Left pelvic lymph nodes   H: Right pelvic lymph nodes     FINAL DIAGNOSIS:   A. LEFT VULVAR LESION, BIOPSY:   - Intradermal nevus     B. LEFT PELVIC SENTINEL LYMPH NODE, EXCISION:   - One reactive lymph node, negative for malignancy (0/1)     C. RIGHT PELVIC SENTINEL LYMPH NODE, EXCISION:   - Six reactive lymph nodes, negative for malignancy (0/6)     D. UTERUS, CERVIX, BILATERAL OVARIES AND FALLOPIAN TUBES, HYSTERECTOMY WITH BILATERAL SALPINGO-OOPHORECTOMY:   - Endometrial endometrioid  adenocarcinoma, FIGO grade 1   - Atypical endometrial hyperplasia   - Myometrium with no significant histologic abnormality   - Chronic cervicitis with microglandular hyperplasia   - Uterine serosal fibrous adhesions   - Ovaries with cortical hyperplasia   - Metastatic endometrial adenocarcinoma to the left fallopian tube   - Right fallopian tube with no significant histologic abnormality     E. PORTION OF LEFT OVARY, EXCISION:   - Benign cortical inclusion cysts     F. PARA-AORTIC LYMPH NODES, EXCISION:   - Four reactive lymph nodes, negative for malignancy (0/4)   - Benign glandular inclusions consistent with endosalpingiosis     G. LEFT PELVIC LYMPH NODES, EXCISION:   - Eight reactive lymph nodes, negative for malignancy (0/8)     H. RIGHT PELVIC LYMPH NODES, EXCISION:   - Nine reactive lymph nodes, negative for malignancy (0/9)   - Benign glandular inclusions consistent with endosalpingiosis     COMMENT:   The final diagnosis confirms the interpretation provided intraoperatively. The tumor seen in the left fallopian tube involves the wall (invading the mucosa and submucosa) and appears free floating in the lumen.   Tumor Site:         - Endometrium - Anterior and posterior     Histologic Type:         - Endometrioid carcinoma, NOS     Histologic Grade:         - FIGO grade 1     Tumor Size: 5.7 Centimeters (cm)     Tumor Extent       Myometrial Invasion:           - Not identified       Adenomyosis:           - Not identified       Uterine Serosa Involvement:           - Not identified       Lower Uterine Segment Involvement:           - Not identified       Cervical Stromal Involvement:           - Not identified       Other Tissue / Organ Involvement:           - Left fallopian tube       Peritoneal Ascitic Fluid:           - Results pending     Accessory Findings       Lymphovascular Invasion:           - Not identified     11/6/18: CT ap IMPRESSION:   1. Surgical changes of hysterectomy, bilateral  salpingo-oophorectomy, and pelvic lymph node dissection with extensive fat stranding and fluid within the low pelvis extending superiorly along the iliac vasculature, right greater than left, potentially within normal postsurgical limits, however, slightly more than expected for postsurgical change.   1a. The ureters are not well evaluated on this single phase exam and there is no hydronephrosis or hydroureter, however, the amount of fluid in the pelvis does raise the question of ureteral injury. If there is clinical concern, consider obtaining a CT urogram.  1b. An area of irregular rim enhancement in the low pelvis may represent a normal postsurgical vaginal cuff, though, again, this is slightly more conspicuous than usual and dehiscence or a developing abscess may have a similar appearance.  2. 6.8 x 4.1 x 5.6 cm right pelvic sidewall seroma versus lymphocele.  3. Soft tissue nodular thickening anteriorly to the right psoas muscle and along the right lateral conal fascia, indeterminate, cannot rule out metastatic foci. Attention on follow-up studies.  4. Cholelithiasis without evidence of cholecystitis.      11/7/18: CT abd with contrast CT urogram IMPRESSION:   1. Postsurgical changes of hysterectomy, bilateral salpingo-oophorectomy, and iliac lymph node dissection with fluid  collections within the pelvis extending along the iliac vasculature.  a. The right pelvic sidewall fluid collection is not significantly changed while the left pelvic sidewall fluid collection has enlarged  in comparison to the 11/6/2018 CT. The differential remains a seroma versus a lymphocele.  b. No evidence of contrast extravasation to suggest a ureteral injury.  c. Redemonstration of soft tissue nodular thickening along the right lateral conal fascia and anteriorly to the right psoas muscle and right psoas muscle. Attention on follow-up is recommended.  2. Cholelithiasis without evidence of cholecystitis.    Plan: adjuvant therapy to  include 6 cycles of taxol and carboplatin; would not add radiation as all lymph nodes are negative, she does not have LVSI or deep invasion and this is a low grade tumor. Rx percocet 5 tabs due to continued abdominal pain. Will not prescribe any more narcotics and patient is aware of this.    11/16/18: Cycle #1 Paclitaxel/Carboplatin.   35.  12/6/18: Cycle #2 Paclitaxel/Carboplatin.  pending.  12/27/2018: Cycle #3 T/C  1/8/19: US lower extremity: IMPRESSION: No evidence of deep venous thrombosis in either lower extremity.  1/9/2019: CT abd/pelvis    IMPRESSION:   1. Minimal change in a right pelvic sidewall collection and decreased  size of a left pelvic sidewall collection, which are most consistent  with lymphoceles.  2. No suspicious pelvic lymphadenopathy.     1/21/2019 Cycle #4 planned             Today she comes to clinic due to pain in her left leg, feels tight, hurts with walking. Can't walk across the street. Pain starts on top of thigh then goes into groin Has to lay with pillow between her legs. Occurs with walking and movement. Right thigh has numbness since surgery.  Going to cancer rehab tomorrow.  Has vacation planned for next week.        Review of Systems     Constitutional:  Negative for fever, chills, weight loss, weight gain, fatigue, decreased appetite, night sweats, recent stressors, height gain, height loss, post-operative complications, incisional pain, hallucinations, increased energy, hyperactivity and confused.   HENT:  Negative for ear pain, hearing loss, tinnitus, nosebleeds, trouble swallowing, hoarse voice, mouth sores, sore throat, ear discharge, tooth pain, gum tenderness, taste disturbance, smell disturbance, hearing aid, bleeding gums, dry mouth, sinus pain, sinus congestion and neck mass.    Eyes:  Negative for double vision, pain, redness, eye pain, decreased vision, eye watering, eye bulging, eye dryness, flashing lights, spots, floaters, strabismus, tunnel vision,  jaundice and eye irritation.   Respiratory:   Negative for cough, hemoptysis, sputum production, shortness of breath, wheezing, sleep disturbances due to breathing, snores loudly, respiratory pain, dyspnea on exertion, cough disturbing sleep and postural dyspnea.    Cardiovascular:  Positive for hypertension. Negative for chest pain, dyspnea on exertion, palpitations, orthopnea, claudication, leg swelling, fingers/toes turn blue, hypotension, syncope, history of heart murmur, chest pain on exertion, chest pain at rest, pacemaker, few scattered varicosities, leg pain, sleep disturbances due to breathing, tachycardia, light-headedness, exercise intolerance and edema.   Gastrointestinal:  Negative for heartburn, nausea, vomiting, abdominal pain, diarrhea, constipation, blood in stool, melena, rectal pain, bloating, hemorrhoids, bowel incontinence, jaundice, rectal bleeding, coffee ground emesis and change in stool.   Genitourinary:  Negative for bladder incontinence, dysuria, urgency, hematuria, flank pain, vaginal discharge, difficulty urinating, genital sores, dyspareunia, decreased libido, nocturia, voiding less frequently, arousal difficulty, abnormal vaginal bleeding, excessive menstruation, menstrual changes, hot flashes, vaginal dryness and postmenopausal bleeding.   Musculoskeletal:  Negative for myalgias, back pain, joint swelling, arthralgias, stiffness, muscle cramps, neck pain, bone pain, muscle weakness and fracture.   Skin:  Negative for nail changes, itching, poor wound healing, rash, hair changes, skin changes, acne, warts, poor wound healing, scarring, flaky skin, Raynaud's phenomenon, sensitivity to sunlight and skin thickening.   Neurological:  Negative for dizziness, tingling, tremors, speech change, seizures, loss of consciousness, weakness, light-headedness, numbness, headaches, disturbances in coordination, extremity numbness, memory loss, difficulty walking and paralysis.   Endo/Heme:  Negative  for anemia, swollen glands and bruises/bleeds easily.   Psychiatric/Behavioral:  Negative for depression, hallucinations, memory loss, decreased concentration, mood swings and panic attacks.    Breast:  Negative for breast discharge, breast mass, breast pain and nipple retraction.   Endocrine:  Negative for altered temperature regulation, polyphagia, polydipsia, unwanted hair growth and change in facial hair.      I have reviewed the pertinent positive findings in the review of symptoms with the patient.      Past Medical History:    Past Medical History:   Diagnosis Date     Chickenpox     as a child     Depression      Endometrial cancer (H)      Hypertension     since her 30's     Infertility, female      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)      PCOS (polycystic ovarian syndrome)      STD (female)     in her 20's         Past Surgical History:    Past Surgical History:   Procedure Laterality Date     CYSTOSCOPY N/A 10/24/2018    Procedure: Cystoscopy;  Surgeon: Linh De Souza MD;  Location: [facility] OR     DAVINCI HYSTERECTOMY TOTAL, BILATERAL SALPINGO-OOPHORECTOMY, NODE DISSECTION, COMBINED N/A 10/24/2018    Procedure: DaVinci Assisted Total Laparoscopic Hysterectomy, Bilateral Salpingo-Oophorectomy, lymph node dissection;  Surgeon: Linh De Souza MD;  Location: [facility] OR     EXCISE LESION VULVA Left 10/24/2018    Procedure: Excision of Left Vulvar Lesion;  Surgeon: Linh De Souza MD;  Location: [facility]         Health Maintenance Due   Topic Date Due     PHQ-2 Q1 YR  01/20/1989     HIV SCREEN (SYSTEM ASSIGNED)  01/20/1995     PAP SCREENING Q3 YR (SYSTEM ASSIGNED)  01/20/1998       Current Medications:     Current Outpatient Medications   Medication Sig Dispense Refill     buPROPion (WELLBUTRIN XL) 300 MG 24 hr tablet Take 300 mg by mouth       gabapentin (NEURONTIN) 600 MG tablet Take 600 mg by mouth 600mg in AM, 600mg in the afternoon, 900mg in the evening       ibuprofen (ADVIL/MOTRIN) 600 MG tablet  "Take 1 tablet (600 mg) by mouth every 6 hours as needed for pain (mild) 30 tablet 0     loratadine (CLARITIN) 10 MG tablet Take 10 mg by mouth       MELATONIN PO Take 10 mg by mouth nightly as needed       metoprolol succinate (TOPROL-XL) 25 MG 24 hr tablet Take 25 mg by mouth       Multiple Vitamin (MULTIVITAMIN  S) CAPS Take 1 capsule by mouth       ondansetron (ZOFRAN) 8 MG tablet Take 1 tablet (8 mg) by mouth every 8 hours as needed for nausea (Did not start until 3 days after chemo.) 20 tablet 1     polyethylene glycol (MIRALAX/GLYCOLAX) powder Take 1 capful by mouth daily       spironolactone (ALDACTONE) 50 MG tablet Take 50 mg by mouth       albuterol (PROAIR HFA/PROVENTIL HFA/VENTOLIN HFA) 108 (90 Base) MCG/ACT inhaler Inhale 1-2 puffs into the lungs       prochlorperazine (COMPAZINE) 10 MG tablet Take 1 tablet (10 mg) by mouth every 6 hours as needed (nausea/vomiting) (Patient not taking: Reported on 1/10/2019) 30 tablet 5     senna-docusate (SENOKOT-S;PERICOLACE) 8.6-50 MG per tablet Take 1-2 tablets by mouth 2 times daily Take while on oral narcotics to prevent or treat constipation. (Patient not taking: Reported on 1/10/2019) 30 tablet 0         Allergies:        Allergies   Allergen Reactions     Amoxicillin      Penicillin G         Social History:     Social History     Tobacco Use     Smoking status: Former Smoker     Packs/day: 0.50     Types: Cigarettes     Smokeless tobacco: Never Used     Tobacco comment: uses vape pen about 3-4 times daily   Substance Use Topics     Alcohol use: No     Comment: 11 months sober        History   Drug Use No     Comment: 11 months sober. lives at sober housing         Family History:     The patient's family history is notable for:    No family history on file.      Physical Exam:     /70   Pulse 87   Temp 97.2  F (36.2  C) (Oral)   Resp 16   Ht 1.645 m (5' 4.75\")   Wt 102.5 kg (226 lb)   SpO2 100%   BMI 37.90 kg/m    Body mass index is 37.9 " kg/m .    General Appearance: healthy and alert, no distress     Musculoskeletal: extremities non tender and without edema, pain not reproductible, muscle strength 5+/5+ bilaterally, no gait problems.    Neurological: normal gait, no gross defects     Psychiatric: appropriate mood and affect                              Genitourinary: deferred      Assessment:    Meagan Morales is a 43 year old woman with a diagnosis of stage IIIA grade 1 endometrioid adenocarcinoma.   She is here today for follow up and disease management.     30 minutes were spent with this patient, over 50% of that time was spent in symptom management, treatment planning and in counseling and coordination of care.      Plan:     1.)       Left leg pain: Plan ibuprofen 600 mg q 6 hours for next 48 hours, recommend ice the area. If no improvement plan PT. May need MRI of back as this could be nerve related and lymphedema.     2.) Genetic risk factors were assessed and her MMR was intact.    3.) Labs and/or tests ordered include:  none      4.) Cancer rehab planned tomorrow.    Linh De Souza MD    Department of Ob/Gyn and Women's Health  Division of Gynecologic Oncology  [facility]  [phone]            CC  Patient Care Team:  Aundrea Max MD as PCP - General  SELF, REFERRED

## 2019-01-10 NOTE — LETTER
1/10/2019       RE: Meagan Morales  [address] [facility] [address]     Dear Colleague,    Thank you for referring your patient, Meagan Morales, to the [facility]. Please see a copy of my visit note below.                    Follow Up Notes on Referred Patient    Date: 2018         RE: Meagan Morales  : 1975  VY: 2018      Meagan Morales is a 43 year old woman with a diagnosis of stage IIIA grade 1 endometrioid adenocarcinoma.   She is here today for follow up for acute left leg pain.     Brief History:  Meagan originally presented to clinic due to continuous vaginal bleeding, very light. She thought it was just her menses being continuous. US was done which found thickened endometrial lining. IUD was placed. Second US was done and IUD did not affect endometrial lining and IUD was malpositioned. Since then, she has noted continuous spotting. IUD was removed. Also of note, ovarian cysts were noted on second US. Subsequent endometrial biopsy was done which revealed grade 1 endometrial cancer. She has always had irregular menses and has never been able to get pregnant. Patient's  first and only pap smear on 10/10/2017. Had mammogram in 20's, will order additional mammogram for baseline. No history of prior colonoscopy. Started Wellbutrin this last year, mood has improved. Prior cigarette smoker, quit 2 months ago and switched to vape. Started again smoking cigarettes again but has not had one for 10 days. History of drug use, has been sober for 11 months. History of meth and alcohol abuse. No history of prior abdominal surgeries. No history of heart disease, lung disease or diabetes. Has never been able to get pregnant and dose not desire children.     18: pelvic US IMPRESSION:  Peripheral follicular arrangement high within the ovaries suggesting polycystic ovarian syndrome. Slightly thickened heterogeneous endometrium raising a concern of potential hyperplastic  change.  9/17/18: pelvic US   1. Uterus is not normal in appearance. The endometrium appears thickened and vascular, and the IUD appears to be malpositioned.  Clinical correlation is recommended.    2. Bilateral complex ovarian cysts are noted.  Follow up ultrasound in 4-6 weeks is recommended, consider saline infusion sonohysterogram for further imaging of the endometrium.      9/17/18: endometrial biopsy  ENDOMETRIUM, BIOPSY:  1. FIGO Grade 1 (well differentiated) endometrioid carcinoma of      the endometrium  2. DNA mismatch repair enzyme immunohistochemistry studies:     All intact (see Amendment note and synoptic reporting)     10/24/2018: DaVinci Assisted Total Laparoscopic Hysterectomy, Bilateral Salpingo-Oophorectomy, lymph node dissection, Excision of Left Vulvar Lesion  Pelvic Washing:   Rare atypical cells present.     IMMUNOPATHOLOGY-Addendum     TEST(S) PERFORMED     Test(s) Performed:         - Mismatch Repair Immunohistochemistry (IHC) Testing for Mismatch Repair (MMR) Proteins:         MLH1 Expression:             - Intact nuclear expression         MSH2 Expression:             - Intact nuclear expression         MSH6 Expression:             - Intact nuclear expression         PMS2 Expression:             - Intact nuclear expression       Immunohistochemistry (IHC) Interpretation:           - No loss of nuclear expression of MMR proteins: low probability of microsatellite instability-high (MSI-H)     ORIGINAL REPORT:     SPECIMEN(S):   A: Left vulvar lesion   B: Left pelvic sentinel lymph node   C: Right pelvic sentinel lymph node   D: Uterus, cervix, bilateral ovaries and fallopian tubes   E: Portion of left ovary   F: Para-aortic lymph nodes   G: Left pelvic lymph nodes   H: Right pelvic lymph nodes     FINAL DIAGNOSIS:   A. LEFT VULVAR LESION, BIOPSY:   - Intradermal nevus     B. LEFT PELVIC SENTINEL LYMPH NODE, EXCISION:   - One reactive lymph node, negative for malignancy (0/1)     C. RIGHT  PELVIC SENTINEL LYMPH NODE, EXCISION:   - Six reactive lymph nodes, negative for malignancy (0/6)     D. UTERUS, CERVIX, BILATERAL OVARIES AND FALLOPIAN TUBES, HYSTERECTOMY WITH BILATERAL SALPINGO-OOPHORECTOMY:   - Endometrial endometrioid adenocarcinoma, FIGO grade 1   - Atypical endometrial hyperplasia   - Myometrium with no significant histologic abnormality   - Chronic cervicitis with microglandular hyperplasia   - Uterine serosal fibrous adhesions   - Ovaries with cortical hyperplasia   - Metastatic endometrial adenocarcinoma to the left fallopian tube   - Right fallopian tube with no significant histologic abnormality     E. PORTION OF LEFT OVARY, EXCISION:   - Benign cortical inclusion cysts     F. PARA-AORTIC LYMPH NODES, EXCISION:   - Four reactive lymph nodes, negative for malignancy (0/4)   - Benign glandular inclusions consistent with endosalpingiosis     G. LEFT PELVIC LYMPH NODES, EXCISION:   - Eight reactive lymph nodes, negative for malignancy (0/8)     H. RIGHT PELVIC LYMPH NODES, EXCISION:   - Nine reactive lymph nodes, negative for malignancy (0/9)   - Benign glandular inclusions consistent with endosalpingiosis     COMMENT:   The final diagnosis confirms the interpretation provided intraoperatively. The tumor seen in the left fallopian tube involves the wall (invading the mucosa and submucosa) and appears free floating in the lumen.   Tumor Site:         - Endometrium - Anterior and posterior     Histologic Type:         - Endometrioid carcinoma, NOS     Histologic Grade:         - FIGO grade 1     Tumor Size: 5.7 Centimeters (cm)     Tumor Extent       Myometrial Invasion:           - Not identified       Adenomyosis:           - Not identified       Uterine Serosa Involvement:           - Not identified       Lower Uterine Segment Involvement:           - Not identified       Cervical Stromal Involvement:           - Not identified       Other Tissue / Organ Involvement:           - Left  fallopian tube       Peritoneal Ascitic Fluid:           - Results pending     Accessory Findings       Lymphovascular Invasion:           - Not identified     11/6/18: CT ap IMPRESSION:   1. Surgical changes of hysterectomy, bilateral salpingo-oophorectomy, and pelvic lymph node dissection with extensive fat stranding and fluid within the low pelvis extending superiorly along the iliac vasculature, right greater than left, potentially within normal postsurgical limits, however, slightly more than expected for postsurgical change.   1a. The ureters are not well evaluated on this single phase exam and there is no hydronephrosis or hydroureter, however, the amount of fluid in the pelvis does raise the question of ureteral injury. If there is clinical concern, consider obtaining a CT urogram.  1b. An area of irregular rim enhancement in the low pelvis may represent a normal postsurgical vaginal cuff, though, again, this is slightly more conspicuous than usual and dehiscence or a developing abscess may have a similar appearance.  2. 6.8 x 4.1 x 5.6 cm right pelvic sidewall seroma versus lymphocele.  3. Soft tissue nodular thickening anteriorly to the right psoas muscle and along the right lateral conal fascia, indeterminate, cannot rule out metastatic foci. Attention on follow-up studies.  4. Cholelithiasis without evidence of cholecystitis.      11/7/18: CT abd with contrast CT urogram IMPRESSION:   1. Postsurgical changes of hysterectomy, bilateral salpingo-oophorectomy, and iliac lymph node dissection with fluid  collections within the pelvis extending along the iliac vasculature.  a. The right pelvic sidewall fluid collection is not significantly changed while the left pelvic sidewall fluid collection has enlarged  in comparison to the 11/6/2018 CT. The differential remains a seroma versus a lymphocele.  b. No evidence of contrast extravasation to suggest a ureteral injury.  c. Redemonstration of soft tissue nodular  thickening along the right lateral conal fascia and anteriorly to the right psoas muscle and right psoas muscle. Attention on follow-up is recommended.  2. Cholelithiasis without evidence of cholecystitis.    Plan: adjuvant therapy to include 6 cycles of taxol and carboplatin; would not add radiation as all lymph nodes are negative, she does not have LVSI or deep invasion and this is a low grade tumor. Rx percocet 5 tabs due to continued abdominal pain. Will not prescribe any more narcotics and patient is aware of this.    11/16/18: Cycle #1 Paclitaxel/Carboplatin.   35.  12/6/18: Cycle #2 Paclitaxel/Carboplatin.  pending.  12/27/2018: Cycle #3 T/C  1/8/19: US lower extremity: IMPRESSION: No evidence of deep venous thrombosis in either lower extremity.  1/9/2019: CT abd/pelvis    IMPRESSION:   1. Minimal change in a right pelvic sidewall collection and decreased  size of a left pelvic sidewall collection, which are most consistent  with lymphoceles.  2. No suspicious pelvic lymphadenopathy.     1/21/2019 Cycle #4 planned             Today she comes to clinic due to pain in her left leg, feels tight, hurts with walking. Can't walk across the street. Pain starts on top of thigh then goes into groin Has to lay with pillow between her legs. Occurs with walking and movement. Right thigh has numbness since surgery.  Going to cancer rehab tomorrow.  Has vacation planned for next week.        Review of Systems     Constitutional:  Negative for fever, chills, weight loss, weight gain, fatigue, decreased appetite, night sweats, recent stressors, height gain, height loss, post-operative complications, incisional pain, hallucinations, increased energy, hyperactivity and confused.   HENT:  Negative for ear pain, hearing loss, tinnitus, nosebleeds, trouble swallowing, hoarse voice, mouth sores, sore throat, ear discharge, tooth pain, gum tenderness, taste disturbance, smell disturbance, hearing aid, bleeding gums, dry  mouth, sinus pain, sinus congestion and neck mass.    Eyes:  Negative for double vision, pain, redness, eye pain, decreased vision, eye watering, eye bulging, eye dryness, flashing lights, spots, floaters, strabismus, tunnel vision, jaundice and eye irritation.   Respiratory:   Negative for cough, hemoptysis, sputum production, shortness of breath, wheezing, sleep disturbances due to breathing, snores loudly, respiratory pain, dyspnea on exertion, cough disturbing sleep and postural dyspnea.    Cardiovascular:  Positive for hypertension. Negative for chest pain, dyspnea on exertion, palpitations, orthopnea, claudication, leg swelling, fingers/toes turn blue, hypotension, syncope, history of heart murmur, chest pain on exertion, chest pain at rest, pacemaker, few scattered varicosities, leg pain, sleep disturbances due to breathing, tachycardia, light-headedness, exercise intolerance and edema.   Gastrointestinal:  Negative for heartburn, nausea, vomiting, abdominal pain, diarrhea, constipation, blood in stool, melena, rectal pain, bloating, hemorrhoids, bowel incontinence, jaundice, rectal bleeding, coffee ground emesis and change in stool.   Genitourinary:  Negative for bladder incontinence, dysuria, urgency, hematuria, flank pain, vaginal discharge, difficulty urinating, genital sores, dyspareunia, decreased libido, nocturia, voiding less frequently, arousal difficulty, abnormal vaginal bleeding, excessive menstruation, menstrual changes, hot flashes, vaginal dryness and postmenopausal bleeding.   Musculoskeletal:  Negative for myalgias, back pain, joint swelling, arthralgias, stiffness, muscle cramps, neck pain, bone pain, muscle weakness and fracture.   Skin:  Negative for nail changes, itching, poor wound healing, rash, hair changes, skin changes, acne, warts, poor wound healing, scarring, flaky skin, Raynaud's phenomenon, sensitivity to sunlight and skin thickening.   Neurological:  Negative for dizziness,  tingling, tremors, speech change, seizures, loss of consciousness, weakness, light-headedness, numbness, headaches, disturbances in coordination, extremity numbness, memory loss, difficulty walking and paralysis.   Endo/Heme:  Negative for anemia, swollen glands and bruises/bleeds easily.   Psychiatric/Behavioral:  Negative for depression, hallucinations, memory loss, decreased concentration, mood swings and panic attacks.    Breast:  Negative for breast discharge, breast mass, breast pain and nipple retraction.   Endocrine:  Negative for altered temperature regulation, polyphagia, polydipsia, unwanted hair growth and change in facial hair.      I have reviewed the pertinent positive findings in the review of symptoms with the patient.      Past Medical History:    Past Medical History:   Diagnosis Date     Chickenpox     as a child     Depression      Endometrial cancer (H)      Hypertension     since her 30's     Infertility, female      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)      PCOS (polycystic ovarian syndrome)      STD (female)     in her 20's         Past Surgical History:    Past Surgical History:   Procedure Laterality Date     CYSTOSCOPY N/A 10/24/2018    Procedure: Cystoscopy;  Surgeon: Linh De Souza MD;  Location: [facility] OR     DAVINCI HYSTERECTOMY TOTAL, BILATERAL SALPINGO-OOPHORECTOMY, NODE DISSECTION, COMBINED N/A 10/24/2018    Procedure: DaVinci Assisted Total Laparoscopic Hysterectomy, Bilateral Salpingo-Oophorectomy, lymph node dissection;  Surgeon: Linh De Souza MD;  Location: [facility] OR     EXCISE LESION VULVA Left 10/24/2018    Procedure: Excision of Left Vulvar Lesion;  Surgeon: Linh De Souza MD;  Location: [facility] OR         Health Maintenance Due   Topic Date Due     PHQ-2 Q1 YR  01/20/1989     HIV SCREEN (SYSTEM ASSIGNED)  01/20/1995     PAP SCREENING Q3 YR (SYSTEM ASSIGNED)  01/20/1998       Current Medications:     Current Outpatient Medications   Medication Sig Dispense Refill      buPROPion (WELLBUTRIN XL) 300 MG 24 hr tablet Take 300 mg by mouth       gabapentin (NEURONTIN) 600 MG tablet Take 600 mg by mouth 600mg in AM, 600mg in the afternoon, 900mg in the evening       ibuprofen (ADVIL/MOTRIN) 600 MG tablet Take 1 tablet (600 mg) by mouth every 6 hours as needed for pain (mild) 30 tablet 0     loratadine (CLARITIN) 10 MG tablet Take 10 mg by mouth       MELATONIN PO Take 10 mg by mouth nightly as needed       metoprolol succinate (TOPROL-XL) 25 MG 24 hr tablet Take 25 mg by mouth       Multiple Vitamin (MULTIVITAMIN  S) CAPS Take 1 capsule by mouth       ondansetron (ZOFRAN) 8 MG tablet Take 1 tablet (8 mg) by mouth every 8 hours as needed for nausea (Did not start until 3 days after chemo.) 20 tablet 1     polyethylene glycol (MIRALAX/GLYCOLAX) powder Take 1 capful by mouth daily       spironolactone (ALDACTONE) 50 MG tablet Take 50 mg by mouth       albuterol (PROAIR HFA/PROVENTIL HFA/VENTOLIN HFA) 108 (90 Base) MCG/ACT inhaler Inhale 1-2 puffs into the lungs       prochlorperazine (COMPAZINE) 10 MG tablet Take 1 tablet (10 mg) by mouth every 6 hours as needed (nausea/vomiting) (Patient not taking: Reported on 1/10/2019) 30 tablet 5     senna-docusate (SENOKOT-S;PERICOLACE) 8.6-50 MG per tablet Take 1-2 tablets by mouth 2 times daily Take while on oral narcotics to prevent or treat constipation. (Patient not taking: Reported on 1/10/2019) 30 tablet 0         Allergies:        Allergies   Allergen Reactions     Amoxicillin      Penicillin G         Social History:     Social History     Tobacco Use     Smoking status: Former Smoker     Packs/day: 0.50     Types: Cigarettes     Smokeless tobacco: Never Used     Tobacco comment: uses vape pen about 3-4 times daily   Substance Use Topics     Alcohol use: No     Comment: 11 months sober        History   Drug Use No     Comment: 11 months sober. lives at sober housing         Family History:     The patient's family history is notable  "for:    No family history on file.      Physical Exam:     /70   Pulse 87   Temp 97.2  F (36.2  C) (Oral)   Resp 16   Ht 1.645 m (5' 4.75\")   Wt 102.5 kg (226 lb)   SpO2 100%   BMI 37.90 kg/m     Body mass index is 37.9 kg/m .    General Appearance: healthy and alert, no distress     Musculoskeletal: extremities non tender and without edema, pain not reproductible, muscle strength 5+/5+ bilaterally, no gait problems.    Neurological: normal gait, no gross defects     Psychiatric: appropriate mood and affect                              Genitourinary: deferred      Assessment:    Meagan Morales is a 43 year old woman with a diagnosis of stage IIIA grade 1 endometrioid adenocarcinoma.   She is here today for follow up and disease management.     30 minutes were spent with this patient, over 50% of that time was spent in symptom management, treatment planning and in counseling and coordination of care.      Plan:     1.)       Left leg pain: Plan ibuprofen 600 mg q 6 hours for next 48 hours, recommend ice the area. If no improvement plan PT. May need MRI of back as this could be nerve related.      2.) Genetic risk factors were assessed and her MMR was intact.    3.) Labs and/or tests ordered include:  none      4.) Cancer rehab planned tomorrow.    Linh De Souza MD    Department of Ob/Gyn and Women's Health  Division of Gynecologic Oncology  [facility]  [phone]      CC  Patient Care Team:  Aundrea Max MD as PCP - General      "

## 2019-01-11 ENCOUNTER — THERAPY VISIT (OUTPATIENT)
Dept: PHYSICAL THERAPY | Facility: CLINIC | Age: 44
End: 2019-01-11
Attending: NURSE PRACTITIONER
Payer: COMMERCIAL

## 2019-01-11 DIAGNOSIS — M25.552 PAIN IN JOINT, PELVIC REGION AND THIGH, LEFT: Primary | ICD-10-CM

## 2019-01-11 DIAGNOSIS — R53.81 PHYSICAL DECONDITIONING: ICD-10-CM

## 2019-01-11 DIAGNOSIS — C54.1 ENDOMETRIAL CANCER (H): ICD-10-CM
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
## 2019-01-16 ENCOUNTER — THERAPY VISIT (OUTPATIENT)
Dept: PHYSICAL THERAPY | Facility: CLINIC | Age: 44
End: 2019-01-16
Payer: COMMERCIAL

## 2019-01-16 DIAGNOSIS — R53.81 PHYSICAL DECONDITIONING: Primary | ICD-10-CM

## 2019-01-16 DIAGNOSIS — M25.552 PAIN IN JOINT, PELVIC REGION AND THIGH, LEFT: ICD-10-CM

## 2019-01-17 ENCOUNTER — CARE COORDINATION (OUTPATIENT)
Dept: ONCOLOGY | Facility: CLINIC | Age: 44
End: 2019-01-17

## 2019-01-21 ENCOUNTER — INFUSION THERAPY VISIT (OUTPATIENT)
Dept: ONCOLOGY | Facility: CLINIC | Age: 44
End: 2019-01-21
Attending: OBSTETRICS & GYNECOLOGY
Payer: COMMERCIAL

## 2019-01-21 ENCOUNTER — APPOINTMENT (OUTPATIENT)
Dept: LAB | Facility: CLINIC | Age: 44
End: 2019-01-21
Attending: OBSTETRICS & GYNECOLOGY
Payer: COMMERCIAL

## 2019-01-21 VITALS
TEMPERATURE: 97.2 F | OXYGEN SATURATION: 100 % | WEIGHT: 231.8 LBS | BODY MASS INDEX: 38.62 KG/M2 | HEIGHT: 65 IN | DIASTOLIC BLOOD PRESSURE: 75 MMHG | HEART RATE: 72 BPM | SYSTOLIC BLOOD PRESSURE: 114 MMHG

## 2019-01-21 DIAGNOSIS — C54.1 ENDOMETRIAL CANCER (H): Primary | ICD-10-CM

## 2019-01-21 LAB
ALBUMIN SERPL-MCNC: 3.6 G/DL (ref 3.4–5)
ALP SERPL-CCNC: 81 U/L (ref 40–150)
ALT SERPL W P-5'-P-CCNC: 28 U/L (ref 0–50)
ANION GAP SERPL CALCULATED.3IONS-SCNC: 7 MMOL/L (ref 3–14)
AST SERPL W P-5'-P-CCNC: 16 U/L (ref 0–45)
BASOPHILS # BLD AUTO: 0 10E9/L (ref 0–0.2)
BASOPHILS NFR BLD AUTO: 0.5 %
BILIRUB SERPL-MCNC: 0.2 MG/DL (ref 0.2–1.3)
BUN SERPL-MCNC: 19 MG/DL (ref 7–30)
CALCIUM SERPL-MCNC: 9 MG/DL (ref 8.5–10.1)
CANCER AG125 SERPL-ACNC: 8 U/ML (ref 0–30)
CHLORIDE SERPL-SCNC: 106 MMOL/L (ref 94–109)
CO2 SERPL-SCNC: 26 MMOL/L (ref 20–32)
CREAT SERPL-MCNC: 0.84 MG/DL (ref 0.52–1.04)
DIFFERENTIAL METHOD BLD: ABNORMAL
EOSINOPHIL # BLD AUTO: 0.1 10E9/L (ref 0–0.7)
EOSINOPHIL NFR BLD AUTO: 2.2 %
ERYTHROCYTE [DISTWIDTH] IN BLOOD BY AUTOMATED COUNT: 13.6 % (ref 10–15)
GFR SERPL CREATININE-BSD FRML MDRD: 86 ML/MIN/{1.73_M2}
GLUCOSE SERPL-MCNC: 79 MG/DL (ref 70–99)
HCT VFR BLD AUTO: 40.4 % (ref 35–47)
HGB BLD-MCNC: 13.2 G/DL (ref 11.7–15.7)
IMM GRANULOCYTES # BLD: 0 10E9/L (ref 0–0.4)
IMM GRANULOCYTES NFR BLD: 0.3 %
LYMPHOCYTES # BLD AUTO: 1.2 10E9/L (ref 0.8–5.3)
LYMPHOCYTES NFR BLD AUTO: 32.8 %
MAGNESIUM SERPL-MCNC: 2.1 MG/DL (ref 1.6–2.3)
MCH RBC QN AUTO: 29.7 PG (ref 26.5–33)
MCHC RBC AUTO-ENTMCNC: 32.7 G/DL (ref 31.5–36.5)
MCV RBC AUTO: 91 FL (ref 78–100)
MONOCYTES # BLD AUTO: 0.3 10E9/L (ref 0–1.3)
MONOCYTES NFR BLD AUTO: 7.9 %
NEUTROPHILS # BLD AUTO: 2.1 10E9/L (ref 1.6–8.3)
NEUTROPHILS NFR BLD AUTO: 56.3 %
NRBC # BLD AUTO: 0 10*3/UL
NRBC BLD AUTO-RTO: 0 /100
PLATELET # BLD AUTO: 207 10E9/L (ref 150–450)
POTASSIUM SERPL-SCNC: 4 MMOL/L (ref 3.4–5.3)
PROT SERPL-MCNC: 8.2 G/DL (ref 6.8–8.8)
RBC # BLD AUTO: 4.45 10E12/L (ref 3.8–5.2)
SODIUM SERPL-SCNC: 139 MMOL/L (ref 133–144)
WBC # BLD AUTO: 3.7 10E9/L (ref 4–11)

## 2019-01-21 PROCEDURE — 83735 ASSAY OF MAGNESIUM: CPT | Performed by: NURSE PRACTITIONER

## 2019-01-21 PROCEDURE — 25000125 ZZHC RX 250: Mod: ZF | Performed by: OBSTETRICS & GYNECOLOGY

## 2019-01-21 PROCEDURE — 96417 CHEMO IV INFUS EACH ADDL SEQ: CPT

## 2019-01-21 PROCEDURE — 96415 CHEMO IV INFUSION ADDL HR: CPT

## 2019-01-21 PROCEDURE — 80053 COMPREHEN METABOLIC PANEL: CPT | Performed by: NURSE PRACTITIONER

## 2019-01-21 PROCEDURE — 25000128 H RX IP 250 OP 636: Mod: ZF | Performed by: OBSTETRICS & GYNECOLOGY

## 2019-01-21 PROCEDURE — 96375 TX/PRO/DX INJ NEW DRUG ADDON: CPT

## 2019-01-21 PROCEDURE — 96367 TX/PROPH/DG ADDL SEQ IV INF: CPT

## 2019-01-21 PROCEDURE — 96413 CHEMO IV INFUSION 1 HR: CPT

## 2019-01-21 PROCEDURE — G0463 HOSPITAL OUTPT CLINIC VISIT: HCPCS | Mod: ZF

## 2019-01-21 PROCEDURE — 99214 OFFICE O/P EST MOD 30 MIN: CPT | Mod: ZP | Performed by: OBSTETRICS & GYNECOLOGY

## 2019-01-21 PROCEDURE — 85025 COMPLETE CBC W/AUTO DIFF WBC: CPT | Performed by: NURSE PRACTITIONER

## 2019-01-21 PROCEDURE — 86304 IMMUNOASSAY TUMOR CA 125: CPT | Performed by: NURSE PRACTITIONER

## 2019-01-21 PROCEDURE — 25000132 ZZH RX MED GY IP 250 OP 250 PS 637: Mod: ZF | Performed by: OBSTETRICS & GYNECOLOGY

## 2019-01-21 RX ORDER — EPINEPHRINE 1 MG/ML
0.3 INJECTION, SOLUTION INTRAMUSCULAR; SUBCUTANEOUS EVERY 5 MIN PRN
Status: CANCELLED | OUTPATIENT
Start: 2019-01-21

## 2019-01-21 RX ORDER — METHYLPREDNISOLONE SODIUM SUCCINATE 125 MG/2ML
125 INJECTION, POWDER, LYOPHILIZED, FOR SOLUTION INTRAMUSCULAR; INTRAVENOUS
Status: CANCELLED
Start: 2019-01-21

## 2019-01-21 RX ORDER — PALONOSETRON 0.05 MG/ML
0.25 INJECTION, SOLUTION INTRAVENOUS ONCE
Status: COMPLETED | OUTPATIENT
Start: 2019-01-21 | End: 2019-01-21

## 2019-01-21 RX ORDER — LORAZEPAM 2 MG/ML
1 INJECTION INTRAMUSCULAR EVERY 6 HOURS PRN
Status: CANCELLED
Start: 2019-01-21

## 2019-01-21 RX ORDER — ALBUTEROL SULFATE 0.83 MG/ML
2.5 SOLUTION RESPIRATORY (INHALATION)
Status: CANCELLED | OUTPATIENT
Start: 2019-01-21

## 2019-01-21 RX ORDER — DIPHENHYDRAMINE HYDROCHLORIDE 50 MG/ML
50 INJECTION INTRAMUSCULAR; INTRAVENOUS
Status: CANCELLED
Start: 2019-01-21

## 2019-01-21 RX ORDER — MEPERIDINE HYDROCHLORIDE 25 MG/ML
25 INJECTION INTRAMUSCULAR; INTRAVENOUS; SUBCUTANEOUS EVERY 30 MIN PRN
Status: CANCELLED | OUTPATIENT
Start: 2019-01-21

## 2019-01-21 RX ORDER — SODIUM CHLORIDE 9 MG/ML
1000 INJECTION, SOLUTION INTRAVENOUS CONTINUOUS PRN
Status: CANCELLED
Start: 2019-01-21

## 2019-01-21 RX ORDER — ALBUTEROL SULFATE 90 UG/1
1-2 AEROSOL, METERED RESPIRATORY (INHALATION)
Status: CANCELLED
Start: 2019-01-21

## 2019-01-21 RX ORDER — PALONOSETRON 0.05 MG/ML
0.25 INJECTION, SOLUTION INTRAVENOUS ONCE
Status: CANCELLED
Start: 2019-01-21

## 2019-01-21 RX ORDER — DIPHENHYDRAMINE HCL 25 MG
50 CAPSULE ORAL ONCE
Status: COMPLETED | OUTPATIENT
Start: 2019-01-21 | End: 2019-01-21

## 2019-01-21 RX ORDER — DIPHENHYDRAMINE HCL 25 MG
50 CAPSULE ORAL ONCE
Status: CANCELLED
Start: 2019-01-21

## 2019-01-21 RX ORDER — EPINEPHRINE 0.3 MG/.3ML
0.3 INJECTION SUBCUTANEOUS EVERY 5 MIN PRN
Status: CANCELLED | OUTPATIENT
Start: 2019-01-21

## 2019-01-21 RX ADMIN — SODIUM CHLORIDE 250 ML: 9 INJECTION, SOLUTION INTRAVENOUS at 10:15

## 2019-01-21 RX ADMIN — DIPHENHYDRAMINE HYDROCHLORIDE 50 MG: 25 CAPSULE ORAL at 10:19

## 2019-01-21 RX ADMIN — PACLITAXEL 380 MG: 6 INJECTION, SOLUTION INTRAVENOUS at 10:51

## 2019-01-21 RX ADMIN — PALONOSETRON HYDROCHLORIDE 0.25 MG: 0.25 INJECTION INTRAVENOUS at 10:20

## 2019-01-21 RX ADMIN — CARBOPLATIN 800 MG: 10 INJECTION, SOLUTION INTRAVENOUS at 14:07

## 2019-01-21 RX ADMIN — DEXAMETHASONE SODIUM PHOSPHATE 20 MG: 10 INJECTION, SOLUTION INTRAMUSCULAR; INTRAVENOUS at 10:18

## 2019-01-21 RX ADMIN — FAMOTIDINE 40 MG: 10 INJECTION INTRAVENOUS at 10:20

## 2019-01-21 ASSESSMENT — ENCOUNTER SYMPTOMS
NERVOUS/ANXIOUS: 0
NECK MASS: 0
PARALYSIS: 0
BRUISES/BLEEDS EASILY: 0
CONSTIPATION: 0
TASTE DISTURBANCE: 0
SINUS CONGESTION: 0
JOINT SWELLING: 0
POSTURAL DYSPNEA: 0
BREAST MASS: 0
BLOATING: 0
ORTHOPNEA: 0
EYE WATERING: 0
WEAKNESS: 0
WHEEZING: 0
BLOOD IN STOOL: 0
CLAUDICATION: 0
DEPRESSION: 0
EYE REDNESS: 0
EYE PAIN: 0
HOARSE VOICE: 0
DIZZINESS: 0
COUGH DISTURBING SLEEP: 0
MYALGIAS: 0
POOR WOUND HEALING: 0
HEMOPTYSIS: 0
SHORTNESS OF BREATH: 0
LEG PAIN: 0
TROUBLE SWALLOWING: 0
SPUTUM PRODUCTION: 0
RECTAL PAIN: 0
HALLUCINATIONS: 0
BREAST PAIN: 0
LEG SWELLING: 0
NAUSEA: 0
DYSURIA: 0
FLANK PAIN: 0
ABDOMINAL PAIN: 0
SKIN CHANGES: 0
DYSPNEA ON EXERTION: 0
NAIL CHANGES: 0
DECREASED CONCENTRATION: 0
ALTERED TEMPERATURE REGULATION: 0
NUMBNESS: 0
INSOMNIA: 0
TINGLING: 0
WEIGHT GAIN: 0
HEARTBURN: 0
SYNCOPE: 0
SPEECH CHANGE: 0
VOMITING: 0
EYE IRRITATION: 0
SLEEP DISTURBANCES DUE TO BREATHING: 0
INCREASED ENERGY: 0
PANIC: 0
HYPOTENSION: 0
SMELL DISTURBANCE: 0
POLYPHAGIA: 0
MEMORY LOSS: 0
RESPIRATORY PAIN: 0
HEADACHES: 0
TREMORS: 0
RECTAL BLEEDING: 0
STIFFNESS: 0
BOWEL INCONTINENCE: 0
DISTURBANCES IN COORDINATION: 0
HOT FLASHES: 0
FATIGUE: 0
SEIZURES: 0
FEVER: 0
TACHYCARDIA: 0
NECK PAIN: 0
SNORES LOUDLY: 0
BACK PAIN: 0
HYPERTENSION: 1
JAUNDICE: 0
SWOLLEN GLANDS: 0
DIFFICULTY URINATING: 0
HEMATURIA: 0
MUSCLE WEAKNESS: 0
PALPITATIONS: 0
EXERCISE INTOLERANCE: 0
DIARRHEA: 0
WEIGHT LOSS: 0
NIGHT SWEATS: 0
DECREASED LIBIDO: 0
MUSCLE CRAMPS: 0
LIGHT-HEADEDNESS: 0
DECREASED APPETITE: 0
ARTHRALGIAS: 0
COUGH: 0
DOUBLE VISION: 0
POLYDIPSIA: 0
EXTREMITY NUMBNESS: 0
SINUS PAIN: 0
CHILLS: 0
SORE THROAT: 0
LOSS OF CONSCIOUSNESS: 0

## 2019-01-21 ASSESSMENT — MIFFLIN-ST. JEOR: SCORE: 1708.35

## 2019-01-21 ASSESSMENT — PAIN SCALES - GENERAL: PAINLEVEL: NO PAIN (0)

## 2019-01-21 NOTE — PATIENT INSTRUCTIONS
Contact Numbers    [facility] [place] [facility] Main Line: [phone]  [facility] [place] [facility] according to the [facility] Triage and after hours / weekends / holidays:  [phone]      Please call the triage or after hours line if you experience a temperature greater than or equal to 100.5, shaking chills, have uncontrolled nausea, vomiting and/or diarrhea, dizziness, shortness of breath, chest pain, bleeding, unexplained bruising, or if you have any other new/concerning symptoms, questions or concerns.      If you are having any concerning symptoms or wish to speak to a provider before your next infusion visit, please call your care coordinator or triage to notify them so we can adequately serve you.     If you need a refill on a narcotic prescription or other medication, please call before your infusion appointment.         January 2019 Sunday Monday Tuesday Wednesday Thursday Friday Saturday             1     2     3     4     5       6     7     8    US LWR EXT VENOUS DUPLEX BILAT   1:45 PM   (45 min.)   US1   [facility] US 9    CT ABDOMEN PELVIS W   9:20 AM   (20 min.)   CT2   [facility] CT 10    P RETURN   2:45 PM   (20 min.)   Linh De Souza MD   [facility] 11    CANCER REHAB EVAL   8:45 AM   (60 min.)   Karina Pathak, PT   [facility] 12       13     14     15     16    CANCER REHAB TREATMENT   1:45 PM   (60 min.)   Savannah Armstrong, PT   [facility] 17     18     19       20  Happy Birthday!     21    [facility] LAB DRAW   8:30 AM   (15 min.)    [facility] LAB DRAW   [facility] Lab Draw    [facility] RETURN ACTIVE TREATMENT   8:45 AM   (30 min.)   Garrett Sunshine MD   [facility] ONC INFUSION 360  10:00 AM   (360 min.)    ONCOLOGY INFUSION   [facility] 22     23     24     25    CANCER REHAB TREATMENT  10:45 AM   (60 min.)   Karina Pathak, PT   [facility] 26       27     28     29    CANCER REHAB TREATMENT   3:30 PM   (45 min.)   Nathaniel  Savannah Rosado, PT   [facility] 30     31    LYMPHEDEMA EVALUATION  10:30 AM   (75 min.)   Karina Pathak, PT   [facility]                       February 2019 Sunday Monday Tuesday Wednesday Thursday Friday Saturday                            1     2       3     4     5    CANCER REHAB TREATMENT   3:30 PM   (60 min.)   Savannah Armstrong, PT   [facility] 6     7     8     9       10     11     12     13     14     15     16       17     18     19     20     21     22     23       24     25     26     27     28                               Lab Results:  Recent Results (from the past 12 hour(s))   CBC with platelets differential    Collection Time: 01/21/19  9:05 AM   Result Value Ref Range    WBC 3.7 (L) 4.0 - 11.0 10e9/L    RBC Count 4.45 3.8 - 5.2 10e12/L    Hemoglobin 13.2 11.7 - 15.7 g/dL    Hematocrit 40.4 35.0 - 47.0 %    MCV 91 78 - 100 fl    MCH 29.7 26.5 - 33.0 pg    MCHC 32.7 31.5 - 36.5 g/dL    RDW 13.6 10.0 - 15.0 %    Platelet Count 207 150 - 450 10e9/L    Diff Method Automated Method     % Neutrophils 56.3 %    % Lymphocytes 32.8 %    % Monocytes 7.9 %    % Eosinophils 2.2 %    % Basophils 0.5 %    % Immature Granulocytes 0.3 %    Nucleated RBCs 0 0 /100    Absolute Neutrophil 2.1 1.6 - 8.3 10e9/L    Absolute Lymphocytes 1.2 0.8 - 5.3 10e9/L    Absolute Monocytes 0.3 0.0 - 1.3 10e9/L    Absolute Eosinophils 0.1 0.0 - 0.7 10e9/L    Absolute Basophils 0.0 0.0 - 0.2 10e9/L    Abs Immature Granulocytes 0.0 0 - 0.4 10e9/L    Absolute Nucleated RBC 0.0    Comprehensive metabolic panel    Collection Time: 01/21/19  9:05 AM   Result Value Ref Range    Sodium 139 133 - 144 mmol/L    Potassium 4.0 3.4 - 5.3 mmol/L    Chloride 106 94 - 109 mmol/L    Carbon Dioxide 26 20 - 32 mmol/L    Anion Gap 7 3 - 14 mmol/L    Glucose 79 70 - 99 mg/dL    Urea Nitrogen 19 7 - 30 mg/dL    Creatinine 0.84 0.52 - 1.04 mg/dL    GFR Estimate 86 >60 mL/min/[1.73_m2]    GFR Estimate If Black >90 >60  mL/min/[1.73_m2]    Calcium 9.0 8.5 - 10.1 mg/dL    Bilirubin Total 0.2 0.2 - 1.3 mg/dL    Albumin 3.6 3.4 - 5.0 g/dL    Protein Total 8.2 6.8 - 8.8 g/dL    Alkaline Phosphatase 81 40 - 150 U/L    ALT 28 0 - 50 U/L    AST 16 0 - 45 U/L   Magnesium    Collection Time: 01/21/19  9:05 AM   Result Value Ref Range    Magnesium 2.1 1.6 - 2.3 mg/dL

## 2019-01-21 NOTE — PROGRESS NOTES
Infusion Nursing Note:  Meagan Morales presents today for Cycle 4 Day 1 Taxol, Carboplatin.    Patient seen by provider today: Yes: Dr. Sunshine   present during visit today: Not Applicable.    Note: Patient presents to infusion today following her appointment with Dr. Sunshine. Patient denies any further concerns or complaints of pain at this time.     Intravenous Access:  Peripheral IV placed.    Treatment Conditions:  Lab Results   Component Value Date    HGB 13.2 01/21/2019     Lab Results   Component Value Date    WBC 3.7 01/21/2019      Lab Results   Component Value Date    ANEU 2.1 01/21/2019     Lab Results   Component Value Date     01/21/2019      Lab Results   Component Value Date     01/21/2019                   Lab Results   Component Value Date    POTASSIUM 4.0 01/21/2019           Lab Results   Component Value Date    MAG 2.1 01/21/2019            Lab Results   Component Value Date    CR 0.84 01/21/2019                   Lab Results   Component Value Date    SIDDHARTHA 9.0 01/21/2019                Lab Results   Component Value Date    BILITOTAL 0.2 01/21/2019           Lab Results   Component Value Date    ALBUMIN 3.6 01/21/2019                    Lab Results   Component Value Date    ALT 28 01/21/2019           Lab Results   Component Value Date    AST 16 01/21/2019       Results reviewed, labs MET treatment parameters, ok to proceed with treatment.      Post Infusion Assessment:  Patient tolerated infusion without incident.  Blood return noted pre and post infusion.  Site patent and intact, free from redness, edema or discomfort.  No evidence of extravasations.  Access discontinued per protocol.    Discharge Plan:   Patient declined prescription refills.  Discharge instructions reviewed with: Patient.  Patient and/or family verbalized understanding of discharge instructions and all questions answered.  Copy of AVS reviewed with patient and/or family.  Next appointment not scheduled at  time of discharge. In[employer]et message sent to scheduling pool. Instructed patient to watch for appointment on MyChart and call scheduling in a couple of days if appointment is not scheduled. Patient verbalized understanding.    Patient discharged in stable condition accompanied by: self.  Departure Mode: Ambulatory.    Kim Le RN

## 2019-01-21 NOTE — NURSING NOTE
"Oncology Rooming Note    January 21, 2019 9:13 AM   Meagan Morales is a 44 year old female who presents for:    Chief Complaint   Patient presents with     Blood Draw     labs drawn by RN via Piv in lab, saline and heparin locked, vitals completed     Oncology Clinic Visit     Return Endometrial Ca     Initial Vitals: /75   Pulse 72   Temp 97.2  F (36.2  C) (Oral)   Ht 1.645 m (5' 4.75\")   Wt 105.1 kg (231 lb 12.8 oz)   SpO2 100%   BMI 38.87 kg/m   Estimated body mass index is 38.87 kg/m  as calculated from the following:    Height as of this encounter: 1.645 m (5' 4.75\").    Weight as of this encounter: 105.1 kg (231 lb 12.8 oz). Body surface area is 2.19 meters squared.  No Pain (0) Comment: Data Unavailable   No LMP recorded. Patient has had a hysterectomy.  Allergies reviewed: Yes  Medications reviewed: Yes    Medications: Medication refills not needed today.  Pharmacy name entered into [employer]: [id number] - [place] [id number] AT [facility] [address]    Clinical concerns: Patient worried about gaining weight;      6 minutes for nursing intake (face to face time)     Dionne Schultz CMA              "

## 2019-01-21 NOTE — LETTER
2019       RE: Meagan Morales  [address] [place] [id number]     Dear Colleague,    Thank you for referring your patient, Meagan Morales, to the [facility]. Please see a copy of my visit note below.        Again, lonnie            Follow Up Notes on Referred Patient      RE: Meagan Morales  : 1975  VY: 2019       Meagan Morales is a 44 year old woman with a diagnosis of stage IIIA grade 1 endometrioid adenocarcinoma.   She is here today for treatment management.    Brief History:  Meagan originally presented to clinic due to continuous vaginal bleeding, very light. She thought it was just her menses being continuous. US was done which found thickened endometrial lining. IUD was placed. Second US was done and IUD did not affect endometrial lining and IUD was malpositioned. Since then, she has noted continuous spotting. IUD was removed. Also of note, ovarian cysts were noted on second US. Subsequent endometrial biopsy was done which revealed grade 1 endometrial cancer. She has always had irregular menses and has never been able to get pregnant. Patient's  first and only pap smear on 10/10/2017. Had mammogram in 20's, will order additional mammogram for baseline. No history of prior colonoscopy. Started Wellbutrin this last year, mood has improved. Prior cigarette smoker, quit 2 months ago and switched to vape. Started again smoking cigarettes again but has not had one for 10 days. History of drug use, has been sober for 11 months. History of meth and alcohol abuse. No history of prior abdominal surgeries. No history of heart disease, lung disease or diabetes. Has never been able to get pregnant and dose not desire children.     18: pelvic US IMPRESSION:  Peripheral follicular arrangement high within the ovaries suggesting polycystic ovarian syndrome. Slightly thickened heterogeneous endometrium raising a concern of potential hyperplastic change.  18: pelvic US   1. Uterus  is not normal in appearance. The endometrium appears thickened and vascular, and the IUD appears to be malpositioned.  Clinical correlation is recommended.    2. Bilateral complex ovarian cysts are noted.  Follow up ultrasound in 4-6 weeks is recommended, consider saline infusion sonohysterogram for further imaging of the endometrium.      9/17/18: endometrial biopsy  ENDOMETRIUM, BIOPSY:  1. FIGO Grade 1 (well differentiated) endometrioid carcinoma of      the endometrium  2. DNA mismatch repair enzyme immunohistochemistry studies:     All intact (see Amendment note and synoptic reporting)     10/24/2018: DaVinci Assisted Total Laparoscopic Hysterectomy, Bilateral Salpingo-Oophorectomy, lymph node dissection, Excision of Left Vulvar Lesion  Pelvic Washing:   Rare atypical cells present.         TEST(S) PERFORMED     Test(s) Performed:         - Mismatch Repair Immunohistochemistry (IHC) Testing for Mismatch Repair (MMR) Proteins:           - No loss of nuclear expression of MMR proteins: low probability of microsatellite instability-high (MSI-H)     ORIGINAL REPORT:     SPECIMEN(S):   A: Left vulvar lesion   B: Left pelvic sentinel lymph node   C: Right pelvic sentinel lymph node   D: Uterus, cervix, bilateral ovaries and fallopian tubes   E: Portion of left ovary   F: Para-aortic lymph nodes   G: Left pelvic lymph nodes   H: Right pelvic lymph nodes     FINAL DIAGNOSIS:   A. LEFT VULVAR LESION, BIOPSY:   - Intradermal nevus     B. LEFT PELVIC SENTINEL LYMPH NODE, EXCISION:   - One reactive lymph node, negative for malignancy (0/1)     C. RIGHT PELVIC SENTINEL LYMPH NODE, EXCISION:   - Six reactive lymph nodes, negative for malignancy (0/6)     D. UTERUS, CERVIX, BILATERAL OVARIES AND FALLOPIAN TUBES, HYSTERECTOMY WITH BILATERAL SALPINGO-OOPHORECTOMY:   - Endometrial endometrioid adenocarcinoma, FIGO grade 1   - Atypical endometrial hyperplasia   - Myometrium with no significant histologic abnormality   - Chronic  cervicitis with microglandular hyperplasia   - Uterine serosal fibrous adhesions   - Ovaries with cortical hyperplasia   - Metastatic endometrial adenocarcinoma to the left fallopian tube   - Right fallopian tube with no significant histologic abnormality     E. PORTION OF LEFT OVARY, EXCISION:   - Benign cortical inclusion cysts     F. PARA-AORTIC LYMPH NODES, EXCISION:   - Four reactive lymph nodes, negative for malignancy (0/4)   - Benign glandular inclusions consistent with endosalpingiosis     G. LEFT PELVIC LYMPH NODES, EXCISION:   - Eight reactive lymph nodes, negative for malignancy (0/8)     H. RIGHT PELVIC LYMPH NODES, EXCISION:   - Nine reactive lymph nodes, negative for malignancy (0/9)   - Benign glandular inclusions consistent with endosalpingiosis     COMMENT:   The final diagnosis confirms the interpretation provided intraoperatively. The tumor seen in the left fallopian tube involves the wall (invading the mucosa and submucosa) and appears free floating in the lumen.   Tumor Site:         - Endometrium - Anterior and posterior     Histologic Type:         - Endometrioid carcinoma, NOS     Histologic Grade:         - FIGO grade 1     Tumor Size: 5.7 Centimeters (cm)     Tumor Extent       Myometrial Invasion:           - Not identified       Adenomyosis:           - Not identified       Uterine Serosa Involvement:           - Not identified       Lower Uterine Segment Involvement:           - Not identified       Cervical Stromal Involvement:           - Not identified       Other Tissue / Organ Involvement:           - Left fallopian tube       Peritoneal Ascitic Fluid:           - Results pending     Accessory Findings       Lymphovascular Invasion:           - Not identified     11/6/18: CT ap IMPRESSION:   1. Surgical changes of hysterectomy, bilateral salpingo-oophorectomy, and pelvic lymph node dissection with extensive fat stranding and fluid within the low pelvis extending superiorly along  the iliac vasculature, right greater than left, potentially within normal postsurgical limits, however, slightly more than expected for postsurgical change.   1a. The ureters are not well evaluated on this single phase exam and there is no hydronephrosis or hydroureter, however, the amount of fluid in the pelvis does raise the question of ureteral injury. If there is clinical concern, consider obtaining a CT urogram.  1b. An area of irregular rim enhancement in the low pelvis may represent a normal postsurgical vaginal cuff, though, again, this is slightly more conspicuous than usual and dehiscence or a developing abscess may have a similar appearance.  2. 6.8 x 4.1 x 5.6 cm right pelvic sidewall seroma versus lymphocele.  3. Soft tissue nodular thickening anteriorly to the right psoas muscle and along the right lateral conal fascia, indeterminate, cannot rule out metastatic foci. Attention on follow-up studies.  4. Cholelithiasis without evidence of cholecystitis.      11/7/18: CT abd with contrast CT urogram IMPRESSION:   1. Postsurgical changes of hysterectomy, bilateral salpingo-oophorectomy, and iliac lymph node dissection with fluid  collections within the pelvis extending along the iliac vasculature.  a. The right pelvic sidewall fluid collection is not significantly changed while the left pelvic sidewall fluid collection has enlarged  in comparison to the 11/6/2018 CT. The differential remains a seroma versus a lymphocele.  b. No evidence of contrast extravasation to suggest a ureteral injury.  c. Redemonstration of soft tissue nodular thickening along the right lateral conal fascia and anteriorly to the right psoas muscle and right psoas muscle. Attention on follow-up is recommended.  2. Cholelithiasis without evidence of cholecystitis.    Plan: adjuvant therapy to include 6 cycles of taxol and carboplatin; would not add radiation as all lymph nodes are negative, she does not have LVSI or deep invasion and  this is a low grade tumor. Rx percocet 5 tabs due to continued abdominal pain. Will not prescribe any more narcotics and patient is aware of this.    11/16/18: Cycle #1 Paclitaxel/Carboplatin.   35.  12/6/18: Cycle #2 Paclitaxel/Carboplatin.  pending.  12/27/2018: Cycle #3 T/C  1/8/19: US lower extremity: IMPRESSION: No evidence of deep venous thrombosis in either lower extremity.  1/9/2019: CT abd/pelvis    IMPRESSION:   1. Minimal change in a right pelvic sidewall collection and decreased  size of a left pelvic sidewall collection, which are most consistent  with lymphoceles.  2. No suspicious pelvic lymphadenopathy.     1/21/2019 Cycle #4 planned     1/2019 Presented with pain in LE:  US IMPRESSION: No evidence of deep venous thrombosis in either lower  extremity.    1/2019 CT:  IMPRESSION:   1. Minimal change in a right pelvic sidewall collection and decreased  size of a left pelvic sidewall collection, which are most consistent  with lymphoceles.  2. No suspicious pelvic lymphadenopathy.         Review of Systems     Constitutional:  Negative for fever, chills, weight loss, weight gain, fatigue, decreased appetite, night sweats, recent stressors, height gain, height loss, post-operative complications, incisional pain, hallucinations, increased energy, hyperactivity and confused.   HENT:  Negative for ear pain, hearing loss, tinnitus, nosebleeds, trouble swallowing, hoarse voice, mouth sores, sore throat, ear discharge, tooth pain, gum tenderness, taste disturbance, smell disturbance, hearing aid, bleeding gums, dry mouth, sinus pain, sinus congestion and neck mass.    Eyes:  Negative for double vision, pain, redness, eye pain, decreased vision, eye watering, eye bulging, eye dryness, flashing lights, spots, floaters, strabismus, tunnel vision, jaundice and eye irritation.   Respiratory:   Negative for cough, hemoptysis, sputum production, shortness of breath, wheezing, sleep disturbances due to  breathing, snores loudly, respiratory pain, dyspnea on exertion, cough disturbing sleep and postural dyspnea.    Cardiovascular:  Positive for hypertension. Negative for chest pain, dyspnea on exertion, palpitations, orthopnea, claudication, leg swelling, fingers/toes turn blue, hypotension, syncope, history of heart murmur, chest pain on exertion, chest pain at rest, pacemaker, few scattered varicosities, leg pain, sleep disturbances due to breathing, tachycardia, light-headedness, exercise intolerance and edema.   Gastrointestinal:  Negative for heartburn, nausea, vomiting, abdominal pain, diarrhea, constipation, blood in stool, melena, rectal pain, bloating, hemorrhoids, bowel incontinence, jaundice, rectal bleeding, coffee ground emesis and change in stool.   Genitourinary:  Negative for bladder incontinence, dysuria, urgency, hematuria, flank pain, vaginal discharge, difficulty urinating, genital sores, dyspareunia, decreased libido, nocturia, voiding less frequently, arousal difficulty, abnormal vaginal bleeding, excessive menstruation, menstrual changes, hot flashes, vaginal dryness and postmenopausal bleeding.   Musculoskeletal:  Negative for myalgias, back pain, joint swelling, arthralgias, stiffness, muscle cramps, neck pain, bone pain, muscle weakness and fracture.   Skin:  Negative for nail changes, itching, poor wound healing, rash, hair changes, skin changes, acne, warts, poor wound healing, scarring, flaky skin, Raynaud's phenomenon, sensitivity to sunlight and skin thickening.   Neurological:  Negative for dizziness, tingling, tremors, speech change, seizures, loss of consciousness, weakness, light-headedness, numbness, headaches, disturbances in coordination, extremity numbness, memory loss, difficulty walking and paralysis.   Endo/Heme:  Negative for anemia, swollen glands and bruises/bleeds easily.   Psychiatric/Behavioral:  Negative for depression, hallucinations, memory loss, decreased  concentration, mood swings and panic attacks.    Breast:  Negative for breast discharge, breast mass, breast pain and nipple retraction.   Endocrine:  Negative for altered temperature regulation, polyphagia, polydipsia, unwanted hair growth and change in facial hair.      I have reviewed the pertinent positive findings in the review of symptoms with the patient.      Past Medical History:    Past Medical History:   Diagnosis Date     Chickenpox     as a child     Depression      Endometrial cancer (H)      Hypertension     since her 30's     Infertility, female      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)      PCOS (polycystic ovarian syndrome)      STD (female)     in her 20's         Past Surgical History:    Past Surgical History:   Procedure Laterality Date     CYSTOSCOPY N/A 10/24/2018    Procedure: Cystoscopy;  Surgeon: Linh De Souza MD;  Location: [facility] OR     DAVINCI HYSTERECTOMY TOTAL, BILATERAL SALPINGO-OOPHORECTOMY, NODE DISSECTION, COMBINED N/A 10/24/2018    Procedure: DaVinci Assisted Total Laparoscopic Hysterectomy, Bilateral Salpingo-Oophorectomy, lymph node dissection;  Surgeon: Linh De Souza MD;  Location: [facility] OR     EXCISE LESION VULVA Left 10/24/2018    Procedure: Excision of Left Vulvar Lesion;  Surgeon: Linh De Souza MD;  Location: [facility] OR         Health Maintenance Due   Topic Date Due     PHQ-2 Q1 YR  01/20/1989     HIV SCREEN (SYSTEM ASSIGNED)  01/20/1995     PAP SCREENING Q3 YR (SYSTEM ASSIGNED)  01/20/1998       Current Medications:     Current Outpatient Medications   Medication Sig Dispense Refill     albuterol (PROAIR HFA/PROVENTIL HFA/VENTOLIN HFA) 108 (90 Base) MCG/ACT inhaler Inhale 1-2 puffs into the lungs       buPROPion (WELLBUTRIN XL) 300 MG 24 hr tablet Take 300 mg by mouth       gabapentin (NEURONTIN) 600 MG tablet Take 600 mg by mouth 600mg in AM, 600mg in the afternoon, 900mg in the evening       ibuprofen (ADVIL/MOTRIN) 600 MG tablet Take 1 tablet (600 mg) by  "mouth every 6 hours as needed for moderate pain 30 tablet 1     ibuprofen (ADVIL/MOTRIN) 600 MG tablet Take 1 tablet (600 mg) by mouth every 6 hours as needed for pain (mild) 30 tablet 0     loratadine (CLARITIN) 10 MG tablet Take 10 mg by mouth       MELATONIN PO Take 10 mg by mouth nightly as needed       metoprolol succinate (TOPROL-XL) 25 MG 24 hr tablet Take 25 mg by mouth       Multiple Vitamin (MULTIVITAMIN  S) CAPS Take 1 capsule by mouth       ondansetron (ZOFRAN) 8 MG tablet Take 1 tablet (8 mg) by mouth every 8 hours as needed for nausea (Did not start until 3 days after chemo.) 20 tablet 1     polyethylene glycol (MIRALAX/GLYCOLAX) powder Take 1 capful by mouth daily       prochlorperazine (COMPAZINE) 10 MG tablet Take 1 tablet (10 mg) by mouth every 6 hours as needed (nausea/vomiting) (Patient not taking: Reported on 1/10/2019) 30 tablet 5     senna-docusate (SENOKOT-S;PERICOLACE) 8.6-50 MG per tablet Take 1-2 tablets by mouth 2 times daily Take while on oral narcotics to prevent or treat constipation. (Patient not taking: Reported on 1/10/2019) 30 tablet 0     spironolactone (ALDACTONE) 50 MG tablet Take 50 mg by mouth           Allergies:        Allergies   Allergen Reactions     Amoxicillin      Penicillin G         Social History:     Social History     Tobacco Use     Smoking status: Former Smoker     Packs/day: 0.50     Types: Cigarettes     Smokeless tobacco: Never Used     Tobacco comment: uses vape pen about 3-4 times daily   Substance Use Topics     Alcohol use: No     Comment: 11 months sober        History   Drug Use No     Comment: 11 months sober. lives at sober housing         Family History:     The patient's family history is notable for:    No family history on file.      Physical Exam:     PS: 1  VS: /75   Pulse 72   Temp 97.2  F (36.2  C) (Oral)   Ht 1.645 m (5' 4.75\")   Wt 105.1 kg (231 lb 12.8 oz)   SpO2 100%   BMI 38.87 kg/m       General Appearance: healthy and alert, " no distress     Musculoskeletal: extremities non tender and without edema, pain not reproductible, muscle strength 5+/5+ bilaterally, no gait problems.    Neurological: normal gait, no gross defects     Psychiatric: appropriate mood and affect                              Genitourinary: deferred      Assessment:    Meagan Morales is a woman with a diagnosis of stage IIIA grade 1 endometrioid adenocarcinoma.   She is here today for follow up and disease management.     30 minutes were spent with this patient, over 50% of that time was spent in symptom management, treatment planning and in counseling and coordination of care.      Plan:     1.)       Left leg pain: grossly improved clincially    2.)  EMCA: we have discussed the clinical and lab findings and she preliminarily appears ready for treatment today (some labs pending)     2.) Genetic risk factors were assessed and her MMR was intact.    3.) Labs and/or tests ordered include:  none      4.) Cancer rehab planned tomorrow.    Garrett ÁLVAREZ  Patient Care Team:  Aundrea Max MD as PCP - General

## 2019-01-21 NOTE — NURSING NOTE
Chief Complaint   Patient presents with     Labs Only     labs drawn by RN via Piv in lab, saline and heparin locked, vitals completed     Labs drawn with PIV start by rn.  Pt tolerated well.  VS taken and pt checked in for next appt.  Sharri Obrien RN

## 2019-01-21 NOTE — PROGRESS NOTES
Follow Up Notes on Referred Patient      RE: Meagan Morales  : 1975  VY: 2019       Meagan Morales is a 44 year old woman with a diagnosis of stage IIIA grade 1 endometrioid adenocarcinoma.   She is here today for treatment management.    Brief History:  Meagan originally presented to clinic due to continuous vaginal bleeding, very light. She thought it was just her menses being continuous. US was done which found thickened endometrial lining. IUD was placed. Second US was done and IUD did not affect endometrial lining and IUD was malpositioned. Since then, she has noted continuous spotting. IUD was removed. Also of note, ovarian cysts were noted on second US. Subsequent endometrial biopsy was done which revealed grade 1 endometrial cancer. She has always had irregular menses and has never been able to get pregnant. Patient's  first and only pap smear on 10/10/2017. Had mammogram in 20's, will order additional mammogram for baseline. No history of prior colonoscopy. Started Wellbutrin this last year, mood has improved. Prior cigarette smoker, quit 2 months ago and switched to vape. Started again smoking cigarettes again but has not had one for 10 days. History of drug use, has been sober for 11 months. History of meth and alcohol abuse. No history of prior abdominal surgeries. No history of heart disease, lung disease or diabetes. Has never been able to get pregnant and dose not desire children.     18: pelvic US IMPRESSION:  Peripheral follicular arrangement high within the ovaries suggesting polycystic ovarian syndrome. Slightly thickened heterogeneous endometrium raising a concern of potential hyperplastic change.  18: pelvic US   1. Uterus is not normal in appearance. The endometrium appears thickened and vascular, and the IUD appears to be malpositioned.  Clinical correlation is recommended.    2. Bilateral complex ovarian cysts are noted.  Follow up ultrasound in 4-6 weeks is  recommended, consider saline infusion sonohysterogram for further imaging of the endometrium.      9/17/18: endometrial biopsy  ENDOMETRIUM, BIOPSY:  1. FIGO Grade 1 (well differentiated) endometrioid carcinoma of      the endometrium  2. DNA mismatch repair enzyme immunohistochemistry studies:     All intact (see Amendment note and synoptic reporting)     10/24/2018: DaVinci Assisted Total Laparoscopic Hysterectomy, Bilateral Salpingo-Oophorectomy, lymph node dissection, Excision of Left Vulvar Lesion  Pelvic Washing:   Rare atypical cells present.         TEST(S) PERFORMED     Test(s) Performed:         - Mismatch Repair Immunohistochemistry (IHC) Testing for Mismatch Repair (MMR) Proteins:           - No loss of nuclear expression of MMR proteins: low probability of microsatellite instability-high (MSI-H)     ORIGINAL REPORT:     SPECIMEN(S):   A: Left vulvar lesion   B: Left pelvic sentinel lymph node   C: Right pelvic sentinel lymph node   D: Uterus, cervix, bilateral ovaries and fallopian tubes   E: Portion of left ovary   F: Para-aortic lymph nodes   G: Left pelvic lymph nodes   H: Right pelvic lymph nodes     FINAL DIAGNOSIS:   A. LEFT VULVAR LESION, BIOPSY:   - Intradermal nevus     B. LEFT PELVIC SENTINEL LYMPH NODE, EXCISION:   - One reactive lymph node, negative for malignancy (0/1)     C. RIGHT PELVIC SENTINEL LYMPH NODE, EXCISION:   - Six reactive lymph nodes, negative for malignancy (0/6)     D. UTERUS, CERVIX, BILATERAL OVARIES AND FALLOPIAN TUBES, HYSTERECTOMY WITH BILATERAL SALPINGO-OOPHORECTOMY:   - Endometrial endometrioid adenocarcinoma, FIGO grade 1   - Atypical endometrial hyperplasia   - Myometrium with no significant histologic abnormality   - Chronic cervicitis with microglandular hyperplasia   - Uterine serosal fibrous adhesions   - Ovaries with cortical hyperplasia   - Metastatic endometrial adenocarcinoma to the left fallopian tube   - Right fallopian tube with no significant histologic  abnormality     E. PORTION OF LEFT OVARY, EXCISION:   - Benign cortical inclusion cysts     F. PARA-AORTIC LYMPH NODES, EXCISION:   - Four reactive lymph nodes, negative for malignancy (0/4)   - Benign glandular inclusions consistent with endosalpingiosis     G. LEFT PELVIC LYMPH NODES, EXCISION:   - Eight reactive lymph nodes, negative for malignancy (0/8)     H. RIGHT PELVIC LYMPH NODES, EXCISION:   - Nine reactive lymph nodes, negative for malignancy (0/9)   - Benign glandular inclusions consistent with endosalpingiosis     COMMENT:   The final diagnosis confirms the interpretation provided intraoperatively. The tumor seen in the left fallopian tube involves the wall (invading the mucosa and submucosa) and appears free floating in the lumen.   Tumor Site:         - Endometrium - Anterior and posterior     Histologic Type:         - Endometrioid carcinoma, NOS     Histologic Grade:         - FIGO grade 1     Tumor Size: 5.7 Centimeters (cm)     Tumor Extent       Myometrial Invasion:           - Not identified       Adenomyosis:           - Not identified       Uterine Serosa Involvement:           - Not identified       Lower Uterine Segment Involvement:           - Not identified       Cervical Stromal Involvement:           - Not identified       Other Tissue / Organ Involvement:           - Left fallopian tube       Peritoneal Ascitic Fluid:           - Results pending     Accessory Findings       Lymphovascular Invasion:           - Not identified     11/6/18: CT ap IMPRESSION:   1. Surgical changes of hysterectomy, bilateral salpingo-oophorectomy, and pelvic lymph node dissection with extensive fat stranding and fluid within the low pelvis extending superiorly along the iliac vasculature, right greater than left, potentially within normal postsurgical limits, however, slightly more than expected for postsurgical change.   1a. The ureters are not well evaluated on this single phase exam and there is no  hydronephrosis or hydroureter, however, the amount of fluid in the pelvis does raise the question of ureteral injury. If there is clinical concern, consider obtaining a CT urogram.  1b. An area of irregular rim enhancement in the low pelvis may represent a normal postsurgical vaginal cuff, though, again, this is slightly more conspicuous than usual and dehiscence or a developing abscess may have a similar appearance.  2. 6.8 x 4.1 x 5.6 cm right pelvic sidewall seroma versus lymphocele.  3. Soft tissue nodular thickening anteriorly to the right psoas muscle and along the right lateral conal fascia, indeterminate, cannot rule out metastatic foci. Attention on follow-up studies.  4. Cholelithiasis without evidence of cholecystitis.      11/7/18: CT abd with contrast CT urogram IMPRESSION:   1. Postsurgical changes of hysterectomy, bilateral salpingo-oophorectomy, and iliac lymph node dissection with fluid  collections within the pelvis extending along the iliac vasculature.  a. The right pelvic sidewall fluid collection is not significantly changed while the left pelvic sidewall fluid collection has enlarged  in comparison to the 11/6/2018 CT. The differential remains a seroma versus a lymphocele.  b. No evidence of contrast extravasation to suggest a ureteral injury.  c. Redemonstration of soft tissue nodular thickening along the right lateral conal fascia and anteriorly to the right psoas muscle and right psoas muscle. Attention on follow-up is recommended.  2. Cholelithiasis without evidence of cholecystitis.    Plan: adjuvant therapy to include 6 cycles of taxol and carboplatin; would not add radiation as all lymph nodes are negative, she does not have LVSI or deep invasion and this is a low grade tumor. Rx percocet 5 tabs due to continued abdominal pain. Will not prescribe any more narcotics and patient is aware of this.    11/16/18: Cycle #1 Paclitaxel/Carboplatin.   35.  12/6/18: Cycle #2  Paclitaxel/Carboplatin.  pending.  12/27/2018: Cycle #3 T/C  1/8/19: US lower extremity: IMPRESSION: No evidence of deep venous thrombosis in either lower extremity.  1/9/2019: CT abd/pelvis    IMPRESSION:   1. Minimal change in a right pelvic sidewall collection and decreased  size of a left pelvic sidewall collection, which are most consistent  with lymphoceles.  2. No suspicious pelvic lymphadenopathy.     1/21/2019 Cycle #4 planned     1/2019 Presented with pain in LE:  US IMPRESSION: No evidence of deep venous thrombosis in either lower  extremity.    1/2019 CT:  IMPRESSION:   1. Minimal change in a right pelvic sidewall collection and decreased  size of a left pelvic sidewall collection, which are most consistent  with lymphoceles.  2. No suspicious pelvic lymphadenopathy.         Review of Systems     Constitutional:  Negative for fever, chills, weight loss, weight gain, fatigue, decreased appetite, night sweats, recent stressors, height gain, height loss, post-operative complications, incisional pain, hallucinations, increased energy, hyperactivity and confused.   HENT:  Negative for ear pain, hearing loss, tinnitus, nosebleeds, trouble swallowing, hoarse voice, mouth sores, sore throat, ear discharge, tooth pain, gum tenderness, taste disturbance, smell disturbance, hearing aid, bleeding gums, dry mouth, sinus pain, sinus congestion and neck mass.    Eyes:  Negative for double vision, pain, redness, eye pain, decreased vision, eye watering, eye bulging, eye dryness, flashing lights, spots, floaters, strabismus, tunnel vision, jaundice and eye irritation.   Respiratory:   Negative for cough, hemoptysis, sputum production, shortness of breath, wheezing, sleep disturbances due to breathing, snores loudly, respiratory pain, dyspnea on exertion, cough disturbing sleep and postural dyspnea.    Cardiovascular:  Positive for hypertension. Negative for chest pain, dyspnea on exertion, palpitations, orthopnea,  claudication, leg swelling, fingers/toes turn blue, hypotension, syncope, history of heart murmur, chest pain on exertion, chest pain at rest, pacemaker, few scattered varicosities, leg pain, sleep disturbances due to breathing, tachycardia, light-headedness, exercise intolerance and edema.   Gastrointestinal:  Negative for heartburn, nausea, vomiting, abdominal pain, diarrhea, constipation, blood in stool, melena, rectal pain, bloating, hemorrhoids, bowel incontinence, jaundice, rectal bleeding, coffee ground emesis and change in stool.   Genitourinary:  Negative for bladder incontinence, dysuria, urgency, hematuria, flank pain, vaginal discharge, difficulty urinating, genital sores, dyspareunia, decreased libido, nocturia, voiding less frequently, arousal difficulty, abnormal vaginal bleeding, excessive menstruation, menstrual changes, hot flashes, vaginal dryness and postmenopausal bleeding.   Musculoskeletal:  Negative for myalgias, back pain, joint swelling, arthralgias, stiffness, muscle cramps, neck pain, bone pain, muscle weakness and fracture.   Skin:  Negative for nail changes, itching, poor wound healing, rash, hair changes, skin changes, acne, warts, poor wound healing, scarring, flaky skin, Raynaud's phenomenon, sensitivity to sunlight and skin thickening.   Neurological:  Negative for dizziness, tingling, tremors, speech change, seizures, loss of consciousness, weakness, light-headedness, numbness, headaches, disturbances in coordination, extremity numbness, memory loss, difficulty walking and paralysis.   Endo/Heme:  Negative for anemia, swollen glands and bruises/bleeds easily.   Psychiatric/Behavioral:  Negative for depression, hallucinations, memory loss, decreased concentration, mood swings and panic attacks.    Breast:  Negative for breast discharge, breast mass, breast pain and nipple retraction.   Endocrine:  Negative for altered temperature regulation, polyphagia, polydipsia, unwanted hair  growth and change in facial hair.      I have reviewed the pertinent positive findings in the review of symptoms with the patient.      Past Medical History:    Past Medical History:   Diagnosis Date     Chickenpox     as a child     Depression      Endometrial cancer (H)      Hypertension     since her 30's     Infertility, female      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)      PCOS (polycystic ovarian syndrome)      STD (female)     in her 20's         Past Surgical History:    Past Surgical History:   Procedure Laterality Date     CYSTOSCOPY N/A 10/24/2018    Procedure: Cystoscopy;  Surgeon: Linh De Souza MD;  Location: [facility] OR     DAVINCI HYSTERECTOMY TOTAL, BILATERAL SALPINGO-OOPHORECTOMY, NODE DISSECTION, COMBINED N/A 10/24/2018    Procedure: DaVinci Assisted Total Laparoscopic Hysterectomy, Bilateral Salpingo-Oophorectomy, lymph node dissection;  Surgeon: Linh De Souza MD;  Location: [facility] OR     EXCISE LESION VULVA Left 10/24/2018    Procedure: Excision of Left Vulvar Lesion;  Surgeon: Linh De Souza MD;  Location: [facility] OR         Health Maintenance Due   Topic Date Due     PHQ-2 Q1 YR  01/20/1989     HIV SCREEN (SYSTEM ASSIGNED)  01/20/1995     PAP SCREENING Q3 YR (SYSTEM ASSIGNED)  01/20/1998       Current Medications:     Current Outpatient Medications   Medication Sig Dispense Refill     albuterol (PROAIR HFA/PROVENTIL HFA/VENTOLIN HFA) 108 (90 Base) MCG/ACT inhaler Inhale 1-2 puffs into the lungs       buPROPion (WELLBUTRIN XL) 300 MG 24 hr tablet Take 300 mg by mouth       gabapentin (NEURONTIN) 600 MG tablet Take 600 mg by mouth 600mg in AM, 600mg in the afternoon, 900mg in the evening       ibuprofen (ADVIL/MOTRIN) 600 MG tablet Take 1 tablet (600 mg) by mouth every 6 hours as needed for moderate pain 30 tablet 1     ibuprofen (ADVIL/MOTRIN) 600 MG tablet Take 1 tablet (600 mg) by mouth every 6 hours as needed for pain (mild) 30 tablet 0     loratadine (CLARITIN) 10 MG tablet Take 10 mg  "by mouth       MELATONIN PO Take 10 mg by mouth nightly as needed       metoprolol succinate (TOPROL-XL) 25 MG 24 hr tablet Take 25 mg by mouth       Multiple Vitamin (MULTIVITAMIN  S) CAPS Take 1 capsule by mouth       ondansetron (ZOFRAN) 8 MG tablet Take 1 tablet (8 mg) by mouth every 8 hours as needed for nausea (Did not start until 3 days after chemo.) 20 tablet 1     polyethylene glycol (MIRALAX/GLYCOLAX) powder Take 1 capful by mouth daily       prochlorperazine (COMPAZINE) 10 MG tablet Take 1 tablet (10 mg) by mouth every 6 hours as needed (nausea/vomiting) (Patient not taking: Reported on 1/10/2019) 30 tablet 5     senna-docusate (SENOKOT-S;PERICOLACE) 8.6-50 MG per tablet Take 1-2 tablets by mouth 2 times daily Take while on oral narcotics to prevent or treat constipation. (Patient not taking: Reported on 1/10/2019) 30 tablet 0     spironolactone (ALDACTONE) 50 MG tablet Take 50 mg by mouth           Allergies:        Allergies   Allergen Reactions     Amoxicillin      Penicillin G         Social History:     Social History     Tobacco Use     Smoking status: Former Smoker     Packs/day: 0.50     Types: Cigarettes     Smokeless tobacco: Never Used     Tobacco comment: uses vape pen about 3-4 times daily   Substance Use Topics     Alcohol use: No     Comment: 11 months sober        History   Drug Use No     Comment: 11 months sober. lives at sober housing         Family History:     The patient's family history is notable for:    No family history on file.      Physical Exam:     PS: 1  VS: /75   Pulse 72   Temp 97.2  F (36.2  C) (Oral)   Ht 1.645 m (5' 4.75\")   Wt 105.1 kg (231 lb 12.8 oz)   SpO2 100%   BMI 38.87 kg/m      General Appearance: healthy and alert, no distress     Musculoskeletal: extremities non tender and without edema, pain not reproductible, muscle strength 5+/5+ bilaterally, no gait problems.    Neurological: normal gait, no gross defects     Psychiatric: appropriate mood and " affect                              Genitourinary: deferred      Assessment:    Meagan Morales is a woman with a diagnosis of stage IIIA grade 1 endometrioid adenocarcinoma.   She is here today for follow up and disease management.     30 minutes were spent with this patient, over 50% of that time was spent in symptom management, treatment planning and in counseling and coordination of care.      Plan:     1.)       Left leg pain: grossly improved clincially    2.)  EMCA: we have discussed the clinical and lab findings and she preliminarily appears ready for treatment today (some labs pending)     2.) Genetic risk factors were assessed and her MMR was intact.    3.) Labs and/or tests ordered include:  none      4.) Cancer rehab planned tomorrow.    Garrett Sunshine        CC  Patient Care Team:  Aundrea Max MD as PCP - General  SELF, REFERRED

## 2019-01-25 ENCOUNTER — THERAPY VISIT (OUTPATIENT)
Dept: PHYSICAL THERAPY | Facility: CLINIC | Age: 44
End: 2019-01-25
Payer: COMMERCIAL

## 2019-01-25 DIAGNOSIS — R53.81 PHYSICAL DECONDITIONING: ICD-10-CM

## 2019-01-25 DIAGNOSIS — M25.552 PAIN IN JOINT, PELVIC REGION AND THIGH, LEFT: Primary | ICD-10-CM

## 2019-01-25 DIAGNOSIS — C54.1 ENDOMETRIAL CANCER (H): ICD-10-CM

## 2019-01-31 ENCOUNTER — HOSPITAL ENCOUNTER (OUTPATIENT)
Dept: PHYSICAL THERAPY | Facility: CLINIC | Age: 44
Setting detail: THERAPIES SERIES
End: 2019-01-31
Attending: NURSE PRACTITIONER
Payer: COMMERCIAL

## 2019-01-31 DIAGNOSIS — M79.605 PAIN OF LEFT LOWER EXTREMITY: ICD-10-CM

## 2019-01-31 PROCEDURE — 97140 MANUAL THERAPY 1/> REGIONS: CPT | Mod: GP,ZF | Performed by: PHYSICAL THERAPIST

## 2019-01-31 PROCEDURE — 97161 PT EVAL LOW COMPLEX 20 MIN: CPT | Mod: GP,ZF | Performed by: PHYSICAL THERAPIST

## 2019-01-31 PROCEDURE — 97535 SELF CARE MNGMENT TRAINING: CPT | Mod: GP,ZF | Performed by: PHYSICAL THERAPIST

## 2019-01-31 NOTE — PROGRESS NOTES
01/31/19 1100   Rehab Discipline   Discipline PT   Type of Visit   Type of visit Initial Edema Evaluation       present No   General Information   Start of care 01/31/19   Referring physician CORTES Prater CNP   Orders Evaluate and treat as indicated   Order date 01/11/19   Medical diagnosis endometrial cancer   Onset of illness / date of surgery 10/24/18   Edema onset 10/31/18   Affected body parts Trunk;LLE;RLE   Edema etiology Cancer with lymph node dissection   Location - Cancer with lymph node dissection pelvic   Edema etiology comments Pt had post op swelling in abdomen but it increased 1 week after surgery. It continues to seem swollen to her. She did have LE edema, but feels this is now gone.    Pertinent history of current problem (PT: include personal factors and/or comorbidities that impact the POC; OT: include additional occupational profile info) 10/24/2018: DaVinci Assisted Total Laparoscopic Hysterectomy, Bilateral Salpingo-Oophorectomy, lymph node dissection, Excision of Left Vulvar Lesion. disection of para-aortic, B pelvic LNs, all negative. No radiation planned. adjuvant therapy to include 6 cycles of taxol and carboplatin. completed 4 of 6 chemo cycles, next one 2/14/19. She has gained 9# since surgery. Now able to do 10 min of her 40 min workout. History of drug use including meth. Has been sober 11 months and lives in sober house. Pt being seen in cancer rehab for strengthening and leg pain.    Surgical / medical history reviewed Yes   Edema special tests MRI  (lymphocele)   Prior level of functional mobility very active with exercises   Community support Other  (sober house)   Patient role / employment history Unemployed   Psychosocial concerns Impaired body image   Living environment Other  (sober house)   Fall Risk Screen   Fall screen completed by PT   Have you fallen 2 or more times in the past year? No   Have you fallen and had an injury in the past  "year? No   Is patient a fall risk? No   Abuse Screen (yes response referral indicated)   Feels Unsafe at Home or Work/School no   Feels Threatened by Someone no   Does Anyone Try to Keep You From Having Contact with Others or Doing Things Outside Your Home? no   Physical Signs of Abuse Present no   System Outcome Measures   Outcome Measures Lymphedema   Lymphedema Life Impact Scale (score range 0-72). A higher score indicates greater impairment. 9  (answered 9 questions)   Subjective Report   Patient report of symptoms notes visible difference in size of abdomen   Precautions   Precautions comments none   Patient / Family Goals   Patient / family goals statement \"I want to be proactive with the lymphedema.\" Get in under control with working out.    Pain   Patient currently in pain No   Vitals Signs   Vital Signs Comments BMI: 37, HT: 5'5\", wt: 102.5 kg   Cognitive Status   Orientation Orientation to person, place and time   Level of consciousness Alert   Follows commands and answers questions 100% of the time   Cognitive status comments \"it seems to be alittle better, it's getting better.\"   Edema Exam / Assessment   Skin condition Non-pitting;Dryness;Intact   Skin condition comments puffy, boggy edema on abdomen, <1+ pitting on lower legs, abdomen/pannushandgs down over ING LNs when standing and sitting   Scar Yes   Location abdomen   Mobility good   Capillary refill Symmetrical   Stemmer sign Negative   Stemmer sign comments B 2nd toes   Girth Measurements   Girth Measurements Refer to separate girth measurement flowsheet   Volume LE   Right LE (mL) to 70 cm 8290   Left LE (mL) to 70 cm 8761   LE volume comparison LLE volume greater than RLE volume   % difference 5.6   Trunk volume comments circumference: at superior incision: 106 cm, umbilicus: 124, level of trochanteric head: 126 cm   Range of Motion   ROM comments WNL   Strength   Strength comments see cancer rehab PT eval   Planned Edema Interventions "   Planned edema interventions Manual lymph drainage;Gradient compression bandaging;Fit for compression garment;Exercises;Precautions to prevent infection / exacerbation;Education;Manual therapy;ADL training;Skin care / precautions;Scar mobilization;Home management program development   Clinical Impression   Criteria for skilled therapeutic intervention met Yes   Therapy diagnosis abdominal and trunk lymphedema   Influenced by the following impairments / conditions Stage 2   Functional limitations due to impairments / conditions see LLIS, difficult to get clothing to fit   Clinical Presentation Stable/Uncomplicated   Clinical Presentation Rationale Pt presents with typical edema s/p cancer treatment   Clinical Decision Making (Complexity) Low complexity   Treatment frequency Other   Treatment duration 8 visits over 3 months   Patient / family and/or staff in agreement with plan of care Yes   Risks and benefits of therapy have been explained Yes   Clinical impression comments Pt presents with abdominal lymphedema s/p surgery and LN dissection and would benefit from above interventions   Education Assessment   Preferred learning style Listening;Reading;Demonstration   Barriers to learning No barriers   Goals   Edema Eval Goals 1;2   Goal 1   Goal identifier home program   Goal description In order to improve tolerance for functional mobility, ADLs, and recreational activities outside of physical therapy, patient will demonstrate independence with home program of exercise and lifestyle modification and possible compression.    Target date 05/01/19   Goal 2   Goal identifier circumference   Goal description Pt will have 2 cm or more decrease in circumference at umbilicus so she can fit in clothes more easily   Target date 05/01/19   Total Evaluation Time   PT Barry Low Complexity Minutes (21183) 20

## 2019-02-11 PROBLEM — C54.1 ENDOMETRIAL CANCER (H): Status: ACTIVE | Noted: 2018-09-17

## 2019-02-12 NOTE — PROGRESS NOTES
Follow Up Notes on Referred Patient    Date: 2019        RE: Meagan Morales  : 1975  VY: 2019      Meagan Morales is a 44 year old woman with a diagnosis of stage IIIA grade 1 endometrioid adenocarcinoma.   She is here today for treatment management.     Oncology History:  Meagan originally presented to clinic due to continuous vaginal bleeding, very light. She thought it was just her menses being continuous. US was done which found thickened endometrial lining. IUD was placed. Second US was done and IUD did not affect endometrial lining and IUD was malpositioned. Since then, she has noted continuous spotting. IUD was removed. Also of note, ovarian cysts were noted on second US. Subsequent endometrial biopsy was done which revealed grade 1 endometrial cancer. She has always had irregular menses and has never been able to get pregnant. Patient's  first and only pap smear on 10/10/2017. Had mammogram in 20's, will order additional mammogram for baseline. No history of prior colonoscopy. Started Wellbutrin this last year, mood has improved. Prior cigarette smoker, quit 2 months ago and switched to vape. Started again smoking cigarettes again but has not had one for 10 days. 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 No history of prior abdominal surgeries. No history of heart disease, lung disease or diabetes. Has never been able to get pregnant and dose not desire children.     18: pelvic US IMPRESSION:  Peripheral follicular arrangement high within the ovaries suggesting polycystic ovarian syndrome. Slightly thickened heterogeneous endometrium raising a concern of potential hyperplastic change.  18: pelvic US   1. Uterus is not normal in appearance. The endometrium appears thickened and vascular, and the IUD appears to be malpositioned.  Clinical correlation is recommended.    2. Bilateral complex ovarian cysts are noted.  Follow up  ultrasound in 4-6 weeks is recommended, consider saline infusion sonohysterogram for further imaging of the endometrium.      9/17/18: endometrial biopsy  ENDOMETRIUM, BIOPSY:  1. FIGO Grade 1 (well differentiated) endometrioid carcinoma of      the endometrium  2. DNA mismatch repair enzyme immunohistochemistry studies:     All intact (see Amendment note and synoptic reporting)     10/24/2018: DaVinci Assisted Total Laparoscopic Hysterectomy, Bilateral Salpingo-Oophorectomy, lymph node dissection, Excision of Left Vulvar Lesion  Pelvic Washing:   Rare atypical cells present.         TEST(S) PERFORMED     Test(s) Performed:         - Mismatch Repair Immunohistochemistry (IHC) Testing for Mismatch Repair (MMR) Proteins:           - No loss of nuclear expression of MMR proteins: low probability of microsatellite instability-high (MSI-H)     ORIGINAL REPORT:     SPECIMEN(S):   A: Left vulvar lesion   B: Left pelvic sentinel lymph node   C: Right pelvic sentinel lymph node   D: Uterus, cervix, bilateral ovaries and fallopian tubes   E: Portion of left ovary   F: Para-aortic lymph nodes   G: Left pelvic lymph nodes   H: Right pelvic lymph nodes     FINAL DIAGNOSIS:   A. LEFT VULVAR LESION, BIOPSY:   - Intradermal nevus     B. LEFT PELVIC SENTINEL LYMPH NODE, EXCISION:   - One reactive lymph node, negative for malignancy (0/1)     C. RIGHT PELVIC SENTINEL LYMPH NODE, EXCISION:   - Six reactive lymph nodes, negative for malignancy (0/6)     D. UTERUS, CERVIX, BILATERAL OVARIES AND FALLOPIAN TUBES, HYSTERECTOMY WITH BILATERAL SALPINGO-OOPHORECTOMY:   - Endometrial endometrioid adenocarcinoma, FIGO grade 1   - Atypical endometrial hyperplasia   - Myometrium with no significant histologic abnormality   - Chronic cervicitis with microglandular hyperplasia   - Uterine serosal fibrous adhesions   - Ovaries with cortical hyperplasia   - Metastatic endometrial adenocarcinoma to the left fallopian tube   - Right fallopian tube with  no significant histologic abnormality     E. PORTION OF LEFT OVARY, EXCISION:   - Benign cortical inclusion cysts     F. PARA-AORTIC LYMPH NODES, EXCISION:   - Four reactive lymph nodes, negative for malignancy (0/4)   - Benign glandular inclusions consistent with endosalpingiosis     G. LEFT PELVIC LYMPH NODES, EXCISION:   - Eight reactive lymph nodes, negative for malignancy (0/8)     H. RIGHT PELVIC LYMPH NODES, EXCISION:   - Nine reactive lymph nodes, negative for malignancy (0/9)   - Benign glandular inclusions consistent with endosalpingiosis     COMMENT:   The final diagnosis confirms the interpretation provided intraoperatively. The tumor seen in the left fallopian tube involves the wall (invading the mucosa and submucosa) and appears free floating in the lumen.   Tumor Site:         - Endometrium - Anterior and posterior     Histologic Type:         - Endometrioid carcinoma, NOS     Histologic Grade:         - FIGO grade 1     Tumor Size: 5.7 Centimeters (cm)     Tumor Extent       Myometrial Invasion:           - Not identified       Adenomyosis:           - Not identified       Uterine Serosa Involvement:           - Not identified       Lower Uterine Segment Involvement:           - Not identified       Cervical Stromal Involvement:           - Not identified       Other Tissue / Organ Involvement:           - Left fallopian tube       Peritoneal Ascitic Fluid:           - Results pending     Accessory Findings       Lymphovascular Invasion:           - Not identified     11/6/18: CT ap IMPRESSION:   1. Surgical changes of hysterectomy, bilateral salpingo-oophorectomy, and pelvic lymph node dissection with extensive fat stranding and fluid within the low pelvis extending superiorly along the iliac vasculature, right greater than left, potentially within normal postsurgical limits, however, slightly more than expected for postsurgical change.   1a. The ureters are not well evaluated on this single phase  exam and there is no hydronephrosis or hydroureter, however, the amount of fluid in the pelvis does raise the question of ureteral injury. If there is clinical concern, consider obtaining a CT urogram.  1b. An area of irregular rim enhancement in the low pelvis may represent a normal postsurgical vaginal cuff, though, again, this is slightly more conspicuous than usual and dehiscence or a developing abscess may have a similar appearance.  2. 6.8 x 4.1 x 5.6 cm right pelvic sidewall seroma versus lymphocele.  3. Soft tissue nodular thickening anteriorly to the right psoas muscle and along the right lateral conal fascia, indeterminate, cannot rule out metastatic foci. Attention on follow-up studies.  4. Cholelithiasis without evidence of cholecystitis.      11/7/18: CT abd with contrast CT urogram IMPRESSION:   1. Postsurgical changes of hysterectomy, bilateral salpingo-oophorectomy, and iliac lymph node dissection with fluid  collections within the pelvis extending along the iliac vasculature.  a. The right pelvic sidewall fluid collection is not significantly changed while the left pelvic sidewall fluid collection has enlarged  in comparison to the 11/6/2018 CT. The differential remains a seroma versus a lymphocele.  b. No evidence of contrast extravasation to suggest a ureteral injury.  c. Redemonstration of soft tissue nodular thickening along the right lateral conal fascia and anteriorly to the right psoas muscle and right psoas muscle. Attention on follow-up is recommended.  2. Cholelithiasis without evidence of cholecystitis.     Plan: adjuvant therapy to include 6 cycles of taxol and carboplatin; would not add radiation as all lymph nodes are negative, she does not have LVSI or deep invasion and this is a low grade tumor. Rx percocet 5 tabs due to continued abdominal pain. Will not prescribe any more narcotics and patient is aware of this.     11/16/18: Cycle #1 Paclitaxel/Carboplatin.   35.  12/6/18:  Cycle #2 Paclitaxel/Carboplatin.  pending.  12/27/2018: Cycle #3 T/C  1/8/19: US lower extremity: IMPRESSION: No evidence of deep venous thrombosis in either lower extremity.  1/9/2019: CT abd/pelvis IMPRESSION:   1. Minimal change in a right pelvic sidewall collection and decreased size of a left pelvic sidewall collection, which are most consistent with lymphoceles.  2. No suspicious pelvic lymphadenopathy.      1/21/19 Cycle #4 Paclitaxel/Carboplatin.  8.      1/2019 Presented with pain in LE:  US IMPRESSION: No evidence of deep venous thrombosis in either lower extremity.     1/2019 CT:IMPRESSION:   1. Minimal change in a right pelvic sidewall collection and decreased size of a left pelvic sidewall collection, which are most consistent with lymphoceles.  2. No suspicious pelvic lymphadenopathy.     2/14/19: Cycle #5 Paclitaxel/Carboplatin.  pending.         Today she comes to clinic feeling well and denies any new issues. She denies any vaginal bleeding, no changes in her bowel or bladder habits, no nausea/emesis, no lower extremity edema, and no difficulties eating or sleeping. She denies any abdominal discomfort/bloating, no fevers or chills, and no chest pain or shortness of breath. Her night sweats/hot flashes are the same. She has been taking Gabapentin about 9 months or so; she denies any neuropathy. She has not needed to take her antiemetics and does not need any medications refilled. She has been seeing PT and lymphedema. The leg pain she was having with activity is now resolved as she is working on increasing her tolerance/strength to exercise/walk. She just got a gym membership this week.       Review of Systems:    Systemic           no weight changes; no fever; no chills; + night sweats; no appetite changes  Skin           no rashes, or lesions  Eye           no irritation; no changes in vision  Deny-Laryngeal           no dysphagia; no hoarseness   Pulmonary    no cough; no  shortness of breath  Cardiovascular    no chest pain; no palpitations; + HTN  Gastrointestinal    no diarrhea; no constipation; no abdominal pain; no changes in bowel habits; no blood in stool  Genitourinary   no urinary frequency; no urinary urgency; no dysuria; no pain; no abnormal vaginal discharge; no abnormal vaginal bleeding  Breast    no breast discharge; no breast changes; no breast pain  Musculoskeletal    no myalgias; no arthralgias; no back pain  Psychiatric           no depressed mood; no anxiety    Hematologic              no tender lymph nodes; no noticeable swellings or lumps   Endocrine    + hot flashes; no heat/cold intolerance         Neurological   no tremor; no numbness and tingling; no headaches; no difficulty sleeping      Past Medical History:    Past Medical History:   Diagnosis Date     Chickenpox     as a child     Depression      Endometrial cancer (H)      Hypertension     since her 30's     Infertility, female      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)      PCOS (polycystic ovarian syndrome)      STD (female)     in her 20's         Past Surgical History:    Past Surgical History:   Procedure Laterality Date     CYSTOSCOPY N/A 10/24/2018    Procedure: Cystoscopy;  Surgeon: Linh De Souza MD;  Location: [facility] OR     DAVINCI HYSTERECTOMY TOTAL, BILATERAL SALPINGO-OOPHORECTOMY, NODE DISSECTION, COMBINED N/A 10/24/2018    Procedure: DaVinci Assisted Total Laparoscopic Hysterectomy, Bilateral Salpingo-Oophorectomy, lymph node dissection;  Surgeon: Linh De Souza MD;  Location: [facility] OR     EXCISE LESION VULVA Left 10/24/2018    Procedure: Excision of Left Vulvar Lesion;  Surgeon: Linh De Souza MD;  Location: [facility] OR         Health Maintenance Due   Topic Date Due     PHQ-2 Q1 YR  01/20/1989     HIV SCREEN (SYSTEM ASSIGNED)  01/20/1995     PAP SCREENING Q3 YR (SYSTEM ASSIGNED)  01/20/1998       Current Medications:     Current Outpatient Medications   Medication Sig Dispense Refill      buPROPion (WELLBUTRIN XL) 300 MG 24 hr tablet Take 300 mg by mouth       gabapentin (NEURONTIN) 600 MG tablet Take 600 mg by mouth 600mg in AM, 600mg in the afternoon, 900mg in the evening       ibuprofen (ADVIL/MOTRIN) 600 MG tablet Take 1 tablet (600 mg) by mouth every 6 hours as needed for moderate pain 30 tablet 1     loratadine (CLARITIN) 10 MG tablet Take 10 mg by mouth       MELATONIN PO Take 10 mg by mouth nightly as needed       metoprolol succinate (TOPROL-XL) 25 MG 24 hr tablet Take 25 mg by mouth       Multiple Vitamin (MULTIVITAMIN  S) CAPS Take 1 capsule by mouth       Pseudoeph-Doxylamine-DM-APAP (NYQUIL PO)        spironolactone (ALDACTONE) 50 MG tablet Take 50 mg by mouth       albuterol (PROAIR HFA/PROVENTIL HFA/VENTOLIN HFA) 108 (90 Base) MCG/ACT inhaler Inhale 1-2 puffs into the lungs       ondansetron (ZOFRAN) 8 MG tablet Take 1 tablet (8 mg) by mouth every 8 hours as needed for nausea (Did not start until 3 days after chemo.) (Patient not taking: Reported on 1/21/2019) 20 tablet 1     polyethylene glycol (MIRALAX/GLYCOLAX) powder Take 1 capful by mouth daily       prochlorperazine (COMPAZINE) 10 MG tablet Take 1 tablet (10 mg) by mouth every 6 hours as needed (nausea/vomiting) (Patient not taking: Reported on 1/10/2019) 30 tablet 5         Allergies:        Allergies   Allergen Reactions     Amoxicillin      Penicillin G         Social History:     Social History     Tobacco Use     Smoking status: Former Smoker     Packs/day: 0.50     Types: Cigarettes     Smokeless tobacco: Never Used     Tobacco comment: uses vape pen about 3-4 times daily   Substance Use Topics     Alcohol use: No     Comment: 11 months sober        History   Drug Use No     Comment: 11 months sober. lives at sober housing         Family History:       No family history on file.      Physical Exam:     /69 (BP Location: Right arm, Patient Position: Sitting, Cuff Size: Adult Large)   Pulse 83   Temp 97.8  F (36.6  " C) (Oral)   Resp 18   Ht 1.645 m (5' 4.75\")   Wt 104.7 kg (230 lb 12.8 oz)   SpO2 98%   BMI 38.70 kg/m    Body mass index is 38.7 kg/m .    General Appearance: healthy and alert, no distress     HEENT: no thyromegaly, no palpable nodules or masses        Cardiovascular: regular rate and rhythm, no gallops, rubs or murmurs     Respiratory: lungs clear, no rales, rhonchi or wheezes, normal diaphragmatic excursion    Musculoskeletal: extremities non tender and without edema    Skin: no lesions or rashes     Neurological: normal gait, no gross defects     Psychiatric: appropriate mood and affect                               Hematological: normal cervical, supraclavicular lymph nodes     Gastrointestinal:       abdomen soft, non-tender, non-distended    Genitourinary: Deferred      Assessment:    Meagan Morales is a 44 year old woman with a diagnosis of stage IIIA grade 1 endometrioid adenocarcinoma.   She is here today for treatment management.    30 minutes were spent with this patient, over 50% of that time was spent in symptom management, treatment planning and in counseling and coordination of care.      Plan:     1.)        Ok to proceed with planned chemotherapy pending labs are WNL. She will return in 3 weeks for her next cycle and then have imaging and see Dr. De Souza for results. Reviewed signs and symptoms for when she should contact the clinic or seek additional care. Patient to contact the clinic with any questions or concerns in the interim.     2.) Genetic risk factors were assessed and she had intact MMR proteins.    3.) Labs and/or tests ordered include:  Chemo labs. .      4.) Health maintenance issues addressed today include annual health maintenance and non-gynecologic issues with PCP.    5.)       Continue to see cancer rehab and lymphedema as needed.         CORTES Gant, WHNP-BC, ANP-BC  Women's Health Nurse Practitioner  Adult Nurse Pracitioner  Division of Gynecologic " Oncology          CC  Patient Care Team:  Aundrea Max MD as PCP - General  SELF, REFERRED

## 2019-02-14 ENCOUNTER — APPOINTMENT (OUTPATIENT)
Dept: LAB | Facility: CLINIC | Age: 44
End: 2019-02-14
Attending: NURSE PRACTITIONER
Payer: COMMERCIAL

## 2019-02-14 ENCOUNTER — INFUSION THERAPY VISIT (OUTPATIENT)
Dept: ONCOLOGY | Facility: CLINIC | Age: 44
End: 2019-02-14
Attending: NURSE PRACTITIONER
Payer: COMMERCIAL

## 2019-02-14 VITALS
DIASTOLIC BLOOD PRESSURE: 69 MMHG | HEART RATE: 83 BPM | SYSTOLIC BLOOD PRESSURE: 118 MMHG | HEIGHT: 65 IN | RESPIRATION RATE: 18 BRPM | OXYGEN SATURATION: 98 % | TEMPERATURE: 97.8 F | WEIGHT: 230.8 LBS | BODY MASS INDEX: 38.45 KG/M2

## 2019-02-14 DIAGNOSIS — R97.1 ELEVATED CA-125: ICD-10-CM

## 2019-02-14 DIAGNOSIS — C54.1 ENDOMETRIAL CANCER (H): Primary | ICD-10-CM

## 2019-02-14 DIAGNOSIS — Z51.11 ENCOUNTER FOR ANTINEOPLASTIC CHEMOTHERAPY: ICD-10-CM

## 2019-02-14 LAB
ALBUMIN SERPL-MCNC: 3.7 G/DL (ref 3.4–5)
ALP SERPL-CCNC: 83 U/L (ref 40–150)
ALT SERPL W P-5'-P-CCNC: 31 U/L (ref 0–50)
ANION GAP SERPL CALCULATED.3IONS-SCNC: 9 MMOL/L (ref 3–14)
AST SERPL W P-5'-P-CCNC: 18 U/L (ref 0–45)
BASOPHILS # BLD AUTO: 0 10E9/L (ref 0–0.2)
BASOPHILS NFR BLD AUTO: 0.5 %
BILIRUB SERPL-MCNC: 0.2 MG/DL (ref 0.2–1.3)
BUN SERPL-MCNC: 16 MG/DL (ref 7–30)
CALCIUM SERPL-MCNC: 9 MG/DL (ref 8.5–10.1)
CANCER AG125 SERPL-ACNC: 9 U/ML (ref 0–30)
CHLORIDE SERPL-SCNC: 106 MMOL/L (ref 94–109)
CO2 SERPL-SCNC: 27 MMOL/L (ref 20–32)
CREAT SERPL-MCNC: 0.86 MG/DL (ref 0.52–1.04)
DIFFERENTIAL METHOD BLD: NORMAL
EOSINOPHIL # BLD AUTO: 0.1 10E9/L (ref 0–0.7)
EOSINOPHIL NFR BLD AUTO: 2.3 %
ERYTHROCYTE [DISTWIDTH] IN BLOOD BY AUTOMATED COUNT: 13.7 % (ref 10–15)
GFR SERPL CREATININE-BSD FRML MDRD: 83 ML/MIN/{1.73_M2}
GLUCOSE SERPL-MCNC: 108 MG/DL (ref 70–99)
HCT VFR BLD AUTO: 41.1 % (ref 35–47)
HGB BLD-MCNC: 13 G/DL (ref 11.7–15.7)
IMM GRANULOCYTES # BLD: 0 10E9/L (ref 0–0.4)
IMM GRANULOCYTES NFR BLD: 0.5 %
LYMPHOCYTES # BLD AUTO: 1.6 10E9/L (ref 0.8–5.3)
LYMPHOCYTES NFR BLD AUTO: 29 %
MAGNESIUM SERPL-MCNC: 2.3 MG/DL (ref 1.6–2.3)
MCH RBC QN AUTO: 29.4 PG (ref 26.5–33)
MCHC RBC AUTO-ENTMCNC: 31.6 G/DL (ref 31.5–36.5)
MCV RBC AUTO: 93 FL (ref 78–100)
MONOCYTES # BLD AUTO: 0.3 10E9/L (ref 0–1.3)
MONOCYTES NFR BLD AUTO: 5.5 %
NEUTROPHILS # BLD AUTO: 3.5 10E9/L (ref 1.6–8.3)
NEUTROPHILS NFR BLD AUTO: 62.2 %
NRBC # BLD AUTO: 0 10*3/UL
NRBC BLD AUTO-RTO: 0 /100
PLATELET # BLD AUTO: 233 10E9/L (ref 150–450)
POTASSIUM SERPL-SCNC: 3.7 MMOL/L (ref 3.4–5.3)
PROT SERPL-MCNC: 8.1 G/DL (ref 6.8–8.8)
RBC # BLD AUTO: 4.42 10E12/L (ref 3.8–5.2)
SODIUM SERPL-SCNC: 143 MMOL/L (ref 133–144)
WBC # BLD AUTO: 5.6 10E9/L (ref 4–11)

## 2019-02-14 PROCEDURE — 25000128 H RX IP 250 OP 636: Mod: ZF | Performed by: NURSE PRACTITIONER

## 2019-02-14 PROCEDURE — 83735 ASSAY OF MAGNESIUM: CPT | Performed by: NURSE PRACTITIONER

## 2019-02-14 PROCEDURE — 86304 IMMUNOASSAY TUMOR CA 125: CPT | Performed by: NURSE PRACTITIONER

## 2019-02-14 PROCEDURE — 80053 COMPREHEN METABOLIC PANEL: CPT | Performed by: NURSE PRACTITIONER

## 2019-02-14 PROCEDURE — 96415 CHEMO IV INFUSION ADDL HR: CPT

## 2019-02-14 PROCEDURE — 96417 CHEMO IV INFUS EACH ADDL SEQ: CPT

## 2019-02-14 PROCEDURE — 85025 COMPLETE CBC W/AUTO DIFF WBC: CPT | Performed by: NURSE PRACTITIONER

## 2019-02-14 PROCEDURE — 96375 TX/PRO/DX INJ NEW DRUG ADDON: CPT

## 2019-02-14 PROCEDURE — 25000125 ZZHC RX 250: Mod: ZF | Performed by: NURSE PRACTITIONER

## 2019-02-14 PROCEDURE — 96413 CHEMO IV INFUSION 1 HR: CPT

## 2019-02-14 PROCEDURE — 99214 OFFICE O/P EST MOD 30 MIN: CPT | Mod: ZP | Performed by: NURSE PRACTITIONER

## 2019-02-14 PROCEDURE — 25000132 ZZH RX MED GY IP 250 OP 250 PS 637: Mod: ZF | Performed by: NURSE PRACTITIONER

## 2019-02-14 PROCEDURE — G0463 HOSPITAL OUTPT CLINIC VISIT: HCPCS | Mod: ZF

## 2019-02-14 PROCEDURE — 25800030 ZZH RX IP 258 OP 636: Mod: ZF | Performed by: NURSE PRACTITIONER

## 2019-02-14 RX ORDER — PALONOSETRON 0.05 MG/ML
0.25 INJECTION, SOLUTION INTRAVENOUS ONCE
Status: COMPLETED | OUTPATIENT
Start: 2019-02-14 | End: 2019-02-14

## 2019-02-14 RX ORDER — MEPERIDINE HYDROCHLORIDE 25 MG/ML
25 INJECTION INTRAMUSCULAR; INTRAVENOUS; SUBCUTANEOUS EVERY 30 MIN PRN
Status: CANCELLED | OUTPATIENT
Start: 2019-02-14

## 2019-02-14 RX ORDER — ALBUTEROL SULFATE 0.83 MG/ML
2.5 SOLUTION RESPIRATORY (INHALATION)
Status: CANCELLED | OUTPATIENT
Start: 2019-02-14

## 2019-02-14 RX ORDER — METHYLPREDNISOLONE SODIUM SUCCINATE 125 MG/2ML
125 INJECTION, POWDER, LYOPHILIZED, FOR SOLUTION INTRAMUSCULAR; INTRAVENOUS
Status: CANCELLED
Start: 2019-02-14

## 2019-02-14 RX ORDER — EPINEPHRINE 1 MG/ML
0.3 INJECTION, SOLUTION INTRAMUSCULAR; SUBCUTANEOUS EVERY 5 MIN PRN
Status: CANCELLED | OUTPATIENT
Start: 2019-02-14

## 2019-02-14 RX ORDER — DIPHENHYDRAMINE HCL 25 MG
50 CAPSULE ORAL ONCE
Status: COMPLETED | OUTPATIENT
Start: 2019-02-14 | End: 2019-02-14

## 2019-02-14 RX ORDER — DIPHENHYDRAMINE HCL 25 MG
50 CAPSULE ORAL ONCE
Status: CANCELLED
Start: 2019-02-14

## 2019-02-14 RX ORDER — ALBUTEROL SULFATE 90 UG/1
1-2 AEROSOL, METERED RESPIRATORY (INHALATION)
Status: CANCELLED
Start: 2019-02-14

## 2019-02-14 RX ORDER — LORAZEPAM 2 MG/ML
1 INJECTION INTRAMUSCULAR EVERY 6 HOURS PRN
Status: CANCELLED
Start: 2019-02-14

## 2019-02-14 RX ORDER — EPINEPHRINE 0.3 MG/.3ML
0.3 INJECTION SUBCUTANEOUS EVERY 5 MIN PRN
Status: CANCELLED | OUTPATIENT
Start: 2019-02-14

## 2019-02-14 RX ORDER — SODIUM CHLORIDE 9 MG/ML
1000 INJECTION, SOLUTION INTRAVENOUS CONTINUOUS PRN
Status: CANCELLED
Start: 2019-02-14

## 2019-02-14 RX ORDER — DIPHENHYDRAMINE HYDROCHLORIDE 50 MG/ML
50 INJECTION INTRAMUSCULAR; INTRAVENOUS
Status: CANCELLED
Start: 2019-02-14

## 2019-02-14 RX ORDER — PALONOSETRON 0.05 MG/ML
0.25 INJECTION, SOLUTION INTRAVENOUS ONCE
Status: CANCELLED
Start: 2019-02-14

## 2019-02-14 RX ADMIN — SODIUM CHLORIDE 250 ML: 9 INJECTION, SOLUTION INTRAVENOUS at 09:35

## 2019-02-14 RX ADMIN — DEXAMETHASONE SODIUM PHOSPHATE 20 MG: 10 INJECTION, SOLUTION INTRAMUSCULAR; INTRAVENOUS at 09:35

## 2019-02-14 RX ADMIN — CARBOPLATIN 750 MG: 10 INJECTION, SOLUTION INTRAVENOUS at 13:21

## 2019-02-14 RX ADMIN — PALONOSETRON HYDROCHLORIDE 0.25 MG: 0.25 INJECTION INTRAVENOUS at 09:35

## 2019-02-14 RX ADMIN — DIPHENHYDRAMINE HYDROCHLORIDE 50 MG: 25 CAPSULE ORAL at 09:34

## 2019-02-14 RX ADMIN — FAMOTIDINE 40 MG: 10 INJECTION INTRAVENOUS at 09:35

## 2019-02-14 RX ADMIN — PACLITAXEL 380 MG: 6 INJECTION, SOLUTION INTRAVENOUS at 10:15

## 2019-02-14 ASSESSMENT — PAIN SCALES - GENERAL: PAINLEVEL: NO PAIN (0)

## 2019-02-14 ASSESSMENT — MIFFLIN-ST. JEOR: SCORE: 1703.81

## 2019-02-14 NOTE — LETTER
2019     RE: Meagan Morales  [address] [place] [id number]     Dear Colleague,    Thank you for referring your patient, Meagan Morales, to the [facility]. Please see a copy of my visit note below.                Follow Up Notes on Referred Patient    Date: 2019      RE: Meagan Morales  : 1975  VY: 2019    Meagan Morales is a 44 year old woman with a diagnosis of stage IIIA grade 1 endometrioid adenocarcinoma.   She is here today for treatment management.     Oncology History:  Meagan originally presented to clinic due to continuous vaginal bleeding, very light. She thought it was just her menses being continuous. US was done which found thickened endometrial lining. IUD was placed. Second US was done and IUD did not affect endometrial lining and IUD was malpositioned. Since then, she has noted continuous spotting. IUD was removed. Also of note, ovarian cysts were noted on second US. Subsequent endometrial biopsy was done which revealed grade 1 endometrial cancer. She has always had irregular menses and has never been able to get pregnant. Patient's  first and only pap smear on 10/10/2017. Had mammogram in 20's, will order additional mammogram for baseline. No history of prior colonoscopy. Started Wellbutrin this last year, mood has improved. Prior cigarette smoker, quit 2 months ago and switched to vape. Started again smoking cigarettes again but has not had one for 10 days. 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 No history of prior abdominal surgeries. No history of heart disease, lung disease or diabetes. Has never been able to get pregnant and dose not desire children.     18: pelvic US IMPRESSION:  Peripheral follicular arrangement high within the ovaries suggesting polycystic ovarian syndrome. Slightly thickened heterogeneous endometrium raising a concern of potential hyperplastic change.  18: pelvic US   1.  Uterus is not normal in appearance. The endometrium appears thickened and vascular, and the IUD appears to be malpositioned.  Clinical correlation is recommended.    2. Bilateral complex ovarian cysts are noted.  Follow up ultrasound in 4-6 weeks is recommended, consider saline infusion sonohysterogram for further imaging of the endometrium.      9/17/18: endometrial biopsy  ENDOMETRIUM, BIOPSY:  1. FIGO Grade 1 (well differentiated) endometrioid carcinoma of      the endometrium  2. DNA mismatch repair enzyme immunohistochemistry studies:     All intact (see Amendment note and synoptic reporting)     10/24/2018: DaVinci Assisted Total Laparoscopic Hysterectomy, Bilateral Salpingo-Oophorectomy, lymph node dissection, Excision of Left Vulvar Lesion  Pelvic Washing:   Rare atypical cells present.     TEST(S) PERFORMED     Test(s) Performed:         - Mismatch Repair Immunohistochemistry (IHC) Testing for Mismatch Repair (MMR) Proteins:           - No loss of nuclear expression of MMR proteins: low probability of microsatellite instability-high (MSI-H)     ORIGINAL REPORT:     SPECIMEN(S):   A: Left vulvar lesion   B: Left pelvic sentinel lymph node   C: Right pelvic sentinel lymph node   D: Uterus, cervix, bilateral ovaries and fallopian tubes   E: Portion of left ovary   F: Para-aortic lymph nodes   G: Left pelvic lymph nodes   H: Right pelvic lymph nodes     FINAL DIAGNOSIS:   A. LEFT VULVAR LESION, BIOPSY:   - Intradermal nevus     B. LEFT PELVIC SENTINEL LYMPH NODE, EXCISION:   - One reactive lymph node, negative for malignancy (0/1)     C. RIGHT PELVIC SENTINEL LYMPH NODE, EXCISION:   - Six reactive lymph nodes, negative for malignancy (0/6)     D. UTERUS, CERVIX, BILATERAL OVARIES AND FALLOPIAN TUBES, HYSTERECTOMY WITH BILATERAL SALPINGO-OOPHORECTOMY:   - Endometrial endometrioid adenocarcinoma, FIGO grade 1   - Atypical endometrial hyperplasia   - Myometrium with no significant histologic abnormality   -  Chronic cervicitis with microglandular hyperplasia   - Uterine serosal fibrous adhesions   - Ovaries with cortical hyperplasia   - Metastatic endometrial adenocarcinoma to the left fallopian tube   - Right fallopian tube with no significant histologic abnormality     E. PORTION OF LEFT OVARY, EXCISION:   - Benign cortical inclusion cysts     F. PARA-AORTIC LYMPH NODES, EXCISION:   - Four reactive lymph nodes, negative for malignancy (0/4)   - Benign glandular inclusions consistent with endosalpingiosis     G. LEFT PELVIC LYMPH NODES, EXCISION:   - Eight reactive lymph nodes, negative for malignancy (0/8)     H. RIGHT PELVIC LYMPH NODES, EXCISION:   - Nine reactive lymph nodes, negative for malignancy (0/9)   - Benign glandular inclusions consistent with endosalpingiosis     COMMENT:   The final diagnosis confirms the interpretation provided intraoperatively. The tumor seen in the left fallopian tube involves the wall (invading the mucosa and submucosa) and appears free floating in the lumen.   Tumor Site:         - Endometrium - Anterior and posterior     Histologic Type:         - Endometrioid carcinoma, NOS     Histologic Grade:         - FIGO grade 1     Tumor Size: 5.7 Centimeters (cm)     Tumor Extent       Myometrial Invasion:           - Not identified       Adenomyosis:           - Not identified       Uterine Serosa Involvement:           - Not identified       Lower Uterine Segment Involvement:           - Not identified       Cervical Stromal Involvement:           - Not identified       Other Tissue / Organ Involvement:           - Left fallopian tube       Peritoneal Ascitic Fluid:           - Results pending     Accessory Findings       Lymphovascular Invasion:           - Not identified     11/6/18: CT ap IMPRESSION:   1. Surgical changes of hysterectomy, bilateral salpingo-oophorectomy, and pelvic lymph node dissection with extensive fat stranding and fluid within the low pelvis extending superiorly  along the iliac vasculature, right greater than left, potentially within normal postsurgical limits, however, slightly more than expected for postsurgical change.   1a. The ureters are not well evaluated on this single phase exam and there is no hydronephrosis or hydroureter, however, the amount of fluid in the pelvis does raise the question of ureteral injury. If there is clinical concern, consider obtaining a CT urogram.  1b. An area of irregular rim enhancement in the low pelvis may represent a normal postsurgical vaginal cuff, though, again, this is slightly more conspicuous than usual and dehiscence or a developing abscess may have a similar appearance.  2. 6.8 x 4.1 x 5.6 cm right pelvic sidewall seroma versus lymphocele.  3. Soft tissue nodular thickening anteriorly to the right psoas muscle and along the right lateral conal fascia, indeterminate, cannot rule out metastatic foci. Attention on follow-up studies.  4. Cholelithiasis without evidence of cholecystitis.      11/7/18: CT abd with contrast CT urogram IMPRESSION:   1. Postsurgical changes of hysterectomy, bilateral salpingo-oophorectomy, and iliac lymph node dissection with fluid  collections within the pelvis extending along the iliac vasculature.  a. The right pelvic sidewall fluid collection is not significantly changed while the left pelvic sidewall fluid collection has enlarged  in comparison to the 11/6/2018 CT. The differential remains a seroma versus a lymphocele.  b. No evidence of contrast extravasation to suggest a ureteral injury.  c. Redemonstration of soft tissue nodular thickening along the right lateral conal fascia and anteriorly to the right psoas muscle and right psoas muscle. Attention on follow-up is recommended.  2. Cholelithiasis without evidence of cholecystitis.     Plan: adjuvant therapy to include 6 cycles of taxol and carboplatin; would not add radiation as all lymph nodes are negative, she does not have LVSI or deep  invasion and this is a low grade tumor. Rx percocet 5 tabs due to continued abdominal pain. Will not prescribe any more narcotics and patient is aware of this.     11/16/18: Cycle #1 Paclitaxel/Carboplatin.   35.  12/6/18: Cycle #2 Paclitaxel/Carboplatin.  pending.  12/27/2018: Cycle #3 T/C  1/8/19: US lower extremity: IMPRESSION: No evidence of deep venous thrombosis in either lower extremity.  1/9/2019: CT abd/pelvis IMPRESSION:   1. Minimal change in a right pelvic sidewall collection and decreased size of a left pelvic sidewall collection, which are most consistent with lymphoceles.  2. No suspicious pelvic lymphadenopathy.      1/21/19 Cycle #4 Paclitaxel/Carboplatin.  8.      1/2019 Presented with pain in LE:  US IMPRESSION: No evidence of deep venous thrombosis in either lower extremity.     1/2019 CT:IMPRESSION:   1. Minimal change in a right pelvic sidewall collection and decreased size of a left pelvic sidewall collection, which are most consistent with lymphoceles.  2. No suspicious pelvic lymphadenopathy.     2/14/19: Cycle #5 Paclitaxel/Carboplatin.  pending.         Today she comes to clinic feeling well and denies any new issues. She denies any vaginal bleeding, no changes in her bowel or bladder habits, no nausea/emesis, no lower extremity edema, and no difficulties eating or sleeping. She denies any abdominal discomfort/bloating, no fevers or chills, and no chest pain or shortness of breath. Her night sweats/hot flashes are the same. She has been taking Gabapentin about 9 months or so; she denies any neuropathy. She has not needed to take her antiemetics and does not need any medications refilled. She has been seeing PT and lymphedema. The leg pain she was having with activity is now resolved as she is working on increasing her tolerance/strength to exercise/walk. She just got a gym membership this week.       Review of Systems:    Systemic           no weight changes; no  fever; no chills; + night sweats; no appetite changes  Skin           no rashes, or lesions  Eye           no irritation; no changes in vision  Deny-Laryngeal           no dysphagia; no hoarseness   Pulmonary    no cough; no shortness of breath  Cardiovascular    no chest pain; no palpitations; + HTN  Gastrointestinal    no diarrhea; no constipation; no abdominal pain; no changes in bowel habits; no blood in stool  Genitourinary   no urinary frequency; no urinary urgency; no dysuria; no pain; no abnormal vaginal discharge; no abnormal vaginal bleeding  Breast    no breast discharge; no breast changes; no breast pain  Musculoskeletal    no myalgias; no arthralgias; no back pain  Psychiatric           no depressed mood; no anxiety    Hematologic              no tender lymph nodes; no noticeable swellings or lumps   Endocrine    + hot flashes; no heat/cold intolerance         Neurological   no tremor; no numbness and tingling; no headaches; no difficulty sleeping      Past Medical History:    Past Medical History:   Diagnosis Date     Chickenpox     as a child     Depression      Endometrial cancer (H)      Hypertension     since her 30's     Infertility, female      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)      PCOS (polycystic ovarian syndrome)      STD (female)     in her 20's         Past Surgical History:    Past Surgical History:   Procedure Laterality Date     CYSTOSCOPY N/A 10/24/2018    Procedure: Cystoscopy;  Surgeon: Linh De Souza MD;  Location: [facility] OR     DAVINCI HYSTERECTOMY TOTAL, BILATERAL SALPINGO-OOPHORECTOMY, NODE DISSECTION, COMBINED N/A 10/24/2018    Procedure: DaVinci Assisted Total Laparoscopic Hysterectomy, Bilateral Salpingo-Oophorectomy, lymph node dissection;  Surgeon: Linh De Souza MD;  Location:  OR     EXCISE LESION VULVA Left 10/24/2018    Procedure: Excision of Left Vulvar Lesion;  Surgeon: Linh De Souza MD;  Location:  OR         Health Maintenance Due   Topic Date  Due     PHQ-2 Q1 YR  01/20/1989     HIV SCREEN (SYSTEM ASSIGNED)  01/20/1995     PAP SCREENING Q3 YR (SYSTEM ASSIGNED)  01/20/1998       Current Medications:     Current Outpatient Medications   Medication Sig Dispense Refill     buPROPion (WELLBUTRIN XL) 300 MG 24 hr tablet Take 300 mg by mouth       gabapentin (NEURONTIN) 600 MG tablet Take 600 mg by mouth 600mg in AM, 600mg in the afternoon, 900mg in the evening       ibuprofen (ADVIL/MOTRIN) 600 MG tablet Take 1 tablet (600 mg) by mouth every 6 hours as needed for moderate pain 30 tablet 1     loratadine (CLARITIN) 10 MG tablet Take 10 mg by mouth       MELATONIN PO Take 10 mg by mouth nightly as needed       metoprolol succinate (TOPROL-XL) 25 MG 24 hr tablet Take 25 mg by mouth       Multiple Vitamin (MULTIVITAMIN  S) CAPS Take 1 capsule by mouth       Pseudoeph-Doxylamine-DM-APAP (NYQUIL PO)        spironolactone (ALDACTONE) 50 MG tablet Take 50 mg by mouth       albuterol (PROAIR HFA/PROVENTIL HFA/VENTOLIN HFA) 108 (90 Base) MCG/ACT inhaler Inhale 1-2 puffs into the lungs       ondansetron (ZOFRAN) 8 MG tablet Take 1 tablet (8 mg) by mouth every 8 hours as needed for nausea (Did not start until 3 days after chemo.) (Patient not taking: Reported on 1/21/2019) 20 tablet 1     polyethylene glycol (MIRALAX/GLYCOLAX) powder Take 1 capful by mouth daily       prochlorperazine (COMPAZINE) 10 MG tablet Take 1 tablet (10 mg) by mouth every 6 hours as needed (nausea/vomiting) (Patient not taking: Reported on 1/10/2019) 30 tablet 5       Allergies:   Allergies   Allergen Reactions     Amoxicillin      Penicillin G         Social History:     Social History     Tobacco Use     Smoking status: Former Smoker     Packs/day: 0.50     Types: Cigarettes     Smokeless tobacco: Never Used     Tobacco comment: uses vape pen about 3-4 times daily   Substance Use Topics     Alcohol use: No     Comment: 11 months sober        History   Drug Use No     Comment: 11 months sober.  "lives at sober housing         Family History:       No family history on file.      Physical Exam:     /69 (BP Location: Right arm, Patient Position: Sitting, Cuff Size: Adult Large)   Pulse 83   Temp 97.8  F (36.6  C) (Oral)   Resp 18   Ht 1.645 m (5' 4.75\")   Wt 104.7 kg (230 lb 12.8 oz)   SpO2 98%   BMI 38.70 kg/m     Body mass index is 38.7 kg/m .    General Appearance: healthy and alert, no distress     HEENT: no thyromegaly, no palpable nodules or masses        Cardiovascular: regular rate and rhythm, no gallops, rubs or murmurs     Respiratory: lungs clear, no rales, rhonchi or wheezes, normal diaphragmatic excursion    Musculoskeletal: extremities non tender and without edema    Skin: no lesions or rashes     Neurological: normal gait, no gross defects     Psychiatric: appropriate mood and affect                               Hematological: normal cervical, supraclavicular lymph nodes     Gastrointestinal:       abdomen soft, non-tender, non-distended    Genitourinary: Deferred      Assessment:    Meagan Morales is a 44 year old woman with a diagnosis of stage IIIA grade 1 endometrioid adenocarcinoma.   She is here today for treatment management.    30 minutes were spent with this patient, over 50% of that time was spent in symptom management, treatment planning and in counseling and coordination of care.      Plan:     1.)        Ok to proceed with planned chemotherapy pending labs are WNL. She will return in 3 weeks for her next cycle and then have imaging and see Dr. De Souza for results. Reviewed signs and symptoms for when she should contact the clinic or seek additional care. Patient to contact the clinic with any questions or concerns in the interim.     2.) Genetic risk factors were assessed and she had intact MMR proteins.    3.) Labs and/or tests ordered include:  Chemo labs. .      4.) Health maintenance issues addressed today include annual health maintenance and non-gynecologic " issues with PCP.    5.)       Continue to see cancer rehab and lymphedema as needed.       CORTES Gant, WHNP-BC, ANP-BC  Women's Health Nurse Practitioner  Adult Nurse Pracitioner  Division of Gynecologic Oncology    CC  Patient Care Team:  Aundrea Max MD as PCP - General  SELF, REFERRED

## 2019-02-14 NOTE — PROGRESS NOTES
Infusion Nursing Note:  Meagan Morales presents today for C5 Taxol-Carboplatin.    Patient seen by provider today: Yes: Virgen Gallagher NP    Treatment Conditions:  Lab Results   Component Value Date    HGB 13.0 02/14/2019     Lab Results   Component Value Date    WBC 5.6 02/14/2019      Lab Results   Component Value Date    ANEU 3.5 02/14/2019     Lab Results   Component Value Date     02/14/2019      Lab Results   Component Value Date     02/14/2019                   Lab Results   Component Value Date    POTASSIUM 3.7 02/14/2019           Lab Results   Component Value Date    MAG 2.3 02/14/2019            Lab Results   Component Value Date    CR 0.86 02/14/2019                   Lab Results   Component Value Date    SIDDHARTHA 9.0 02/14/2019                Lab Results   Component Value Date    BILITOTAL 0.2 02/14/2019           Lab Results   Component Value Date    ALBUMIN 3.7 02/14/2019                    Lab Results   Component Value Date    ALT 31 02/14/2019           Lab Results   Component Value Date    AST 18 02/14/2019       Results reviewed, labs MET treatment parameters, ok to proceed with treatment.    Intravenous Access:  Peripheral IV placed in lab.  Access dc'd at time of discharge.      Note:   Results reviewed, copy given to patient.  Proceed with treatment.    Copy of AVS given to patient. + Blood return from PIV pre and post infusion.  Tolerated infusion without incident. No Prescriptions filled today.   D/C in care of self.  Pt will return 3/7 for provider visit/C6 Taxol-Carboplatin.       Ramona Ness RN

## 2019-02-14 NOTE — NURSING NOTE
"Oncology Rooming Note    February 14, 2019 8:14 AM   Meagan Morales is a 44 year old female who presents for:    Chief Complaint   Patient presents with     Blood Draw     Labs drawn via PIV placed by RN in lab. Line flushed with saline. VS taken.      Oncology Clinic Visit     Return Endometrial Ca     Initial Vitals: /69 (BP Location: Right arm, Patient Position: Sitting, Cuff Size: Adult Large)   Pulse 83   Temp 97.8  F (36.6  C) (Oral)   Resp 18   Ht 1.645 m (5' 4.75\")   Wt 104.7 kg (230 lb 12.8 oz)   SpO2 98%   BMI 38.70 kg/m   Estimated body mass index is 38.7 kg/m  as calculated from the following:    Height as of this encounter: 1.645 m (5' 4.75\").    Weight as of this encounter: 104.7 kg (230 lb 12.8 oz). Body surface area is 2.19 meters squared.  No Pain (0) Comment: Data Unavailable   No LMP recorded. Patient has had a hysterectomy.  Allergies reviewed: Yes  Medications reviewed: Yes    Medications: Medication refills not needed today.  Pharmacy name entered into [facility]: [facility] [id number] AT [facility] [address]    Clinical concerns: stuffy nose in morning; diarrhea last week;      6 minutes for nursing intake (face to face time)     Dionne Schultz CMA              "

## 2019-02-14 NOTE — NURSING NOTE
Chief Complaint   Patient presents with     Blood Draw     Labs drawn via PIV placed by RN in lab. Line flushed with saline. VS taken.      Graham Gomez RN

## 2019-02-28 ENCOUNTER — HOSPITAL ENCOUNTER (OUTPATIENT)
Dept: PHYSICAL THERAPY | Facility: CLINIC | Age: 44
Setting detail: THERAPIES SERIES
End: 2019-02-28
Attending: NURSE PRACTITIONER
Payer: COMMERCIAL

## 2019-02-28 PROCEDURE — 97110 THERAPEUTIC EXERCISES: CPT | Mod: GP,ZF | Performed by: PHYSICAL THERAPIST

## 2019-02-28 PROCEDURE — 97140 MANUAL THERAPY 1/> REGIONS: CPT | Mod: GP,ZF | Performed by: PHYSICAL THERAPIST

## 2019-03-05 NOTE — PROGRESS NOTES
Follow Up Notes on Referred Patient    Date: 3/7/2019        RE: Meagan Morales  : 1975  VY: 3/7/2019        Meagan Morales is a 44 year old woman with a diagnosis of stage IIIA grade 1 endometrioid adenocarcinoma.   She is here today for treatment management.     Oncology History:  Meagan originally presented to clinic due to continuous vaginal bleeding, very light. She thought it was just her menses being continuous. US was done which found thickened endometrial lining. IUD was placed. Second US was done and IUD did not affect endometrial lining and IUD was malpositioned. Since then, she has noted continuous spotting. IUD was removed. Also of note, ovarian cysts were noted on second US. Subsequent endometrial biopsy was done which revealed grade 1 endometrial cancer. She has always had irregular menses and has never been able to get pregnant. Patient's  first and only pap smear on 10/10/2017. Had mammogram in 20's, will order additional mammogram for baseline. No history of prior colonoscopy. Started Wellbutrin this last year, mood has improved. Prior cigarette smoker, quit 2 months ago and switched to vape. Started again smoking cigarettes again but has not had one for 10 days. 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 No history of prior abdominal surgeries. No history of heart disease, lung disease or diabetes. Has never been able to get pregnant and dose not desire children.     18: pelvic US IMPRESSION:  Peripheral follicular arrangement high within the ovaries suggesting polycystic ovarian syndrome. Slightly thickened heterogeneous endometrium raising a concern of potential hyperplastic change.  18: pelvic US   1. Uterus is not normal in appearance. The endometrium appears thickened and vascular, and the IUD appears to be malpositioned.  Clinical correlation is recommended.    2. Bilateral complex ovarian cysts are noted.  Follow up  ultrasound in 4-6 weeks is recommended, consider saline infusion sonohysterogram for further imaging of the endometrium.      9/17/18: endometrial biopsy  ENDOMETRIUM, BIOPSY:  1. FIGO Grade 1 (well differentiated) endometrioid carcinoma of      the endometrium  2. DNA mismatch repair enzyme immunohistochemistry studies:     All intact (see Amendment note and synoptic reporting)     10/24/2018: DaVinci Assisted Total Laparoscopic Hysterectomy, Bilateral Salpingo-Oophorectomy, lymph node dissection, Excision of Left Vulvar Lesion  Pelvic Washing:   Rare atypical cells present.         TEST(S) PERFORMED     Test(s) Performed:         - Mismatch Repair Immunohistochemistry (IHC) Testing for Mismatch Repair (MMR) Proteins:           - No loss of nuclear expression of MMR proteins: low probability of microsatellite instability-high (MSI-H)     ORIGINAL REPORT:     SPECIMEN(S):   A: Left vulvar lesion   B: Left pelvic sentinel lymph node   C: Right pelvic sentinel lymph node   D: Uterus, cervix, bilateral ovaries and fallopian tubes   E: Portion of left ovary   F: Para-aortic lymph nodes   G: Left pelvic lymph nodes   H: Right pelvic lymph nodes     FINAL DIAGNOSIS:   A. LEFT VULVAR LESION, BIOPSY:   - Intradermal nevus     B. LEFT PELVIC SENTINEL LYMPH NODE, EXCISION:   - One reactive lymph node, negative for malignancy (0/1)     C. RIGHT PELVIC SENTINEL LYMPH NODE, EXCISION:   - Six reactive lymph nodes, negative for malignancy (0/6)     D. UTERUS, CERVIX, BILATERAL OVARIES AND FALLOPIAN TUBES, HYSTERECTOMY WITH BILATERAL SALPINGO-OOPHORECTOMY:   - Endometrial endometrioid adenocarcinoma, FIGO grade 1   - Atypical endometrial hyperplasia   - Myometrium with no significant histologic abnormality   - Chronic cervicitis with microglandular hyperplasia   - Uterine serosal fibrous adhesions   - Ovaries with cortical hyperplasia   - Metastatic endometrial adenocarcinoma to the left fallopian tube   - Right fallopian tube with  no significant histologic abnormality     E. PORTION OF LEFT OVARY, EXCISION:   - Benign cortical inclusion cysts     F. PARA-AORTIC LYMPH NODES, EXCISION:   - Four reactive lymph nodes, negative for malignancy (0/4)   - Benign glandular inclusions consistent with endosalpingiosis     G. LEFT PELVIC LYMPH NODES, EXCISION:   - Eight reactive lymph nodes, negative for malignancy (0/8)     H. RIGHT PELVIC LYMPH NODES, EXCISION:   - Nine reactive lymph nodes, negative for malignancy (0/9)   - Benign glandular inclusions consistent with endosalpingiosis     COMMENT:   The final diagnosis confirms the interpretation provided intraoperatively. The tumor seen in the left fallopian tube involves the wall (invading the mucosa and submucosa) and appears free floating in the lumen.   Tumor Site:         - Endometrium - Anterior and posterior     Histologic Type:         - Endometrioid carcinoma, NOS     Histologic Grade:         - FIGO grade 1     Tumor Size: 5.7 Centimeters (cm)     Tumor Extent       Myometrial Invasion:           - Not identified       Adenomyosis:           - Not identified       Uterine Serosa Involvement:           - Not identified       Lower Uterine Segment Involvement:           - Not identified       Cervical Stromal Involvement:           - Not identified       Other Tissue / Organ Involvement:           - Left fallopian tube       Peritoneal Ascitic Fluid:           - Results pending     Accessory Findings       Lymphovascular Invasion:           - Not identified     11/6/18: CT ap IMPRESSION:   1. Surgical changes of hysterectomy, bilateral salpingo-oophorectomy, and pelvic lymph node dissection with extensive fat stranding and fluid within the low pelvis extending superiorly along the iliac vasculature, right greater than left, potentially within normal postsurgical limits, however, slightly more than expected for postsurgical change.   1a. The ureters are not well evaluated on this single phase  exam and there is no hydronephrosis or hydroureter, however, the amount of fluid in the pelvis does raise the question of ureteral injury. If there is clinical concern, consider obtaining a CT urogram.  1b. An area of irregular rim enhancement in the low pelvis may represent a normal postsurgical vaginal cuff, though, again, this is slightly more conspicuous than usual and dehiscence or a developing abscess may have a similar appearance.  2. 6.8 x 4.1 x 5.6 cm right pelvic sidewall seroma versus lymphocele.  3. Soft tissue nodular thickening anteriorly to the right psoas muscle and along the right lateral conal fascia, indeterminate, cannot rule out metastatic foci. Attention on follow-up studies.  4. Cholelithiasis without evidence of cholecystitis.      11/7/18: CT abd with contrast CT urogram IMPRESSION:   1. Postsurgical changes of hysterectomy, bilateral salpingo-oophorectomy, and iliac lymph node dissection with fluid  collections within the pelvis extending along the iliac vasculature.  a. The right pelvic sidewall fluid collection is not significantly changed while the left pelvic sidewall fluid collection has enlarged  in comparison to the 11/6/2018 CT. The differential remains a seroma versus a lymphocele.  b. No evidence of contrast extravasation to suggest a ureteral injury.  c. Redemonstration of soft tissue nodular thickening along the right lateral conal fascia and anteriorly to the right psoas muscle and right psoas muscle. Attention on follow-up is recommended.  2. Cholelithiasis without evidence of cholecystitis.     Plan: adjuvant therapy to include 6 cycles of taxol and carboplatin; would not add radiation as all lymph nodes are negative, she does not have LVSI or deep invasion and this is a low grade tumor. Rx percocet 5 tabs due to continued abdominal pain. Will not prescribe any more narcotics and patient is aware of this.     11/16/18: Cycle #1 Paclitaxel/Carboplatin.   35.  12/6/18:  Cycle #2 Paclitaxel/Carboplatin.  pending.  12/27/2018: Cycle #3 T/C  1/8/19: US lower extremity: IMPRESSION: No evidence of deep venous thrombosis in either lower extremity.  1/9/2019: CT abd/pelvis IMPRESSION:   1. Minimal change in a right pelvic sidewall collection and decreased size of a left pelvic sidewall collection, which are most consistent with lymphoceles.  2. No suspicious pelvic lymphadenopathy.      1/21/19 Cycle #4 Paclitaxel/Carboplatin.  8.      1/2019 Presented with pain in LE:  US IMPRESSION: No evidence of deep venous thrombosis in either lower extremity.     1/2019 CT:IMPRESSION:   1. Minimal change in a right pelvic sidewall collection and decreased size of a left pelvic sidewall collection, which are most consistent with lymphoceles.  2. No suspicious pelvic lymphadenopathy.      2/14/19: Cycle #5 Paclitaxel/Carboplatin.  9.  3/7/19: Cycle #6 Paclitaxel/Carboplatin.  pending.           Today she comes to clinic feeling well. She denies any vaginal bleeding, no changes in her bowel or bladder habits, no nausea/emesis, no lower extremity edema, and no difficulties eating or sleeping. She denies any abdominal discomfort/bloating, no fevers or chills, and no chest pain or shortness of breath. She denies any neuropathy. She has not needed to take any of her antiemetics and does not need any medications refilled. She will become the  as of April 1st.         Review of Systems:    Systemic           no weight changes; no fever; no chills; no night sweats; no appetite changes  Skin           no rashes, or lesions  Eye           no irritation; no changes in vision  Deny-Laryngeal           no dysphagia; no hoarseness   Pulmonary    no cough; no shortness of breath  Cardiovascular    no chest pain; no palpitations; + HTN  Gastrointestinal    no diarrhea; no constipation; no abdominal pain; no changes in bowel habits; no blood in stool  Genitourinary   no urinary  frequency; no urinary urgency; no dysuria; no pain; no abnormal vaginal discharge; no abnormal vaginal bleeding  Breast    no breast discharge; no breast changes; no breast pain  Musculoskeletal    no myalgias; no arthralgias; no back pain  Psychiatric           no depressed mood; no anxiety    Hematologic              no tender lymph nodes; no noticeable swellings or lumps   Endocrine    no hot flashes; no heat/cold intolerance         Neurological   no tremor; no numbness and tingling; no headaches; no difficulty sleeping      Past Medical History:    Past Medical History:   Diagnosis Date     Chickenpox     as a child     Depression      Endometrial cancer (H)      Hypertension     since her 30's     Infertility, female      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)      PCOS (polycystic ovarian syndrome)      STD (female)     in her 20's         Past Surgical History:    Past Surgical History:   Procedure Laterality Date     CYSTOSCOPY N/A 10/24/2018    Procedure: Cystoscopy;  Surgeon: Linh De Souza MD;  Location: [facility] OR     DAVINCI HYSTERECTOMY TOTAL, BILATERAL SALPINGO-OOPHORECTOMY, NODE DISSECTION, COMBINED N/A 10/24/2018    Procedure: DaVinci Assisted Total Laparoscopic Hysterectomy, Bilateral Salpingo-Oophorectomy, lymph node dissection;  Surgeon: Linh De Souza MD;  Location: [facility] OR     EXCISE LESION VULVA Left 10/24/2018    Procedure: Excision of Left Vulvar Lesion;  Surgeon: Linh De Souza MD;  Location: [facility] OR         Health Maintenance Due   Topic Date Due     PREVENTIVE CARE VISIT  1975     PHQ-2 Q1 YR  01/20/1989     HIV SCREEN (SYSTEM ASSIGNED)  01/20/1995     PAP SCREENING Q3 YR (SYSTEM ASSIGNED)  01/20/1998       Current Medications:     Current Outpatient Medications   Medication Sig Dispense Refill     buPROPion (WELLBUTRIN XL) 300 MG 24 hr tablet Take 300 mg by mouth       gabapentin (NEURONTIN) 600 MG tablet Take 600 mg by mouth 600mg in AM, 600mg in the afternoon, 900mg in  "the evening       ibuprofen (ADVIL/MOTRIN) 600 MG tablet Take 1 tablet (600 mg) by mouth every 6 hours as needed for moderate pain 30 tablet 1     loratadine (CLARITIN) 10 MG tablet Take 10 mg by mouth       MELATONIN PO Take 10 mg by mouth nightly as needed       metoprolol succinate (TOPROL-XL) 25 MG 24 hr tablet Take 25 mg by mouth       Multiple Vitamin (MULTIVITAMIN  S) CAPS Take 1 capsule by mouth       polyethylene glycol (MIRALAX/GLYCOLAX) powder Take 1 capful by mouth daily       spironolactone (ALDACTONE) 50 MG tablet Take 50 mg by mouth       albuterol (PROAIR HFA/PROVENTIL HFA/VENTOLIN HFA) 108 (90 Base) MCG/ACT inhaler Inhale 1-2 puffs into the lungs       ondansetron (ZOFRAN) 8 MG tablet Take 1 tablet (8 mg) by mouth every 8 hours as needed for nausea (Did not start until 3 days after chemo.) (Patient not taking: Reported on 1/21/2019) 20 tablet 1     prochlorperazine (COMPAZINE) 10 MG tablet Take 1 tablet (10 mg) by mouth every 6 hours as needed (nausea/vomiting) (Patient not taking: Reported on 1/10/2019) 30 tablet 5     Pseudoeph-Doxylamine-DM-APAP (NYQUIL PO)            Allergies:        Allergies   Allergen Reactions     Amoxicillin      Penicillin G         Social History:     Social History     Tobacco Use     Smoking status: Former Smoker     Packs/day: 0.50     Types: Cigarettes     Smokeless tobacco: Never Used     Tobacco comment: uses vape pen about 3-4 times daily   Substance Use Topics     Alcohol use: No     Comment: 11 months sober        History   Drug Use No     Comment: 11 months sober. lives at sober housing         Family History:     No family history on file.      Physical Exam:     /75 (BP Location: Right arm, Patient Position: Sitting, Cuff Size: Adult Regular)   Pulse 94   Temp 98.2  F (36.8  C) (Oral)   Resp 16   Ht 1.645 m (5' 4.75\")   Wt 105.7 kg (233 lb)   SpO2 100%   BMI 39.07 kg/m    Body mass index is 39.07 kg/m .    General Appearance: healthy and alert, " no distress     HEENT: no thyromegaly, no palpable nodules or masses        Cardiovascular: regular rate and rhythm, no gallops, rubs or murmurs     Respiratory: lungs clear, no rales, rhonchi or wheezes, normal diaphragmatic excursion    Musculoskeletal: extremities non tender and without edema    Skin: no lesions or rashes     Neurological: normal gait, no gross defects     Psychiatric: appropriate mood and affect                               Hematological: normal cervical, supraclavicular lymph nodes     Gastrointestinal:       abdomen soft, non-tender, non-distended    Genitourinary: Deferred      Assessment:    Meagan Morales is a 44 year old woman with a diagnosis of stage IIIA grade 1 endometrioid adenocarcinoma.   She is here today for treatment management.    25 minutes were spent with this patient, over 50% of that time was spent in symptom management, treatment planning and in counseling and coordination of care.    Plan:     1.)         Ok to proceed with planned chemotherapy pending labs are WNL. She will have imaging and then see Dr. De Souza for results. Reviewed signs and symptoms for when she should contact the clinic or seek additional care. Patient to contact the clinic with any questions or concerns in the interim. Briefly discussed survivorship visit about a month after seeing Dr. De Souza.      2.) Genetic risk factors were assessed and her MMR proteins were intact.     3.) Labs and/or tests ordered include:  Chemo labs. .  CT cap.      4.) Health maintenance issues addressed today include annual health maintenance and non-gynecologic issues with PCP.    CORTES Gant, WHNP-BC, ANP-BC  Women's Health Nurse Practitioner  Adult Nurse Pracitioner  Division of Gynecologic Oncology          CC  Patient Care Team:  Aundrea Max MD as PCP - General  SELF, REFERRED

## 2019-03-07 ENCOUNTER — INFUSION THERAPY VISIT (OUTPATIENT)
Dept: ONCOLOGY | Facility: CLINIC | Age: 44
End: 2019-03-07
Attending: NURSE PRACTITIONER
Payer: COMMERCIAL

## 2019-03-07 VITALS
WEIGHT: 233 LBS | BODY MASS INDEX: 38.82 KG/M2 | HEIGHT: 65 IN | TEMPERATURE: 98.2 F | SYSTOLIC BLOOD PRESSURE: 126 MMHG | HEART RATE: 94 BPM | DIASTOLIC BLOOD PRESSURE: 75 MMHG | RESPIRATION RATE: 16 BRPM | OXYGEN SATURATION: 100 %

## 2019-03-07 DIAGNOSIS — Z51.11 ENCOUNTER FOR ANTINEOPLASTIC CHEMOTHERAPY: ICD-10-CM

## 2019-03-07 DIAGNOSIS — C54.1 ENDOMETRIAL CANCER (H): Primary | ICD-10-CM

## 2019-03-07 DIAGNOSIS — R97.1 ELEVATED CA-125: ICD-10-CM

## 2019-03-07 PROBLEM — G89.18 ACUTE POST-OPERATIVE PAIN: Status: RESOLVED | Noted: 2018-11-08 | Resolved: 2019-03-07

## 2019-03-07 LAB
ALBUMIN SERPL-MCNC: 3.4 G/DL (ref 3.4–5)
ALP SERPL-CCNC: 81 U/L (ref 40–150)
ALT SERPL W P-5'-P-CCNC: 35 U/L (ref 0–50)
ANION GAP SERPL CALCULATED.3IONS-SCNC: 5 MMOL/L (ref 3–14)
AST SERPL W P-5'-P-CCNC: 19 U/L (ref 0–45)
BASOPHILS # BLD AUTO: 0 10E9/L (ref 0–0.2)
BASOPHILS NFR BLD AUTO: 0.6 %
BILIRUB SERPL-MCNC: 0.2 MG/DL (ref 0.2–1.3)
BUN SERPL-MCNC: 20 MG/DL (ref 7–30)
CALCIUM SERPL-MCNC: 8.4 MG/DL (ref 8.5–10.1)
CANCER AG125 SERPL-ACNC: 9 U/ML (ref 0–30)
CHLORIDE SERPL-SCNC: 107 MMOL/L (ref 94–109)
CO2 SERPL-SCNC: 26 MMOL/L (ref 20–32)
CREAT SERPL-MCNC: 0.78 MG/DL (ref 0.52–1.04)
DIFFERENTIAL METHOD BLD: ABNORMAL
EOSINOPHIL # BLD AUTO: 0.1 10E9/L (ref 0–0.7)
EOSINOPHIL NFR BLD AUTO: 2 %
ERYTHROCYTE [DISTWIDTH] IN BLOOD BY AUTOMATED COUNT: 13.7 % (ref 10–15)
GFR SERPL CREATININE-BSD FRML MDRD: >90 ML/MIN/{1.73_M2}
GLUCOSE SERPL-MCNC: 96 MG/DL (ref 70–99)
HCT VFR BLD AUTO: 39 % (ref 35–47)
HGB BLD-MCNC: 12.8 G/DL (ref 11.7–15.7)
IMM GRANULOCYTES # BLD: 0 10E9/L (ref 0–0.4)
IMM GRANULOCYTES NFR BLD: 0.3 %
LYMPHOCYTES # BLD AUTO: 1.4 10E9/L (ref 0.8–5.3)
LYMPHOCYTES NFR BLD AUTO: 39.5 %
MAGNESIUM SERPL-MCNC: 2 MG/DL (ref 1.6–2.3)
MCH RBC QN AUTO: 30.6 PG (ref 26.5–33)
MCHC RBC AUTO-ENTMCNC: 32.8 G/DL (ref 31.5–36.5)
MCV RBC AUTO: 93 FL (ref 78–100)
MONOCYTES # BLD AUTO: 0.3 10E9/L (ref 0–1.3)
MONOCYTES NFR BLD AUTO: 8.2 %
NEUTROPHILS # BLD AUTO: 1.8 10E9/L (ref 1.6–8.3)
NEUTROPHILS NFR BLD AUTO: 49.4 %
NRBC # BLD AUTO: 0 10*3/UL
NRBC BLD AUTO-RTO: 0 /100
PLATELET # BLD AUTO: 202 10E9/L (ref 150–450)
POTASSIUM SERPL-SCNC: 3.7 MMOL/L (ref 3.4–5.3)
PROT SERPL-MCNC: 7.6 G/DL (ref 6.8–8.8)
RBC # BLD AUTO: 4.18 10E12/L (ref 3.8–5.2)
SODIUM SERPL-SCNC: 138 MMOL/L (ref 133–144)
WBC # BLD AUTO: 3.5 10E9/L (ref 4–11)

## 2019-03-07 PROCEDURE — 25000128 H RX IP 250 OP 636: Mod: ZF | Performed by: NURSE PRACTITIONER

## 2019-03-07 PROCEDURE — 80053 COMPREHEN METABOLIC PANEL: CPT | Performed by: NURSE PRACTITIONER

## 2019-03-07 PROCEDURE — 96375 TX/PRO/DX INJ NEW DRUG ADDON: CPT

## 2019-03-07 PROCEDURE — 96415 CHEMO IV INFUSION ADDL HR: CPT

## 2019-03-07 PROCEDURE — 25800030 ZZH RX IP 258 OP 636: Mod: ZF | Performed by: NURSE PRACTITIONER

## 2019-03-07 PROCEDURE — 96417 CHEMO IV INFUS EACH ADDL SEQ: CPT

## 2019-03-07 PROCEDURE — 25000132 ZZH RX MED GY IP 250 OP 250 PS 637: Mod: ZF | Performed by: NURSE PRACTITIONER

## 2019-03-07 PROCEDURE — 96367 TX/PROPH/DG ADDL SEQ IV INF: CPT

## 2019-03-07 PROCEDURE — 96413 CHEMO IV INFUSION 1 HR: CPT

## 2019-03-07 PROCEDURE — 85025 COMPLETE CBC W/AUTO DIFF WBC: CPT | Performed by: NURSE PRACTITIONER

## 2019-03-07 PROCEDURE — 83735 ASSAY OF MAGNESIUM: CPT | Performed by: NURSE PRACTITIONER

## 2019-03-07 PROCEDURE — 25000125 ZZHC RX 250: Mod: ZF | Performed by: NURSE PRACTITIONER

## 2019-03-07 PROCEDURE — G0463 HOSPITAL OUTPT CLINIC VISIT: HCPCS | Mod: ZF

## 2019-03-07 PROCEDURE — 86304 IMMUNOASSAY TUMOR CA 125: CPT | Performed by: NURSE PRACTITIONER

## 2019-03-07 PROCEDURE — 99214 OFFICE O/P EST MOD 30 MIN: CPT | Mod: ZP | Performed by: NURSE PRACTITIONER

## 2019-03-07 RX ORDER — METHYLPREDNISOLONE SODIUM SUCCINATE 125 MG/2ML
125 INJECTION, POWDER, LYOPHILIZED, FOR SOLUTION INTRAMUSCULAR; INTRAVENOUS
Status: CANCELLED
Start: 2019-03-07

## 2019-03-07 RX ORDER — DIPHENHYDRAMINE HCL 25 MG
50 CAPSULE ORAL ONCE
Status: CANCELLED
Start: 2019-03-07

## 2019-03-07 RX ORDER — ALBUTEROL SULFATE 0.83 MG/ML
2.5 SOLUTION RESPIRATORY (INHALATION)
Status: CANCELLED | OUTPATIENT
Start: 2019-03-07

## 2019-03-07 RX ORDER — EPINEPHRINE 0.3 MG/.3ML
0.3 INJECTION SUBCUTANEOUS EVERY 5 MIN PRN
Status: CANCELLED | OUTPATIENT
Start: 2019-03-07

## 2019-03-07 RX ORDER — LORAZEPAM 2 MG/ML
1 INJECTION INTRAMUSCULAR EVERY 6 HOURS PRN
Status: CANCELLED
Start: 2019-03-07

## 2019-03-07 RX ORDER — EPINEPHRINE 1 MG/ML
0.3 INJECTION, SOLUTION INTRAMUSCULAR; SUBCUTANEOUS EVERY 5 MIN PRN
Status: CANCELLED | OUTPATIENT
Start: 2019-03-07

## 2019-03-07 RX ORDER — PALONOSETRON 0.05 MG/ML
0.25 INJECTION, SOLUTION INTRAVENOUS ONCE
Status: CANCELLED
Start: 2019-03-07

## 2019-03-07 RX ORDER — DIPHENHYDRAMINE HYDROCHLORIDE 50 MG/ML
50 INJECTION INTRAMUSCULAR; INTRAVENOUS
Status: CANCELLED
Start: 2019-03-07

## 2019-03-07 RX ORDER — SODIUM CHLORIDE 9 MG/ML
1000 INJECTION, SOLUTION INTRAVENOUS CONTINUOUS PRN
Status: CANCELLED
Start: 2019-03-07

## 2019-03-07 RX ORDER — MEPERIDINE HYDROCHLORIDE 25 MG/ML
25 INJECTION INTRAMUSCULAR; INTRAVENOUS; SUBCUTANEOUS EVERY 30 MIN PRN
Status: CANCELLED | OUTPATIENT
Start: 2019-03-07

## 2019-03-07 RX ORDER — DIPHENHYDRAMINE HCL 25 MG
50 CAPSULE ORAL ONCE
Status: COMPLETED | OUTPATIENT
Start: 2019-03-07 | End: 2019-03-07

## 2019-03-07 RX ORDER — ALBUTEROL SULFATE 90 UG/1
1-2 AEROSOL, METERED RESPIRATORY (INHALATION)
Status: CANCELLED
Start: 2019-03-07

## 2019-03-07 RX ORDER — PALONOSETRON 0.05 MG/ML
0.25 INJECTION, SOLUTION INTRAVENOUS ONCE
Status: COMPLETED | OUTPATIENT
Start: 2019-03-07 | End: 2019-03-07

## 2019-03-07 RX ADMIN — CARBOPLATIN 800 MG: 10 INJECTION, SOLUTION INTRAVENOUS at 12:29

## 2019-03-07 RX ADMIN — PALONOSETRON HYDROCHLORIDE 0.25 MG: 0.25 INJECTION INTRAVENOUS at 08:46

## 2019-03-07 RX ADMIN — PACLITAXEL 380 MG: 6 INJECTION, SOLUTION INTRAVENOUS at 09:14

## 2019-03-07 RX ADMIN — DEXAMETHASONE SODIUM PHOSPHATE 20 MG: 10 INJECTION, SOLUTION INTRAMUSCULAR; INTRAVENOUS at 08:53

## 2019-03-07 RX ADMIN — DIPHENHYDRAMINE HYDROCHLORIDE 50 MG: 25 CAPSULE ORAL at 08:46

## 2019-03-07 RX ADMIN — FAMOTIDINE 40 MG: 10 INJECTION, SOLUTION INTRAVENOUS at 08:46

## 2019-03-07 RX ADMIN — SODIUM CHLORIDE 250 ML: 9 INJECTION, SOLUTION INTRAVENOUS at 08:46

## 2019-03-07 ASSESSMENT — PAIN SCALES - GENERAL: PAINLEVEL: NO PAIN (0)

## 2019-03-07 ASSESSMENT — MIFFLIN-ST. JEOR: SCORE: 1713.79

## 2019-03-07 NOTE — PROGRESS NOTES
Infusion Nursing Note:  Meagan Morales presents today for Cycle 6 Day 1 Taxol, Carboplatin.    Patient seen by provider today: Yes: Virgen Gallagher NP.    present during visit today: Not Applicable.    Note: Patient presents to infusion today following her provider visit. Patient states she feel well and denies any concerns or questions. Patient denies any pain at this time.    Intravenous Access:  Peripheral IV placed by lab.    Treatment Conditions:  Lab Results   Component Value Date    HGB 12.8 03/07/2019     Lab Results   Component Value Date    WBC 3.5 03/07/2019      Lab Results   Component Value Date    ANEU 1.8 03/07/2019     Lab Results   Component Value Date     03/07/2019      Lab Results   Component Value Date     03/07/2019                   Lab Results   Component Value Date    POTASSIUM 3.7 03/07/2019           Lab Results   Component Value Date    MAG 2.0 03/07/2019            Lab Results   Component Value Date    CR 0.78 03/07/2019                   Lab Results   Component Value Date    SIDDHARTHA 8.4 03/07/2019                Lab Results   Component Value Date    BILITOTAL 0.2 03/07/2019           Lab Results   Component Value Date    ALBUMIN 3.4 03/07/2019                    Lab Results   Component Value Date    ALT 35 03/07/2019           Lab Results   Component Value Date    AST 19 03/07/2019       Results reviewed, labs MET treatment parameters, ok to proceed with treatment.      Post Infusion Assessment:  Patient tolerated infusion without incident.  Blood return noted pre and post infusion.  Site patent and intact, free from redness, edema or discomfort.  No evidence of extravasations.  Access discontinued per protocol.    Discharge Plan:   Patient declined prescription refills.  Discharge instructions reviewed with: Patient.  Patient and/or family verbalized understanding of discharge instructions and all questions answered.  Copy of AVS reviewed with patient and/or family.   Patient will return 4/4/19 for next appointment.  Patient discharged in stable condition accompanied by: self.  Departure Mode: Ambulatory.    Kim Le RN

## 2019-03-07 NOTE — PATIENT INSTRUCTIONS
Contact Numbers    [facility] [place]: [phone]  [facility] [place] and after hours / weekends / holidays:  [phone]      Please call the triage or after hours line if you experience a temperature greater than or equal to 100.5, shaking chills, have uncontrolled nausea, vomiting and/or diarrhea, dizziness, shortness of breath, chest pain, bleeding, unexplained bruising, or if you have any other new/concerning symptoms, questions or concerns.      If you are having any concerning symptoms or wish to speak to a provider before your next infusion visit, please call your care coordinator or triage to notify them so we can adequately serve you.     If you need a refill on a narcotic prescription or other medication, please call before your infusion appointment.         March 2019 Sunday Monday Tuesday Wednesday Thursday Friday Saturday                            1     2       3     4     5     6     7    [facility] DRAW   7:00 AM   (15 min.)    [facility] DRAW   [facility] Lab Draw    [facility] RETURN ACTIVE TREATMENT   7:25 AM   (40 min.)   Virgen Gallagher APRN CNP   [facility] ONC INFUSION 360   8:30 AM   (360 min.)    ONCOLOGY INFUSION   [facility] 8    CANCER REHAB TREATMENT   7:45 AM   (60 min.)   Karina Pathak PT    [facility] 9       10     11     12     13     14    CANCER REHAB TREATMENT  12:45 PM   (60 min.)   Karina Pathak PT    [facility] 15     16       17     18    CANCER REHAB TREATMENT   2:45 PM   (60 min.)   Savannah Armstrong PT    Health Re[facility] 19     20     21     22     23       24     25    CANCER REHAB TREATMENT   9:00 AM   (60 min.)   Savannah Armstrong PT    [facility] 26     27     28     29     30       31                                               April 2019 Sunday Monday Tuesday Wednesday Thursday Friday Saturday        1     2     3     4     5     6       7     8     9     10     11     12      13       14     15     16     17     18     19     20       21     22     23     24     25    [facility] RETURN  10:00 AM   (20 min.)   Linh De Souza MD   [facility] 26     27       28     29     30                                         Lab Results:  Recent Results (from the past 12 hour(s))   CBC with platelets differential    Collection Time: 03/07/19  6:52 AM   Result Value Ref Range    WBC 3.5 (L) 4.0 - 11.0 10e9/L    RBC Count 4.18 3.8 - 5.2 10e12/L    Hemoglobin 12.8 11.7 - 15.7 g/dL    Hematocrit 39.0 35.0 - 47.0 %    MCV 93 78 - 100 fl    MCH 30.6 26.5 - 33.0 pg    MCHC 32.8 31.5 - 36.5 g/dL    RDW 13.7 10.0 - 15.0 %    Platelet Count 202 150 - 450 10e9/L    Diff Method Automated Method     % Neutrophils 49.4 %    % Lymphocytes 39.5 %    % Monocytes 8.2 %    % Eosinophils 2.0 %    % Basophils 0.6 %    % Immature Granulocytes 0.3 %    Nucleated RBCs 0 0 /100    Absolute Neutrophil 1.8 1.6 - 8.3 10e9/L    Absolute Lymphocytes 1.4 0.8 - 5.3 10e9/L    Absolute Monocytes 0.3 0.0 - 1.3 10e9/L    Absolute Eosinophils 0.1 0.0 - 0.7 10e9/L    Absolute Basophils 0.0 0.0 - 0.2 10e9/L    Abs Immature Granulocytes 0.0 0 - 0.4 10e9/L    Absolute Nucleated RBC 0.0    Comprehensive metabolic panel    Collection Time: 03/07/19  6:52 AM   Result Value Ref Range    Sodium 138 133 - 144 mmol/L    Potassium 3.7 3.4 - 5.3 mmol/L    Chloride 107 94 - 109 mmol/L    Carbon Dioxide 26 20 - 32 mmol/L    Anion Gap 5 3 - 14 mmol/L    Glucose 96 70 - 99 mg/dL    Urea Nitrogen 20 7 - 30 mg/dL    Creatinine 0.78 0.52 - 1.04 mg/dL    GFR Estimate >90 >60 mL/min/[1.73_m2]    GFR Estimate If Black >90 >60 mL/min/[1.73_m2]    Calcium 8.4 (L) 8.5 - 10.1 mg/dL    Bilirubin Total 0.2 0.2 - 1.3 mg/dL    Albumin 3.4 3.4 - 5.0 g/dL    Protein Total 7.6 6.8 - 8.8 g/dL    Alkaline Phosphatase 81 40 - 150 U/L    ALT 35 0 - 50 U/L    AST 19 0 - 45 U/L   Magnesium    Collection Time: 03/07/19  6:52 AM   Result Value Ref Range     Magnesium 2.0 1.6 - 2.3 mg/dL

## 2019-03-07 NOTE — NURSING NOTE
"Oncology Rooming Note    March 7, 2019 7:18 AM   Meagan Morales is a 44 year old female who presents for:    Chief Complaint   Patient presents with     Blood Draw     Labs drawn via PIV by RN in lab. VS taken. Pt checked in for next appt     Oncology Clinic Visit     Return Endometrial Ca     Initial Vitals: /75 (BP Location: Right arm, Patient Position: Sitting, Cuff Size: Adult Regular)   Pulse 94   Temp 98.2  F (36.8  C) (Oral)   Resp 16   Ht 1.645 m (5' 4.75\")   Wt 105.7 kg (233 lb)   SpO2 100%   BMI 39.07 kg/m   Estimated body mass index is 39.07 kg/m  as calculated from the following:    Height as of this encounter: 1.645 m (5' 4.75\").    Weight as of this encounter: 105.7 kg (233 lb). Body surface area is 2.2 meters squared.  No Pain (0) Comment: Data Unavailable   No LMP recorded. Patient has had a hysterectomy.  Allergies reviewed: Yes  Medications reviewed: Yes    Medications: Medication refills not needed today.  Pharmacy [facility] entered into [facility]: [id number] [place] [facility] [place] [address] [facility] [place] [address]    Clinical concerns: No new concerns.         Dionne Schultz CMA              "

## 2019-03-07 NOTE — NURSING NOTE
Chief Complaint   Patient presents with     Blood Draw     Labs drawn via PIV by RN in lab. VS taken. Pt checked in for next appt     Labs drawn from PIV placed by RN. Line flushed with saline. Vitals taken. Pt checked in for appointment(s).    Gillian FLANAGAN RN PHN BSN  BMT/Oncology Lab

## 2019-03-07 NOTE — LETTER
3/7/2019       RE: Meagan Morales  [address] [place] [id number]     Dear Colleague,    Thank you for referring your patient, Maegan Morales, to the [facility]. Please see a copy of my visit note below.                Follow Up Notes on Referred Patient    Date: 3/7/2019        RE: Meagan Morales  : 1975  VY: 3/7/2019        Meagan Morales is a 44 year old woman with a diagnosis of stage IIIA grade 1 endometrioid adenocarcinoma.   She is here today for treatment management.     Oncology History:  Meagan originally presented to clinic due to continuous vaginal bleeding, very light. She thought it was just her menses being continuous. US was done which found thickened endometrial lining. IUD was placed. Second US was done and IUD did not affect endometrial lining and IUD was malpositioned. Since then, she has noted continuous spotting. IUD was removed. Also of note, ovarian cysts were noted on second US. Subsequent endometrial biopsy was done which revealed grade 1 endometrial cancer. She has always had irregular menses and has never been able to get pregnant. Patient's  first and only pap smear on 10/10/2017. Had mammogram in 20's, will order additional mammogram for baseline. No history of prior colonoscopy. Started Wellbutrin this last year, mood has improved. Prior cigarette smoker, quit 2 months ago and switched to vape. Started again smoking cigarettes again but has not had one for 10 days. 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 No history of prior abdominal surgeries. No history of heart disease, lung disease or diabetes. Has never been able to get pregnant and dose not desire children.     18: pelvic US IMPRESSION:  Peripheral follicular arrangement high within the ovaries suggesting polycystic ovarian syndrome. Slightly thickened heterogeneous endometrium raising a concern of potential hyperplastic change.  18: pelvic US    1. Uterus is not normal in appearance. The endometrium appears thickened and vascular, and the IUD appears to be malpositioned.  Clinical correlation is recommended.    2. Bilateral complex ovarian cysts are noted.  Follow up ultrasound in 4-6 weeks is recommended, consider saline infusion sonohysterogram for further imaging of the endometrium.      9/17/18: endometrial biopsy  ENDOMETRIUM, BIOPSY:  1. FIGO Grade 1 (well differentiated) endometrioid carcinoma of      the endometrium  2. DNA mismatch repair enzyme immunohistochemistry studies:     All intact (see Amendment note and synoptic reporting)     10/24/2018: DaVinci Assisted Total Laparoscopic Hysterectomy, Bilateral Salpingo-Oophorectomy, lymph node dissection, Excision of Left Vulvar Lesion  Pelvic Washing:   Rare atypical cells present.         TEST(S) PERFORMED     Test(s) Performed:         - Mismatch Repair Immunohistochemistry (IHC) Testing for Mismatch Repair (MMR) Proteins:           - No loss of nuclear expression of MMR proteins: low probability of microsatellite instability-high (MSI-H)     ORIGINAL REPORT:     SPECIMEN(S):   A: Left vulvar lesion   B: Left pelvic sentinel lymph node   C: Right pelvic sentinel lymph node   D: Uterus, cervix, bilateral ovaries and fallopian tubes   E: Portion of left ovary   F: Para-aortic lymph nodes   G: Left pelvic lymph nodes   H: Right pelvic lymph nodes     FINAL DIAGNOSIS:   A. LEFT VULVAR LESION, BIOPSY:   - Intradermal nevus     B. LEFT PELVIC SENTINEL LYMPH NODE, EXCISION:   - One reactive lymph node, negative for malignancy (0/1)     C. RIGHT PELVIC SENTINEL LYMPH NODE, EXCISION:   - Six reactive lymph nodes, negative for malignancy (0/6)     D. UTERUS, CERVIX, BILATERAL OVARIES AND FALLOPIAN TUBES, HYSTERECTOMY WITH BILATERAL SALPINGO-OOPHORECTOMY:   - Endometrial endometrioid adenocarcinoma, FIGO grade 1   - Atypical endometrial hyperplasia   - Myometrium with no significant histologic abnormality    - Chronic cervicitis with microglandular hyperplasia   - Uterine serosal fibrous adhesions   - Ovaries with cortical hyperplasia   - Metastatic endometrial adenocarcinoma to the left fallopian tube   - Right fallopian tube with no significant histologic abnormality     E. PORTION OF LEFT OVARY, EXCISION:   - Benign cortical inclusion cysts     F. PARA-AORTIC LYMPH NODES, EXCISION:   - Four reactive lymph nodes, negative for malignancy (0/4)   - Benign glandular inclusions consistent with endosalpingiosis     G. LEFT PELVIC LYMPH NODES, EXCISION:   - Eight reactive lymph nodes, negative for malignancy (0/8)     H. RIGHT PELVIC LYMPH NODES, EXCISION:   - Nine reactive lymph nodes, negative for malignancy (0/9)   - Benign glandular inclusions consistent with endosalpingiosis     COMMENT:   The final diagnosis confirms the interpretation provided intraoperatively. The tumor seen in the left fallopian tube involves the wall (invading the mucosa and submucosa) and appears free floating in the lumen.   Tumor Site:         - Endometrium - Anterior and posterior     Histologic Type:         - Endometrioid carcinoma, NOS     Histologic Grade:         - FIGO grade 1     Tumor Size: 5.7 Centimeters (cm)     Tumor Extent       Myometrial Invasion:           - Not identified       Adenomyosis:           - Not identified       Uterine Serosa Involvement:           - Not identified       Lower Uterine Segment Involvement:           - Not identified       Cervical Stromal Involvement:           - Not identified       Other Tissue / Organ Involvement:           - Left fallopian tube       Peritoneal Ascitic Fluid:           - Results pending     Accessory Findings       Lymphovascular Invasion:           - Not identified     11/6/18: CT ap IMPRESSION:   1. Surgical changes of hysterectomy, bilateral salpingo-oophorectomy, and pelvic lymph node dissection with extensive fat stranding and fluid within the low pelvis extending  superiorly along the iliac vasculature, right greater than left, potentially within normal postsurgical limits, however, slightly more than expected for postsurgical change.   1a. The ureters are not well evaluated on this single phase exam and there is no hydronephrosis or hydroureter, however, the amount of fluid in the pelvis does raise the question of ureteral injury. If there is clinical concern, consider obtaining a CT urogram.  1b. An area of irregular rim enhancement in the low pelvis may represent a normal postsurgical vaginal cuff, though, again, this is slightly more conspicuous than usual and dehiscence or a developing abscess may have a similar appearance.  2. 6.8 x 4.1 x 5.6 cm right pelvic sidewall seroma versus lymphocele.  3. Soft tissue nodular thickening anteriorly to the right psoas muscle and along the right lateral conal fascia, indeterminate, cannot rule out metastatic foci. Attention on follow-up studies.  4. Cholelithiasis without evidence of cholecystitis.      11/7/18: CT abd with contrast CT urogram IMPRESSION:   1. Postsurgical changes of hysterectomy, bilateral salpingo-oophorectomy, and iliac lymph node dissection with fluid  collections within the pelvis extending along the iliac vasculature.  a. The right pelvic sidewall fluid collection is not significantly changed while the left pelvic sidewall fluid collection has enlarged  in comparison to the 11/6/2018 CT. The differential remains a seroma versus a lymphocele.  b. No evidence of contrast extravasation to suggest a ureteral injury.  c. Redemonstration of soft tissue nodular thickening along the right lateral conal fascia and anteriorly to the right psoas muscle and right psoas muscle. Attention on follow-up is recommended.  2. Cholelithiasis without evidence of cholecystitis.     Plan: adjuvant therapy to include 6 cycles of taxol and carboplatin; would not add radiation as all lymph nodes are negative, she does not have LVSI or  deep invasion and this is a low grade tumor. Rx percocet 5 tabs due to continued abdominal pain. Will not prescribe any more narcotics and patient is aware of this.     11/16/18: Cycle #1 Paclitaxel/Carboplatin.   35.  12/6/18: Cycle #2 Paclitaxel/Carboplatin.  pending.  12/27/2018: Cycle #3 T/C  1/8/19: US lower extremity: IMPRESSION: No evidence of deep venous thrombosis in either lower extremity.  1/9/2019: CT abd/pelvis IMPRESSION:   1. Minimal change in a right pelvic sidewall collection and decreased size of a left pelvic sidewall collection, which are most consistent with lymphoceles.  2. No suspicious pelvic lymphadenopathy.      1/21/19 Cycle #4 Paclitaxel/Carboplatin.  8.      1/2019 Presented with pain in LE:  US IMPRESSION: No evidence of deep venous thrombosis in either lower extremity.     1/2019 CT:IMPRESSION:   1. Minimal change in a right pelvic sidewall collection and decreased size of a left pelvic sidewall collection, which are most consistent with lymphoceles.  2. No suspicious pelvic lymphadenopathy.      2/14/19: Cycle #5 Paclitaxel/Carboplatin.  9.  3/7/19: Cycle #6 Paclitaxel/Carboplatin.  pending.           Today she comes to clinic feeling well. She denies any vaginal bleeding, no changes in her bowel or bladder habits, no nausea/emesis, no lower extremity edema, and no difficulties eating or sleeping. She denies any abdominal discomfort/bloating, no fevers or chills, and no chest pain or shortness of breath. She denies any neuropathy. She has not needed to take any of her antiemetics and does not need any medications refilled. She will become the  as of April 1st.         Review of Systems:    Systemic           no weight changes; no fever; no chills; no night sweats; no appetite changes  Skin           no rashes, or lesions  Eye           no irritation; no changes in vision  Deny-Laryngeal           no dysphagia; no hoarseness   Pulmonary    no  cough; no shortness of breath  Cardiovascular    no chest pain; no palpitations; + HTN  Gastrointestinal    no diarrhea; no constipation; no abdominal pain; no changes in bowel habits; no blood in stool  Genitourinary   no urinary frequency; no urinary urgency; no dysuria; no pain; no abnormal vaginal discharge; no abnormal vaginal bleeding  Breast    no breast discharge; no breast changes; no breast pain  Musculoskeletal    no myalgias; no arthralgias; no back pain  Psychiatric           no depressed mood; no anxiety    Hematologic              no tender lymph nodes; no noticeable swellings or lumps   Endocrine    no hot flashes; no heat/cold intolerance         Neurological   no tremor; no numbness and tingling; no headaches; no difficulty sleeping      Past Medical History:    Past Medical History:   Diagnosis Date     Chickenpox     as a child     Depression      Endometrial cancer (H)      Hypertension     since her 30's     Infertility, female      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)      PCOS (polycystic ovarian syndrome)      STD (female)     in her 20's         Past Surgical History:    Past Surgical History:   Procedure Laterality Date     CYSTOSCOPY N/A 10/24/2018    Procedure: Cystoscopy;  Surgeon: Linh De Souza MD;  Location: [facility] OR     DAVINCI HYSTERECTOMY TOTAL, BILATERAL SALPINGO-OOPHORECTOMY, NODE DISSECTION, COMBINED N/A 10/24/2018    Procedure: DaVinci Assisted Total Laparoscopic Hysterectomy, Bilateral Salpingo-Oophorectomy, lymph node dissection;  Surgeon: Linh De Souza MD;  Location: [facility] OR     EXCISE LESION VULVA Left 10/24/2018    Procedure: Excision of Left Vulvar Lesion;  Surgeon: Linh De Souza MD;  Location: [facility] OR         Health Maintenance Due   Topic Date Due     PREVENTIVE CARE VISIT  1975     PHQ-2 Q1 YR  01/20/1989     HIV SCREEN (SYSTEM ASSIGNED)  01/20/1995     PAP SCREENING Q3 YR (SYSTEM ASSIGNED)  01/20/1998       Current Medications:     Current  Outpatient Medications   Medication Sig Dispense Refill     buPROPion (WELLBUTRIN XL) 300 MG 24 hr tablet Take 300 mg by mouth       gabapentin (NEURONTIN) 600 MG tablet Take 600 mg by mouth 600mg in AM, 600mg in the afternoon, 900mg in the evening       ibuprofen (ADVIL/MOTRIN) 600 MG tablet Take 1 tablet (600 mg) by mouth every 6 hours as needed for moderate pain 30 tablet 1     loratadine (CLARITIN) 10 MG tablet Take 10 mg by mouth       MELATONIN PO Take 10 mg by mouth nightly as needed       metoprolol succinate (TOPROL-XL) 25 MG 24 hr tablet Take 25 mg by mouth       Multiple Vitamin (MULTIVITAMIN  S) CAPS Take 1 capsule by mouth       polyethylene glycol (MIRALAX/GLYCOLAX) powder Take 1 capful by mouth daily       spironolactone (ALDACTONE) 50 MG tablet Take 50 mg by mouth       albuterol (PROAIR HFA/PROVENTIL HFA/VENTOLIN HFA) 108 (90 Base) MCG/ACT inhaler Inhale 1-2 puffs into the lungs       ondansetron (ZOFRAN) 8 MG tablet Take 1 tablet (8 mg) by mouth every 8 hours as needed for nausea (Did not start until 3 days after chemo.) (Patient not taking: Reported on 1/21/2019) 20 tablet 1     prochlorperazine (COMPAZINE) 10 MG tablet Take 1 tablet (10 mg) by mouth every 6 hours as needed (nausea/vomiting) (Patient not taking: Reported on 1/10/2019) 30 tablet 5     Pseudoeph-Doxylamine-DM-APAP (NYQUIL PO)            Allergies:        Allergies   Allergen Reactions     Amoxicillin      Penicillin G         Social History:     Social History     Tobacco Use     Smoking status: Former Smoker     Packs/day: 0.50     Types: Cigarettes     Smokeless tobacco: Never Used     Tobacco comment: uses vape pen about 3-4 times daily   Substance Use Topics     Alcohol use: No     Comment: 11 months sober        History   Drug Use No     Comment: 11 months sober. lives at sober housing         Family History:     No family history on file.      Physical Exam:     /75 (BP Location: Right arm, Patient Position: Sitting,  "Cuff Size: Adult Regular)   Pulse 94   Temp 98.2  F (36.8  C) (Oral)   Resp 16   Ht 1.645 m (5' 4.75\")   Wt 105.7 kg (233 lb)   SpO2 100%   BMI 39.07 kg/m     Body mass index is 39.07 kg/m .    General Appearance: healthy and alert, no distress     HEENT: no thyromegaly, no palpable nodules or masses        Cardiovascular: regular rate and rhythm, no gallops, rubs or murmurs     Respiratory: lungs clear, no rales, rhonchi or wheezes, normal diaphragmatic excursion    Musculoskeletal: extremities non tender and without edema    Skin: no lesions or rashes     Neurological: normal gait, no gross defects     Psychiatric: appropriate mood and affect                               Hematological: normal cervical, supraclavicular lymph nodes     Gastrointestinal:       abdomen soft, non-tender, non-distended    Genitourinary: Deferred      Assessment:    Meagan Morales is a 44 year old woman with a diagnosis of stage IIIA grade 1 endometrioid adenocarcinoma.   She is here today for treatment management.    25 minutes were spent with this patient, over 50% of that time was spent in symptom management, treatment planning and in counseling and coordination of care.    Plan:     1.)         Ok to proceed with planned chemotherapy pending labs are WNL. She will have imaging and then see Dr. De Souza for results. Reviewed signs and symptoms for when she should contact the clinic or seek additional care. Patient to contact the clinic with any questions or concerns in the interim. Briefly discussed survivorship visit about a month after seeing Dr. De Souza.      2.) Genetic risk factors were assessed and her MMR proteins were intact.     3.) Labs and/or tests ordered include:  Chemo labs. .  CT cap.      4.) Health maintenance issues addressed today include annual health maintenance and non-gynecologic issues with PCP.    CORTES Gant, WHNP-BC, ANP-BC  Women's Health Nurse Practitioner  Adult Nurse " Pracitioner  Division of Gynecologic Oncology          CC  Patient Care Team:  Aundrea Max MD as PCP - General  SELF, REFERRED

## 2019-03-08 ENCOUNTER — THERAPY VISIT (OUTPATIENT)
Dept: PHYSICAL THERAPY | Facility: CLINIC | Age: 44
End: 2019-03-08

## 2019-03-08 DIAGNOSIS — C54.1 ENDOMETRIAL CANCER (H): ICD-10-CM

## 2019-03-08 DIAGNOSIS — R53.81 PHYSICAL DECONDITIONING: ICD-10-CM

## 2019-03-08 DIAGNOSIS — M79.605 PAIN OF LEFT LOWER EXTREMITY: Primary | ICD-10-CM

## 2019-03-08 DIAGNOSIS — M25.552 PAIN IN JOINT, PELVIC REGION AND THIGH, LEFT: ICD-10-CM

## 2019-03-08 ASSESSMENT — 6 MINUTE WALK TEST (6MWT): TOTAL DISTANCE WALKED (METERS): 89

## 2019-03-14 ENCOUNTER — THERAPY VISIT (OUTPATIENT)
Dept: PHYSICAL THERAPY | Facility: CLINIC | Age: 44
End: 2019-03-14

## 2019-03-14 DIAGNOSIS — M25.552 PAIN IN JOINT, PELVIC REGION AND THIGH, LEFT: ICD-10-CM

## 2019-03-14 DIAGNOSIS — R53.81 PHYSICAL DECONDITIONING: ICD-10-CM

## 2019-03-14 DIAGNOSIS — M79.605 PAIN OF LEFT LOWER EXTREMITY: Primary | ICD-10-CM

## 2019-03-14 DIAGNOSIS — C54.1 ENDOMETRIAL CANCER (H): ICD-10-CM

## 2019-03-25 ENCOUNTER — THERAPY VISIT (OUTPATIENT)
Dept: PHYSICAL THERAPY | Facility: CLINIC | Age: 44
End: 2019-03-25

## 2019-03-25 DIAGNOSIS — R53.81 PHYSICAL DECONDITIONING: Primary | ICD-10-CM

## 2019-03-25 DIAGNOSIS — M25.551 PAIN IN JOINT, PELVIC REGION AND THIGH, RIGHT: ICD-10-CM

## 2019-03-25 NOTE — DISCHARGE INSTRUCTIONS
3/25/19   - Try wearing your abdominal binder when you are up and moving. Can wear it over your leggings/pants to help improve comfort.   - Keep swimming as much as you can or each time you go to the gym.   - Elevate your LE's throughout the day and at the end of the day. Perform ankle pumps.

## 2019-03-28 NOTE — PROGRESS NOTES
Surgery date: 10/24/18  Post op visit:  11/8    Spoke with pt states feeling good  Pain   Sore and stiff, low back and shoulder   Meds:  Ibuprofen Q 6 hours and oxycodone Q HS   Well controlled:  yes  Denies fever, chills, bowel/bladder issues, leg redness/swelling/tenderness, chest pain, SOB  Eating and drinking well, denies nausea/vomiting  Incision    Healthy: no signs of infection,     Redness or drainage:  no   Shower:  yes  Staples:  Na  Walking:  yes  Questions:  No  Notes throat feels a little sore, reassured likely due to intubation, push fluids, using candies or something to keep throat moist should resolve within a few days  Call if no relief    Post op appt confirmed and patient will call if any questions or concerns

## 2019-04-03 DIAGNOSIS — C54.1 ENDOMETRIAL CANCER (H): Primary | ICD-10-CM

## 2019-04-04 ENCOUNTER — ANCILLARY PROCEDURE (OUTPATIENT)
Dept: CT IMAGING | Facility: CLINIC | Age: 44
End: 2019-04-04
Attending: OBSTETRICS & GYNECOLOGY

## 2019-04-04 DIAGNOSIS — C54.1 ENDOMETRIAL CANCER (H): ICD-10-CM

## 2019-04-04 RX ORDER — IOPAMIDOL 755 MG/ML
135 INJECTION, SOLUTION INTRAVASCULAR ONCE
Status: COMPLETED | OUTPATIENT
Start: 2019-04-04 | End: 2019-04-04

## 2019-04-04 RX ADMIN — IOPAMIDOL 135 ML: 755 INJECTION, SOLUTION INTRAVASCULAR at 10:50

## 2019-04-04 NOTE — DISCHARGE INSTRUCTIONS

## 2019-04-19 ENCOUNTER — THERAPY VISIT (OUTPATIENT)
Dept: PHYSICAL THERAPY | Facility: CLINIC | Age: 44
End: 2019-04-19

## 2019-04-19 DIAGNOSIS — I89.0 LYMPHEDEMA OF BOTH LOWER EXTREMITIES: Primary | ICD-10-CM

## 2019-04-19 DIAGNOSIS — C54.1 ENDOMETRIAL CANCER (H): ICD-10-CM

## 2019-04-19 DIAGNOSIS — M25.551 PAIN IN JOINT, PELVIC REGION AND THIGH, RIGHT: ICD-10-CM

## 2019-04-25 ENCOUNTER — ONCOLOGY VISIT (OUTPATIENT)
Dept: ONCOLOGY | Facility: CLINIC | Age: 44
End: 2019-04-25
Attending: OBSTETRICS & GYNECOLOGY
Payer: COMMERCIAL

## 2019-04-25 ENCOUNTER — DOCUMENTATION ONLY (OUTPATIENT)
Dept: RADIOLOGY | Facility: CLINIC | Age: 44
End: 2019-04-25

## 2019-04-25 VITALS
RESPIRATION RATE: 16 BRPM | TEMPERATURE: 98 F | OXYGEN SATURATION: 99 % | HEART RATE: 83 BPM | HEIGHT: 65 IN | SYSTOLIC BLOOD PRESSURE: 125 MMHG | WEIGHT: 234 LBS | DIASTOLIC BLOOD PRESSURE: 80 MMHG | BODY MASS INDEX: 38.99 KG/M2

## 2019-04-25 DIAGNOSIS — I89.8 INGUINAL LYMPHOCYST: ICD-10-CM

## 2019-04-25 DIAGNOSIS — C54.1 ENDOMETRIAL CANCER (H): Primary | ICD-10-CM

## 2019-04-25 PROBLEM — F33.41 RECURRENT MAJOR DEPRESSIVE DISORDER, IN PARTIAL REMISSION (H): Status: ACTIVE | Noted: 2018-07-31

## 2019-04-25 PROBLEM — G47.33 OSA (OBSTRUCTIVE SLEEP APNEA): Status: ACTIVE | Noted: 2019-04-25

## 2019-04-25 PROCEDURE — G0463 HOSPITAL OUTPT CLINIC VISIT: HCPCS | Mod: ZF

## 2019-04-25 PROCEDURE — 99214 OFFICE O/P EST MOD 30 MIN: CPT | Mod: ZP | Performed by: OBSTETRICS & GYNECOLOGY

## 2019-04-25 RX ORDER — OMEPRAZOLE 40 MG/1
40 CAPSULE, DELAYED RELEASE ORAL
COMMUNITY
Start: 2019-03-26 | End: 2021-01-29

## 2019-04-25 ASSESSMENT — MIFFLIN-ST. JEOR: SCORE: 1718.33

## 2019-04-25 ASSESSMENT — PAIN SCALES - GENERAL: PAINLEVEL: NO PAIN (0)

## 2019-04-25 NOTE — LETTER
2019       RE: Meagan Morales  [address] [place] [id number]     Dear Colleague,    Thank you for referring your patient, Meagan Morales, to the [facility]. Please see a copy of my visit note below.                Follow Up Notes on Referred Patient    Date: 2019          RE: Meagan Morales  : 1975  VY: 2019      Meagan Morales is a 44 year old woman with a diagnosis of stage IIIA grade 1 endometrioid adenocarcinoma.   She is here today for treatment management.     Oncology History:  Meagan originally presented to clinic due to continuous vaginal bleeding, very light. She thought it was just her menses being continuous. US was done which found thickened endometrial lining. IUD was placed. Second US was done and IUD did not affect endometrial lining and IUD was malpositioned. Since then, she has noted continuous spotting. IUD was removed. Also of note, ovarian cysts were noted on second US. Subsequent endometrial biopsy was done which revealed grade 1 endometrial cancer. She has always had irregular menses and has never been able to get pregnant. Patient's  first and only pap smear on 10/10/2017. Had mammogram in 20's, will order additional mammogram for baseline. No history of prior colonoscopy. Started Wellbutrin this last year, mood has improved. Prior cigarette smoker, quit 2 months ago and switched to vape. Started again smoking cigarettes again but has not had one for 10 days. 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 No history of prior abdominal surgeries. No history of heart disease, lung disease or diabetes. Has never been able to get pregnant and dose not desire children.     18: pelvic US IMPRESSION:  Peripheral follicular arrangement high within the ovaries suggesting polycystic ovarian syndrome. Slightly thickened heterogeneous endometrium raising a concern of potential hyperplastic  change.  9/17/18: pelvic US   1. Uterus is not normal in appearance. The endometrium appears thickened and vascular, and the IUD appears to be malpositioned.  Clinical correlation is recommended.    2. Bilateral complex ovarian cysts are noted.  Follow up ultrasound in 4-6 weeks is recommended, consider saline infusion sonohysterogram for further imaging of the endometrium.      9/17/18: endometrial biopsy  ENDOMETRIUM, BIOPSY:  1. FIGO Grade 1 (well differentiated) endometrioid carcinoma of      the endometrium  2. DNA mismatch repair enzyme immunohistochemistry studies:     All intact (see Amendment note and synoptic reporting)     10/24/2018: DaVinci Assisted Total Laparoscopic Hysterectomy, Bilateral Salpingo-Oophorectomy, lymph node dissection, Excision of Left Vulvar Lesion  Pelvic Washing:   Rare atypical cells present.     TEST(S) PERFORMED     Test(s) Performed:         - Mismatch Repair Immunohistochemistry (IHC) Testing for Mismatch Repair (MMR) Proteins:           - No loss of nuclear expression of MMR proteins: low probability of microsatellite instability-high (MSI-H)     ORIGINAL REPORT:     SPECIMEN(S):   A: Left vulvar lesion   B: Left pelvic sentinel lymph node   C: Right pelvic sentinel lymph node   D: Uterus, cervix, bilateral ovaries and fallopian tubes   E: Portion of left ovary   F: Para-aortic lymph nodes   G: Left pelvic lymph nodes   H: Right pelvic lymph nodes     FINAL DIAGNOSIS:   A. LEFT VULVAR LESION, BIOPSY:   - Intradermal nevus     B. LEFT PELVIC SENTINEL LYMPH NODE, EXCISION:   - One reactive lymph node, negative for malignancy (0/1)     C. RIGHT PELVIC SENTINEL LYMPH NODE, EXCISION:   - Six reactive lymph nodes, negative for malignancy (0/6)     D. UTERUS, CERVIX, BILATERAL OVARIES AND FALLOPIAN TUBES, HYSTERECTOMY WITH BILATERAL SALPINGO-OOPHORECTOMY:   - Endometrial endometrioid adenocarcinoma, FIGO grade 1   - Atypical endometrial hyperplasia   - Myometrium with no significant  histologic abnormality   - Chronic cervicitis with microglandular hyperplasia   - Uterine serosal fibrous adhesions   - Ovaries with cortical hyperplasia   - Metastatic endometrial adenocarcinoma to the left fallopian tube   - Right fallopian tube with no significant histologic abnormality     E. PORTION OF LEFT OVARY, EXCISION:   - Benign cortical inclusion cysts     F. PARA-AORTIC LYMPH NODES, EXCISION:   - Four reactive lymph nodes, negative for malignancy (0/4)   - Benign glandular inclusions consistent with endosalpingiosis     G. LEFT PELVIC LYMPH NODES, EXCISION:   - Eight reactive lymph nodes, negative for malignancy (0/8)     H. RIGHT PELVIC LYMPH NODES, EXCISION:   - Nine reactive lymph nodes, negative for malignancy (0/9)   - Benign glandular inclusions consistent with endosalpingiosis     COMMENT:   The final diagnosis confirms the interpretation provided intraoperatively. The tumor seen in the left fallopian tube involves the wall (invading the mucosa and submucosa) and appears free floating in the lumen.   Tumor Site:         - Endometrium - Anterior and posterior     Histologic Type:         - Endometrioid carcinoma, NOS     Histologic Grade:         - FIGO grade 1     Tumor Size: 5.7 Centimeters (cm)     Tumor Extent       Myometrial Invasion:           - Not identified       Adenomyosis:           - Not identified       Uterine Serosa Involvement:           - Not identified       Lower Uterine Segment Involvement:           - Not identified       Cervical Stromal Involvement:           - Not identified       Other Tissue / Organ Involvement:           - Left fallopian tube       Peritoneal Ascitic Fluid:           - Results pending     Accessory Findings       Lymphovascular Invasion:           - Not identified     11/6/18: CT ap IMPRESSION:   1. Surgical changes of hysterectomy, bilateral salpingo-oophorectomy, and pelvic lymph node dissection with extensive fat stranding and fluid within the low  pelvis extending superiorly along the iliac vasculature, right greater than left, potentially within normal postsurgical limits, however, slightly more than expected for postsurgical change.   1a. The ureters are not well evaluated on this single phase exam and there is no hydronephrosis or hydroureter, however, the amount of fluid in the pelvis does raise the question of ureteral injury. If there is clinical concern, consider obtaining a CT urogram.  1b. An area of irregular rim enhancement in the low pelvis may represent a normal postsurgical vaginal cuff, though, again, this is slightly more conspicuous than usual and dehiscence or a developing abscess may have a similar appearance.  2. 6.8 x 4.1 x 5.6 cm right pelvic sidewall seroma versus lymphocele.  3. Soft tissue nodular thickening anteriorly to the right psoas muscle and along the right lateral conal fascia, indeterminate, cannot rule out metastatic foci. Attention on follow-up studies.  4. Cholelithiasis without evidence of cholecystitis.      11/7/18: CT abd with contrast CT urogram IMPRESSION:   1. Postsurgical changes of hysterectomy, bilateral salpingo-oophorectomy, and iliac lymph node dissection with fluid  collections within the pelvis extending along the iliac vasculature.  a. The right pelvic sidewall fluid collection is not significantly changed while the left pelvic sidewall fluid collection has enlarged  in comparison to the 11/6/2018 CT. The differential remains a seroma versus a lymphocele.  b. No evidence of contrast extravasation to suggest a ureteral injury.  c. Redemonstration of soft tissue nodular thickening along the right lateral conal fascia and anteriorly to the right psoas muscle and right psoas muscle. Attention on follow-up is recommended.  2. Cholelithiasis without evidence of cholecystitis.     Plan: adjuvant therapy to include 6 cycles of taxol and carboplatin; would not add radiation as all lymph nodes are negative, she does  not have LVSI or deep invasion and this is a low grade tumor. Rx percocet 5 tabs due to continued abdominal pain. Will not prescribe any more narcotics and patient is aware of this.     11/16/18: Cycle #1 Paclitaxel/Carboplatin.   35.  12/6/18: Cycle #2 Paclitaxel/Carboplatin.  pending.  12/27/2018: Cycle #3 T/C  1/8/19: US lower extremity: IMPRESSION: No evidence of deep venous thrombosis in either lower extremity.  1/9/2019: CT abd/pelvis IMPRESSION:   1. Minimal change in a right pelvic sidewall collection and decreased size of a left pelvic sidewall collection, which are most consistent with lymphoceles.  2. No suspicious pelvic lymphadenopathy.      1/21/19 Cycle #4 Paclitaxel/Carboplatin.  8.      1/2019 Presented with pain in LE:  US IMPRESSION: No evidence of deep venous thrombosis in either lower extremity.     1/2019 CT:IMPRESSION:   1. Minimal change in a right pelvic sidewall collection and decreased size of a left pelvic sidewall collection, which are most consistent with lymphoceles.  2. No suspicious pelvic lymphadenopathy.      2/14/19: Cycle #5 Paclitaxel/Carboplatin.  9.  3/7/19: Cycle #6 Paclitaxel/Carboplatin.  pending.     4/4/2019: CT CAP IMPRESSION:   1. Significant decrease in the right pelvic sidewall collection, with  resolved left pelvic sidewall collection. These are most consistent  with lymphoceles.  2. No suspicious pelvic lymphadenopathy.  3. No suspicious abnormality in the chest.    Peritoneum / Retroperitoneum: Interval significant decrease in the  right pelvic sidewall fluid collection now measuring 68 x 34 mm,  previously 84 x 37 mm. The left pelvic sidewall fluid collection seen  on prior examination has resolved    Today: Meagan presents to clinic today feeling well. She is happy to be done with chemo. She reports lower extremity swelling and pain. Pain is intermittent, worse when she is more active. She has noticed that pain sometimes radiates to  her abdomen from her groin. She also was just fitted for compression stockings. She reports the pain has improved since October. Meagan reports some hot flashes and trouble sleeping. She just had a sleep study done and has follow up planned with that, she may be given CPAP. She is on gabapentin for restless leg.        Date Value Ref Range Status   03/07/2019 9 0 - 30 U/mL Final     Comment:     Assay Method:  Chemiluminescence using Siemens Centaur XP   02/14/2019 9 0 - 30 U/mL Final     Comment:     Assay Method:  Chemiluminescence using Siemens Centaur XP   01/21/2019 8 0 - 30 U/mL Final     Comment:     Assay Method:  Chemiluminescence using Siemens Centaur XP   12/27/2018 7 0 - 30 U/mL Final     Comment:     Assay Method:  Chemiluminescence using Siemens Centaur XP   12/06/2018 8 0 - 30 U/mL Final     Comment:     Assay Method:  Chemiluminescence using Siemens Centaur XP   11/16/2018 35 (H) 0 - 30 U/mL Final     Comment:     Assay Method:  Chemiluminescence using Siemens Centaur XP       Review of Systems:    Systemic           no weight changes; no fever; no chills; no night sweats; no appetite changes  Skin           no rashes, or lesions  Eye           no irritation; no changes in vision  Deny-Laryngeal           no dysphagia; no hoarseness   Pulmonary    no cough; no shortness of breath  Cardiovascular    no chest pain; no palpitations; + HTN  Gastrointestinal    no diarrhea; no constipation; no abdominal pain; no changes in bowel habits; no blood in stool  Genitourinary   no urinary frequency; no urinary urgency; no dysuria; no pain; no abnormal vaginal discharge; no abnormal vaginal bleeding  Breast    no breast discharge; no breast changes; no breast pain  Musculoskeletal    no myalgias; no arthralgias; no back pain  Psychiatric           no depressed mood; no anxiety    Hematologic              no tender lymph nodes; no noticeable swellings or lumps   Endocrine    no hot flashes; no heat/cold  intolerance         Neurological   no tremor; no numbness and tingling; no headaches; no difficulty sleeping    I have reviewed and addressed the patient's review of symptoms for today's visit.     Past Medical History:    Past Medical History:   Diagnosis Date     Chickenpox     as a child     Depression      Endometrial cancer (H)      Hypertension     since her 30's     Infertility, female      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)      PCOS (polycystic ovarian syndrome)      STD (female)     in her 20's     Past Surgical History:  Past Surgical History:   Procedure Laterality Date     CYSTOSCOPY N/A 10/24/2018    Procedure: Cystoscopy;  Surgeon: Linh De Souza MD;  Location: [facility] OR     DAVINCI HYSTERECTOMY TOTAL, BILATERAL SALPINGO-OOPHORECTOMY, NODE DISSECTION, COMBINED N/A 10/24/2018    Procedure: DaVinci Assisted Total Laparoscopic Hysterectomy, Bilateral Salpingo-Oophorectomy, lymph node dissection;  Surgeon: Linh De Souza MD;  Location: [facility] OR     EXCISE LESION VULVA Left 10/24/2018    Procedure: Excision of Left Vulvar Lesion;  Surgeon: Linh De Souza MD;  Location: [facility] OR     Health Maintenance Due   Topic Date Due     PREVENTIVE CARE VISIT  1975     HIV SCREEN (SYSTEM ASSIGNED)  01/20/1995     PAP SCREENING Q3 YR (SYSTEM ASSIGNED)  01/20/1998     PHQ-2  01/01/2019       Current Medications:   Current Outpatient Medications   Medication Sig Dispense Refill     buPROPion (WELLBUTRIN XL) 300 MG 24 hr tablet Take 300 mg by mouth       gabapentin (NEURONTIN) 600 MG tablet Take 600 mg by mouth 600mg in AM, 600mg in the afternoon, 900mg in the evening       ibuprofen (ADVIL/MOTRIN) 600 MG tablet Take 1 tablet (600 mg) by mouth every 6 hours as needed for moderate pain 30 tablet 1     loratadine (CLARITIN) 10 MG tablet Take 10 mg by mouth       Multiple Vitamin (MULTIVITAMIN  S) CAPS Take 1 capsule by mouth       omeprazole (PRILOSEC) 40 MG DR capsule Take 40 mg by mouth        "spironolactone (ALDACTONE) 50 MG tablet Take 50 mg by mouth daily        albuterol (PROAIR HFA/PROVENTIL HFA/VENTOLIN HFA) 108 (90 Base) MCG/ACT inhaler Inhale 1-2 puffs into the lungs       MELATONIN PO Take 10 mg by mouth nightly as needed       ondansetron (ZOFRAN) 8 MG tablet Take 1 tablet (8 mg) by mouth every 8 hours as needed for nausea (Did not start until 3 days after chemo.) (Patient not taking: Reported on 1/21/2019) 20 tablet 1     polyethylene glycol (MIRALAX/GLYCOLAX) powder Take 1 capful by mouth daily       prochlorperazine (COMPAZINE) 10 MG tablet Take 1 tablet (10 mg) by mouth every 6 hours as needed (nausea/vomiting) (Patient not taking: Reported on 1/10/2019) 30 tablet 5     Pseudoeph-Doxylamine-DM-APAP (NYQUIL PO)            Allergies:   Allergies   Allergen Reactions     Amoxicillin      Penicillin G       Social History:  Social History     Tobacco Use     Smoking status: Former Smoker     Packs/day: 0.50     Types: Cigarettes     Smokeless tobacco: Never Used     Tobacco comment: uses vape pen about 3-4 times daily   Substance Use Topics     Alcohol use: No     Comment: 11 months sober        History   Drug Use No     Comment: 11 months sober. lives at sober housing     Family History:     No family history on file.    Physical Exam:     /80   Pulse 83   Temp 98  F (36.7  C) (Oral)   Resp 16   Ht 1.645 m (5' 4.75\")   Wt 106.1 kg (234 lb)   SpO2 99%   BMI 39.24 kg/m     Body mass index is 39.24 kg/m .    General Appearance: healthy and alert, no distress, but moves continuously     HEENT: no thyromegaly, no palpable nodules or masses        Cardiovascular: regular rate and rhythm, no gallops, rubs or murmurs     Respiratory: lungs clear, no rales, rhonchi or wheezes, normal diaphragmatic excursion    Musculoskeletal: extremities non tender, LLE +1    Skin: no lesions or rashes     Neurological: normal gait, no gross defects     Psychiatric: appropriate mood and affect              "                  Hematological: normal cervical, supraclavicular lymph nodes     Gastrointestinal:       abdomen soft, non-tender, non-distended    Genitourinary: Deferred      Assessment:    Meagan Morales is a 44 year old woman with a diagnosis of stage IIIA grade 1 endometrioid adenocarcinoma.   She is here today for treatment management following completion of her chemotherapy.    30 minutes were spent with this patient, over 50% of that time was spent in symptom management, treatment planning and in counseling and coordination of care.    Plan:     1.)         Reviewed imaging with patient, imaging showed disease response. Follow up will be q 3 mos visits x 2 years and then q 6 mos after that for 3 additional years, will alternate between NP and MD. Reviewed signs and symptoms for when she should contact the clinic or seek additional care. Patient to contact the clinic with any questions or concerns in the interim.  - reviewed importance of exercise and weight loss as well as healthy eating. Pt understanding of and in agreement with plan.     2.) Genetic risk factors were assessed and her MMR proteins were intact.     3.) Labs and/or tests ordered include:  none.      4.) Health maintenance issues addressed today include annual health maintenance and non-gynecologic issues with PCP.    5.) LL edema: seeing lymphedema specialist. Will schedule consult with IR to discuss possible draining of fluid.     6.) Will schedule survivorship consult.     Linh De Souza MD    Department of Ob/Gyn and Women's Health  Division of Gynecologic Oncology  [facility]  [phone]    CC  Patient Care Team:  Aundrea Max MD as PCP - General  SELF, REFERRED    I, Moreno Summers, am serving as a scribe to document services personally performed by Linh De Souza MD, based upon my observations and the provider's statements to me. All documentation has been reviewed by the  aforementioned doctor prior to being entered into the official medical record.     I have reviewed the above note and agree with the scribe's notation as written.        Again, thank you for allowing me to participate in the care of your patient.      Sincerely,    Linh De Souza MD

## 2019-04-25 NOTE — PROGRESS NOTES
Follow Up Notes on Referred Patient    Date: 2019          RE: Meagan Morales  : 1975  VY: 2019      Meagan Morales is a 44 year old woman with a diagnosis of stage IIIA grade 1 endometrioid adenocarcinoma.   She is here today for treatment management.     Oncology History:  Meagan originally presented to clinic due to continuous vaginal bleeding, very light. She thought it was just her menses being continuous. US was done which found thickened endometrial lining. IUD was placed. Second US was done and IUD did not affect endometrial lining and IUD was malpositioned. Since then, she has noted continuous spotting. IUD was removed. Also of note, ovarian cysts were noted on second US. Subsequent endometrial biopsy was done which revealed grade 1 endometrial cancer. She has always had irregular menses and has never been able to get pregnant. Patient's  first and only pap smear on 10/10/2017. Had mammogram in 20's, will order additional mammogram for baseline. No history of prior colonoscopy. Started Wellbutrin this last year, mood has improved. Prior cigarette smoker, quit 2 months ago and switched to vape. Started again smoking cigarettes again but has not had one for 10 days. 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 No history of prior abdominal surgeries. No history of heart disease, lung disease or diabetes. Has never been able to get pregnant and dose not desire children.     18: pelvic US IMPRESSION:  Peripheral follicular arrangement high within the ovaries suggesting polycystic ovarian syndrome. Slightly thickened heterogeneous endometrium raising a concern of potential hyperplastic change.  18: pelvic US   1. Uterus is not normal in appearance. The endometrium appears thickened and vascular, and the IUD appears to be malpositioned.  Clinical correlation is recommended.    2. Bilateral complex ovarian cysts are  noted.  Follow up ultrasound in 4-6 weeks is recommended, consider saline infusion sonohysterogram for further imaging of the endometrium.      9/17/18: endometrial biopsy  ENDOMETRIUM, BIOPSY:  1. FIGO Grade 1 (well differentiated) endometrioid carcinoma of      the endometrium  2. DNA mismatch repair enzyme immunohistochemistry studies:     All intact (see Amendment note and synoptic reporting)     10/24/2018: DaVinci Assisted Total Laparoscopic Hysterectomy, Bilateral Salpingo-Oophorectomy, lymph node dissection, Excision of Left Vulvar Lesion  Pelvic Washing:   Rare atypical cells present.     TEST(S) PERFORMED     Test(s) Performed:         - Mismatch Repair Immunohistochemistry (IHC) Testing for Mismatch Repair (MMR) Proteins:           - No loss of nuclear expression of MMR proteins: low probability of microsatellite instability-high (MSI-H)     ORIGINAL REPORT:     SPECIMEN(S):   A: Left vulvar lesion   B: Left pelvic sentinel lymph node   C: Right pelvic sentinel lymph node   D: Uterus, cervix, bilateral ovaries and fallopian tubes   E: Portion of left ovary   F: Para-aortic lymph nodes   G: Left pelvic lymph nodes   H: Right pelvic lymph nodes     FINAL DIAGNOSIS:   A. LEFT VULVAR LESION, BIOPSY:   - Intradermal nevus     B. LEFT PELVIC SENTINEL LYMPH NODE, EXCISION:   - One reactive lymph node, negative for malignancy (0/1)     C. RIGHT PELVIC SENTINEL LYMPH NODE, EXCISION:   - Six reactive lymph nodes, negative for malignancy (0/6)     D. UTERUS, CERVIX, BILATERAL OVARIES AND FALLOPIAN TUBES, HYSTERECTOMY WITH BILATERAL SALPINGO-OOPHORECTOMY:   - Endometrial endometrioid adenocarcinoma, FIGO grade 1   - Atypical endometrial hyperplasia   - Myometrium with no significant histologic abnormality   - Chronic cervicitis with microglandular hyperplasia   - Uterine serosal fibrous adhesions   - Ovaries with cortical hyperplasia   - Metastatic endometrial adenocarcinoma to the left fallopian tube   - Right  fallopian tube with no significant histologic abnormality     E. PORTION OF LEFT OVARY, EXCISION:   - Benign cortical inclusion cysts     F. PARA-AORTIC LYMPH NODES, EXCISION:   - Four reactive lymph nodes, negative for malignancy (0/4)   - Benign glandular inclusions consistent with endosalpingiosis     G. LEFT PELVIC LYMPH NODES, EXCISION:   - Eight reactive lymph nodes, negative for malignancy (0/8)     H. RIGHT PELVIC LYMPH NODES, EXCISION:   - Nine reactive lymph nodes, negative for malignancy (0/9)   - Benign glandular inclusions consistent with endosalpingiosis     COMMENT:   The final diagnosis confirms the interpretation provided intraoperatively. The tumor seen in the left fallopian tube involves the wall (invading the mucosa and submucosa) and appears free floating in the lumen.   Tumor Site:         - Endometrium - Anterior and posterior     Histologic Type:         - Endometrioid carcinoma, NOS     Histologic Grade:         - FIGO grade 1     Tumor Size: 5.7 Centimeters (cm)     Tumor Extent       Myometrial Invasion:           - Not identified       Adenomyosis:           - Not identified       Uterine Serosa Involvement:           - Not identified       Lower Uterine Segment Involvement:           - Not identified       Cervical Stromal Involvement:           - Not identified       Other Tissue / Organ Involvement:           - Left fallopian tube       Peritoneal Ascitic Fluid:           - Results pending     Accessory Findings       Lymphovascular Invasion:           - Not identified     11/6/18: CT ap IMPRESSION:   1. Surgical changes of hysterectomy, bilateral salpingo-oophorectomy, and pelvic lymph node dissection with extensive fat stranding and fluid within the low pelvis extending superiorly along the iliac vasculature, right greater than left, potentially within normal postsurgical limits, however, slightly more than expected for postsurgical change.   1a. The ureters are not well evaluated on  this single phase exam and there is no hydronephrosis or hydroureter, however, the amount of fluid in the pelvis does raise the question of ureteral injury. If there is clinical concern, consider obtaining a CT urogram.  1b. An area of irregular rim enhancement in the low pelvis may represent a normal postsurgical vaginal cuff, though, again, this is slightly more conspicuous than usual and dehiscence or a developing abscess may have a similar appearance.  2. 6.8 x 4.1 x 5.6 cm right pelvic sidewall seroma versus lymphocele.  3. Soft tissue nodular thickening anteriorly to the right psoas muscle and along the right lateral conal fascia, indeterminate, cannot rule out metastatic foci. Attention on follow-up studies.  4. Cholelithiasis without evidence of cholecystitis.      11/7/18: CT abd with contrast CT urogram IMPRESSION:   1. Postsurgical changes of hysterectomy, bilateral salpingo-oophorectomy, and iliac lymph node dissection with fluid  collections within the pelvis extending along the iliac vasculature.  a. The right pelvic sidewall fluid collection is not significantly changed while the left pelvic sidewall fluid collection has enlarged  in comparison to the 11/6/2018 CT. The differential remains a seroma versus a lymphocele.  b. No evidence of contrast extravasation to suggest a ureteral injury.  c. Redemonstration of soft tissue nodular thickening along the right lateral conal fascia and anteriorly to the right psoas muscle and right psoas muscle. Attention on follow-up is recommended.  2. Cholelithiasis without evidence of cholecystitis.     Plan: adjuvant therapy to include 6 cycles of taxol and carboplatin; would not add radiation as all lymph nodes are negative, she does not have LVSI or deep invasion and this is a low grade tumor. Rx percocet 5 tabs due to continued abdominal pain. Will not prescribe any more narcotics and patient is aware of this.     11/16/18: Cycle #1 Paclitaxel/Carboplatin.    35.  12/6/18: Cycle #2 Paclitaxel/Carboplatin.  pending.  12/27/2018: Cycle #3 T/C  1/8/19: US lower extremity: IMPRESSION: No evidence of deep venous thrombosis in either lower extremity.  1/9/2019: CT abd/pelvis IMPRESSION:   1. Minimal change in a right pelvic sidewall collection and decreased size of a left pelvic sidewall collection, which are most consistent with lymphoceles.  2. No suspicious pelvic lymphadenopathy.      1/21/19 Cycle #4 Paclitaxel/Carboplatin.  8.      1/2019 Presented with pain in LE:  US IMPRESSION: No evidence of deep venous thrombosis in either lower extremity.     1/2019 CT:IMPRESSION:   1. Minimal change in a right pelvic sidewall collection and decreased size of a left pelvic sidewall collection, which are most consistent with lymphoceles.  2. No suspicious pelvic lymphadenopathy.      2/14/19: Cycle #5 Paclitaxel/Carboplatin.  9.  3/7/19: Cycle #6 Paclitaxel/Carboplatin.  pending.     4/4/2019: CT CAP IMPRESSION:   1. Significant decrease in the right pelvic sidewall collection, with  resolved left pelvic sidewall collection. These are most consistent  with lymphoceles.  2. No suspicious pelvic lymphadenopathy.  3. No suspicious abnormality in the chest.    Peritoneum / Retroperitoneum: Interval significant decrease in the  right pelvic sidewall fluid collection now measuring 68 x 34 mm,  previously 84 x 37 mm. The left pelvic sidewall fluid collection seen  on prior examination has resolved    Today: Meagan presents to clinic today feeling well. She is happy to be done with chemo. She reports lower extremity swelling and pain. Pain is intermittent, worse when she is more active. She has noticed that pain sometimes radiates to her abdomen from her groin. She also was just fitted for compression stockings. She reports the pain has improved since October. Meagan reports some hot flashes and trouble sleeping. She just had a sleep study done and has follow up  planned with that, she may be given CPAP. She is on gabapentin for restless leg.        Date Value Ref Range Status   03/07/2019 9 0 - 30 U/mL Final     Comment:     Assay Method:  Chemiluminescence using Siemens Centaur XP   02/14/2019 9 0 - 30 U/mL Final     Comment:     Assay Method:  Chemiluminescence using Siemens Centaur XP   01/21/2019 8 0 - 30 U/mL Final     Comment:     Assay Method:  Chemiluminescence using Siemens Centaur XP   12/27/2018 7 0 - 30 U/mL Final     Comment:     Assay Method:  Chemiluminescence using Siemens Centaur XP   12/06/2018 8 0 - 30 U/mL Final     Comment:     Assay Method:  Chemiluminescence using Siemens Centaur XP   11/16/2018 35 (H) 0 - 30 U/mL Final     Comment:     Assay Method:  Chemiluminescence using Siemens Centaur XP       Review of Systems:    Systemic           no weight changes; no fever; no chills; no night sweats; no appetite changes  Skin           no rashes, or lesions  Eye           no irritation; no changes in vision  Deny-Laryngeal           no dysphagia; no hoarseness   Pulmonary    no cough; no shortness of breath  Cardiovascular    no chest pain; no palpitations; + HTN  Gastrointestinal    no diarrhea; no constipation; no abdominal pain; no changes in bowel habits; no blood in stool  Genitourinary   no urinary frequency; no urinary urgency; no dysuria; no pain; no abnormal vaginal discharge; no abnormal vaginal bleeding  Breast    no breast discharge; no breast changes; no breast pain  Musculoskeletal    no myalgias; no arthralgias; no back pain  Psychiatric           no depressed mood; no anxiety    Hematologic              no tender lymph nodes; no noticeable swellings or lumps   Endocrine    no hot flashes; no heat/cold intolerance         Neurological   no tremor; no numbness and tingling; no headaches; no difficulty sleeping    I have reviewed and addressed the patient's review of symptoms for today's visit.     Past Medical History:    Past Medical  History:   Diagnosis Date     Chickenpox     as a child     Depression      Endometrial cancer (H)      Hypertension     since her 30's     Infertility, female      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)      PCOS (polycystic ovarian syndrome)      STD (female)     in her 20's     Past Surgical History:  Past Surgical History:   Procedure Laterality Date     CYSTOSCOPY N/A 10/24/2018    Procedure: Cystoscopy;  Surgeon: Linh De Souza MD;  Location: [facility] OR     DAVINCI HYSTERECTOMY TOTAL, BILATERAL SALPINGO-OOPHORECTOMY, NODE DISSECTION, COMBINED N/A 10/24/2018    Procedure: DaVinci Assisted Total Laparoscopic Hysterectomy, Bilateral Salpingo-Oophorectomy, lymph node dissection;  Surgeon: Linh De Souza MD;  Location: [facility] OR     EXCISE LESION VULVA Left 10/24/2018    Procedure: Excision of Left Vulvar Lesion;  Surgeon: Linh De Souza MD;  Location: [facility] OR     Health Maintenance Due   Topic Date Due     PREVENTIVE CARE VISIT  1975     HIV SCREEN (SYSTEM ASSIGNED)  01/20/1995     PAP SCREENING Q3 YR (SYSTEM ASSIGNED)  01/20/1998     PHQ-2  01/01/2019       Current Medications:   Current Outpatient Medications   Medication Sig Dispense Refill     buPROPion (WELLBUTRIN XL) 300 MG 24 hr tablet Take 300 mg by mouth       gabapentin (NEURONTIN) 600 MG tablet Take 600 mg by mouth 600mg in AM, 600mg in the afternoon, 900mg in the evening       ibuprofen (ADVIL/MOTRIN) 600 MG tablet Take 1 tablet (600 mg) by mouth every 6 hours as needed for moderate pain 30 tablet 1     loratadine (CLARITIN) 10 MG tablet Take 10 mg by mouth       Multiple Vitamin (MULTIVITAMIN  S) CAPS Take 1 capsule by mouth       omeprazole (PRILOSEC) 40 MG DR capsule Take 40 mg by mouth       spironolactone (ALDACTONE) 50 MG tablet Take 50 mg by mouth daily        albuterol (PROAIR HFA/PROVENTIL HFA/VENTOLIN HFA) 108 (90 Base) MCG/ACT inhaler Inhale 1-2 puffs into the lungs       MELATONIN PO Take 10 mg by mouth nightly as needed        "ondansetron (ZOFRAN) 8 MG tablet Take 1 tablet (8 mg) by mouth every 8 hours as needed for nausea (Did not start until 3 days after chemo.) (Patient not taking: Reported on 1/21/2019) 20 tablet 1     polyethylene glycol (MIRALAX/GLYCOLAX) powder Take 1 capful by mouth daily       prochlorperazine (COMPAZINE) 10 MG tablet Take 1 tablet (10 mg) by mouth every 6 hours as needed (nausea/vomiting) (Patient not taking: Reported on 1/10/2019) 30 tablet 5     Pseudoeph-Doxylamine-DM-APAP (NYQUIL PO)            Allergies:   Allergies   Allergen Reactions     Amoxicillin      Penicillin G       Social History:  Social History     Tobacco Use     Smoking status: Former Smoker     Packs/day: 0.50     Types: Cigarettes     Smokeless tobacco: Never Used     Tobacco comment: uses vape pen about 3-4 times daily   Substance Use Topics     Alcohol use: No     Comment: 11 months sober        History   Drug Use No     Comment: 11 months sober. lives at sober housing     Family History:     No family history on file.    Physical Exam:     /80   Pulse 83   Temp 98  F (36.7  C) (Oral)   Resp 16   Ht 1.645 m (5' 4.75\")   Wt 106.1 kg (234 lb)   SpO2 99%   BMI 39.24 kg/m    Body mass index is 39.24 kg/m .    General Appearance: healthy and alert, no distress, but moves continuously     HEENT: no thyromegaly, no palpable nodules or masses        Cardiovascular: regular rate and rhythm, no gallops, rubs or murmurs     Respiratory: lungs clear, no rales, rhonchi or wheezes, normal diaphragmatic excursion    Musculoskeletal: extremities non tender, LLE +1    Skin: no lesions or rashes     Neurological: normal gait, no gross defects     Psychiatric: appropriate mood and affect                               Hematological: normal cervical, supraclavicular lymph nodes     Gastrointestinal:       abdomen soft, non-tender, non-distended    Genitourinary: Deferred      Assessment:    Meagan Morales is a 44 year old woman with a diagnosis of " stage IIIA grade 1 endometrioid adenocarcinoma.   She is here today for treatment management following completion of her chemotherapy.    30 minutes were spent with this patient, over 50% of that time was spent in symptom management, treatment planning and in counseling and coordination of care.    Plan:     1.)         Reviewed imaging with patient, imaging showed disease response. Follow up will be q 3 mos visits x 2 years and then q 6 mos after that for 3 additional years, will alternate between NP and MD. Reviewed signs and symptoms for when she should contact the clinic or seek additional care. Patient to contact the clinic with any questions or concerns in the interim.  - reviewed importance of exercise and weight loss as well as healthy eating. Pt understanding of and in agreement with plan.     2.) Genetic risk factors were assessed and her MMR proteins were intact.     3.) Labs and/or tests ordered include:  none.      4.) Health maintenance issues addressed today include annual health maintenance and non-gynecologic issues with PCP.    5.) LL edema: seeing lymphedema specialist. Will schedule consult with IR to discuss possible draining of fluid.     6.) Will schedule survivorship consult.     Linh De Souza MD    Department of Ob/Gyn and Women's Health  Division of Gynecologic Oncology  [facility]  [phone]    CC  Patient Care Team:  Aundrea Max MD as PCP - General  SELF, REFERRED    I, Moreno Summers, am serving as a scribe to document services personally performed by Linh De Souza MD, based upon my observations and the provider's statements to me. All documentation has been reviewed by the aforementioned doctor prior to being entered into the official medical record.     I have reviewed the above note and agree with the scribe's notation as written.

## 2019-04-25 NOTE — NURSING NOTE
Return appt in 3 months with NP  Schedule a surviorship visit with NP  appt with IR for lymphocyst drainage-appt to be scheduled

## 2019-04-25 NOTE — PROGRESS NOTES
Interventional  Radiology consulted for a lymphocele drain.  Ms. Morales is a 44 yr old female with a history of endometrial cancer. 10/2018 PT underwent  DaVinci Assisted DEL-BSO and chemotherapy  Follow pt experienced  pain sometimes radiates to her abdomen from her groin with lower extremity swelling and pain  .  Updated Imaging confirms  right pelvic sidewall collection  most consistent with lymphoceles.  Plan : Aspiration of right groin lymphocele . If fluid reaccumulates a drain placement could be considered      Discussed with Dr. Milena Benson IR [facility]  [phone] Call pager  [phone] pager

## 2019-04-25 NOTE — NURSING NOTE
"Oncology Rooming Note    April 25, 2019 10:14 AM   Meagan Morales is a 44 year old female who presents for:    Chief Complaint   Patient presents with     Oncology Clinic Visit     Return Endometrial Ca     Initial Vitals: /80   Pulse 83   Temp 98  F (36.7  C) (Oral)   Resp 16   Ht 1.645 m (5' 4.75\")   Wt 106.1 kg (234 lb)   SpO2 99%   BMI 39.24 kg/m   Estimated body mass index is 39.24 kg/m  as calculated from the following:    Height as of this encounter: 1.645 m (5' 4.75\").    Weight as of this encounter: 106.1 kg (234 lb). Body surface area is 2.2 meters squared.  No Pain (0) Comment: Data Unavailable   No LMP recorded. Patient has had a hysterectomy.  Allergies reviewed: Yes  Medications reviewed: Yes    Medications: Medication refills not needed today.  Ph[facility] into [facility] [address] [place] [address] [address] AT [facility]    Clinical concerns: CT scan results; follow up treatment       Dionne Schultz CMA              "

## 2019-05-13 ENCOUNTER — TELEPHONE (OUTPATIENT)
Dept: INTERVENTIONAL RADIOLOGY/VASCULAR | Facility: CLINIC | Age: 44
End: 2019-05-13

## 2019-05-15 ENCOUNTER — APPOINTMENT (OUTPATIENT)
Dept: MEDSURG UNIT | Facility: CLINIC | Age: 44
End: 2019-05-15
Attending: OBSTETRICS & GYNECOLOGY
Payer: COMMERCIAL

## 2019-05-15 ENCOUNTER — HOSPITAL ENCOUNTER (OUTPATIENT)
Facility: CLINIC | Age: 44
Discharge: HOME | End: 2019-05-15
Attending: OBSTETRICS & GYNECOLOGY | Admitting: OBSTETRICS & GYNECOLOGY
Payer: COMMERCIAL

## 2019-05-15 ENCOUNTER — APPOINTMENT (OUTPATIENT)
Dept: INTERVENTIONAL RADIOLOGY/VASCULAR | Facility: CLINIC | Age: 44
End: 2019-05-15
Attending: OBSTETRICS & GYNECOLOGY
Payer: COMMERCIAL

## 2019-05-15 VITALS
HEART RATE: 64 BPM | OXYGEN SATURATION: 99 % | BODY MASS INDEX: 37.32 KG/M2 | DIASTOLIC BLOOD PRESSURE: 73 MMHG | HEIGHT: 65 IN | SYSTOLIC BLOOD PRESSURE: 134 MMHG | RESPIRATION RATE: 16 BRPM | WEIGHT: 224 LBS | TEMPERATURE: 98.2 F

## 2019-05-15 DIAGNOSIS — I89.8 INGUINAL LYMPHOCYST: ICD-10-CM

## 2019-05-15 LAB
B-HCG FREE SERPL-ACNC: 1 [IU]/L (ref 0.86–1.14)
ERYTHROCYTE [DISTWIDTH] IN BLOOD BY AUTOMATED COUNT: 11.9 % (ref 10–15)
HCT VFR BLD AUTO: 39.7 % (ref 35–47)
HGB BLD-MCNC: 12.6 G/DL (ref 11.7–15.7)
MCH RBC QN AUTO: 29.4 PG (ref 26.5–33)
MCHC RBC AUTO-ENTMCNC: 31.7 G/DL (ref 31.5–36.5)
MCV RBC AUTO: 93 FL (ref 78–100)
PLATELET # BLD AUTO: 237 10E9/L (ref 150–450)
RBC # BLD AUTO: 4.28 10E12/L (ref 3.8–5.2)
WBC # BLD AUTO: 4.4 10E9/L (ref 4–11)

## 2019-05-15 PROCEDURE — 85027 COMPLETE CBC AUTOMATED: CPT | Performed by: RADIOLOGY

## 2019-05-15 PROCEDURE — 25800030 ZZH RX IP 258 OP 636: Performed by: PHYSICIAN ASSISTANT

## 2019-05-15 PROCEDURE — 25000125 ZZHC RX 250: Performed by: PHYSICIAN ASSISTANT

## 2019-05-15 PROCEDURE — 77012 CT SCAN FOR NEEDLE BIOPSY: CPT | Mod: 73

## 2019-05-15 PROCEDURE — 40000166 ZZH STATISTIC PP CARE STAGE 1

## 2019-05-15 PROCEDURE — 85610 PROTHROMBIN TIME: CPT

## 2019-05-15 RX ORDER — PROPRANOLOL HYDROCHLORIDE 10 MG/1
20 TABLET ORAL EVERY MORNING
COMMUNITY
End: 2021-01-29

## 2019-05-15 RX ORDER — NICOTINE POLACRILEX 4 MG
15-30 LOZENGE BUCCAL
Status: DISCONTINUED | OUTPATIENT
Start: 2019-05-15 | End: 2019-05-15 | Stop reason: HOSPADM

## 2019-05-15 RX ORDER — FLUMAZENIL 0.1 MG/ML
0.2 INJECTION, SOLUTION INTRAVENOUS
Status: DISCONTINUED | OUTPATIENT
Start: 2019-05-15 | End: 2019-05-15 | Stop reason: HOSPADM

## 2019-05-15 RX ORDER — CLINDAMYCIN PHOSPHATE 900 MG/50ML
900 INJECTION, SOLUTION INTRAVENOUS
Status: COMPLETED | OUTPATIENT
Start: 2019-05-15 | End: 2019-05-15

## 2019-05-15 RX ORDER — FENTANYL CITRATE 50 UG/ML
25-50 INJECTION, SOLUTION INTRAMUSCULAR; INTRAVENOUS EVERY 5 MIN PRN
Status: DISCONTINUED | OUTPATIENT
Start: 2019-05-15 | End: 2019-05-15 | Stop reason: HOSPADM

## 2019-05-15 RX ORDER — NALOXONE HYDROCHLORIDE 0.4 MG/ML
.1-.4 INJECTION, SOLUTION INTRAMUSCULAR; INTRAVENOUS; SUBCUTANEOUS
Status: DISCONTINUED | OUTPATIENT
Start: 2019-05-15 | End: 2019-05-15 | Stop reason: HOSPADM

## 2019-05-15 RX ORDER — DEXTROSE MONOHYDRATE 25 G/50ML
25-50 INJECTION, SOLUTION INTRAVENOUS
Status: DISCONTINUED | OUTPATIENT
Start: 2019-05-15 | End: 2019-05-15 | Stop reason: HOSPADM

## 2019-05-15 RX ORDER — PRAMIPEXOLE DIHYDROCHLORIDE 0.12 MG/1
0.12 TABLET ORAL 2 TIMES DAILY
COMMUNITY

## 2019-05-15 RX ORDER — LIDOCAINE 40 MG/G
CREAM TOPICAL
Status: DISCONTINUED | OUTPATIENT
Start: 2019-05-15 | End: 2019-05-15 | Stop reason: HOSPADM

## 2019-05-15 RX ORDER — SODIUM CHLORIDE 9 MG/ML
INJECTION, SOLUTION INTRAVENOUS CONTINUOUS
Status: DISCONTINUED | OUTPATIENT
Start: 2019-05-15 | End: 2019-05-15 | Stop reason: HOSPADM

## 2019-05-15 RX ADMIN — CLINDAMYCIN IN 5 PERCENT DEXTROSE 900 MG: 18 INJECTION, SOLUTION INTRAVENOUS at 13:06

## 2019-05-15 RX ADMIN — SODIUM CHLORIDE: 9 INJECTION, SOLUTION INTRAVENOUS at 13:06

## 2019-05-15 ASSESSMENT — MIFFLIN-ST. JEOR: SCORE: 1672.97

## 2019-05-15 NOTE — PROGRESS NOTES
Pt arrives to 2a, with mom and friend, for aspiration of groin lymphocele and possible drain placement. Pre procedure assessment completed. H&P is up to date. Consent needs to be signed. PIV placed, labs sent.

## 2019-05-15 NOTE — PROGRESS NOTES
Interventional Radiology Pre-Procedure Sedation Assessment   Time of Assessment: 1:32 PM    Expected Level: Moderate Sedation    Indication: Sedation is required for the following type of Procedure: Aspiration    Sedation and procedural consent: Risks, benefits and alternatives were discussed with Patient    PO Intake: Appropriately NPO for procedure    ASA Class: Class 2 - MILD SYSTEMIC DISEASE, NO ACUTE PROBLEMS, NO FUNCTIONAL LIMITATIONS.    Mallampati: Grade 2:  Soft palate, base of uvula, tonsillar pillars, and portion of posterior pharyngeal wall visible    Lungs: Lungs Clear with good breath sounds bilaterally    Heart: Normal heart sounds and rate    History and physical reviewed and no updates needed. I have reviewed the lab findings, diagnostic data, medications, and the plan for sedation. I have determined this patient to be an appropriate candidate for the planned sedation and procedure and have reassessed the patient IMMEDIATELY PRIOR to sedation and procedure.    Junior Rankin MD

## 2019-05-15 NOTE — PROCEDURES
Interventional Radiology Brief Post Procedure Note    Procedure: CT ABDOMEN PERITONEUM ABSCESS DRAIN W CATH PLACE    Proceduralist: Harry Matson MD    Assistant: None    Time Out: Prior to the start of the procedure and with procedural staff participation, I verbally confirmed the patient s identity using two indicators, relevant allergies, that the procedure was appropriate and matched the consent or emergent situation, and that the correct equipment/implants were available. Immediately prior to starting the procedure I conducted the Time Out with the procedural staff and re-confirmed the patient s name, procedure, and site/side. (The Joint Commission universal protocol was followed.)  Yes    Medications   Medication Event Details Admin User Admin Time       Sedation: None.    Findings: Preliminary CT shows marked reduction in right pelvic side wall fluid collection with only minimal residual.  No procedure performed.     Estimated Blood Loss: None    Fluoroscopy Time:  minute(s)    SPECIMENS: None    Complications: 1. None     Condition: Stable    Plan: Per Dr. De Souza    Comments: See dictated procedure note for full details.    Harry Matson MD

## 2019-05-23 ENCOUNTER — THERAPY VISIT (OUTPATIENT)
Dept: PHYSICAL THERAPY | Facility: CLINIC | Age: 44
End: 2019-05-23

## 2019-05-23 DIAGNOSIS — C54.1 ENDOMETRIAL CANCER (H): ICD-10-CM

## 2019-05-23 DIAGNOSIS — M25.551 PAIN IN JOINT, PELVIC REGION AND THIGH, RIGHT: ICD-10-CM

## 2019-05-23 DIAGNOSIS — R53.81 PHYSICAL DECONDITIONING: ICD-10-CM

## 2019-05-23 DIAGNOSIS — I89.0 LYMPHEDEMA OF BOTH LOWER EXTREMITIES: Primary | ICD-10-CM

## 2019-05-23 ASSESSMENT — 6 MINUTE WALK TEST (6MWT): TOTAL DISTANCE WALKED (METERS): 360

## 2019-05-23 NOTE — PROGRESS NOTES
Outpatient Physical Therapy Discharge Note     Patient: Meagan Morales  : 1975    Beginning/End Dates of Reporting Period:  19 to 2019    Referring Provider: CORTES Gant    Therapy Diagnosis: edema, LE and trunk weakness and pain.      Client Self Report: Able to do long walk w/o pain and exercise in gym up to 3 hours/day painfree.   Objective Measurements:  Objective Measure: FACIT   Details: 50  Objective Measure: 6MWT   Details: 1184 ft on 3/25/19  Objective Measure: LLIS  Details: 4        Objective Measure: LE volume  Details: -5.6% on R slightly increased on L            Outcome Measures (most recent score):  Facit: 50  6 minute walk test: 350 meters          Goals:  Goal Identifier walk   Goal Description Pt will be able to walk 1 mile without disabling increase in L leg pain   Target Date 19   Date Met  19   Progress:     Goal Identifier home exercise program   Goal Description In order to facilitate gains in general strength and endurance, and improve tolerance for functional mobility, ADLs, and recreational activities outside of physical therapy, patient will demonstrate independence with a HEP and report how to safely progress the program.   Target Date 19   Date Met  19   Progress:     Goal Identifier facit; usual activities   Goal Description Pt will report a score of 3 or 4 on facit: being able to do usual activities, indicating improvement in this area from a score of 2 . ( 4 is best , 0 is most limited)   Target Date 19   Date Met  19   Progress:         Progress Toward Goals:   Progress this reporting period: goals met    Plan:  Discharge from therapy.    Discharge:    Reason for Discharge: Patient has met all goals.    Equipment Issued: compression capris, thigh highs and bike shorts.     Discharge Plan: Patient to continue home program.

## 2019-06-03 NOTE — PROGRESS NOTES
GYNECOLOGIC ONCOLOGY SURVIVORSHIP NOTE    RE: Meagan Morales  MRN: [id number]  : 1975  Date of Visit: 2019    CC: Meagan Morales is a 44 year old year old female with a history of stage IIIA grade 1 endometrioid adenocarcinoma. She presents today for a survivorship visit.    HPI: Meagan Morales comes to the clinic accompanied by her mother She is feeling well. She completed treatment with Paclitaxel/Carboplatin on 3/7/19. She was recently discharged from  for her lymphedema; she has compression stockings. She has decreased her Gabapentin dose to 800 mg at bedtime. She is current with her health screenings. She has been going to the gym every day and does weights as well as cardio. She does not currently drink or smoke but is a former smoker. She does not have any eating restrictions and feels as though she eats a sufficient amount of calcium. She feels as though she has enough support since completing her chemotherapy.       Oncology History:  Diagnosis: stage IIIA grade 1 endometrioid adenocarcinoma  Primary GYN oncologist: Linh De Souza MD  Surgery: 10/24/18: DaVinci Assisted Total Laparoscopic Hysterectomy, Bilateral Salpingo-Oophorectomy, lymph node dissection, Excision of Left Vulvar Lesion  Chemotherapy: 18-3/7/19: Paclitaxel/Carboplatin x 6 cycles  Complications: lymphedema  Genetic testing: MMR proteins intact      Health Maintenance:  Cervical cancer screening: no longer indicated  Mammogram: current, due 10/2019  Colonoscopy: regular screening  DEXA: regular screening    Past Medical History:   Diagnosis Date     Chickenpox     as a child     Depression      Endometrial cancer (H)      Hypertension     since her 30's     Infertility, female      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)      PCOS (polycystic ovarian syndrome)      STD (female)     in her 20's       Past Surgical History:   Procedure Laterality Date     CYSTOSCOPY N/A 10/24/2018    Procedure: Cystoscopy;  Surgeon: Nolvia  Linh Irizarry MD;  Location: [facility] OR     DAVINCI HYSTERECTOMY TOTAL, BILATERAL SALPINGO-OOPHORECTOMY, NODE DISSECTION, COMBINED N/A 10/24/2018    Procedure: DaVinci Assisted Total Laparoscopic Hysterectomy, Bilateral Salpingo-Oophorectomy, lymph node dissection;  Surgeon: Linh De Souza MD;  Location: [facility] OR     EXCISE LESION VULVA Left 10/24/2018    Procedure: Excision of Left Vulvar Lesion;  Surgeon: Linh De Souza MD;  Location: [facility] OR       Social History     Socioeconomic History     Marital status: Single     Spouse name: Not on file     Number of children: Not on file     Years of education: Not on file     Highest education level: Not on file   Occupational History     Not on file   Social Needs     Financial resource strain: Not on file     Food insecurity:     Worry: Not on file     Inability: Not on file     Transportation needs:     Medical: Not on file     Non-medical: Not on file   Tobacco Use     Smoking status: Former Smoker     Packs/day: 0.50     Types: Cigarettes     Smokeless tobacco: Never Used     Tobacco comment: uses vape pen about 3-4 times daily   Substance and Sexual Activity     Alcohol use: No     Comment: 11 months sober      Drug use: No     Types: Methamphetamines     Comment: 11 months sober. lives at sober housing     Sexual activity: Not on file   Lifestyle     Physical activity:     Days per week: Not on file     Minutes per session: Not on file     Stress: Not on file   Relationships     Social connections:     Talks on phone: Not on file     Gets together: Not on file     Attends Hindu service: Not on file     Active member of club or organization: Not on file     Attends meetings of clubs or organizations: Not on file     Relationship status: Not on file     Intimate partner violence:     Fear of current or ex partner: Not on file     Emotionally abused: Not on file     Physically abused: Not on file     Forced sexual activity: Not on file   Other Topics Concern      "Not on file   Social History Narrative     Not on file       No family history on file.      OBJECTIVE:    /89 (BP Location: Right arm, Patient Position: Sitting, Cuff Size: Adult Large)   Pulse 76   Temp 98.2  F (36.8  C) (Oral)   Resp 16   Ht 1.645 m (5' 4.76\")   Wt 98.5 kg (217 lb 1.6 oz)   SpO2 97%   BMI 36.39 kg/m      Constitutional: Alert and oriented non-toxic appearing female in no acute distress  Psychologic: Pleasant and interactive, euthymic affect, makes appropriate eye contact, judgment intact, linear thought pattern    ASSESSMENT/PLAN:  1) stage IIIA grade 1 endometrioid adenocarcinoma. Completed treatment and now in surveillance. Discussed surveillance recommendations- to be seen by the nurse practitioner team every 3 months x 2 years (through 3/2020) then every 6 months x3 years (through 3/2023) with a  and pelvic/rectal exam. Discussed signs and symptoms of a recurrence, including but not limited to vaginal spotting or bleeding, persistent change in bowels/bladder.     2) Post-chemotherapy side effects:   Fatigue: improved   Neuropathy: denies   Hair loss: growing back   Ringing in the ears: denies   Chemo brain: improved although still feels as though he has some memory issues.     3) Post-surgical side effects: discussed effects including lymphedema, sexual side effects/menopause, lymphocele.     4) Late effects: reviewed potential late effects of cardiotoxicity, nephrotoxicity, and secondary malignancies.     5) Emotional well being: feels as though she has sufficient support.     6) Genetic counseling: her tumor testing had normal MMR proteins.    7) Health maintenance: discussed importance of keeping current with health screenings, having her eyes/teeth/skin checked, as well as hearing checks.      8) Healthy lifestyle: continue eating healthy foods, maintaining a health weight, doing weight bearing and cardio exercises, wearing a seatbelt, and general clean living.     9) " Patient verbalized understanding of and agreement with plan. She was given a copies of her treatment summary, surgical pathology, and survivorship care plan.  Questions answered to the best of my ability.    50 minutes were spent with this patient, over 50% of that time was spent in symptom management, treatment planning and in counseling and coordination of care.    CORTES Gant, WHNP-BC, ANP-BC  Women's Health Nurse Practitioner  Adult Nurse Pracitioner  Division of Gynecologic Oncology

## 2019-06-04 ENCOUNTER — ONCOLOGY SURVIVORSHIP VISIT (OUTPATIENT)
Dept: ONCOLOGY | Facility: CLINIC | Age: 44
End: 2019-06-04
Attending: NURSE PRACTITIONER
Payer: COMMERCIAL

## 2019-06-04 VITALS
DIASTOLIC BLOOD PRESSURE: 89 MMHG | OXYGEN SATURATION: 97 % | HEIGHT: 65 IN | HEART RATE: 76 BPM | SYSTOLIC BLOOD PRESSURE: 124 MMHG | BODY MASS INDEX: 36.17 KG/M2 | TEMPERATURE: 98.2 F | WEIGHT: 217.1 LBS | RESPIRATION RATE: 16 BRPM

## 2019-06-04 DIAGNOSIS — C54.1 ENDOMETRIAL CANCER (H): Primary | ICD-10-CM

## 2019-06-04 PROCEDURE — G0463 HOSPITAL OUTPT CLINIC VISIT: HCPCS | Mod: ZF

## 2019-06-04 PROCEDURE — 99215 OFFICE O/P EST HI 40 MIN: CPT | Mod: ZP | Performed by: NURSE PRACTITIONER

## 2019-06-04 ASSESSMENT — PAIN SCALES - GENERAL: PAINLEVEL: NO PAIN (0)

## 2019-06-04 ASSESSMENT — MIFFLIN-ST. JEOR: SCORE: 1641.89

## 2019-06-04 NOTE — NURSING NOTE
"Oncology Rooming Note    June 4, 2019 2:44 PM   Meagan Morales is a 44 year old female who presents for:    Chief Complaint   Patient presents with     Oncology Clinic Visit     Return; Survivorship visit - Endometrial Ca     Initial Vitals: /89 (BP Location: Right arm, Patient Position: Sitting, Cuff Size: Adult Large)   Pulse 76   Temp 98.2  F (36.8  C) (Oral)   Resp 16   Ht 1.645 m (5' 4.76\")   Wt 98.5 kg (217 lb 1.6 oz)   SpO2 97%   BMI 36.39 kg/m   Estimated body mass index is 36.39 kg/m  as calculated from the following:    Height as of this encounter: 1.645 m (5' 4.76\").    Weight as of this encounter: 98.5 kg (217 lb 1.6 oz). Body surface area is 2.12 meters squared.  No Pain (0) Comment: Data Unavailable   No LMP recorded. Patient has had a hysterectomy.  Allergies reviewed: No  Medications reviewed: No    Medications:   Pharmacy name entered into Whisk: [id number] - [place], [place] - [address] [place] [facility] [place] [facility] [address]    Clinical concerns: Provider came into room, nothing was reviewed with patient.      Rosa Leon CMA              "

## 2019-08-07 NOTE — PROGRESS NOTES
Follow Up Notes on Referred Patient    Date: 2019        RE: Meagan Morales  : 1975  VY: 2019      Meagan Morales is a 44 year old woman with a diagnosis of stage IIIA grade 1 endometrioid adenocarcinoma. She completed chemotherapy 3/2019.  She is here today for a surveillance visit.      Oncology History:  Meagan originally presented to clinic due to continuous vaginal bleeding, very light. She thought it was just her menses being continuous. US was done which found thickened endometrial lining. IUD was placed. Second US was done and IUD did not affect endometrial lining and IUD was malpositioned. Since then, she has noted continuous spotting. IUD was removed. Also of note, ovarian cysts were noted on second US. Subsequent endometrial biopsy was done which revealed grade 1 endometrial cancer. She has always had irregular menses and has never been able to get pregnant. Patient's  first and only pap smear on 10/10/2017. Had mammogram in 20's, will order additional mammogram for baseline. No history of prior colonoscopy. Started Wellbutrin this last year, mood has improved. Prior cigarette smoker, quit 2 months ago and switched to vape. Started again smoking cigarettes again but has not had one for 10 days. 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 No history of prior abdominal surgeries. No history of heart disease, lung disease or diabetes. Has never been able to get pregnant and dose not desire children.     18: pelvic US IMPRESSION:  Peripheral follicular arrangement high within the ovaries suggesting polycystic ovarian syndrome. Slightly thickened heterogeneous endometrium raising a concern of potential hyperplastic change.  18: pelvic US   1. Uterus is not normal in appearance. The endometrium appears thickened and vascular, and the IUD appears to be malpositioned.  Clinical correlation is recommended.    2. Bilateral complex ovarian  cysts are noted.  Follow up ultrasound in 4-6 weeks is recommended, consider saline infusion sonohysterogram for further imaging of the endometrium.      9/17/18: endometrial biopsy  ENDOMETRIUM, BIOPSY:  1. FIGO Grade 1 (well differentiated) endometrioid carcinoma of      the endometrium  2. DNA mismatch repair enzyme immunohistochemistry studies:     All intact (see Amendment note and synoptic reporting)     10/24/2018: DaVinci Assisted Total Laparoscopic Hysterectomy, Bilateral Salpingo-Oophorectomy, lymph node dissection, Excision of Left Vulvar Lesion  Pelvic Washing:   Rare atypical cells present.     TEST(S) PERFORMED     Test(s) Performed:         - Mismatch Repair Immunohistochemistry (IHC) Testing for Mismatch Repair (MMR) Proteins:           - No loss of nuclear expression of MMR proteins: low probability of microsatellite instability-high (MSI-H)     ORIGINAL REPORT:     SPECIMEN(S):   A: Left vulvar lesion   B: Left pelvic sentinel lymph node   C: Right pelvic sentinel lymph node   D: Uterus, cervix, bilateral ovaries and fallopian tubes   E: Portion of left ovary   F: Para-aortic lymph nodes   G: Left pelvic lymph nodes   H: Right pelvic lymph nodes     FINAL DIAGNOSIS:   A. LEFT VULVAR LESION, BIOPSY:   - Intradermal nevus     B. LEFT PELVIC SENTINEL LYMPH NODE, EXCISION:   - One reactive lymph node, negative for malignancy (0/1)     C. RIGHT PELVIC SENTINEL LYMPH NODE, EXCISION:   - Six reactive lymph nodes, negative for malignancy (0/6)     D. UTERUS, CERVIX, BILATERAL OVARIES AND FALLOPIAN TUBES, HYSTERECTOMY WITH BILATERAL SALPINGO-OOPHORECTOMY:   - Endometrial endometrioid adenocarcinoma, FIGO grade 1   - Atypical endometrial hyperplasia   - Myometrium with no significant histologic abnormality   - Chronic cervicitis with microglandular hyperplasia   - Uterine serosal fibrous adhesions   - Ovaries with cortical hyperplasia   - Metastatic endometrial adenocarcinoma to the left fallopian tube   -  Right fallopian tube with no significant histologic abnormality     E. PORTION OF LEFT OVARY, EXCISION:   - Benign cortical inclusion cysts     F. PARA-AORTIC LYMPH NODES, EXCISION:   - Four reactive lymph nodes, negative for malignancy (0/4)   - Benign glandular inclusions consistent with endosalpingiosis     G. LEFT PELVIC LYMPH NODES, EXCISION:   - Eight reactive lymph nodes, negative for malignancy (0/8)     H. RIGHT PELVIC LYMPH NODES, EXCISION:   - Nine reactive lymph nodes, negative for malignancy (0/9)   - Benign glandular inclusions consistent with endosalpingiosis     COMMENT:   The final diagnosis confirms the interpretation provided intraoperatively. The tumor seen in the left fallopian tube involves the wall (invading the mucosa and submucosa) and appears free floating in the lumen.   Tumor Site:         - Endometrium - Anterior and posterior     Histologic Type:         - Endometrioid carcinoma, NOS     Histologic Grade:         - FIGO grade 1     Tumor Size: 5.7 Centimeters (cm)     Tumor Extent       Myometrial Invasion:           - Not identified       Adenomyosis:           - Not identified       Uterine Serosa Involvement:           - Not identified       Lower Uterine Segment Involvement:           - Not identified       Cervical Stromal Involvement:           - Not identified       Other Tissue / Organ Involvement:           - Left fallopian tube       Peritoneal Ascitic Fluid:           - Results pending     Accessory Findings       Lymphovascular Invasion:           - Not identified     11/6/18: CT ap IMPRESSION:   1. Surgical changes of hysterectomy, bilateral salpingo-oophorectomy, and pelvic lymph node dissection with extensive fat stranding and fluid within the low pelvis extending superiorly along the iliac vasculature, right greater than left, potentially within normal postsurgical limits, however, slightly more than expected for postsurgical change.   1a. The ureters are not well  evaluated on this single phase exam and there is no hydronephrosis or hydroureter, however, the amount of fluid in the pelvis does raise the question of ureteral injury. If there is clinical concern, consider obtaining a CT urogram.  1b. An area of irregular rim enhancement in the low pelvis may represent a normal postsurgical vaginal cuff, though, again, this is slightly more conspicuous than usual and dehiscence or a developing abscess may have a similar appearance.  2. 6.8 x 4.1 x 5.6 cm right pelvic sidewall seroma versus lymphocele.  3. Soft tissue nodular thickening anteriorly to the right psoas muscle and along the right lateral conal fascia, indeterminate, cannot rule out metastatic foci. Attention on follow-up studies.  4. Cholelithiasis without evidence of cholecystitis.      11/7/18: CT abd with contrast CT urogram IMPRESSION:   1. Postsurgical changes of hysterectomy, bilateral salpingo-oophorectomy, and iliac lymph node dissection with fluid  collections within the pelvis extending along the iliac vasculature.  a. The right pelvic sidewall fluid collection is not significantly changed while the left pelvic sidewall fluid collection has enlarged  in comparison to the 11/6/2018 CT. The differential remains a seroma versus a lymphocele.  b. No evidence of contrast extravasation to suggest a ureteral injury.  c. Redemonstration of soft tissue nodular thickening along the right lateral conal fascia and anteriorly to the right psoas muscle and right psoas muscle. Attention on follow-up is recommended.  2. Cholelithiasis without evidence of cholecystitis.     Plan: adjuvant therapy to include 6 cycles of taxol and carboplatin; would not add radiation as all lymph nodes are negative, she does not have LVSI or deep invasion and this is a low grade tumor. Rx percocet 5 tabs due to continued abdominal pain. Will not prescribe any more narcotics and patient is aware of this.     11/16/18: Cycle #1  Paclitaxel/Carboplatin.   35.  12/6/18: Cycle #2 Paclitaxel/Carboplatin.  8.  12/27/2018: Cycle #3 T/C.  7.  1/8/19: US lower extremity: IMPRESSION: No evidence of deep venous thrombosis in either lower extremity.  1/9/2019: CT abd/pelvis IMPRESSION:   1. Minimal change in a right pelvic sidewall collection and decreased size of a left pelvic sidewall collection, which are most consistent with lymphoceles.  2. No suspicious pelvic lymphadenopathy.      1/21/19 Cycle #4 Paclitaxel/Carboplatin.  8.      1/2019 Presented with pain in LE:  US IMPRESSION: No evidence of deep venous thrombosis in either lower extremity.     1/2019 CT:IMPRESSION:   1. Minimal change in a right pelvic sidewall collection and decreased size of a left pelvic sidewall collection, which are most consistent with lymphoceles.  2. No suspicious pelvic lymphadenopathy.      2/14/19: Cycle #5 Paclitaxel/Carboplatin.  9.  3/7/19: Cycle #6 Paclitaxel/Carboplatin.  9.     4/4/2019: CT CAP IMPRESSION:   1. Significant decrease in the right pelvic sidewall collection, with resolved left pelvic sidewall collection. These are most consistent with lymphoceles.  2. No suspicious pelvic lymphadenopathy.  3. No suspicious abnormality in the chest.     Peritoneum / Retroperitoneum: Interval significant decrease in the right pelvic sidewall fluid collection now measuring 68 x 34 mm, previously 84 x 37 mm. The left pelvic sidewall fluid collection seen on prior examination has resolved.    Start surveillance; will alternate between NP and MD.          Today she comes to clinic feeling well. She denies any vaginal bleeding, no changes in her bowel or bladder habits, no nausea/emesis, no lower extremity edema, and no difficulties eating or sleeping. She denies any abdominal discomfort/bloating, no fevers or chills, and no chest pain or shortness of breath. She is current with her health screenings with her mammogram due in  October. She is intermittently sexually active ahd did use a lubricant. She has been trying to wean off of her Wellbutrin; her psychiatrist is aware and helping with this. She continues to work as the  for the sober house she lives in and she is also working another job. She has been exercising more and has lost some weight. She states the pain she was having in her left thigh has resolved.            Review of Systems:    Systemic           no weight changes; no fever; no chills; no night sweats; no appetite changes  Skin           no rashes, or lesions  Eye           no irritation; no changes in vision  Deny-Laryngeal           no dysphagia; no hoarseness   Pulmonary    no cough; no shortness of breath  Cardiovascular    no chest pain; no palpitations  Gastrointestinal    no diarrhea; no constipation; no abdominal pain; no changes in bowel habits; no blood in stool  Genitourinary   no urinary frequency; no urinary urgency; no dysuria; no pain; no abnormal vaginal discharge; no abnormal vaginal bleeding  Breast    no breast discharge; no breast changes; no breast pain  Musculoskeletal    no myalgias; no arthralgias; no back pain  Psychiatric           no depressed mood; no anxiety    Hematologic               no tender lymph nodes; no noticeable swellings or lumps   Endocrine    no hot flashes; no heat/cold intolerance         Neurological   no tremor; no numbness and tingling; no headaches; no difficulty sleeping      Past Medical History:    Past Medical History:   Diagnosis Date     Chickenpox     as a child     Depression      Endometrial cancer (H)      Hypertension     since her 30's     Infertility, female      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)      PCOS (polycystic ovarian syndrome)      STD (female)     in her 20's         Past Surgical History:    Past Surgical History:   Procedure Laterality Date     CYSTOSCOPY N/A 10/24/2018    Procedure: Cystoscopy;  Surgeon: Linh De Souza MD;   Location: [facility] OR     DAVINCI HYSTERECTOMY TOTAL, BILATERAL SALPINGO-OOPHORECTOMY, NODE DISSECTION, COMBINED N/A 10/24/2018    Procedure: DaVinci Assisted Total Laparoscopic Hysterectomy, Bilateral Salpingo-Oophorectomy, lymph node dissection;  Surgeon: Linh De Souza MD;  Location: [facility]     EXCISE LESION VULVA Left 10/24/2018    Procedure: Excision of Left Vulvar Lesion;  Surgeon: Linh De Souza MD;  Location: [facility] OR         Health Maintenance Due   Topic Date Due     PREVENTIVE CARE VISIT  1975     DEPRESSION ACTION PLAN  1975     PAP  1975     PHQ-9  1975     HIV SCREENING  01/20/1992       Current Medications:     Current Outpatient Medications   Medication Sig Dispense Refill     albuterol (PROAIR HFA/PROVENTIL HFA/VENTOLIN HFA) 108 (90 Base) MCG/ACT inhaler Inhale 1-2 puffs into the lungs       buPROPion (WELLBUTRIN XL) 150 MG 24 hr tablet Take 150 mg by mouth       gabapentin (NEURONTIN) 600 MG tablet Take 600 mg by mouth as needed 600mg in AM, 600mg in the afternoon, 900mg in the evening       ibuprofen (ADVIL/MOTRIN) 600 MG tablet Take 1 tablet (600 mg) by mouth every 6 hours as needed for moderate pain 30 tablet 1     loratadine (CLARITIN) 10 MG tablet Take 10 mg by mouth       Multiple Vitamin (MULTIVITAMIN  S) CAPS Take 1 capsule by mouth       omeprazole (PRILOSEC) 40 MG DR capsule Take 40 mg by mouth       pramipexole (MIRAPEX) 0.125 MG tablet Take 0.125 mg by mouth At Bedtime       propranolol (INDERAL) 10 MG tablet Take 10 mg by mouth At Bedtime       spironolactone (ALDACTONE) 50 MG tablet Take 50 mg by mouth daily            Allergies:        Allergies   Allergen Reactions     Amoxicillin      Penicillin G         Social History:     Social History     Tobacco Use     Smoking status: Former Smoker     Packs/day: 0.50     Types: Cigarettes     Smokeless tobacco: Never Used     Tobacco comment: uses vape pen about 3-4 times daily   Substance Use Topics     Alcohol  "use: No     Comment: 11 months sober        History   Drug Use No     Comment: 11 months sober. lives at sober housing         Family History:       No family history on file.      Physical Exam:     /73   Pulse 74   Temp 97.9  F (36.6  C) (Oral)   Resp 14   Ht 1.645 m (5' 4.75\")   Wt 95.7 kg (211 lb)   SpO2 99%   BMI 35.38 kg/m    Body mass index is 35.38 kg/m .    General Appearance: healthy and alert, no distress     HEENT: no thyromegaly, no palpable nodules or masses        Cardiovascular: regular rate and rhythm, no gallops, rubs or murmurs     Respiratory: lungs clear, no rales, rhonchi or wheezes, normal diaphragmatic excursion    Musculoskeletal: extremities non tender and without edema    Skin: no lesions or rashes     Neurological: normal gait, no gross defects     Psychiatric: appropriate mood and affect                               Hematological: normal cervical, supraclavicular and inguinal lymph nodes     Gastrointestinal:       abdomen soft, non-tender, non-distended, no organomegaly or masses    Genitourinary: External genitalia and urethral meatus appears normal.  Vagina is smooth without nodularity or masses.  Cervix surgically absent.  Bimanual exam reveal no masses, nodularity or fullness.  Recto-vaginal exam confirms these findings.      Assessment:    Meagan Morales is a 44 year old woman with a diagnosis of stage IIIA grade 1 endometrioid adenocarcinoma. She completed chemotherapy 3/2019.  She is here today for a surveillance visit.     20 minutes were spent with this patient, over 50% of that time was spent in symptom management, treatment planning and in counseling and coordination of care.      Plan:     1.)        Patient to RTC in 3 months for her next surveillance visit; she will see Dr. De Souza and to discuss role of  in her surveillance. Reviewed recommendations from [facility] not to perform surveillance pap smears in women diagnosed with endometrial cancer as this does not " improve detection of local recurrence. Reviewed signs and symptoms for when she should contact the clinic or seek additional care. Patient to contact the clinic with any questions or concerns in the interim.     2.) Genetic risk factors were assessed and her MMR proteins were intact.      3.) Labs and/or tests ordered include:  None.      4.) Health maintenance issues addressed today include annual health maintenance and non-gynecologic issues with PCP.    CORTES Gant, WHNP-BC, ANP-BC  Women's Health Nurse Practitioner  Adult Nurse Pracitioner  Division of Gynecologic Oncology          CC  Patient Care Team:  Aundrea Max MD as PCP - General  SELF, REFERRED

## 2019-08-08 ENCOUNTER — ONCOLOGY VISIT (OUTPATIENT)
Dept: ONCOLOGY | Facility: CLINIC | Age: 44
End: 2019-08-08
Attending: OBSTETRICS & GYNECOLOGY
Payer: COMMERCIAL

## 2019-08-08 VITALS
OXYGEN SATURATION: 99 % | WEIGHT: 211 LBS | SYSTOLIC BLOOD PRESSURE: 120 MMHG | DIASTOLIC BLOOD PRESSURE: 73 MMHG | HEIGHT: 65 IN | TEMPERATURE: 97.9 F | HEART RATE: 74 BPM | RESPIRATION RATE: 14 BRPM | BODY MASS INDEX: 35.16 KG/M2

## 2019-08-08 DIAGNOSIS — C54.1 ENDOMETRIAL CANCER (H): Primary | ICD-10-CM

## 2019-08-08 PROCEDURE — 99213 OFFICE O/P EST LOW 20 MIN: CPT | Mod: ZP | Performed by: NURSE PRACTITIONER

## 2019-08-08 PROCEDURE — G0463 HOSPITAL OUTPT CLINIC VISIT: HCPCS | Mod: ZF

## 2019-08-08 RX ORDER — BUPROPION HYDROCHLORIDE 150 MG/1
75 TABLET ORAL EVERY MORNING
COMMUNITY
Start: 2019-07-24 | End: 2020-02-11

## 2019-08-08 ASSESSMENT — MIFFLIN-ST. JEOR: SCORE: 1614

## 2019-08-08 ASSESSMENT — PAIN SCALES - GENERAL: PAINLEVEL: NO PAIN (0)

## 2019-10-02 NOTE — NURSING NOTE
"Oncology Rooming Note    August 8, 2019 1:04 PM   Meagan Morales is a 44 year old female who presents for:    Chief Complaint   Patient presents with     Oncology Clinic Visit     Return Endometrial Ca     Initial Vitals: /73   Pulse 74   Temp 97.9  F (36.6  C) (Oral)   Resp 14   Ht 1.645 m (5' 4.75\")   Wt 95.7 kg (211 lb)   SpO2 99%   BMI 35.38 kg/m   Estimated body mass index is 35.38 kg/m  as calculated from the following:    Height as of this encounter: 1.645 m (5' 4.75\").    Weight as of this encounter: 95.7 kg (211 lb). Body surface area is 2.09 meters squared.  No Pain (0) Comment: Data Unavailable   No LMP recorded. Patient has had a hysterectomy.  Allergies reviewed: Yes  Medications reviewed: Yes    Medications: Medication refills not needed today.  Pharmacy name entered into [employer]: [id number] [place] [phone] AT [facility]    Clinical concerns: No new concerns.         Dionne Schultz CMA              " [facility]    Phone Message    May a detailed message be left on voicemail: yes    Reason for Call: Medication Refill Request    Has the patient contacted the pharmacy for the refill? Yes   Name of medication being requested: benzatropine, wellburtin, perphenazine, latuda  Provider who prescribed the medication:   Pharmacy: [facility] - [phone]-  [phone]  Date medication is needed: She has enough the latuda to make it to next appt. She is out of her other medications.      Action Taken: Other: [facility]

## 2019-11-07 ENCOUNTER — ONCOLOGY VISIT (OUTPATIENT)
Dept: ONCOLOGY | Facility: CLINIC | Age: 44
End: 2019-11-07
Attending: OBSTETRICS & GYNECOLOGY
Payer: COMMERCIAL

## 2019-11-07 VITALS
TEMPERATURE: 98.2 F | SYSTOLIC BLOOD PRESSURE: 121 MMHG | DIASTOLIC BLOOD PRESSURE: 80 MMHG | RESPIRATION RATE: 18 BRPM | HEART RATE: 64 BPM | WEIGHT: 217 LBS | BODY MASS INDEX: 36.39 KG/M2 | OXYGEN SATURATION: 97 %

## 2019-11-07 DIAGNOSIS — C54.1 ENDOMETRIAL CANCER (H): Primary | ICD-10-CM

## 2019-11-07 PROCEDURE — G0463 HOSPITAL OUTPT CLINIC VISIT: HCPCS | Mod: ZF

## 2019-11-07 PROCEDURE — 99214 OFFICE O/P EST MOD 30 MIN: CPT | Mod: ZP | Performed by: OBSTETRICS & GYNECOLOGY

## 2019-11-07 ASSESSMENT — PAIN SCALES - GENERAL: PAINLEVEL: NO PAIN (0)

## 2019-11-07 NOTE — LETTER
2019       RE: Meagan Morales  [address] [place] [id number]     Dear Colleague,    Thank you for referring your patient, Meagan Morales, to the [facility]. Please see a copy of my visit note below.                Follow Up Notes on Referred Patient    Date: 2019        RE: Meagan Morales  : 1975  VY: 2019      Meagan Morales is a 44 year old woman with a diagnosis of stage IIIA grade 1 endometrioid adenocarcinoma. She completed chemotherapy 3/2019.  She is here today for a surveillance visit.      Oncology History:  Meagan originally presented to clinic due to continuous vaginal bleeding, very light. She thought it was just her menses being continuous. US was done which found thickened endometrial lining. IUD was placed. Second US was done and IUD did not affect endometrial lining and IUD was malpositioned. Since then, she has noted continuous spotting. IUD was removed. Also of note, ovarian cysts were noted on second US. Subsequent endometrial biopsy was done which revealed grade 1 endometrial cancer. She has always had irregular menses and has never been able to get pregnant. Patient's  first and only pap smear on 10/10/2017. Had mammogram in 20's, will order additional mammogram for baseline. No history of prior colonoscopy. Started Wellbutrin this last year, mood has improved. Prior cigarette smoker, quit 2 months ago and switched to vape. Started again smoking cigarettes again but has not had one for 10 days. 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 No history of prior abdominal surgeries. No history of heart disease, lung disease or diabetes. Has never been able to get pregnant and dose not desire children.     18: pelvic US IMPRESSION:  Peripheral follicular arrangement high within the ovaries suggesting polycystic ovarian syndrome. Slightly thickened heterogeneous endometrium raising a concern of potential  Walk in hyperplastic change.  9/17/18: pelvic US   1. Uterus is not normal in appearance. The endometrium appears thickened and vascular, and the IUD appears to be malpositioned.  Clinical correlation is recommended.    2. Bilateral complex ovarian cysts are noted.  Follow up ultrasound in 4-6 weeks is recommended, consider saline infusion sonohysterogram for further imaging of the endometrium.      9/17/18: endometrial biopsy  ENDOMETRIUM, BIOPSY:  1. FIGO Grade 1 (well differentiated) endometrioid carcinoma of      the endometrium  2. DNA mismatch repair enzyme immunohistochemistry studies:     All intact (see Amendment note and synoptic reporting)     10/24/2018: DaVinci Assisted Total Laparoscopic Hysterectomy, Bilateral Salpingo-Oophorectomy, lymph node dissection, Excision of Left Vulvar Lesion  Pelvic Washing:   Rare atypical cells present.     TEST(S) PERFORMED     Test(s) Performed:         - Mismatch Repair Immunohistochemistry (IHC) Testing for Mismatch Repair (MMR) Proteins:           - No loss of nuclear expression of MMR proteins: low probability of microsatellite instability-high (MSI-H)     ORIGINAL REPORT:     SPECIMEN(S):   A: Left vulvar lesion   B: Left pelvic sentinel lymph node   C: Right pelvic sentinel lymph node   D: Uterus, cervix, bilateral ovaries and fallopian tubes   E: Portion of left ovary   F: Para-aortic lymph nodes   G: Left pelvic lymph nodes   H: Right pelvic lymph nodes     FINAL DIAGNOSIS:   A. LEFT VULVAR LESION, BIOPSY:   - Intradermal nevus     B. LEFT PELVIC SENTINEL LYMPH NODE, EXCISION:   - One reactive lymph node, negative for malignancy (0/1)     C. RIGHT PELVIC SENTINEL LYMPH NODE, EXCISION:   - Six reactive lymph nodes, negative for malignancy (0/6)     D. UTERUS, CERVIX, BILATERAL OVARIES AND FALLOPIAN TUBES, HYSTERECTOMY WITH BILATERAL SALPINGO-OOPHORECTOMY:   - Endometrial endometrioid adenocarcinoma, FIGO grade 1   - Atypical endometrial hyperplasia   - Myometrium with no  significant histologic abnormality   - Chronic cervicitis with microglandular hyperplasia   - Uterine serosal fibrous adhesions   - Ovaries with cortical hyperplasia   - Metastatic endometrial adenocarcinoma to the left fallopian tube   - Right fallopian tube with no significant histologic abnormality     E. PORTION OF LEFT OVARY, EXCISION:   - Benign cortical inclusion cysts     F. PARA-AORTIC LYMPH NODES, EXCISION:   - Four reactive lymph nodes, negative for malignancy (0/4)   - Benign glandular inclusions consistent with endosalpingiosis     G. LEFT PELVIC LYMPH NODES, EXCISION:   - Eight reactive lymph nodes, negative for malignancy (0/8)     H. RIGHT PELVIC LYMPH NODES, EXCISION:   - Nine reactive lymph nodes, negative for malignancy (0/9)   - Benign glandular inclusions consistent with endosalpingiosis     COMMENT:   The final diagnosis confirms the interpretation provided intraoperatively. The tumor seen in the left fallopian tube involves the wall (invading the mucosa and submucosa) and appears free floating in the lumen.   Tumor Site:         - Endometrium - Anterior and posterior     Histologic Type:         - Endometrioid carcinoma, NOS     Histologic Grade:         - FIGO grade 1     Tumor Size: 5.7 Centimeters (cm)     Tumor Extent       Myometrial Invasion:           - Not identified       Adenomyosis:           - Not identified       Uterine Serosa Involvement:           - Not identified       Lower Uterine Segment Involvement:           - Not identified       Cervical Stromal Involvement:           - Not identified       Other Tissue / Organ Involvement:           - Left fallopian tube       Peritoneal Ascitic Fluid:           - Results pending     Accessory Findings       Lymphovascular Invasion:           - Not identified     11/6/18: CT ap IMPRESSION:   1. Surgical changes of hysterectomy, bilateral salpingo-oophorectomy, and pelvic lymph node dissection with extensive fat stranding and fluid  within the low pelvis extending superiorly along the iliac vasculature, right greater than left, potentially within normal postsurgical limits, however, slightly more than expected for postsurgical change.   1a. The ureters are not well evaluated on this single phase exam and there is no hydronephrosis or hydroureter, however, the amount of fluid in the pelvis does raise the question of ureteral injury. If there is clinical concern, consider obtaining a CT urogram.  1b. An area of irregular rim enhancement in the low pelvis may represent a normal postsurgical vaginal cuff, though, again, this is slightly more conspicuous than usual and dehiscence or a developing abscess may have a similar appearance.  2. 6.8 x 4.1 x 5.6 cm right pelvic sidewall seroma versus lymphocele.  3. Soft tissue nodular thickening anteriorly to the right psoas muscle and along the right lateral conal fascia, indeterminate, cannot rule out metastatic foci. Attention on follow-up studies.  4. Cholelithiasis without evidence of cholecystitis.      11/7/18: CT abd with contrast CT urogram IMPRESSION:   1. Postsurgical changes of hysterectomy, bilateral salpingo-oophorectomy, and iliac lymph node dissection with fluid  collections within the pelvis extending along the iliac vasculature.  a. The right pelvic sidewall fluid collection is not significantly changed while the left pelvic sidewall fluid collection has enlarged  in comparison to the 11/6/2018 CT. The differential remains a seroma versus a lymphocele.  b. No evidence of contrast extravasation to suggest a ureteral injury.  c. Redemonstration of soft tissue nodular thickening along the right lateral conal fascia and anteriorly to the right psoas muscle and right psoas muscle. Attention on follow-up is recommended.  2. Cholelithiasis without evidence of cholecystitis.     Plan: adjuvant therapy to include 6 cycles of taxol and carboplatin; would not add radiation as all lymph nodes are  negative, she does not have LVSI or deep invasion and this is a low grade tumor. Rx percocet 5 tabs due to continued abdominal pain. Will not prescribe any more narcotics and patient is aware of this.     11/16/18: Cycle #1 Paclitaxel/Carboplatin.   35.  12/6/18: Cycle #2 Paclitaxel/Carboplatin.  8.  12/27/2018: Cycle #3 T/C.  7.  1/8/19: US lower extremity: IMPRESSION: No evidence of deep venous thrombosis in either lower extremity.  1/9/2019: CT abd/pelvis IMPRESSION:   1. Minimal change in a right pelvic sidewall collection and decreased size of a left pelvic sidewall collection, which are most consistent with lymphoceles.  2. No suspicious pelvic lymphadenopathy.      1/21/19 Cycle #4 Paclitaxel/Carboplatin.  8.      1/2019 Presented with pain in LE:  US IMPRESSION: No evidence of deep venous thrombosis in either lower extremity.     1/2019 CT:IMPRESSION:   1. Minimal change in a right pelvic sidewall collection and decreased size of a left pelvic sidewall collection, which are most consistent with lymphoceles.  2. No suspicious pelvic lymphadenopathy.      2/14/19: Cycle #5 Paclitaxel/Carboplatin.  9.  3/7/19: Cycle #6 Paclitaxel/Carboplatin.  9.     4/4/2019: CT CAP IMPRESSION:   1. Significant decrease in the right pelvic sidewall collection, with resolved left pelvic sidewall collection. These are most consistent with lymphoceles.  2. No suspicious pelvic lymphadenopathy.  3. No suspicious abnormality in the chest.     Peritoneum / Retroperitoneum: Interval significant decrease in the right pelvic sidewall fluid collection now measuring 68 x 34 mm, previously 84 x 37 mm. The left pelvic sidewall fluid collection seen on prior examination has resolved.    Start surveillance; will alternate between NP and MD.          Today she comes to clinic feeling well. She denies any vaginal bleeding, no changes in her bowel or bladder habits, no nausea/emesis, no lower extremity edema,  and no difficulties eating or sleeping. She denies any abdominal discomfort/bloating, no fevers or chills, and no chest pain or shortness of breath. She is current with her health screenings had her mammogram due in October.           Review of Systems:    Systemic           no weight changes; no fever; no chills; no night sweats; no appetite changes  Skin           no rashes, or lesions  Eye           no irritation; no changes in vision  Deny-Laryngeal           no dysphagia; no hoarseness   Pulmonary    no cough; no shortness of breath  Cardiovascular    no chest pain; no palpitations  Gastrointestinal    no diarrhea; no constipation; no abdominal pain; no changes in bowel habits; no blood in stool  Genitourinary   no urinary frequency; no urinary urgency; no dysuria; no pain; no abnormal vaginal discharge; no abnormal vaginal bleeding  Breast    no breast discharge; no breast changes; no breast pain  Musculoskeletal    no myalgias; no arthralgias; no back pain  Psychiatric           no depressed mood; no anxiety    Hematologic               no tender lymph nodes; no noticeable swellings or lumps   Endocrine    no hot flashes; no heat/cold intolerance         Neurological   no tremor; no numbness and tingling; no headaches; no difficulty sleeping      Past Medical History:    Past Medical History:   Diagnosis Date     Chickenpox     as a child     Depression      Endometrial cancer (H)      Hypertension     since her 30's     Infertility, female      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)      PCOS (polycystic ovarian syndrome)      STD (female)     in her 20's         Past Surgical History:    Past Surgical History:   Procedure Laterality Date     CYSTOSCOPY N/A 10/24/2018    Procedure: Cystoscopy;  Surgeon: Linh De Souza MD;  Location: [facility] OR     DAVINCI HYSTERECTOMY TOTAL, BILATERAL SALPINGO-OOPHORECTOMY, NODE DISSECTION, COMBINED N/A 10/24/2018    Procedure: DaVinci Assisted Total Laparoscopic  Hysterectomy, Bilateral Salpingo-Oophorectomy, lymph node dissection;  Surgeon: Linh De Souza MD;  Location: [facility] OR     EXCISE LESION VULVA Left 10/24/2018    Procedure: Excision of Left Vulvar Lesion;  Surgeon: Linh De Souza MD;  Location: [facility] OR         Health Maintenance Due   Topic Date Due     PREVENTIVE CARE VISIT  1975     DEPRESSION ACTION PLAN  1975     PHQ-9  1975     HIV SCREENING  01/20/1992     PNEUMOCOCCAL IMMUNIZATION 19-64 HIGHEST RISK (1 of 3 - PCV13) 01/20/1996     HPV  01/20/1998     PAP  01/20/2002     INFLUENZA VACCINE (1) 09/01/2019       Current Medications:     Current Outpatient Medications   Medication Sig Dispense Refill     albuterol (PROAIR HFA/PROVENTIL HFA/VENTOLIN HFA) 108 (90 Base) MCG/ACT inhaler Inhale 1-2 puffs into the lungs       buPROPion (WELLBUTRIN XL) 150 MG 24 hr tablet Take 75 mg by mouth every morning        gabapentin (NEURONTIN) 600 MG tablet Take 600 mg by mouth as needed 600mg in AM, 600mg in the afternoon, 900mg in the evening       ibuprofen (ADVIL/MOTRIN) 600 MG tablet Take 1 tablet (600 mg) by mouth every 6 hours as needed for moderate pain 30 tablet 1     Multiple Vitamin (MULTIVITAMIN  S) CAPS Take 1 capsule by mouth       omeprazole (PRILOSEC) 40 MG DR capsule Take 40 mg by mouth       pramipexole (MIRAPEX) 0.125 MG tablet Take 0.125 mg by mouth At Bedtime       propranolol (INDERAL) 10 MG tablet Take 20 mg by mouth every morning        Sharps Container (SHARPS-A-GATOR LOCKING BRACKET) [facility] CPAP machine for home use at pressure: 5-16 CM H20 , Heated humidifier x 1, Humidifier chamber x 1,  Nasal mask with cushion x 1, Heated tubing x 1, Headgear x 1, Filters: Disposable x 1pk & Reusable x 1pk, Length of Need: 99 months, Frequency of use: Daily       spironolactone (ALDACTONE) 50 MG tablet Take 50 mg by mouth daily        loratadine (CLARITIN) 10 MG tablet Take 10 mg by mouth           Allergies:        Allergies   Allergen  Reactions     Amoxicillin      Penicillin G         Social History:     Social History     Tobacco Use     Smoking status: Former Smoker     Packs/day: 0.50     Types: Cigarettes     Smokeless tobacco: Never Used     Tobacco comment: uses vape pen about 3-4 times daily   Substance Use Topics     Alcohol use: No     Comment: 11 months sober        History   Drug Use No     Comment: 11 months sober. lives at sober housing             Date Value Ref Range Status   03/07/2019 9 0 - 30 U/mL Final     Comment:     Assay Method:  Chemiluminescence using Siemens Centaur XP   02/14/2019 9 0 - 30 U/mL Final     Comment:     Assay Method:  Chemiluminescence using Siemens Centaur XP   01/21/2019 8 0 - 30 U/mL Final     Comment:     Assay Method:  Chemiluminescence using Siemens Centaur XP   12/27/2018 7 0 - 30 U/mL Final     Comment:     Assay Method:  Chemiluminescence using Siemens Centaur XP   12/06/2018 8 0 - 30 U/mL Final     Comment:     Assay Method:  Chemiluminescence using Siemens Centaur XP   11/16/2018 35 (H) 0 - 30 U/mL Final     Comment:     Assay Method:  Chemiluminescence using Siemens Centaur XP                Physical Exam:     /80   Pulse 64   Temp 98.2  F (36.8  C) (Oral)   Resp 18   Wt 98.4 kg (217 lb)   SpO2 97%   BMI 36.39 kg/m     Body mass index is 36.39 kg/m .    General Appearance: healthy and alert, no distress     HEENT: no thyromegaly, no palpable nodules or masses        Cardiovascular: regular rate and rhythm, no gallops, rubs or murmurs     Respiratory: lungs clear, no rales, rhonchi or wheezes, normal diaphragmatic excursion    Musculoskeletal: extremities non tender and without edema    Skin: no lesions or rashes     Neurological: normal gait, no gross defects     Psychiatric: appropriate mood and affect                               Hematological: normal cervical, supraclavicular and inguinal lymph nodes     Gastrointestinal:       abdomen soft, non-tender, non-distended, no  organomegaly or masses    Genitourinary: External genitalia and urethral meatus appears normal.  Vagina is smooth without nodularity or masses.  Cervix surgically absent.  Bimanual exam reveal no masses, nodularity or fullness.  Recto-vaginal exam confirms these findings.      Assessment:    Meagan Morales is a 44 year old woman with a diagnosis of stage IIIA grade 1 endometrioid adenocarcinoma. She completed chemotherapy 3/2019.  She is here today for a surveillance visit.     25 minutes were spent with this patient, over 50% of that time was spent in symptom management, treatment planning and in counseling and coordination of care.      Plan:     1.)        Patient to RTC in 3 months for her next surveillance visit; she will see Virgen Gallagher for her surveillance visits. Reviewed recommendations from [facility] not to perform surveillance pap smears in women diagnosed with endometrial cancer as this does not improve detection of local recurrence. Reviewed signs and symptoms for when she should contact the clinic or seek additional care. Patient to contact the clinic with any questions or concerns in the interim. I do not think we need to follow  as was acutely elevated post op and have since been normal.     2.) Genetic risk factors were assessed and her MMR proteins were intact.      3.) Labs and/or tests ordered include:  None.      4.) Health maintenance issues addressed today include annual health maintenance and non-gynecologic issues with PCP.    Linh De Souza MD    Department of Ob/Gyn and Women's Health  Division of Gynecologic Oncology  [facility]  [phone]            Patient Care Team:  Aundrea Max MD as PCP - General  SELF, REFERRED    Again, thank you for allowing me to participate in the care of your patient.      Sincerely,    Linh De Souza MD

## 2019-11-07 NOTE — PROGRESS NOTES
Follow Up Notes on Referred Patient    Date: 2019        RE: Meagan Morales  : 1975  VY: 2019      Meagan Morales is a 44 year old woman with a diagnosis of stage IIIA grade 1 endometrioid adenocarcinoma. She completed chemotherapy 3/2019.  She is here today for a surveillance visit.      Oncology History:  Meagan originally presented to clinic due to continuous vaginal bleeding, very light. She thought it was just her menses being continuous. US was done which found thickened endometrial lining. IUD was placed. Second US was done and IUD did not affect endometrial lining and IUD was malpositioned. Since then, she has noted continuous spotting. IUD was removed. Also of note, ovarian cysts were noted on second US. Subsequent endometrial biopsy was done which revealed grade 1 endometrial cancer. She has always had irregular menses and has never been able to get pregnant. Patient's  first and only pap smear on 10/10/2017. Had mammogram in 20's, will order additional mammogram for baseline. No history of prior colonoscopy. Started Wellbutrin this last year, mood has improved. Prior cigarette smoker, quit 2 months ago and switched to vape. Started again smoking cigarettes again but has not had one for 10 days. 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 No history of prior abdominal surgeries. No history of heart disease, lung disease or diabetes. Has never been able to get pregnant and dose not desire children.     18: pelvic US IMPRESSION:  Peripheral follicular arrangement high within the ovaries suggesting polycystic ovarian syndrome. Slightly thickened heterogeneous endometrium raising a concern of potential hyperplastic change.  18: pelvic US   1. Uterus is not normal in appearance. The endometrium appears thickened and vascular, and the IUD appears to be malpositioned.  Clinical correlation is recommended.    2. Bilateral complex ovarian  cysts are noted.  Follow up ultrasound in 4-6 weeks is recommended, consider saline infusion sonohysterogram for further imaging of the endometrium.      9/17/18: endometrial biopsy  ENDOMETRIUM, BIOPSY:  1. FIGO Grade 1 (well differentiated) endometrioid carcinoma of      the endometrium  2. DNA mismatch repair enzyme immunohistochemistry studies:     All intact (see Amendment note and synoptic reporting)     10/24/2018: DaVinci Assisted Total Laparoscopic Hysterectomy, Bilateral Salpingo-Oophorectomy, lymph node dissection, Excision of Left Vulvar Lesion  Pelvic Washing:   Rare atypical cells present.     TEST(S) PERFORMED     Test(s) Performed:         - Mismatch Repair Immunohistochemistry (IHC) Testing for Mismatch Repair (MMR) Proteins:           - No loss of nuclear expression of MMR proteins: low probability of microsatellite instability-high (MSI-H)     ORIGINAL REPORT:     SPECIMEN(S):   A: Left vulvar lesion   B: Left pelvic sentinel lymph node   C: Right pelvic sentinel lymph node   D: Uterus, cervix, bilateral ovaries and fallopian tubes   E: Portion of left ovary   F: Para-aortic lymph nodes   G: Left pelvic lymph nodes   H: Right pelvic lymph nodes     FINAL DIAGNOSIS:   A. LEFT VULVAR LESION, BIOPSY:   - Intradermal nevus     B. LEFT PELVIC SENTINEL LYMPH NODE, EXCISION:   - One reactive lymph node, negative for malignancy (0/1)     C. RIGHT PELVIC SENTINEL LYMPH NODE, EXCISION:   - Six reactive lymph nodes, negative for malignancy (0/6)     D. UTERUS, CERVIX, BILATERAL OVARIES AND FALLOPIAN TUBES, HYSTERECTOMY WITH BILATERAL SALPINGO-OOPHORECTOMY:   - Endometrial endometrioid adenocarcinoma, FIGO grade 1   - Atypical endometrial hyperplasia   - Myometrium with no significant histologic abnormality   - Chronic cervicitis with microglandular hyperplasia   - Uterine serosal fibrous adhesions   - Ovaries with cortical hyperplasia   - Metastatic endometrial adenocarcinoma to the left fallopian tube   -  Right fallopian tube with no significant histologic abnormality     E. PORTION OF LEFT OVARY, EXCISION:   - Benign cortical inclusion cysts     F. PARA-AORTIC LYMPH NODES, EXCISION:   - Four reactive lymph nodes, negative for malignancy (0/4)   - Benign glandular inclusions consistent with endosalpingiosis     G. LEFT PELVIC LYMPH NODES, EXCISION:   - Eight reactive lymph nodes, negative for malignancy (0/8)     H. RIGHT PELVIC LYMPH NODES, EXCISION:   - Nine reactive lymph nodes, negative for malignancy (0/9)   - Benign glandular inclusions consistent with endosalpingiosis     COMMENT:   The final diagnosis confirms the interpretation provided intraoperatively. The tumor seen in the left fallopian tube involves the wall (invading the mucosa and submucosa) and appears free floating in the lumen.   Tumor Site:         - Endometrium - Anterior and posterior     Histologic Type:         - Endometrioid carcinoma, NOS     Histologic Grade:         - FIGO grade 1     Tumor Size: 5.7 Centimeters (cm)     Tumor Extent       Myometrial Invasion:           - Not identified       Adenomyosis:           - Not identified       Uterine Serosa Involvement:           - Not identified       Lower Uterine Segment Involvement:           - Not identified       Cervical Stromal Involvement:           - Not identified       Other Tissue / Organ Involvement:           - Left fallopian tube       Peritoneal Ascitic Fluid:           - Results pending     Accessory Findings       Lymphovascular Invasion:           - Not identified     11/6/18: CT ap IMPRESSION:   1. Surgical changes of hysterectomy, bilateral salpingo-oophorectomy, and pelvic lymph node dissection with extensive fat stranding and fluid within the low pelvis extending superiorly along the iliac vasculature, right greater than left, potentially within normal postsurgical limits, however, slightly more than expected for postsurgical change.   1a. The ureters are not well  evaluated on this single phase exam and there is no hydronephrosis or hydroureter, however, the amount of fluid in the pelvis does raise the question of ureteral injury. If there is clinical concern, consider obtaining a CT urogram.  1b. An area of irregular rim enhancement in the low pelvis may represent a normal postsurgical vaginal cuff, though, again, this is slightly more conspicuous than usual and dehiscence or a developing abscess may have a similar appearance.  2. 6.8 x 4.1 x 5.6 cm right pelvic sidewall seroma versus lymphocele.  3. Soft tissue nodular thickening anteriorly to the right psoas muscle and along the right lateral conal fascia, indeterminate, cannot rule out metastatic foci. Attention on follow-up studies.  4. Cholelithiasis without evidence of cholecystitis.      11/7/18: CT abd with contrast CT urogram IMPRESSION:   1. Postsurgical changes of hysterectomy, bilateral salpingo-oophorectomy, and iliac lymph node dissection with fluid  collections within the pelvis extending along the iliac vasculature.  a. The right pelvic sidewall fluid collection is not significantly changed while the left pelvic sidewall fluid collection has enlarged  in comparison to the 11/6/2018 CT. The differential remains a seroma versus a lymphocele.  b. No evidence of contrast extravasation to suggest a ureteral injury.  c. Redemonstration of soft tissue nodular thickening along the right lateral conal fascia and anteriorly to the right psoas muscle and right psoas muscle. Attention on follow-up is recommended.  2. Cholelithiasis without evidence of cholecystitis.     Plan: adjuvant therapy to include 6 cycles of taxol and carboplatin; would not add radiation as all lymph nodes are negative, she does not have LVSI or deep invasion and this is a low grade tumor. Rx percocet 5 tabs due to continued abdominal pain. Will not prescribe any more narcotics and patient is aware of this.     11/16/18: Cycle #1  Paclitaxel/Carboplatin.   35.  12/6/18: Cycle #2 Paclitaxel/Carboplatin.  8.  12/27/2018: Cycle #3 T/C.  7.  1/8/19: US lower extremity: IMPRESSION: No evidence of deep venous thrombosis in either lower extremity.  1/9/2019: CT abd/pelvis IMPRESSION:   1. Minimal change in a right pelvic sidewall collection and decreased size of a left pelvic sidewall collection, which are most consistent with lymphoceles.  2. No suspicious pelvic lymphadenopathy.      1/21/19 Cycle #4 Paclitaxel/Carboplatin.  8.      1/2019 Presented with pain in LE:  US IMPRESSION: No evidence of deep venous thrombosis in either lower extremity.     1/2019 CT:IMPRESSION:   1. Minimal change in a right pelvic sidewall collection and decreased size of a left pelvic sidewall collection, which are most consistent with lymphoceles.  2. No suspicious pelvic lymphadenopathy.      2/14/19: Cycle #5 Paclitaxel/Carboplatin.  9.  3/7/19: Cycle #6 Paclitaxel/Carboplatin.  9.     4/4/2019: CT CAP IMPRESSION:   1. Significant decrease in the right pelvic sidewall collection, with resolved left pelvic sidewall collection. These are most consistent with lymphoceles.  2. No suspicious pelvic lymphadenopathy.  3. No suspicious abnormality in the chest.     Peritoneum / Retroperitoneum: Interval significant decrease in the right pelvic sidewall fluid collection now measuring 68 x 34 mm, previously 84 x 37 mm. The left pelvic sidewall fluid collection seen on prior examination has resolved.    Start surveillance; will alternate between NP and MD.          Today she comes to clinic feeling well. She denies any vaginal bleeding, no changes in her bowel or bladder habits, no nausea/emesis, no lower extremity edema, and no difficulties eating or sleeping. She denies any abdominal discomfort/bloating, no fevers or chills, and no chest pain or shortness of breath. She is current with her health screenings had her mammogram due in  October.           Review of Systems:    Systemic           no weight changes; no fever; no chills; no night sweats; no appetite changes  Skin           no rashes, or lesions  Eye           no irritation; no changes in vision  Deny-Laryngeal           no dysphagia; no hoarseness   Pulmonary    no cough; no shortness of breath  Cardiovascular    no chest pain; no palpitations  Gastrointestinal    no diarrhea; no constipation; no abdominal pain; no changes in bowel habits; no blood in stool  Genitourinary   no urinary frequency; no urinary urgency; no dysuria; no pain; no abnormal vaginal discharge; no abnormal vaginal bleeding  Breast    no breast discharge; no breast changes; no breast pain  Musculoskeletal    no myalgias; no arthralgias; no back pain  Psychiatric           no depressed mood; no anxiety    Hematologic               no tender lymph nodes; no noticeable swellings or lumps   Endocrine    no hot flashes; no heat/cold intolerance         Neurological   no tremor; no numbness and tingling; no headaches; no difficulty sleeping      Past Medical History:    Past Medical History:   Diagnosis Date     Chickenpox     as a child     Depression      Endometrial cancer (H)      Hypertension     since her 30's     Infertility, female      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)      PCOS (polycystic ovarian syndrome)      STD (female)     in her 20's         Past Surgical History:    Past Surgical History:   Procedure Laterality Date     CYSTOSCOPY N/A 10/24/2018    Procedure: Cystoscopy;  Surgeon: Linh De Souza MD;  Location: [facility] OR     DAVINCI HYSTERECTOMY TOTAL, BILATERAL SALPINGO-OOPHORECTOMY, NODE DISSECTION, COMBINED N/A 10/24/2018    Procedure: DaVinci Assisted Total Laparoscopic Hysterectomy, Bilateral Salpingo-Oophorectomy, lymph node dissection;  Surgeon: Linh De Souza MD;  Location: [facility] OR     EXCISE LESION VULVA Left 10/24/2018    Procedure: Excision of Left Vulvar Lesion;  Surgeon: Nolvia  Linh Irizarry MD;  Location: [facility] OR         Health Maintenance Due   Topic Date Due     PREVENTIVE CARE VISIT  1975     DEPRESSION ACTION PLAN  1975     PHQ-9  1975     HIV SCREENING  01/20/1992     PNEUMOCOCCAL IMMUNIZATION 19-64 HIGHEST RISK (1 of 3 - PCV13) 01/20/1996     HPV  01/20/1998     PAP  01/20/2002     INFLUENZA VACCINE (1) 09/01/2019       Current Medications:     Current Outpatient Medications   Medication Sig Dispense Refill     albuterol (PROAIR HFA/PROVENTIL HFA/VENTOLIN HFA) 108 (90 Base) MCG/ACT inhaler Inhale 1-2 puffs into the lungs       buPROPion (WELLBUTRIN XL) 150 MG 24 hr tablet Take 75 mg by mouth every morning        gabapentin (NEURONTIN) 600 MG tablet Take 600 mg by mouth as needed 600mg in AM, 600mg in the afternoon, 900mg in the evening       ibuprofen (ADVIL/MOTRIN) 600 MG tablet Take 1 tablet (600 mg) by mouth every 6 hours as needed for moderate pain 30 tablet 1     Multiple Vitamin (MULTIVITAMIN  S) CAPS Take 1 capsule by mouth       omeprazole (PRILOSEC) 40 MG DR capsule Take 40 mg by mouth       pramipexole (MIRAPEX) 0.125 MG tablet Take 0.125 mg by mouth At Bedtime       propranolol (INDERAL) 10 MG tablet Take 20 mg by mouth every morning        Sharps Container (SHARPS-A-GATOR LOCKING BRACKET) [facility] CPAP machine for home use at pressure: 5-16 CM H20 , Heated humidifier x 1, Humidifier chamber x 1,  Nasal mask with cushion x 1, Heated tubing x 1, Headgear x 1, Filters: Disposable x 1pk & Reusable x 1pk, Length of Need: 99 months, Frequency of use: Daily       spironolactone (ALDACTONE) 50 MG tablet Take 50 mg by mouth daily        loratadine (CLARITIN) 10 MG tablet Take 10 mg by mouth           Allergies:        Allergies   Allergen Reactions     Amoxicillin      Penicillin G         Social History:     Social History     Tobacco Use     Smoking status: Former Smoker     Packs/day: 0.50     Types: Cigarettes     Smokeless tobacco: Never Used     Tobacco comment:  uses vape pen about 3-4 times daily   Substance Use Topics     Alcohol use: No     Comment: 11 months sober        History   Drug Use No     Comment: 11 months sober. lives at sober housing             Date Value Ref Range Status   03/07/2019 9 0 - 30 U/mL Final     Comment:     Assay Method:  Chemiluminescence using Siemens Centaur XP   02/14/2019 9 0 - 30 U/mL Final     Comment:     Assay Method:  Chemiluminescence using Siemens Centaur XP   01/21/2019 8 0 - 30 U/mL Final     Comment:     Assay Method:  Chemiluminescence using Siemens Centaur XP   12/27/2018 7 0 - 30 U/mL Final     Comment:     Assay Method:  Chemiluminescence using Siemens Centaur XP   12/06/2018 8 0 - 30 U/mL Final     Comment:     Assay Method:  Chemiluminescence using Siemens Centaur XP   11/16/2018 35 (H) 0 - 30 U/mL Final     Comment:     Assay Method:  Chemiluminescence using Siemens Centaur XP                Physical Exam:     /80   Pulse 64   Temp 98.2  F (36.8  C) (Oral)   Resp 18   Wt 98.4 kg (217 lb)   SpO2 97%   BMI 36.39 kg/m    Body mass index is 36.39 kg/m .    General Appearance: healthy and alert, no distress     HEENT: no thyromegaly, no palpable nodules or masses        Cardiovascular: regular rate and rhythm, no gallops, rubs or murmurs     Respiratory: lungs clear, no rales, rhonchi or wheezes, normal diaphragmatic excursion    Musculoskeletal: extremities non tender and without edema    Skin: no lesions or rashes     Neurological: normal gait, no gross defects     Psychiatric: appropriate mood and affect                               Hematological: normal cervical, supraclavicular and inguinal lymph nodes     Gastrointestinal:       abdomen soft, non-tender, non-distended, no organomegaly or masses    Genitourinary: External genitalia and urethral meatus appears normal.  Vagina is smooth without nodularity or masses.  Cervix surgically absent.  Bimanual exam reveal no masses, nodularity or fullness.   Recto-vaginal exam confirms these findings.      Assessment:    Meagan Morales is a 44 year old woman with a diagnosis of stage IIIA grade 1 endometrioid adenocarcinoma. She completed chemotherapy 3/2019.  She is here today for a surveillance visit.     25 minutes were spent with this patient, over 50% of that time was spent in symptom management, treatment planning and in counseling and coordination of care.      Plan:     1.)        Patient to RTC in 3 months for her next surveillance visit; she will see Virgen Gallagher for her surveillance visits. Reviewed recommendations from [facility] not to perform surveillance pap smears in women diagnosed with endometrial cancer as this does not improve detection of local recurrence. Reviewed signs and symptoms for when she should contact the clinic or seek additional care. Patient to contact the clinic with any questions or concerns in the interim. I do not think we need to follow  as was acutely elevated post op and have since been normal.     2.) Genetic risk factors were assessed and her MMR proteins were intact.      3.) Labs and/or tests ordered include:  None.      4.) Health maintenance issues addressed today include annual health maintenance and non-gynecologic issues with PCP.    Linh De Souza MD    Department of Ob/Gyn and Women's Health  Division of Gynecologic Oncology  [facility]  [phone]          CC  Patient Care Team:  Aundrea Max MD as PCP - General  SELF, REFERRED

## 2019-11-07 NOTE — NURSING NOTE
"Oncology Rooming Note    November 7, 2019 11:56 AM   Meagan Morales is a 44 year old female who presents for:    Chief Complaint   Patient presents with     Oncology Clinic Visit     Return; Endometrial Ca     Initial Vitals: /80   Pulse 64   Temp 98.2  F (36.8  C) (Oral)   Resp 18   Wt 98.4 kg (217 lb)   SpO2 97%   BMI 36.39 kg/m   Estimated body mass index is 36.39 kg/m  as calculated from the following:    Height as of 8/8/19: 1.645 m (5' 4.75\").    Weight as of this encounter: 98.4 kg (217 lb). Body surface area is 2.12 meters squared.  No Pain (0) Comment: Data Unavailable   No LMP recorded. Patient has had a hysterectomy.  Allergies reviewed: Yes  Medications reviewed: Yes    Medications: Medication refills not needed today.  Pharmacy name entered into [employer]: [id number] [place] [phone] AT [facility] [address]    Clinical concerns: Patient denies pelvic and vaginal pain at today's visit.         Siri Guillaume CMA              "

## 2020-02-10 PROBLEM — Z51.11 ENCOUNTER FOR ANTINEOPLASTIC CHEMOTHERAPY: Status: RESOLVED | Noted: 2018-12-06 | Resolved: 2020-02-10

## 2020-02-10 NOTE — PROGRESS NOTES
Follow Up Notes on Referred Patient    Date: 2020       RE: Meagan Morales  : 1975  VY: 2020      Meagan Morales is a 45 year old woman with a diagnosis of stage IIIA grade 1 endometrioid adenocarcinoma. She completed chemotherapy 3/2019.  She is here today for a surveillance visit.      Oncology History:  Meagan originally presented to clinic due to continuous vaginal bleeding, very light. She thought it was just her menses being continuous. US was done which found thickened endometrial lining. IUD was placed. Second US was done and IUD did not affect endometrial lining and IUD was malpositioned. Since then, she has noted continuous spotting. IUD was removed. Also of note, ovarian cysts were noted on second US. Subsequent endometrial biopsy was done which revealed grade 1 endometrial cancer. She has always had irregular menses and has never been able to get pregnant. Patient's  first and only pap smear on 10/10/2017. Had mammogram in 20's, will order additional mammogram for baseline. No history of prior colonoscopy. Started Wellbutrin this last year, mood has improved. Prior cigarette smoker, quit 2 months ago and switched to vape. Started again smoking cigarettes again but has not had one for 10 days. 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 No history of prior abdominal surgeries. No history of heart disease, lung disease or diabetes. Has never been able to get pregnant and dose not desire children.     18: pelvic US IMPRESSION:  Peripheral follicular arrangement high within the ovaries suggesting polycystic ovarian syndrome. Slightly thickened heterogeneous endometrium raising a concern of potential hyperplastic change.  18: pelvic US   1. Uterus is not normal in appearance. The endometrium appears thickened and vascular, and the IUD appears to be malpositioned.  Clinical correlation is recommended.    2. Bilateral complex  ovarian cysts are noted.  Follow up ultrasound in 4-6 weeks is recommended, consider saline infusion sonohysterogram for further imaging of the endometrium.      9/17/18: endometrial biopsy  ENDOMETRIUM, BIOPSY:  1. FIGO Grade 1 (well differentiated) endometrioid carcinoma of      the endometrium  2. DNA mismatch repair enzyme immunohistochemistry studies:     All intact (see Amendment note and synoptic reporting)     10/24/2018: DaVinci Assisted Total Laparoscopic Hysterectomy, Bilateral Salpingo-Oophorectomy, lymph node dissection, Excision of Left Vulvar Lesion  Pelvic Washing:   Rare atypical cells present.     TEST(S) PERFORMED     Test(s) Performed:         - Mismatch Repair Immunohistochemistry (IHC) Testing for Mismatch Repair (MMR) Proteins:           - No loss of nuclear expression of MMR proteins: low probability of microsatellite instability-high (MSI-H)     ORIGINAL REPORT:     SPECIMEN(S):   A: Left vulvar lesion   B: Left pelvic sentinel lymph node   C: Right pelvic sentinel lymph node   D: Uterus, cervix, bilateral ovaries and fallopian tubes   E: Portion of left ovary   F: Para-aortic lymph nodes   G: Left pelvic lymph nodes   H: Right pelvic lymph nodes     FINAL DIAGNOSIS:   A. LEFT VULVAR LESION, BIOPSY:   - Intradermal nevus     B. LEFT PELVIC SENTINEL LYMPH NODE, EXCISION:   - One reactive lymph node, negative for malignancy (0/1)     C. RIGHT PELVIC SENTINEL LYMPH NODE, EXCISION:   - Six reactive lymph nodes, negative for malignancy (0/6)     D. UTERUS, CERVIX, BILATERAL OVARIES AND FALLOPIAN TUBES, HYSTERECTOMY WITH BILATERAL SALPINGO-OOPHORECTOMY:   - Endometrial endometrioid adenocarcinoma, FIGO grade 1   - Atypical endometrial hyperplasia   - Myometrium with no significant histologic abnormality   - Chronic cervicitis with microglandular hyperplasia   - Uterine serosal fibrous adhesions   - Ovaries with cortical hyperplasia   - Metastatic endometrial adenocarcinoma to the left fallopian  tube   - Right fallopian tube with no significant histologic abnormality     E. PORTION OF LEFT OVARY, EXCISION:   - Benign cortical inclusion cysts     F. PARA-AORTIC LYMPH NODES, EXCISION:   - Four reactive lymph nodes, negative for malignancy (0/4)   - Benign glandular inclusions consistent with endosalpingiosis     G. LEFT PELVIC LYMPH NODES, EXCISION:   - Eight reactive lymph nodes, negative for malignancy (0/8)     H. RIGHT PELVIC LYMPH NODES, EXCISION:   - Nine reactive lymph nodes, negative for malignancy (0/9)   - Benign glandular inclusions consistent with endosalpingiosis     COMMENT:   The final diagnosis confirms the interpretation provided intraoperatively. The tumor seen in the left fallopian tube involves the wall (invading the mucosa and submucosa) and appears free floating in the lumen.   Tumor Site:         - Endometrium - Anterior and posterior     Histologic Type:         - Endometrioid carcinoma, NOS     Histologic Grade:         - FIGO grade 1     Tumor Size: 5.7 Centimeters (cm)     Tumor Extent       Myometrial Invasion:           - Not identified       Adenomyosis:           - Not identified       Uterine Serosa Involvement:           - Not identified       Lower Uterine Segment Involvement:           - Not identified       Cervical Stromal Involvement:           - Not identified       Other Tissue / Organ Involvement:           - Left fallopian tube       Peritoneal Ascitic Fluid:           - Results pending     Accessory Findings       Lymphovascular Invasion:           - Not identified     11/6/18: CT ap IMPRESSION:   1. Surgical changes of hysterectomy, bilateral salpingo-oophorectomy, and pelvic lymph node dissection with extensive fat stranding and fluid within the low pelvis extending superiorly along the iliac vasculature, right greater than left, potentially within normal postsurgical limits, however, slightly more than expected for postsurgical change.   1a. The ureters are not  well evaluated on this single phase exam and there is no hydronephrosis or hydroureter, however, the amount of fluid in the pelvis does raise the question of ureteral injury. If there is clinical concern, consider obtaining a CT urogram.  1b. An area of irregular rim enhancement in the low pelvis may represent a normal postsurgical vaginal cuff, though, again, this is slightly more conspicuous than usual and dehiscence or a developing abscess may have a similar appearance.  2. 6.8 x 4.1 x 5.6 cm right pelvic sidewall seroma versus lymphocele.  3. Soft tissue nodular thickening anteriorly to the right psoas muscle and along the right lateral conal fascia, indeterminate, cannot rule out metastatic foci. Attention on follow-up studies.  4. Cholelithiasis without evidence of cholecystitis.      11/7/18: CT abd with contrast CT urogram IMPRESSION:   1. Postsurgical changes of hysterectomy, bilateral salpingo-oophorectomy, and iliac lymph node dissection with fluid  collections within the pelvis extending along the iliac vasculature.  a. The right pelvic sidewall fluid collection is not significantly changed while the left pelvic sidewall fluid collection has enlarged  in comparison to the 11/6/2018 CT. The differential remains a seroma versus a lymphocele.  b. No evidence of contrast extravasation to suggest a ureteral injury.  c. Redemonstration of soft tissue nodular thickening along the right lateral conal fascia and anteriorly to the right psoas muscle and right psoas muscle. Attention on follow-up is recommended.  2. Cholelithiasis without evidence of cholecystitis.     Plan: adjuvant therapy to include 6 cycles of taxol and carboplatin; would not add radiation as all lymph nodes are negative, she does not have LVSI or deep invasion and this is a low grade tumor. Rx percocet 5 tabs due to continued abdominal pain. Will not prescribe any more narcotics and patient is aware of this.     11/16/18: Cycle #1  Paclitaxel/Carboplatin.   35.  12/6/18: Cycle #2 Paclitaxel/Carboplatin.  8.  12/27/2018: Cycle #3 T/C.  7.  1/8/19: US lower extremity: IMPRESSION: No evidence of deep venous thrombosis in either lower extremity.  1/9/2019: CT abd/pelvis IMPRESSION:   1. Minimal change in a right pelvic sidewall collection and decreased size of a left pelvic sidewall collection, which are most consistent with lymphoceles.  2. No suspicious pelvic lymphadenopathy.      1/21/19 Cycle #4 Paclitaxel/Carboplatin.  8.      1/2019 Presented with pain in LE:  US IMPRESSION: No evidence of deep venous thrombosis in either lower extremity.     1/2019 CT:IMPRESSION:   1. Minimal change in a right pelvic sidewall collection and decreased size of a left pelvic sidewall collection, which are most consistent with lymphoceles.  2. No suspicious pelvic lymphadenopathy.      2/14/19: Cycle #5 Paclitaxel/Carboplatin.  9.  3/7/19: Cycle #6 Paclitaxel/Carboplatin.  9.     4/4/2019: CT CAP IMPRESSION:   1. Significant decrease in the right pelvic sidewall collection, with resolved left pelvic sidewall collection. These are most consistent with lymphoceles.  2. No suspicious pelvic lymphadenopathy.  3. No suspicious abnormality in the chest.     Peritoneum / Retroperitoneum: Interval significant decrease in the right pelvic sidewall fluid collection now measuring 68 x 34 mm, previously 84 x 37 mm. The left pelvic sidewall fluid collection seen on prior examination has resolved.     Start surveillance; will alternate between NP and MD. No .           Today she comes to clinic and denies any concerns for her visit. She has become sexually active over the past month or so and is using condoms as well as a lubricant. She does shave her mons and last did this about a week ago; starting 3-4 days ago she started having itching in this area; she denies any blistering, no drainage and states she does the most itching at  night. She did use a different kind of razor this last time. She denies any vaginal itching or vulvar itching. She is due for her annual exam coming up and is current with her mammogram. She denies any vaginal bleeding, no changes in her bowel or bladder habits, no nausea/emesis, no lower extremity edema, and no difficulties eating or sleeping. She denies any abdominal discomfort/bloating, no fevers or chills, and no chest pain or shortness of breath.      Review of Systems:    Systemic           no weight changes; no fever; no chills; + night sweats; no appetite changes  Skin           + rashes, or lesions  Eye           no irritation; no changes in vision  Deny-Laryngeal           no dysphagia; no hoarseness   Pulmonary    no cough; no shortness of breath  Cardiovascular    no chest pain; no palpitations  Gastrointestinal    no diarrhea; no constipation; no abdominal pain; no changes in bowel habits; no blood in stool  Genitourinary   no urinary frequency; no urinary urgency; no dysuria; no pain; no abnormal vaginal discharge; no abnormal vaginal bleeding  Breast    no breast discharge; no breast changes; no breast pain  Musculoskeletal    no myalgias; no arthralgias; no back pain  Psychiatric           no depressed mood; no anxiety    Hematologic              no tender lymph nodes; no noticeable swellings or lumps   Endocrine    + hot flashes; no heat/cold intolerance         Neurological   no tremor; no numbness and tingling; no headaches; no difficulty sleeping      Past Medical History:    Past Medical History:   Diagnosis Date     Chickenpox     as a child     Depression      Endometrial cancer (H)      Hypertension     since her 30's     Infertility, female      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)      PCOS (polycystic ovarian syndrome)      STD (female)     in her 20's         Past Surgical History:    Past Surgical History:   Procedure Laterality Date     CYSTOSCOPY N/A 10/24/2018    Procedure:  Cystoscopy;  Surgeon: Linh De Souza MD;  Location: [facility] OR     DAVINCI HYSTERECTOMY TOTAL, BILATERAL SALPINGO-OOPHORECTOMY, NODE DISSECTION, COMBINED N/A 10/24/2018    Procedure: DaVinci Assisted Total Laparoscopic Hysterectomy, Bilateral Salpingo-Oophorectomy, lymph node dissection;  Surgeon: Linh De Souza MD;  Location: [facility] OR     EXCISE LESION VULVA Left 10/24/2018    Procedure: Excision of Left Vulvar Lesion;  Surgeon: Linh De Souza MD;  Location: [facility] OR         Health Maintenance Due   Topic Date Due     PREVENTIVE CARE VISIT  1975     DEPRESSION ACTION PLAN  1975     PHQ-9  1975     HIV SCREENING  01/20/1992     PNEUMOCOCCAL IMMUNIZATION 19-64 HIGHEST RISK (1 of 3 - PCV13) 01/20/1996     PAP  01/20/1998       Current Medications:     Current Outpatient Medications   Medication Sig Dispense Refill     gabapentin (NEURONTIN) 600 MG tablet Take 600 mg by mouth as needed 600mg in AM, 600mg in the afternoon, 900mg in the evening       pramipexole (MIRAPEX) 0.125 MG tablet Take 0.125 mg by mouth At Bedtime       propranolol (INDERAL) 10 MG tablet Take 20 mg by mouth every morning        spironolactone (ALDACTONE) 50 MG tablet Take 50 mg by mouth daily        loratadine (CLARITIN) 10 MG tablet Take 10 mg by mouth       Multiple Vitamin (MULTIVITAMIN  S) CAPS Take 1 capsule by mouth       omeprazole (PRILOSEC) 40 MG DR capsule Take 40 mg by mouth       Sharps Container (SHARPS-A-GATOR LOCKING BRACKET) [facility] [place] for home use at pressure: 5-16 CM H20 , Heated humidifier x 1, Humidifier chamber x 1,  Nasal mask with cushion x 1, Heated tubing x 1, Headgear x 1, Filters: Disposable x 1pk & Reusable x 1pk, Length of Need: 99 months, Frequency of use: Daily           Allergies:        Allergies   Allergen Reactions     Amoxicillin      Penicillin G         Social History:     Social History     Tobacco Use     Smoking status: Former Smoker     Packs/day: 0.50     Types: Cigarettes  "    Smokeless tobacco: Never Used     Tobacco comment: uses vape pen about 3-4 times daily   Substance Use Topics     Alcohol use: No     Comment: 11 months sober        History   Drug Use No     Comment: 11 months sober. lives at sober housing         Family History:     The patient's family history is notable for:    No family history on file.      Physical Exam:     BP (!) 140/82   Pulse 74   Temp 97.4  F (36.3  C) (Oral)   Resp 15   Ht 1.645 m (5' 4.76\")   Wt 100.2 kg (220 lb 12.8 oz)   SpO2 97%   BMI 37.01 kg/m    Body mass index is 37.01 kg/m .    General Appearance: healthy and alert, no distress     HEENT: no thyromegaly, no palpable nodules or masses        Cardiovascular: regular rate and rhythm, no gallops, rubs or murmurs     Respiratory: lungs clear, no rales, rhonchi or wheezes, normal diaphragmatic excursion    Musculoskeletal: extremities non tender and without edema    Skin: no lesions or rashes     Neurological: normal gait, no gross defects     Psychiatric: appropriate mood and affect                               Hematological: normal cervical, supraclavicular and inguinal lymph nodes     Gastrointestinal:       abdomen soft, non-tender, non-distended, no organomegaly or masses    Genitourinary: Shaved mons with scattered areas of inflammation; no blister/pustules, no erythema. urethral meatus appears normal.  Vagina is smooth without nodularity or masses. No discharge in vaginal vault or within labia. Cervix surgically absent.  Bimanual exam reveal no masses, nodularity or fullness.  Recto-vaginal exam confirms these findings.      Assessment:    Meagan Morales is a 45 year old woman with a diagnosis of stage IIIA grade 1 endometrioid adenocarcinoma. She completed chemotherapy 3/2019.  She is here today for a surveillance visit.    25 minutes were spent with this patient, over 50% of that time was spent in symptom management, treatment planning and in counseling and coordination of " care.      Plan:     1.)        Patient to RTC in 3 months for her next surveillance visit. She will see Dr. De Souza per the plan to alternate visits. Will not follow . Reviewed recommendations from [facility] not to perform surveillance pap smears in women diagnosed with endometrial cancer as this does not improve detection of local recurrence. Reviewed signs and symptoms for when she should contact the clinic or seek additional care. Patient to contact the clinic with any questions or concerns in the interim.     2.) Genetic risk factors were assessed and she has intact MMR proteins.     3.) Labs and/or tests ordered include:  .      4.) Health maintenance issues addressed today include annual health maintenance and non-gynecologic issues with PCP.    CORTES Gant, WHNP-BC, ANP-BC  Women's Health Nurse Practitioner  Adult Nurse Pracitioner  Division of Gynecologic Oncology          CC  Patient Care Team:  Aundrea Max MD as PCP - General  SELF, REFERRED

## 2020-02-11 ENCOUNTER — ONCOLOGY VISIT (OUTPATIENT)
Dept: ONCOLOGY | Facility: CLINIC | Age: 45
End: 2020-02-11
Attending: NURSE PRACTITIONER
Payer: COMMERCIAL

## 2020-02-11 VITALS
RESPIRATION RATE: 15 BRPM | DIASTOLIC BLOOD PRESSURE: 82 MMHG | OXYGEN SATURATION: 97 % | TEMPERATURE: 97.4 F | SYSTOLIC BLOOD PRESSURE: 140 MMHG | BODY MASS INDEX: 36.79 KG/M2 | HEART RATE: 74 BPM | HEIGHT: 65 IN | WEIGHT: 220.8 LBS

## 2020-02-11 DIAGNOSIS — C54.1 ENDOMETRIAL CANCER (H): Primary | ICD-10-CM

## 2020-02-11 PROBLEM — F33.0 MILD EPISODE OF RECURRENT MAJOR DEPRESSIVE DISORDER (H): Status: ACTIVE | Noted: 2019-08-21

## 2020-02-11 PROCEDURE — G0463 HOSPITAL OUTPT CLINIC VISIT: HCPCS | Mod: ZF

## 2020-02-11 PROCEDURE — 99214 OFFICE O/P EST MOD 30 MIN: CPT | Mod: ZP | Performed by: NURSE PRACTITIONER

## 2020-02-11 ASSESSMENT — PAIN SCALES - GENERAL: PAINLEVEL: NO PAIN (0)

## 2020-02-11 ASSESSMENT — MIFFLIN-ST. JEOR: SCORE: 1653.67

## 2020-02-11 NOTE — NURSING NOTE
"Oncology Rooming Note    February 11, 2020 11:30 AM   Meagna Morales is a 45 year old female who presents for:    Chief Complaint   Patient presents with     Oncology Clinic Visit     Return; Endometrial Ca     Initial Vitals: There were no vitals taken for this visit. Estimated body mass index is 36.39 kg/m  as calculated from the following:    Height as of 8/8/19: 1.645 m (5' 4.75\").    Weight as of 11/7/19: 98.4 kg (217 lb). There is no height or weight on file to calculate BSA.  Data Unavailable Comment: Data Unavailable   No LMP recorded. Patient has had a hysterectomy.  Allergies reviewed: Yes  Medications reviewed: Yes    Medications: Medication refills not needed today.  Pharmacy name entered into [facility]: [id number] - [place] [phone] AT [facility] [address]    Clinical concerns: Has rash pelvic area and under arms.      Baylee Lewis CMA              "

## 2020-02-11 NOTE — LETTER
2020       RE: Meagan Morales  [address] [place] [id number]     Dear Colleague,    Thank you for referring your patient, Meagan Morales, to the [facility]. Please see a copy of my visit note below.                Follow Up Notes on Referred Patient    Date: 2020       RE: Meagan Morales  : 1975  VY: 2020      Meagan Morales is a 45 year old woman with a diagnosis of stage IIIA grade 1 endometrioid adenocarcinoma. She completed chemotherapy 3/2019.  She is here today for a surveillance visit.      Oncology History:  Meagan originally presented to clinic due to continuous vaginal bleeding, very light. She thought it was just her menses being continuous. US was done which found thickened endometrial lining. IUD was placed. Second US was done and IUD did not affect endometrial lining and IUD was malpositioned. Since then, she has noted continuous spotting. IUD was removed. Also of note, ovarian cysts were noted on second US. Subsequent endometrial biopsy was done which revealed grade 1 endometrial cancer. She has always had irregular menses and has never been able to get pregnant. Patient's  first and only pap smear on 10/10/2017. Had mammogram in 20's, will order additional mammogram for baseline. No history of prior colonoscopy. Started Wellbutrin this last year, mood has improved. Prior cigarette smoker, quit 2 months ago and switched to vape. Started again smoking cigarettes again but has not had one for 10 days. 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 No history of prior abdominal surgeries. No history of heart disease, lung disease or diabetes. Has never been able to get pregnant and dose not desire children.     18: pelvic US IMPRESSION:  Peripheral follicular arrangement high within the ovaries suggesting polycystic ovarian syndrome. Slightly thickened heterogeneous endometrium raising a concern of potential  hyperplastic change.  9/17/18: pelvic US   1. Uterus is not normal in appearance. The endometrium appears thickened and vascular, and the IUD appears to be malpositioned.  Clinical correlation is recommended.    2. Bilateral complex ovarian cysts are noted.  Follow up ultrasound in 4-6 weeks is recommended, consider saline infusion sonohysterogram for further imaging of the endometrium.      9/17/18: endometrial biopsy  ENDOMETRIUM, BIOPSY:  1. FIGO Grade 1 (well differentiated) endometrioid carcinoma of      the endometrium  2. DNA mismatch repair enzyme immunohistochemistry studies:     All intact (see Amendment note and synoptic reporting)     10/24/2018: DaVinci Assisted Total Laparoscopic Hysterectomy, Bilateral Salpingo-Oophorectomy, lymph node dissection, Excision of Left Vulvar Lesion  Pelvic Washing:   Rare atypical cells present.     TEST(S) PERFORMED     Test(s) Performed:         - Mismatch Repair Immunohistochemistry (IHC) Testing for Mismatch Repair (MMR) Proteins:           - No loss of nuclear expression of MMR proteins: low probability of microsatellite instability-high (MSI-H)     ORIGINAL REPORT:     SPECIMEN(S):   A: Left vulvar lesion   B: Left pelvic sentinel lymph node   C: Right pelvic sentinel lymph node   D: Uterus, cervix, bilateral ovaries and fallopian tubes   E: Portion of left ovary   F: Para-aortic lymph nodes   G: Left pelvic lymph nodes   H: Right pelvic lymph nodes     FINAL DIAGNOSIS:   A. LEFT VULVAR LESION, BIOPSY:   - Intradermal nevus     B. LEFT PELVIC SENTINEL LYMPH NODE, EXCISION:   - One reactive lymph node, negative for malignancy (0/1)     C. RIGHT PELVIC SENTINEL LYMPH NODE, EXCISION:   - Six reactive lymph nodes, negative for malignancy (0/6)     D. UTERUS, CERVIX, BILATERAL OVARIES AND FALLOPIAN TUBES, HYSTERECTOMY WITH BILATERAL SALPINGO-OOPHORECTOMY:   - Endometrial endometrioid adenocarcinoma, FIGO grade 1   - Atypical endometrial hyperplasia   - Myometrium with no  significant histologic abnormality   - Chronic cervicitis with microglandular hyperplasia   - Uterine serosal fibrous adhesions   - Ovaries with cortical hyperplasia   - Metastatic endometrial adenocarcinoma to the left fallopian tube   - Right fallopian tube with no significant histologic abnormality     E. PORTION OF LEFT OVARY, EXCISION:   - Benign cortical inclusion cysts     F. PARA-AORTIC LYMPH NODES, EXCISION:   - Four reactive lymph nodes, negative for malignancy (0/4)   - Benign glandular inclusions consistent with endosalpingiosis     G. LEFT PELVIC LYMPH NODES, EXCISION:   - Eight reactive lymph nodes, negative for malignancy (0/8)     H. RIGHT PELVIC LYMPH NODES, EXCISION:   - Nine reactive lymph nodes, negative for malignancy (0/9)   - Benign glandular inclusions consistent with endosalpingiosis     COMMENT:   The final diagnosis confirms the interpretation provided intraoperatively. The tumor seen in the left fallopian tube involves the wall (invading the mucosa and submucosa) and appears free floating in the lumen.   Tumor Site:         - Endometrium - Anterior and posterior     Histologic Type:         - Endometrioid carcinoma, NOS     Histologic Grade:         - FIGO grade 1     Tumor Size: 5.7 Centimeters (cm)     Tumor Extent       Myometrial Invasion:           - Not identified       Adenomyosis:           - Not identified       Uterine Serosa Involvement:           - Not identified       Lower Uterine Segment Involvement:           - Not identified       Cervical Stromal Involvement:           - Not identified       Other Tissue / Organ Involvement:           - Left fallopian tube       Peritoneal Ascitic Fluid:           - Results pending     Accessory Findings       Lymphovascular Invasion:           - Not identified     11/6/18: CT ap IMPRESSION:   1. Surgical changes of hysterectomy, bilateral salpingo-oophorectomy, and pelvic lymph node dissection with extensive fat stranding and fluid  within the low pelvis extending superiorly along the iliac vasculature, right greater than left, potentially within normal postsurgical limits, however, slightly more than expected for postsurgical change.   1a. The ureters are not well evaluated on this single phase exam and there is no hydronephrosis or hydroureter, however, the amount of fluid in the pelvis does raise the question of ureteral injury. If there is clinical concern, consider obtaining a CT urogram.  1b. An area of irregular rim enhancement in the low pelvis may represent a normal postsurgical vaginal cuff, though, again, this is slightly more conspicuous than usual and dehiscence or a developing abscess may have a similar appearance.  2. 6.8 x 4.1 x 5.6 cm right pelvic sidewall seroma versus lymphocele.  3. Soft tissue nodular thickening anteriorly to the right psoas muscle and along the right lateral conal fascia, indeterminate, cannot rule out metastatic foci. Attention on follow-up studies.  4. Cholelithiasis without evidence of cholecystitis.      11/7/18: CT abd with contrast CT urogram IMPRESSION:   1. Postsurgical changes of hysterectomy, bilateral salpingo-oophorectomy, and iliac lymph node dissection with fluid  collections within the pelvis extending along the iliac vasculature.  a. The right pelvic sidewall fluid collection is not significantly changed while the left pelvic sidewall fluid collection has enlarged  in comparison to the 11/6/2018 CT. The differential remains a seroma versus a lymphocele.  b. No evidence of contrast extravasation to suggest a ureteral injury.  c. Redemonstration of soft tissue nodular thickening along the right lateral conal fascia and anteriorly to the right psoas muscle and right psoas muscle. Attention on follow-up is recommended.  2. Cholelithiasis without evidence of cholecystitis.     Plan: adjuvant therapy to include 6 cycles of taxol and carboplatin; would not add radiation as all lymph nodes are  negative, she does not have LVSI or deep invasion and this is a low grade tumor. Rx percocet 5 tabs due to continued abdominal pain. Will not prescribe any more narcotics and patient is aware of this.     11/16/18: Cycle #1 Paclitaxel/Carboplatin.   35.  12/6/18: Cycle #2 Paclitaxel/Carboplatin.  8.  12/27/2018: Cycle #3 T/C.  7.  1/8/19: US lower extremity: IMPRESSION: No evidence of deep venous thrombosis in either lower extremity.  1/9/2019: CT abd/pelvis IMPRESSION:   1. Minimal change in a right pelvic sidewall collection and decreased size of a left pelvic sidewall collection, which are most consistent with lymphoceles.  2. No suspicious pelvic lymphadenopathy.      1/21/19 Cycle #4 Paclitaxel/Carboplatin.  8.      1/2019 Presented with pain in LE:  US IMPRESSION: No evidence of deep venous thrombosis in either lower extremity.     1/2019 CT:IMPRESSION:   1. Minimal change in a right pelvic sidewall collection and decreased size of a left pelvic sidewall collection, which are most consistent with lymphoceles.  2. No suspicious pelvic lymphadenopathy.      2/14/19: Cycle #5 Paclitaxel/Carboplatin.  9.  3/7/19: Cycle #6 Paclitaxel/Carboplatin.  9.     4/4/2019: CT CAP IMPRESSION:   1. Significant decrease in the right pelvic sidewall collection, with resolved left pelvic sidewall collection. These are most consistent with lymphoceles.  2. No suspicious pelvic lymphadenopathy.  3. No suspicious abnormality in the chest.     Peritoneum / Retroperitoneum: Interval significant decrease in the right pelvic sidewall fluid collection now measuring 68 x 34 mm, previously 84 x 37 mm. The left pelvic sidewall fluid collection seen on prior examination has resolved.     Start surveillance; will alternate between NP and MD. No .           Today she comes to clinic and denies any concerns for her visit. She has become sexually active over the past month or so and is using condoms as  well as a lubricant. She does shave her mons and last did this about a week ago; starting 3-4 days ago she started having itching in this area; she denies any blistering, no drainage and states she does the most itching at night. She did use a different kind of razor this last time. She denies any vaginal itching or vulvar itching. She is due for her annual exam coming up and is current with her mammogram. She denies any vaginal bleeding, no changes in her bowel or bladder habits, no nausea/emesis, no lower extremity edema, and no difficulties eating or sleeping. She denies any abdominal discomfort/bloating, no fevers or chills, and no chest pain or shortness of breath.      Review of Systems:    Systemic           no weight changes; no fever; no chills; + night sweats; no appetite changes  Skin           + rashes, or lesions  Eye           no irritation; no changes in vision  Deny-Laryngeal           no dysphagia; no hoarseness   Pulmonary    no cough; no shortness of breath  Cardiovascular    no chest pain; no palpitations  Gastrointestinal    no diarrhea; no constipation; no abdominal pain; no changes in bowel habits; no blood in stool  Genitourinary   no urinary frequency; no urinary urgency; no dysuria; no pain; no abnormal vaginal discharge; no abnormal vaginal bleeding  Breast    no breast discharge; no breast changes; no breast pain  Musculoskeletal    no myalgias; no arthralgias; no back pain  Psychiatric           no depressed mood; no anxiety    Hematologic              no tender lymph nodes; no noticeable swellings or lumps   Endocrine    + hot flashes; no heat/cold intolerance         Neurological   no tremor; no numbness and tingling; no headaches; no difficulty sleeping      Past Medical History:    Past Medical History:   Diagnosis Date     Chickenpox     as a child     Depression      Endometrial cancer (H)      Hypertension     since her 30's     Infertility, female      Methamphetamine abuse in  remission (H)      PCOS (polycystic ovarian syndrome)      STD (female)     in her 20's         Past Surgical History:    Past Surgical History:   Procedure Laterality Date     CYSTOSCOPY N/A 10/24/2018    Procedure: Cystoscopy;  Surgeon: Linh De Souza MD;  Location: [facility] OR     DAVINCI HYSTERECTOMY TOTAL, BILATERAL SALPINGO-OOPHORECTOMY, NODE DISSECTION, COMBINED N/A 10/24/2018    Procedure: DaVinci Assisted Total Laparoscopic Hysterectomy, Bilateral Salpingo-Oophorectomy, lymph node dissection;  Surgeon: Linh De Souza MD;  Location: [facility] OR     EXCISE LESION VULVA Left 10/24/2018    Procedure: Excision of Left Vulvar Lesion;  Surgeon: Linh De Souza MD;  Location: [facility] OR         Health Maintenance Due   Topic Date Due     PREVENTIVE CARE VISIT  1975     DEPRESSION ACTION PLAN  1975     PHQ-9  1975     HIV SCREENING  01/20/1992     PNEUMOCOCCAL IMMUNIZATION 19-64 HIGHEST RISK (1 of 3 - PCV13) 01/20/1996     PAP  01/20/1998       Current Medications:     Current Outpatient Medications   Medication Sig Dispense Refill     gabapentin (NEURONTIN) 600 MG tablet Take 600 mg by mouth as needed 600mg in AM, 600mg in the afternoon, 900mg in the evening       pramipexole (MIRAPEX) 0.125 MG tablet Take 0.125 mg by mouth At Bedtime       propranolol (INDERAL) 10 MG tablet Take 20 mg by mouth every morning        spironolactone (ALDACTONE) 50 MG tablet Take 50 mg by mouth daily        loratadine (CLARITIN) 10 MG tablet Take 10 mg by mouth       Multiple Vitamin (MULTIVITAMIN  S) CAPS Take 1 capsule by mouth       omeprazole (PRILOSEC) 40 MG DR capsule Take 40 mg by mouth       Sharps Container (SHARPS-A-GATOR LOCKING BRACKET) [facility] CPAP machine for home use at pressure: 5-16 CM H20 , Heated humidifier x 1, Humidifier chamber x 1,  Nasal mask with cushion x 1, Heated tubing x 1, Headgear x 1, Filters: Disposable x 1pk & Reusable x 1pk, Length of Need: 99 months, Frequency of use: Daily    "        Allergies:        Allergies   Allergen Reactions     Amoxicillin      Penicillin G         Social History:     Social History     Tobacco Use     Smoking status: Former Smoker     Packs/day: 0.50     Types: Cigarettes     Smokeless tobacco: Never Used     Tobacco comment: uses vape pen about 3-4 times daily   Substance Use Topics     Alcohol use: No     Comment: 11 months sober        History   Drug Use No     Comment: 11 months sober. lives at sober housing         Family History:     The patient's family history is notable for:    No family history on file.      Physical Exam:     BP (!) 140/82   Pulse 74   Temp 97.4  F (36.3  C) (Oral)   Resp 15   Ht 1.645 m (5' 4.76\")   Wt 100.2 kg (220 lb 12.8 oz)   SpO2 97%   BMI 37.01 kg/m     Body mass index is 37.01 kg/m .    General Appearance: healthy and alert, no distress     HEENT: no thyromegaly, no palpable nodules or masses        Cardiovascular: regular rate and rhythm, no gallops, rubs or murmurs     Respiratory: lungs clear, no rales, rhonchi or wheezes, normal diaphragmatic excursion    Musculoskeletal: extremities non tender and without edema    Skin: no lesions or rashes     Neurological: normal gait, no gross defects     Psychiatric: appropriate mood and affect                               Hematological: normal cervical, supraclavicular and inguinal lymph nodes     Gastrointestinal:       abdomen soft, non-tender, non-distended, no organomegaly or masses    Genitourinary: Shaved mons with scattered areas of inflammation; no blister/pustules, no erythema. urethral meatus appears normal.  Vagina is smooth without nodularity or masses. No discharge in vaginal vault or within labia. Cervix surgically absent.  Bimanual exam reveal no masses, nodularity or fullness.  Recto-vaginal exam confirms these findings.      Assessment:    Meagan Morales is a 45 year old woman with a diagnosis of stage IIIA grade 1 endometrioid adenocarcinoma. She completed " chemotherapy 3/2019.  She is here today for a surveillance visit.    25 minutes were spent with this patient, over 50% of that time was spent in symptom management, treatment planning and in counseling and coordination of care.      Plan:     1.)        Patient to RTC in 3 months for her next surveillance visit. She will see Dr. De Souza per the plan to alternate visits. Will not follow . Reviewed recommendations from [facility] not to perform surveillance pap smears in women diagnosed with endometrial cancer as this does not improve detection of local recurrence. Reviewed signs and symptoms for when she should contact the clinic or seek additional care. Patient to contact the clinic with any questions or concerns in the interim.     2.) Genetic risk factors were assessed and she has intact MMR proteins.     3.) Labs and/or tests ordered include:  .      4.) Health maintenance issues addressed today include annual health maintenance and non-gynecologic issues with PCP.    CORTES Gant, WHNP-BC, ANP-BC  Women's Health Nurse Practitioner  Adult Nurse Pracitioner  Division of Gynecologic Oncology      CC  Patient Care Team:  Aundrea Max MD as PCP - General  SELF, REFERRED

## 2020-03-02 ENCOUNTER — HEALTH MAINTENANCE LETTER (OUTPATIENT)
Age: 45
End: 2020-03-02

## 2020-05-07 ENCOUNTER — PATIENT OUTREACH (OUTPATIENT)
Dept: ONCOLOGY | Facility: CLINIC | Age: 45
End: 2020-05-07

## 2020-06-03 NOTE — PROGRESS NOTES
Spoke with pt regarding upcoming appt need  Pt states she is feeling well   denies any vaginal bleeding or discharge, pain, weight changes, no issues with bowel or bladder, no bloating  Eating and drinking well   Really has no concerns  Offered appt in June  Pt agrees

## 2020-06-29 ENCOUNTER — ONCOLOGY VISIT (OUTPATIENT)
Dept: ONCOLOGY | Facility: CLINIC | Age: 45
End: 2020-06-29
Attending: OBSTETRICS & GYNECOLOGY
Payer: COMMERCIAL

## 2020-06-29 VITALS
WEIGHT: 219.7 LBS | OXYGEN SATURATION: 96 % | RESPIRATION RATE: 16 BRPM | TEMPERATURE: 98.2 F | SYSTOLIC BLOOD PRESSURE: 134 MMHG | BODY MASS INDEX: 36.83 KG/M2 | DIASTOLIC BLOOD PRESSURE: 84 MMHG | HEART RATE: 77 BPM

## 2020-06-29 DIAGNOSIS — R97.1 ELEVATED CA-125: ICD-10-CM

## 2020-06-29 DIAGNOSIS — C54.1 ENDOMETRIAL CANCER (H): ICD-10-CM

## 2020-06-29 DIAGNOSIS — C54.1 ENDOMETRIAL CANCER (H): Primary | ICD-10-CM

## 2020-06-29 LAB — CANCER AG125 SERPL-ACNC: 8 U/ML (ref 0–30)

## 2020-06-29 PROCEDURE — 99213 OFFICE O/P EST LOW 20 MIN: CPT | Mod: ZP | Performed by: OBSTETRICS & GYNECOLOGY

## 2020-06-29 PROCEDURE — 86304 IMMUNOASSAY TUMOR CA 125: CPT

## 2020-06-29 PROCEDURE — G0463 HOSPITAL OUTPT CLINIC VISIT: HCPCS | Mod: ZF

## 2020-06-29 ASSESSMENT — PAIN SCALES - GENERAL: PAINLEVEL: NO PAIN (0)

## 2020-06-29 NOTE — LETTER
2020         RE: Meagan Morales  [address] [place] [id number]        Dear Colleague,    Thank you for referring your patient, Meagan Morales, to the [facility]. Please see a copy of my visit note below.                Follow Up Notes on Referred Patient    Date:2020         RE: Meagan Morales  : 1975  VY: 2020        Meagan Morales is a 45 year old woman with a diagnosis of stage IIIA grade 1 endometrioid adenocarcinoma. She completed chemotherapy 3/2019.  She is here today for a surveillance visit.      Oncology History:  Meagan originally presented to clinic due to continuous vaginal bleeding, very light. She thought it was just her menses being continuous. US was done which found thickened endometrial lining. IUD was placed. Second US was done and IUD did not affect endometrial lining and IUD was malpositioned. Since then, she has noted continuous spotting. IUD was removed. Also of note, ovarian cysts were noted on second US. Subsequent endometrial biopsy was done which revealed grade 1 endometrial cancer. She has always had irregular menses and has never been able to get pregnant. Patient's  first and only pap smear on 10/10/2017. Had mammogram in 20's, will order additional mammogram for baseline. No history of prior colonoscopy. Started Wellbutrin this last year, mood has improved. Prior cigarette smoker, quit 2 months ago and switched to vape. Started again smoking cigarettes again but has not had one for 10 days. 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 No history of prior abdominal surgeries. No history of heart disease, lung disease or diabetes. Has never been able to get pregnant and dose not desire children.     18: pelvic US IMPRESSION:  Peripheral follicular arrangement high within the ovaries suggesting polycystic ovarian syndrome. Slightly thickened heterogeneous endometrium raising a concern  of potential hyperplastic change.  9/17/18: pelvic US   1. Uterus is not normal in appearance. The endometrium appears thickened and vascular, and the IUD appears to be malpositioned.  Clinical correlation is recommended.    2. Bilateral complex ovarian cysts are noted.  Follow up ultrasound in 4-6 weeks is recommended, consider saline infusion sonohysterogram for further imaging of the endometrium.      9/17/18: endometrial biopsy  ENDOMETRIUM, BIOPSY:  1. FIGO Grade 1 (well differentiated) endometrioid carcinoma of      the endometrium  2. DNA mismatch repair enzyme immunohistochemistry studies:     All intact (see Amendment note and synoptic reporting)     10/24/2018: DaVinci Assisted Total Laparoscopic Hysterectomy, Bilateral Salpingo-Oophorectomy, lymph node dissection, Excision of Left Vulvar Lesion  Pelvic Washing:   Rare atypical cells present.     TEST(S) PERFORMED     Test(s) Performed:         - Mismatch Repair Immunohistochemistry (IHC) Testing for Mismatch Repair (MMR) Proteins:           - No loss of nuclear expression of MMR proteins: low probability of microsatellite instability-high (MSI-H)     ORIGINAL REPORT:     SPECIMEN(S):   A: Left vulvar lesion   B: Left pelvic sentinel lymph node   C: Right pelvic sentinel lymph node   D: Uterus, cervix, bilateral ovaries and fallopian tubes   E: Portion of left ovary   F: Para-aortic lymph nodes   G: Left pelvic lymph nodes   H: Right pelvic lymph nodes     FINAL DIAGNOSIS:   A. LEFT VULVAR LESION, BIOPSY:   - Intradermal nevus     B. LEFT PELVIC SENTINEL LYMPH NODE, EXCISION:   - One reactive lymph node, negative for malignancy (0/1)     C. RIGHT PELVIC SENTINEL LYMPH NODE, EXCISION:   - Six reactive lymph nodes, negative for malignancy (0/6)     D. UTERUS, CERVIX, BILATERAL OVARIES AND FALLOPIAN TUBES, HYSTERECTOMY WITH BILATERAL SALPINGO-OOPHORECTOMY:   - Endometrial endometrioid adenocarcinoma, FIGO grade 1   - Atypical endometrial hyperplasia   -  Myometrium with no significant histologic abnormality   - Chronic cervicitis with microglandular hyperplasia   - Uterine serosal fibrous adhesions   - Ovaries with cortical hyperplasia   - Metastatic endometrial adenocarcinoma to the left fallopian tube   - Right fallopian tube with no significant histologic abnormality     E. PORTION OF LEFT OVARY, EXCISION:   - Benign cortical inclusion cysts     F. PARA-AORTIC LYMPH NODES, EXCISION:   - Four reactive lymph nodes, negative for malignancy (0/4)   - Benign glandular inclusions consistent with endosalpingiosis     G. LEFT PELVIC LYMPH NODES, EXCISION:   - Eight reactive lymph nodes, negative for malignancy (0/8)     H. RIGHT PELVIC LYMPH NODES, EXCISION:   - Nine reactive lymph nodes, negative for malignancy (0/9)   - Benign glandular inclusions consistent with endosalpingiosis     COMMENT:   The final diagnosis confirms the interpretation provided intraoperatively. The tumor seen in the left fallopian tube involves the wall (invading the mucosa and submucosa) and appears free floating in the lumen.   Tumor Site:         - Endometrium - Anterior and posterior     Histologic Type:         - Endometrioid carcinoma, NOS     Histologic Grade:         - FIGO grade 1     Tumor Size: 5.7 Centimeters (cm)     Tumor Extent       Myometrial Invasion:           - Not identified       Adenomyosis:           - Not identified       Uterine Serosa Involvement:           - Not identified       Lower Uterine Segment Involvement:           - Not identified       Cervical Stromal Involvement:           - Not identified       Other Tissue / Organ Involvement:           - Left fallopian tube       Peritoneal Ascitic Fluid:           - Results pending     Accessory Findings       Lymphovascular Invasion:           - Not identified     11/6/18: CT ap IMPRESSION:   1. Surgical changes of hysterectomy, bilateral salpingo-oophorectomy, and pelvic lymph node dissection with extensive fat  stranding and fluid within the low pelvis extending superiorly along the iliac vasculature, right greater than left, potentially within normal postsurgical limits, however, slightly more than expected for postsurgical change.   1a. The ureters are not well evaluated on this single phase exam and there is no hydronephrosis or hydroureter, however, the amount of fluid in the pelvis does raise the question of ureteral injury. If there is clinical concern, consider obtaining a CT urogram.  1b. An area of irregular rim enhancement in the low pelvis may represent a normal postsurgical vaginal cuff, though, again, this is slightly more conspicuous than usual and dehiscence or a developing abscess may have a similar appearance.  2. 6.8 x 4.1 x 5.6 cm right pelvic sidewall seroma versus lymphocele.  3. Soft tissue nodular thickening anteriorly to the right psoas muscle and along the right lateral conal fascia, indeterminate, cannot rule out metastatic foci. Attention on follow-up studies.  4. Cholelithiasis without evidence of cholecystitis.      11/7/18: CT abd with contrast CT urogram IMPRESSION:   1. Postsurgical changes of hysterectomy, bilateral salpingo-oophorectomy, and iliac lymph node dissection with fluid  collections within the pelvis extending along the iliac vasculature.  a. The right pelvic sidewall fluid collection is not significantly changed while the left pelvic sidewall fluid collection has enlarged  in comparison to the 11/6/2018 CT. The differential remains a seroma versus a lymphocele.  b. No evidence of contrast extravasation to suggest a ureteral injury.  c. Redemonstration of soft tissue nodular thickening along the right lateral conal fascia and anteriorly to the right psoas muscle and right psoas muscle. Attention on follow-up is recommended.  2. Cholelithiasis without evidence of cholecystitis.     Plan: adjuvant therapy to include 6 cycles of taxol and carboplatin; would not add radiation as all  lymph nodes are negative, she does not have LVSI or deep invasion and this is a low grade tumor. Rx percocet 5 tabs due to continued abdominal pain. Will not prescribe any more narcotics and patient is aware of this.     11/16/18: Cycle #1 Paclitaxel/Carboplatin.   35.  12/6/18: Cycle #2 Paclitaxel/Carboplatin.  8.  12/27/2018: Cycle #3 T/C.  7.  1/8/19: US lower extremity: IMPRESSION: No evidence of deep venous thrombosis in either lower extremity.  1/9/2019: CT abd/pelvis IMPRESSION:   1. Minimal change in a right pelvic sidewall collection and decreased size of a left pelvic sidewall collection, which are most consistent with lymphoceles.  2. No suspicious pelvic lymphadenopathy.      1/21/19 Cycle #4 Paclitaxel/Carboplatin.  8.      1/2019 Presented with pain in LE:  US IMPRESSION: No evidence of deep venous thrombosis in either lower extremity.     1/2019 CT:IMPRESSION:   1. Minimal change in a right pelvic sidewall collection and decreased size of a left pelvic sidewall collection, which are most consistent with lymphoceles.  2. No suspicious pelvic lymphadenopathy.      2/14/19: Cycle #5 Paclitaxel/Carboplatin.  9.  3/7/19: Cycle #6 Paclitaxel/Carboplatin.  9.     4/4/2019: CT CAP IMPRESSION:   1. Significant decrease in the right pelvic sidewall collection, with resolved left pelvic sidewall collection. These are most consistent with lymphoceles.  2. No suspicious pelvic lymphadenopathy.  3. No suspicious abnormality in the chest.     Peritoneum / Retroperitoneum: Interval significant decrease in the right pelvic sidewall fluid collection now measuring 68 x 34 mm, previously 84 x 37 mm. The left pelvic sidewall fluid collection seen on prior examination has resolved.     Start surveillance; will alternate between NP and MD.             Date Value Ref Range Status   03/07/2019 9 0 - 30 U/mL Final     Comment:     Assay Method:  Chemiluminescence using Siemens Centaur  XP   02/14/2019 9 0 - 30 U/mL Final     Comment:     Assay Method:  Chemiluminescence using Siemens Centaur XP   01/21/2019 8 0 - 30 U/mL Final     Comment:     Assay Method:  Chemiluminescence using Siemens Centaur XP   12/27/2018 7 0 - 30 U/mL Final     Comment:     Assay Method:  Chemiluminescence using Siemens Centaur XP   12/06/2018 8 0 - 30 U/mL Final     Comment:     Assay Method:  Chemiluminescence using Siemens Centaur XP   11/16/2018 35 (H) 0 - 30 U/mL Final     Comment:     Assay Method:  Chemiluminescence using Siemens Centaur XP         Today she comes to clinic and denies any concerns for her visit. Has been sober over 2 years. Managing sober house now. Eating and drinking well. Lost 5lbs when coronavirus started but now gained back. Working from home. Has gym membership but has not gone back yet. Bowels and bladder working well. No nausea or vomiting, no fevers or chills, every once in awhile has RLQ pain sometimes on left side-occ when constipation occurs. Eating habits different over past 2 mos.    Review of Systems:    Systemic           no weight changes; no fever; no chills; + night sweats; no appetite changes  Skin           + rashes, or lesions  Eye           no irritation; no changes in vision  Deny-Laryngeal           no dysphagia; no hoarseness   Pulmonary    no cough; no shortness of breath  Cardiovascular    no chest pain; no palpitations  Gastrointestinal    no diarrhea; no constipation; no abdominal pain; no changes in bowel habits; no blood in stool  Genitourinary   no urinary frequency; no urinary urgency; no dysuria; no pain; no abnormal vaginal discharge; no abnormal vaginal bleeding  Breast    no breast discharge; no breast changes; no breast pain  Musculoskeletal    no myalgias; no arthralgias; no back pain  Psychiatric           no depressed mood; no anxiety    Hematologic              no tender lymph nodes; no noticeable swellings or lumps   Endocrine    + hot flashes; no  heat/cold intolerance         Neurological   no tremor; no numbness and tingling; no headaches; no difficulty sleeping      Past Medical History:    Past Medical History:   Diagnosis Date     Chickenpox     as a child     Depression      Endometrial cancer (H)      Hypertension     since her 30's     Infertility, female      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)      PCOS (polycystic ovarian syndrome)      STD (female)     in her 20's         Past Surgical History:    Past Surgical History:   Procedure Laterality Date     CYSTOSCOPY N/A 10/24/2018    Procedure: Cystoscopy;  Surgeon: Linh De Souza MD;  Location: [facility] OR     DAVINCI HYSTERECTOMY TOTAL, BILATERAL SALPINGO-OOPHORECTOMY, NODE DISSECTION, COMBINED N/A 10/24/2018    Procedure: DaVinci Assisted Total Laparoscopic Hysterectomy, Bilateral Salpingo-Oophorectomy, lymph node dissection;  Surgeon: Linh De Souza MD;  Location: [facility] OR     EXCISE LESION VULVA Left 10/24/2018    Procedure: Excision of Left Vulvar Lesion;  Surgeon: Linh De Souza MD;  Location: [facility] OR         Health Maintenance Due   Topic Date Due     PREVENTIVE CARE VISIT  1975     DEPRESSION ACTION PLAN  1975     PHQ-9  1975     HIV SCREENING  01/20/1992     PNEUMOCOCCAL IMMUNIZATION 19-64 HIGHEST RISK (1 of 3 - PCV13) 01/20/1996     PAP  01/20/1998       Current Medications:     Current Outpatient Medications   Medication Sig Dispense Refill     gabapentin (NEURONTIN) 600 MG tablet Take 600 mg by mouth as needed 600mg in AM, 600mg in the afternoon, 900mg in the evening       Multiple Vitamin (MULTIVITAMIN  S) CAPS Take 1 capsule by mouth       pramipexole (MIRAPEX) 0.125 MG tablet Take 0.125 mg by mouth At Bedtime       propranolol (INDERAL) 10 MG tablet Take 20 mg by mouth every morning        spironolactone (ALDACTONE) 50 MG tablet Take 50 mg by mouth daily        loratadine (CLARITIN) 10 MG tablet Take 10 mg by mouth       omeprazole (PRILOSEC) 40 MG   capsule Take 40 mg by mouth       Sharps Container (SHARPS-A-GATOR LOCKING BRACKET) [facility] CPAP machine for home use at pressure: 5-16 CM H20 , Heated humidifier x 1, Humidifier chamber x 1,  Nasal mask with cushion x 1, Heated tubing x 1, Headgear x 1, Filters: Disposable x 1pk & Reusable x 1pk, Length of Need: 99 months, Frequency of use: Daily           Allergies:        Allergies   Allergen Reactions     Amoxicillin      Penicillin G         Social History:     Social History     Tobacco Use     Smoking status: Former Smoker     Packs/day: 0.50     Types: Cigarettes     Smokeless tobacco: Never Used     Tobacco comment: uses vape pen about 3-4 times daily   Substance Use Topics     Alcohol use: No     Comment: 11 months sober        History   Drug Use No     Comment: 11 months sober. lives at sober housing         Family History:     The patient's family history is notable for:    No family history on file.      Physical Exam:     /84   Pulse 77   Temp 98.2  F (36.8  C) (Oral)   Resp 16   Wt 99.7 kg (219 lb 11.2 oz)   SpO2 96%   BMI 36.83 kg/m    Body mass index is 36.83 kg/m .    General Appearance: healthy and alert, no distress     HEENT: no thyromegaly, no palpable nodules or masses        Cardiovascular: regular rate and rhythm, no gallops, rubs or murmurs     Respiratory: lungs clear, no rales, rhonchi or wheezes, normal diaphragmatic excursion    Musculoskeletal: extremities non tender and without edema    Skin: no lesions or rashes     Neurological: normal gait, no gross defects     Psychiatric: appropriate mood and affect                               Hematological: normal cervical, supraclavicular and inguinal lymph nodes     Gastrointestinal:       abdomen soft, non-tender, non-distended, no organomegaly or masses    Genitourinary: Shaved mons with scattered areas of inflammation; no blister/pustules, no erythema. urethral meatus appears normal.  Vagina is smooth without nodularity or  masses. No discharge in vaginal vault or within labia. Cervix surgically absent.  Bimanual exam reveal no masses, nodularity or fullness.  Recto-vaginal exam confirms these findings.      Assessment:    Meagan Morales is a 45 year old woman with a diagnosis of stage IIIA grade 1 endometrioid adenocarcinoma. She completed chemotherapy 3/2019.  She is here today for a surveillance visit.    25 minutes were spent with this patient, over 50% of that time was spent in symptom management, treatment planning and in counseling and coordination of care.      Plan:     1.)        Patient to RTC in 3 months for her next surveillance visit. She can see NP for next visit.  Reviewed recommendations from [facility] not to perform surveillance pap smears in women diagnosed with endometrial cancer as this does not improve detection of local recurrence. Reviewed signs and symptoms for when she should contact the clinic or seek additional care. Patient to contact the clinic with any questions or concerns in the interim.     2.) Genetic risk factors were assessed and she has intact MMR proteins.     3.) Labs and/or tests ordered include:  .      4.) Health maintenance issues addressed today include annual health maintenance and non-gynecologic issues with PCP.    Linh De Souza MD    Department of Ob/Gyn and Women's Health  Division of Gynecologic Oncology  [facility]  [phone]          CC  Patient Care Team:  Aundrea Max MD as PCP - General

## 2020-06-29 NOTE — PROGRESS NOTES
Follow Up Notes on Referred Patient    Date:2020         RE: Meagan Morales  : 1975  VY: 2020        Meagan Morales is a 45 year old woman with a diagnosis of stage IIIA grade 1 endometrioid adenocarcinoma. She completed chemotherapy 3/2019.  She is here today for a surveillance visit.      Oncology History:  Meagan originally presented to clinic due to continuous vaginal bleeding, very light. She thought it was just her menses being continuous. US was done which found thickened endometrial lining. IUD was placed. Second US was done and IUD did not affect endometrial lining and IUD was malpositioned. Since then, she has noted continuous spotting. IUD was removed. Also of note, ovarian cysts were noted on second US. Subsequent endometrial biopsy was done which revealed grade 1 endometrial cancer. She has always had irregular menses and has never been able to get pregnant. Patient's  first and only pap smear on 10/10/2017. Had mammogram in 20's, will order additional mammogram for baseline. No history of prior colonoscopy. Started Wellbutrin this last year, mood has improved. Prior cigarette smoker, quit 2 months ago and switched to vape. Started again smoking cigarettes again but has not had one for 10 days. 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 No history of prior abdominal surgeries. No history of heart disease, lung disease or diabetes. Has never been able to get pregnant and dose not desire children.     18: pelvic US IMPRESSION:  Peripheral follicular arrangement high within the ovaries suggesting polycystic ovarian syndrome. Slightly thickened heterogeneous endometrium raising a concern of potential hyperplastic change.  18: pelvic US   1. Uterus is not normal in appearance. The endometrium appears thickened and vascular, and the IUD appears to be malpositioned.  Clinical correlation is recommended.    2. Bilateral  complex ovarian cysts are noted.  Follow up ultrasound in 4-6 weeks is recommended, consider saline infusion sonohysterogram for further imaging of the endometrium.      9/17/18: endometrial biopsy  ENDOMETRIUM, BIOPSY:  1. FIGO Grade 1 (well differentiated) endometrioid carcinoma of      the endometrium  2. DNA mismatch repair enzyme immunohistochemistry studies:     All intact (see Amendment note and synoptic reporting)     10/24/2018: DaVinci Assisted Total Laparoscopic Hysterectomy, Bilateral Salpingo-Oophorectomy, lymph node dissection, Excision of Left Vulvar Lesion  Pelvic Washing:   Rare atypical cells present.     TEST(S) PERFORMED     Test(s) Performed:         - Mismatch Repair Immunohistochemistry (IHC) Testing for Mismatch Repair (MMR) Proteins:           - No loss of nuclear expression of MMR proteins: low probability of microsatellite instability-high (MSI-H)     ORIGINAL REPORT:     SPECIMEN(S):   A: Left vulvar lesion   B: Left pelvic sentinel lymph node   C: Right pelvic sentinel lymph node   D: Uterus, cervix, bilateral ovaries and fallopian tubes   E: Portion of left ovary   F: Para-aortic lymph nodes   G: Left pelvic lymph nodes   H: Right pelvic lymph nodes     FINAL DIAGNOSIS:   A. LEFT VULVAR LESION, BIOPSY:   - Intradermal nevus     B. LEFT PELVIC SENTINEL LYMPH NODE, EXCISION:   - One reactive lymph node, negative for malignancy (0/1)     C. RIGHT PELVIC SENTINEL LYMPH NODE, EXCISION:   - Six reactive lymph nodes, negative for malignancy (0/6)     D. UTERUS, CERVIX, BILATERAL OVARIES AND FALLOPIAN TUBES, HYSTERECTOMY WITH BILATERAL SALPINGO-OOPHORECTOMY:   - Endometrial endometrioid adenocarcinoma, FIGO grade 1   - Atypical endometrial hyperplasia   - Myometrium with no significant histologic abnormality   - Chronic cervicitis with microglandular hyperplasia   - Uterine serosal fibrous adhesions   - Ovaries with cortical hyperplasia   - Metastatic endometrial adenocarcinoma to the left  fallopian tube   - Right fallopian tube with no significant histologic abnormality     E. PORTION OF LEFT OVARY, EXCISION:   - Benign cortical inclusion cysts     F. PARA-AORTIC LYMPH NODES, EXCISION:   - Four reactive lymph nodes, negative for malignancy (0/4)   - Benign glandular inclusions consistent with endosalpingiosis     G. LEFT PELVIC LYMPH NODES, EXCISION:   - Eight reactive lymph nodes, negative for malignancy (0/8)     H. RIGHT PELVIC LYMPH NODES, EXCISION:   - Nine reactive lymph nodes, negative for malignancy (0/9)   - Benign glandular inclusions consistent with endosalpingiosis     COMMENT:   The final diagnosis confirms the interpretation provided intraoperatively. The tumor seen in the left fallopian tube involves the wall (invading the mucosa and submucosa) and appears free floating in the lumen.   Tumor Site:         - Endometrium - Anterior and posterior     Histologic Type:         - Endometrioid carcinoma, NOS     Histologic Grade:         - FIGO grade 1     Tumor Size: 5.7 Centimeters (cm)     Tumor Extent       Myometrial Invasion:           - Not identified       Adenomyosis:           - Not identified       Uterine Serosa Involvement:           - Not identified       Lower Uterine Segment Involvement:           - Not identified       Cervical Stromal Involvement:           - Not identified       Other Tissue / Organ Involvement:           - Left fallopian tube       Peritoneal Ascitic Fluid:           - Results pending     Accessory Findings       Lymphovascular Invasion:           - Not identified     11/6/18: CT ap IMPRESSION:   1. Surgical changes of hysterectomy, bilateral salpingo-oophorectomy, and pelvic lymph node dissection with extensive fat stranding and fluid within the low pelvis extending superiorly along the iliac vasculature, right greater than left, potentially within normal postsurgical limits, however, slightly more than expected for postsurgical change.   1a. The ureters  are not well evaluated on this single phase exam and there is no hydronephrosis or hydroureter, however, the amount of fluid in the pelvis does raise the question of ureteral injury. If there is clinical concern, consider obtaining a CT urogram.  1b. An area of irregular rim enhancement in the low pelvis may represent a normal postsurgical vaginal cuff, though, again, this is slightly more conspicuous than usual and dehiscence or a developing abscess may have a similar appearance.  2. 6.8 x 4.1 x 5.6 cm right pelvic sidewall seroma versus lymphocele.  3. Soft tissue nodular thickening anteriorly to the right psoas muscle and along the right lateral conal fascia, indeterminate, cannot rule out metastatic foci. Attention on follow-up studies.  4. Cholelithiasis without evidence of cholecystitis.      11/7/18: CT abd with contrast CT urogram IMPRESSION:   1. Postsurgical changes of hysterectomy, bilateral salpingo-oophorectomy, and iliac lymph node dissection with fluid  collections within the pelvis extending along the iliac vasculature.  a. The right pelvic sidewall fluid collection is not significantly changed while the left pelvic sidewall fluid collection has enlarged  in comparison to the 11/6/2018 CT. The differential remains a seroma versus a lymphocele.  b. No evidence of contrast extravasation to suggest a ureteral injury.  c. Redemonstration of soft tissue nodular thickening along the right lateral conal fascia and anteriorly to the right psoas muscle and right psoas muscle. Attention on follow-up is recommended.  2. Cholelithiasis without evidence of cholecystitis.     Plan: adjuvant therapy to include 6 cycles of taxol and carboplatin; would not add radiation as all lymph nodes are negative, she does not have LVSI or deep invasion and this is a low grade tumor. Rx percocet 5 tabs due to continued abdominal pain. Will not prescribe any more narcotics and patient is aware of this.     11/16/18: Cycle #1  Paclitaxel/Carboplatin.   35.  12/6/18: Cycle #2 Paclitaxel/Carboplatin.  8.  12/27/2018: Cycle #3 T/C.  7.  1/8/19: US lower extremity: IMPRESSION: No evidence of deep venous thrombosis in either lower extremity.  1/9/2019: CT abd/pelvis IMPRESSION:   1. Minimal change in a right pelvic sidewall collection and decreased size of a left pelvic sidewall collection, which are most consistent with lymphoceles.  2. No suspicious pelvic lymphadenopathy.      1/21/19 Cycle #4 Paclitaxel/Carboplatin.  8.      1/2019 Presented with pain in LE:  US IMPRESSION: No evidence of deep venous thrombosis in either lower extremity.     1/2019 CT:IMPRESSION:   1. Minimal change in a right pelvic sidewall collection and decreased size of a left pelvic sidewall collection, which are most consistent with lymphoceles.  2. No suspicious pelvic lymphadenopathy.      2/14/19: Cycle #5 Paclitaxel/Carboplatin.  9.  3/7/19: Cycle #6 Paclitaxel/Carboplatin.  9.     4/4/2019: CT CAP IMPRESSION:   1. Significant decrease in the right pelvic sidewall collection, with resolved left pelvic sidewall collection. These are most consistent with lymphoceles.  2. No suspicious pelvic lymphadenopathy.  3. No suspicious abnormality in the chest.     Peritoneum / Retroperitoneum: Interval significant decrease in the right pelvic sidewall fluid collection now measuring 68 x 34 mm, previously 84 x 37 mm. The left pelvic sidewall fluid collection seen on prior examination has resolved.     Start surveillance; will alternate between NP and MD.             Date Value Ref Range Status   03/07/2019 9 0 - 30 U/mL Final     Comment:     Assay Method:  Chemiluminescence using Siemens Centaur XP   02/14/2019 9 0 - 30 U/mL Final     Comment:     Assay Method:  Chemiluminescence using Siemens Centaur XP   01/21/2019 8 0 - 30 U/mL Final     Comment:     Assay Method:  Chemiluminescence using Siemens Centaur XP   12/27/2018 7 0 - 30  U/mL Final     Comment:     Assay Method:  Chemiluminescence using Siemens Centaur XP   12/06/2018 8 0 - 30 U/mL Final     Comment:     Assay Method:  Chemiluminescence using Siemens Centaur XP   11/16/2018 35 (H) 0 - 30 U/mL Final     Comment:     Assay Method:  Chemiluminescence using Siemens Centaur XP         Today she comes to clinic and denies any concerns for her visit. Has been sober over 2 years. Managing sober house now. Eating and drinking well. Lost 5lbs when coronavirus started but now gained back. Working from home. Has gym membership but has not gone back yet. Bowels and bladder working well. No nausea or vomiting, no fevers or chills, every once in awhile has RLQ pain sometimes on left side-occ when constipation occurs. Eating habits different over past 2 mos.    Review of Systems:    Systemic           no weight changes; no fever; no chills; + night sweats; no appetite changes  Skin           + rashes, or lesions  Eye           no irritation; no changes in vision  Deny-Laryngeal           no dysphagia; no hoarseness   Pulmonary    no cough; no shortness of breath  Cardiovascular    no chest pain; no palpitations  Gastrointestinal    no diarrhea; no constipation; no abdominal pain; no changes in bowel habits; no blood in stool  Genitourinary   no urinary frequency; no urinary urgency; no dysuria; no pain; no abnormal vaginal discharge; no abnormal vaginal bleeding  Breast    no breast discharge; no breast changes; no breast pain  Musculoskeletal    no myalgias; no arthralgias; no back pain  Psychiatric           no depressed mood; no anxiety    Hematologic              no tender lymph nodes; no noticeable swellings or lumps   Endocrine    + hot flashes; no heat/cold intolerance         Neurological   no tremor; no numbness and tingling; no headaches; no difficulty sleeping      Past Medical History:    Past Medical History:   Diagnosis Date     Chickenpox     as a child     Depression      Endometrial  cancer (H)      Hypertension     since her 30's     Infertility, female      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)      PCOS (polycystic ovarian syndrome)      STD (female)     in her 20's         Past Surgical History:    Past Surgical History:   Procedure Laterality Date     CYSTOSCOPY N/A 10/24/2018    Procedure: Cystoscopy;  Surgeon: Linh De Souza MD;  Location: [facility] OR     DAVINCI HYSTERECTOMY TOTAL, BILATERAL SALPINGO-OOPHORECTOMY, NODE DISSECTION, COMBINED N/A 10/24/2018    Procedure: DaVinci Assisted Total Laparoscopic Hysterectomy, Bilateral Salpingo-Oophorectomy, lymph node dissection;  Surgeon: Linh De Souza MD;  Location: [facility] OR     EXCISE LESION VULVA Left 10/24/2018    Procedure: Excision of Left Vulvar Lesion;  Surgeon: Linh De Souza MD;  Location: [facility] OR         Health Maintenance Due   Topic Date Due     PREVENTIVE CARE VISIT  1975     DEPRESSION ACTION PLAN  1975     PHQ-9  1975     HIV SCREENING  01/20/1992     PNEUMOCOCCAL IMMUNIZATION 19-64 HIGHEST RISK (1 of 3 - PCV13) 01/20/1996     PAP  01/20/1998       Current Medications:     Current Outpatient Medications   Medication Sig Dispense Refill     gabapentin (NEURONTIN) 600 MG tablet Take 600 mg by mouth as needed 600mg in AM, 600mg in the afternoon, 900mg in the evening       Multiple Vitamin (MULTIVITAMIN  S) CAPS Take 1 capsule by mouth       pramipexole (MIRAPEX) 0.125 MG tablet Take 0.125 mg by mouth At Bedtime       propranolol (INDERAL) 10 MG tablet Take 20 mg by mouth every morning        spironolactone (ALDACTONE) 50 MG tablet Take 50 mg by mouth daily        loratadine (CLARITIN) 10 MG tablet Take 10 mg by mouth       omeprazole (PRILOSEC) 40 MG DR capsule Take 40 mg by mouth       Sharps Container (SHARPS-A-GATOR LOCKING BRACKET) [facility] CPAP machine for home use at pressure: 5-16 CM H20 , Heated humidifier x 1, Humidifier chamber x 1,  Nasal mask with cushion x 1, Heated tubing x 1, Headgear x 1,  Filters: Disposable x 1pk & Reusable x 1pk, Length of Need: 99 months, Frequency of use: Daily           Allergies:        Allergies   Allergen Reactions     Amoxicillin      Penicillin G         Social History:     Social History     Tobacco Use     Smoking status: Former Smoker     Packs/day: 0.50     Types: Cigarettes     Smokeless tobacco: Never Used     Tobacco comment: uses vape pen about 3-4 times daily   Substance Use Topics     Alcohol use: No     Comment: 11 months sober        History   Drug Use No     Comment: 11 months sober. lives at sober housing         Family History:     The patient's family history is notable for:    No family history on file.      Physical Exam:     /84   Pulse 77   Temp 98.2  F (36.8  C) (Oral)   Resp 16   Wt 99.7 kg (219 lb 11.2 oz)   SpO2 96%   BMI 36.83 kg/m    Body mass index is 36.83 kg/m .    General Appearance: healthy and alert, no distress     HEENT: no thyromegaly, no palpable nodules or masses        Cardiovascular: regular rate and rhythm, no gallops, rubs or murmurs     Respiratory: lungs clear, no rales, rhonchi or wheezes, normal diaphragmatic excursion    Musculoskeletal: extremities non tender and without edema    Skin: no lesions or rashes     Neurological: normal gait, no gross defects     Psychiatric: appropriate mood and affect                               Hematological: normal cervical, supraclavicular and inguinal lymph nodes     Gastrointestinal:       abdomen soft, non-tender, non-distended, no organomegaly or masses    Genitourinary: Shaved mons with scattered areas of inflammation; no blister/pustules, no erythema. urethral meatus appears normal.  Vagina is smooth without nodularity or masses. No discharge in vaginal vault or within labia. Cervix surgically absent.  Bimanual exam reveal no masses, nodularity or fullness.  Recto-vaginal exam confirms these findings.      Assessment:    Meagan Morales is a 45 year old woman with a diagnosis of  stage IIIA grade 1 endometrioid adenocarcinoma. She completed chemotherapy 3/2019.  She is here today for a surveillance visit.    25 minutes were spent with this patient, over 50% of that time was spent in symptom management, treatment planning and in counseling and coordination of care.      Plan:     1.)        Patient to RTC in 3 months for her next surveillance visit. She can see NP for next visit.  Reviewed recommendations from [facility] not to perform surveillance pap smears in women diagnosed with endometrial cancer as this does not improve detection of local recurrence. Reviewed signs and symptoms for when she should contact the clinic or seek additional care. Patient to contact the clinic with any questions or concerns in the interim.     2.) Genetic risk factors were assessed and she has intact MMR proteins.     3.) Labs and/or tests ordered include:  .      4.) Health maintenance issues addressed today include annual health maintenance and non-gynecologic issues with PCP.    Linh De Souza MD    Department of Ob/Gyn and Women's Health  Division of Gynecologic Oncology  [facility]  [phone]          CC  Patient Care Team:  Aundrea Max MD as PCP - General  SELF, REFERRED

## 2020-06-29 NOTE — NURSING NOTE
"Oncology Rooming Note    June 29, 2020 2:52 PM   Meagan Morales is a 45 year old female who presents for:    Chief Complaint   Patient presents with     Oncology Clinic Visit     RETURN VISIT; ENDOMETRIAL CANCER; VITALS TAKEN      Initial Vitals: /84   Pulse 77   Temp 98.2  F (36.8  C) (Oral)   Resp 16   Wt 99.7 kg (219 lb 11.2 oz)   SpO2 96%   BMI 36.83 kg/m   Estimated body mass index is 36.83 kg/m  as calculated from the following:    Height as of 2/11/20: 1.645 m (5' 4.76\").    Weight as of this encounter: 99.7 kg (219 lb 11.2 oz). Body surface area is 2.13 meters squared.  No Pain (0) Comment: Data Unavailable   No LMP recorded. Patient has had a hysterectomy.  Allergies reviewed: Yes  Medications reviewed: Yes    Medications: Medication refills not needed today.  Pharmacy name entered into IGIGI: [facility] #[id number] [facility] [address] [facility] [address] [facility] [address]    Clinical concerns: No new concerns today  Dr De Souza was notified.      Isabela Gordon              "

## 2020-08-26 NOTE — PROGRESS NOTES
Follow Up Notes on Referred Patient    Date: 2020       Dr. Linh De Souza, MD  [address] [place] [address]       RE: Meagan Morales  : 1975  VY: 2020    Dear Dr. Linh De Souza:    Meagna Morales is a 45 year old woman with a diagnosis of stage IIIA grade 1 endometrioid adenocarcinoma. She completed chemotherapy 3/2019.  She is here today for a surveillance visit.      Oncology History:  Meagan originally presented to her clinic due to continuous vaginal bleeding, very light, 2018. She thought it was just her menses being continuous. US was done which found thickened endometrial lining. IUD was placed. Second US was done and IUD did not affect endometrial lining and IUD was malpositioned. She continued to have spotting. IUD was removed. Also of note, ovarian cysts were noted on second US. Subsequent endometrial biopsy was done which revealed grade 1 endometrial cancer. She has always had irregular menses and has never been able to get pregnant. Patient's  first and only pap smear on 10/10/2017. Had mammogram in . No history of prior colonoscopy. Started Wellbutrin this last year, mood has improved. Prior cigarette smoker, quit 2 months ago (2018) and switched to vape. Started again smoking cigarettes again. 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 Has never been able to get pregnant and dose not desire children.     18: pelvic US IMPRESSION:  Peripheral follicular arrangement high within the ovaries suggesting polycystic ovarian syndrome. Slightly thickened heterogeneous endometrium raising a concern of potential hyperplastic change.  18: pelvic US   1. Uterus is not normal in appearance. The endometrium appears thickened and vascular, and the IUD appears to be malpositioned.  Clinical correlation is recommended.    2. Bilateral complex ovarian cysts are noted.  Follow up ultrasound in 4-6 weeks is  recommended, consider saline infusion sonohysterogram for further imaging of the endometrium.      9/17/18: endometrial biopsy  ENDOMETRIUM, BIOPSY:  1. FIGO Grade 1 (well differentiated) endometrioid carcinoma of      the endometrium  2. DNA mismatch repair enzyme immunohistochemistry studies:     All intact (see Amendment note and synoptic reporting)     10/24/2018: DaVinci Assisted Total Laparoscopic Hysterectomy, Bilateral Salpingo-Oophorectomy, lymph node dissection, Excision of Left Vulvar Lesion  Pelvic Washing:   Rare atypical cells present.  MMR proteins intact.     ORIGINAL REPORT:     SPECIMEN(S):   A: Left vulvar lesion   B: Left pelvic sentinel lymph node   C: Right pelvic sentinel lymph node   D: Uterus, cervix, bilateral ovaries and fallopian tubes   E: Portion of left ovary   F: Para-aortic lymph nodes   G: Left pelvic lymph nodes   H: Right pelvic lymph nodes     FINAL DIAGNOSIS:   A. LEFT VULVAR LESION, BIOPSY:   - Intradermal nevus     B. LEFT PELVIC SENTINEL LYMPH NODE, EXCISION:   - One reactive lymph node, negative for malignancy (0/1)     C. RIGHT PELVIC SENTINEL LYMPH NODE, EXCISION:   - Six reactive lymph nodes, negative for malignancy (0/6)     D. UTERUS, CERVIX, BILATERAL OVARIES AND FALLOPIAN TUBES, HYSTERECTOMY WITH BILATERAL SALPINGO-OOPHORECTOMY:   - Endometrial endometrioid adenocarcinoma, FIGO grade 1   - Atypical endometrial hyperplasia   - Myometrium with no significant histologic abnormality   - Chronic cervicitis with microglandular hyperplasia   - Uterine serosal fibrous adhesions   - Ovaries with cortical hyperplasia   - Metastatic endometrial adenocarcinoma to the left fallopian tube   - Right fallopian tube with no significant histologic abnormality     E. PORTION OF LEFT OVARY, EXCISION:   - Benign cortical inclusion cysts     F. PARA-AORTIC LYMPH NODES, EXCISION:   - Four reactive lymph nodes, negative for malignancy (0/4)   - Benign glandular inclusions consistent with  endosalpingiosis     G. LEFT PELVIC LYMPH NODES, EXCISION:   - Eight reactive lymph nodes, negative for malignancy (0/8)     H. RIGHT PELVIC LYMPH NODES, EXCISION:   - Nine reactive lymph nodes, negative for malignancy (0/9)   - Benign glandular inclusions consistent with endosalpingiosis        11/6/18: CT ap IMPRESSION:   1. Surgical changes of hysterectomy, bilateral salpingo-oophorectomy, and pelvic lymph node dissection with extensive fat stranding and fluid within the low pelvis extending superiorly along the iliac vasculature, right greater than left, potentially within normal postsurgical limits, however, slightly more than expected for postsurgical change.   1a. The ureters are not well evaluated on this single phase exam and there is no hydronephrosis or hydroureter, however, the amount of fluid in the pelvis does raise the question of ureteral injury. If there is clinical concern, consider obtaining a CT urogram.  1b. An area of irregular rim enhancement in the low pelvis may represent a normal postsurgical vaginal cuff, though, again, this is slightly more conspicuous than usual and dehiscence or a developing abscess may have a similar appearance.  2. 6.8 x 4.1 x 5.6 cm right pelvic sidewall seroma versus lymphocele.  3. Soft tissue nodular thickening anteriorly to the right psoas muscle and along the right lateral conal fascia, indeterminate, cannot rule out metastatic foci. Attention on follow-up studies.  4. Cholelithiasis without evidence of cholecystitis.      11/7/18: CT abd with contrast CT urogram IMPRESSION:   1. Postsurgical changes of hysterectomy, bilateral salpingo-oophorectomy, and iliac lymph node dissection with fluid  collections within the pelvis extending along the iliac vasculature.  a. The right pelvic sidewall fluid collection is not significantly changed while the left pelvic sidewall fluid collection has enlarged  in comparison to the 11/6/2018 CT. The differential remains a  seroma versus a lymphocele.  b. No evidence of contrast extravasation to suggest a ureteral injury.  c. Redemonstration of soft tissue nodular thickening along the right lateral conal fascia and anteriorly to the right psoas muscle and right psoas muscle. Attention on follow-up is recommended.  2. Cholelithiasis without evidence of cholecystitis.     Plan: adjuvant therapy to include 6 cycles of taxol and carboplatin; would not add radiation as all lymph nodes are negative, she does not have LVSI or deep invasion and this is a low grade tumor. Rx percocet 5 tabs due to continued abdominal pain. Will not prescribe any more narcotics and patient is aware of this.     11/16/18: Cycle #1 Paclitaxel/Carboplatin.   35.  12/6/18: Cycle #2 Paclitaxel/Carboplatin.  8.  12/27/2018: Cycle #3 T/C.  7.  1/8/19: US lower extremity: IMPRESSION: No evidence of deep venous thrombosis in either lower extremity.  1/9/2019: CT abd/pelvis IMPRESSION:   1. Minimal change in a right pelvic sidewall collection and decreased size of a left pelvic sidewall collection, which are most consistent with lymphoceles.  2. No suspicious pelvic lymphadenopathy.      1/21/19 Cycle #4 Paclitaxel/Carboplatin.  8.      1/2019 Presented with pain in LE:  US IMPRESSION: No evidence of deep venous thrombosis in either lower extremity.     1/2019 CT:IMPRESSION:   1. Minimal change in a right pelvic sidewall collection and decreased size of a left pelvic sidewall collection, which are most consistent with lymphoceles.  2. No suspicious pelvic lymphadenopathy.      2/14/19: Cycle #5 Paclitaxel/Carboplatin.  9.  3/7/19: Cycle #6 Paclitaxel/Carboplatin.  9.     4/4/2019: CT CAP IMPRESSION:   1. Significant decrease in the right pelvic sidewall collection, with resolved left pelvic sidewall collection. These are most consistent with lymphoceles.  2. No suspicious pelvic lymphadenopathy.  3. No suspicious abnormality in the  "chest.     Peritoneum / Retroperitoneum: Interval significant decrease in the right pelvic sidewall fluid collection now measuring 68 x 34 mm, previously 84 x 37 mm. The left pelvic sidewall fluid collection seen on prior examination has resolved.     Start surveillance; will alternate between NP and MD.  Per Dr. De Souza, do not need to follow  \"as was acutely elevated post op and have since been normal.\"    9/1/2020:  pending.       Today she comes to clinic feeling well and denies any concerns for her visit. She did see a new PCP yesterday and was restarted on Wellbutrin and will be seeing a therapist again. She denies any vaginal bleeding, no changes in her bowel or bladder habits, no nausea/emesis, no lower extremity edema, and no difficulties eating or sleeping. She denies any abdominal discomfort/bloating, no fevers or chills, and no chest pain or shortness of breath. She is sexually active and uses a lubricant; she denies dryness at other times. She will be moving out of her sober house tomorrow and moving in with her boyfriend in [facility]. Her mammogram is due next month.         Review of Systems:    Systemic           no weight changes; no fever; no chills; no night sweats; no appetite changes  Skin           no rashes, or lesions  Eye           no irritation; no changes in vision  Deny-Laryngeal           no dysphagia; no hoarseness   Pulmonary    no cough; no shortness of breath  Cardiovascular    no chest pain; no palpitations  Gastrointestinal    no diarrhea; no constipation; no abdominal pain; no changes in bowel habits; no blood in stool  Genitourinary   no urinary frequency; no urinary urgency; no dysuria; no pain; no abnormal vaginal discharge; no abnormal vaginal bleeding  Breast    no breast discharge; no breast changes; no breast pain  Musculoskeletal    no myalgias; no arthralgias; no back pain  Psychiatric           + depressed mood; no anxiety    Hematologic               no " tender lymph nodes; no noticeable swellings or lumps   Endocrine    no hot flashes; no heat/cold intolerance         Neurological   no tremor; no numbness and tingling; no headaches; no difficulty sleeping      Past Medical History:    Past Medical History:   Diagnosis Date     Chickenpox     as a child     Depression      Endometrial cancer (H)      Hypertension     since her 30's     Infertility, female      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)      PCOS (polycystic ovarian syndrome)      STD (female)     in her 20's         Past Surgical History:    Past Surgical History:   Procedure Laterality Date     CYSTOSCOPY N/A 10/24/2018    Procedure: Cystoscopy;  Surgeon: Linh De Souza MD;  Location: [facility] OR     DAVINCI HYSTERECTOMY TOTAL, BILATERAL SALPINGO-OOPHORECTOMY, NODE DISSECTION, COMBINED N/A 10/24/2018    Procedure: DaVinci Assisted Total Laparoscopic Hysterectomy, Bilateral Salpingo-Oophorectomy, lymph node dissection;  Surgeon: Linh De Souza MD;  Location: [facility] OR     EXCISE LESION VULVA Left 10/24/2018    Procedure: Excision of Left Vulvar Lesion;  Surgeon: Linh De Souza MD;  Location: [facility] OR         Health Maintenance Due   Topic Date Due     PREVENTIVE CARE VISIT  1975     DEPRESSION ACTION PLAN  1975     PHQ-9  1975     HIV SCREENING  01/20/1992     PNEUMOCOCCAL IMMUNIZATION 19-64 HIGHEST RISK (1 of 3 - PCV13) 01/20/1996     PAP  01/20/1998     INFLUENZA VACCINE (1) 09/01/2020       Current Medications:     Current Outpatient Medications   Medication Sig Dispense Refill     buPROPion (WELLBUTRIN XL) 150 MG 24 hr tablet        Multiple Vitamin (MULTIVITAMIN  S) CAPS Take 1 capsule by mouth       pramipexole (MIRAPEX) 0.125 MG tablet Take 0.125 mg by mouth At Bedtime       propranolol (INDERAL) 10 MG tablet Take 20 mg by mouth every morning        Sharps Container (SHARPS-A-GATOR LOCKING BRACKET) [facility] [place] for home use at pressure: 5-16 CM H20 , Heated humidifier  x 1, Humidifier chamber x 1,  Nasal mask with cushion x 1, Heated tubing x 1, Headgear x 1, Filters: Disposable x 1pk & Reusable x 1pk, Length of Need: 99 months, Frequency of use: Daily       spironolactone (ALDACTONE) 50 MG tablet Take 50 mg by mouth daily        gabapentin (NEURONTIN) 600 MG tablet Take 600 mg by mouth as needed 600mg in AM, 600mg in the afternoon, 900mg in the evening       loratadine (CLARITIN) 10 MG tablet Take 10 mg by mouth       omeprazole (PRILOSEC) 40 MG DR capsule Take 40 mg by mouth           Allergies:        Allergies   Allergen Reactions     Amoxicillin      Penicillin G         Social History:     Social History     Tobacco Use     Smoking status: Former Smoker     Packs/day: 0.50     Types: Cigarettes     Smokeless tobacco: Never Used   Substance Use Topics     Alcohol use: No     Comment: 11 months sober        History   Drug Use No     Comment: 11 months sober. lives at sober housing         Family History:     The patient's family history is notable for:    No family history on file.      Physical Exam:     /80   Pulse 72   Temp 98.2  F (36.8  C) (Oral)   Resp 18   Wt 107.5 kg (237 lb)   SpO2 97%   BMI 39.73 kg/m    Body mass index is 39.73 kg/m .    General Appearance: healthy and alert, no distress     HEENT: no thyromegaly, no palpable nodules or masses        Cardiovascular: regular rate and rhythm, no gallops, rubs or murmurs     Respiratory: lungs clear, no rales, rhonchi or wheezes, normal diaphragmatic excursion    Musculoskeletal: extremities non tender and without edema    Skin: no lesions or rashes     Neurological: normal gait, no gross defects     Psychiatric: appropriate mood and affect                               Hematological: normal cervical, supraclavicular and inguinal lymph nodes     Gastrointestinal:       abdomen soft, non-tender, non-distended, no organomegaly or masses    Genitourinary: External genitalia and urethral meatus appears normal.   Vagina is smooth without nodularity or masses.  Cervix surgically absent.  Bimanual exam reveal no masses, nodularity or fullness.  Recto-vaginal exam confirms these findings.      Assessment:    Meagan Morales is a 45 year old woman with a diagnosis of stage IIIA grade 1 endometrioid adenocarcinoma. She completed chemotherapy 3/2019.  She is here today for a surveillance visit.     15 minutes were spent with this patient, over 50% of that time was spent in symptom management, treatment planning and in counseling and coordination of care.      Plan:     1.)        Patient to RTC in 3 months for her next surveillance visit and ; today's is pending. Will not follow  after today given it was not done/elevated prior to surgery and only minimally elevated afterwards. Reviewed recommendations from [facility] not to perform surveillance pap smears in women diagnosed with endometrial cancer as this does not improve detection of local recurrence. Reviewed signs and symptoms for when she should contact the clinic or seek additional care.  Patient to contact the clinic with any questions or concerns in the interim.     2.) Genetic risk factors were assessed and her MMR proteins were intact.    3.) Labs and/or tests ordered include:  None.     4.) Health maintenance issues addressed today include annual health maintenance and non-gynecologic issues with PCP.    CORTES Gant, WHNP-BC, ANP-BC  Women's Health Nurse Practitioner  Adult Nurse Pracitioner  Division of Gynecologic Oncology          CC  Patient Care Team:  Aundrea Max MD as PCP - General  Shoshana David APRN CNP as Assigned PCP  HEMANTH SAMANIEGO

## 2020-09-01 ENCOUNTER — ONCOLOGY VISIT (OUTPATIENT)
Dept: ONCOLOGY | Facility: CLINIC | Age: 45
End: 2020-09-01
Attending: NURSE PRACTITIONER
Payer: COMMERCIAL

## 2020-09-01 VITALS
TEMPERATURE: 98.2 F | WEIGHT: 237 LBS | OXYGEN SATURATION: 97 % | HEART RATE: 72 BPM | BODY MASS INDEX: 39.73 KG/M2 | DIASTOLIC BLOOD PRESSURE: 80 MMHG | SYSTOLIC BLOOD PRESSURE: 119 MMHG | RESPIRATION RATE: 18 BRPM

## 2020-09-01 DIAGNOSIS — R97.1 ELEVATED CA-125: ICD-10-CM

## 2020-09-01 DIAGNOSIS — C54.1 ENDOMETRIAL CANCER (H): Primary | ICD-10-CM

## 2020-09-01 DIAGNOSIS — C54.1 ENDOMETRIAL CANCER (H): ICD-10-CM

## 2020-09-01 LAB — CANCER AG125 SERPL-ACNC: 11 U/ML (ref 0–30)

## 2020-09-01 PROCEDURE — G0463 HOSPITAL OUTPT CLINIC VISIT: HCPCS

## 2020-09-01 PROCEDURE — 99213 OFFICE O/P EST LOW 20 MIN: CPT | Mod: ZP | Performed by: NURSE PRACTITIONER

## 2020-09-01 PROCEDURE — 86304 IMMUNOASSAY TUMOR CA 125: CPT

## 2020-09-01 RX ORDER — BUPROPION HYDROCHLORIDE 150 MG/1
TABLET ORAL
COMMUNITY
Start: 2020-08-31 | End: 2021-01-29

## 2020-09-01 ASSESSMENT — PAIN SCALES - GENERAL: PAINLEVEL: NO PAIN (0)

## 2020-09-01 NOTE — NURSING NOTE
"Oncology Rooming Note    September 1, 2020 3:12 PM   Meagan Morales is a 45 year old female who presents for:    Chief Complaint   Patient presents with     Oncology Clinic Visit     Return;  Endometrial Ca     Initial Vitals: /80   Pulse 72   Temp 98.2  F (36.8  C) (Oral)   Resp 18   Wt 107.5 kg (237 lb)   SpO2 97%   BMI 39.73 kg/m   Estimated body mass index is 39.73 kg/m  as calculated from the following:    Height as of 2/11/20: 1.645 m (5' 4.76\").    Weight as of this encounter: 107.5 kg (237 lb). Body surface area is 2.22 meters squared.  No Pain (0) Comment: Data Unavailable   No LMP recorded. Patient has had a hysterectomy.  Allergies reviewed: Yes  Medications reviewed: Yes    Medications: Medication refills not needed today.  Pharmacy name entered into [facility]: [id number] [place] [id number] AT [facility] [address] [facility] [address]    Clinical concerns: Patient states there are no new concerns to discuss with provider.  [employer] was not notified.         Siri Guillaume CMA              "

## 2020-09-01 NOTE — LETTER
2020         RE: Meagan Morales  [address] [facility] [address]        Dear Colleague,    Thank you for referring your patient, Meagan Morales, to the [facility]. Please see a copy of my visit note below.                Follow Up Notes on Referred Patient    Date: 2020       Dr. Linh De Souza MD  [address] [place] [address]       RE: Meagan Morales  : 1975  VY: 2020    Dear Dr. Linh De Souza:    Meagan Morales is a 45 year old woman with a diagnosis of stage IIIA grade 1 endometrioid adenocarcinoma. She completed chemotherapy 3/2019.  She is here today for a surveillance visit.      Oncology History:  Meagan originally presented to her clinic due to continuous vaginal bleeding, very light, 2018. She thought it was just her menses being continuous. US was done which found thickened endometrial lining. IUD was placed. Second US was done and IUD did not affect endometrial lining and IUD was malpositioned. She continued to have spotting. IUD was removed. Also of note, ovarian cysts were noted on second US. Subsequent endometrial biopsy was done which revealed grade 1 endometrial cancer. She has always had irregular menses and has never been able to get pregnant. Patient's  first and only pap smear on 10/10/2017. Had mammogram in . No history of prior colonoscopy. Started Wellbutrin this last year, mood has improved. Prior cigarette smoker, quit 2 months ago (2018) and switched to vape. Started again smoking cigarettes again. 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 Has never been able to get pregnant and dose not desire children.     18: pelvic US IMPRESSION:  Peripheral follicular arrangement high within the ovaries suggesting polycystic ovarian syndrome. Slightly thickened heterogeneous endometrium raising a concern of potential hyperplastic change.  18: pelvic US   1. Uterus is not  normal in appearance. The endometrium appears thickened and vascular, and the IUD appears to be malpositioned.  Clinical correlation is recommended.    2. Bilateral complex ovarian cysts are noted.  Follow up ultrasound in 4-6 weeks is recommended, consider saline infusion sonohysterogram for further imaging of the endometrium.      9/17/18: endometrial biopsy  ENDOMETRIUM, BIOPSY:  1. FIGO Grade 1 (well differentiated) endometrioid carcinoma of      the endometrium  2. DNA mismatch repair enzyme immunohistochemistry studies:     All intact (see Amendment note and synoptic reporting)     10/24/2018: DaVinci Assisted Total Laparoscopic Hysterectomy, Bilateral Salpingo-Oophorectomy, lymph node dissection, Excision of Left Vulvar Lesion  Pelvic Washing:   Rare atypical cells present.  MMR proteins intact.     ORIGINAL REPORT:     SPECIMEN(S):   A: Left vulvar lesion   B: Left pelvic sentinel lymph node   C: Right pelvic sentinel lymph node   D: Uterus, cervix, bilateral ovaries and fallopian tubes   E: Portion of left ovary   F: Para-aortic lymph nodes   G: Left pelvic lymph nodes   H: Right pelvic lymph nodes     FINAL DIAGNOSIS:   A. LEFT VULVAR LESION, BIOPSY:   - Intradermal nevus     B. LEFT PELVIC SENTINEL LYMPH NODE, EXCISION:   - One reactive lymph node, negative for malignancy (0/1)     C. RIGHT PELVIC SENTINEL LYMPH NODE, EXCISION:   - Six reactive lymph nodes, negative for malignancy (0/6)     D. UTERUS, CERVIX, BILATERAL OVARIES AND FALLOPIAN TUBES, HYSTERECTOMY WITH BILATERAL SALPINGO-OOPHORECTOMY:   - Endometrial endometrioid adenocarcinoma, FIGO grade 1   - Atypical endometrial hyperplasia   - Myometrium with no significant histologic abnormality   - Chronic cervicitis with microglandular hyperplasia   - Uterine serosal fibrous adhesions   - Ovaries with cortical hyperplasia   - Metastatic endometrial adenocarcinoma to the left fallopian tube   - Right fallopian tube with no significant histologic  abnormality     E. PORTION OF LEFT OVARY, EXCISION:   - Benign cortical inclusion cysts     F. PARA-AORTIC LYMPH NODES, EXCISION:   - Four reactive lymph nodes, negative for malignancy (0/4)   - Benign glandular inclusions consistent with endosalpingiosis     G. LEFT PELVIC LYMPH NODES, EXCISION:   - Eight reactive lymph nodes, negative for malignancy (0/8)     H. RIGHT PELVIC LYMPH NODES, EXCISION:   - Nine reactive lymph nodes, negative for malignancy (0/9)   - Benign glandular inclusions consistent with endosalpingiosis        11/6/18: CT ap IMPRESSION:   1. Surgical changes of hysterectomy, bilateral salpingo-oophorectomy, and pelvic lymph node dissection with extensive fat stranding and fluid within the low pelvis extending superiorly along the iliac vasculature, right greater than left, potentially within normal postsurgical limits, however, slightly more than expected for postsurgical change.   1a. The ureters are not well evaluated on this single phase exam and there is no hydronephrosis or hydroureter, however, the amount of fluid in the pelvis does raise the question of ureteral injury. If there is clinical concern, consider obtaining a CT urogram.  1b. An area of irregular rim enhancement in the low pelvis may represent a normal postsurgical vaginal cuff, though, again, this is slightly more conspicuous than usual and dehiscence or a developing abscess may have a similar appearance.  2. 6.8 x 4.1 x 5.6 cm right pelvic sidewall seroma versus lymphocele.  3. Soft tissue nodular thickening anteriorly to the right psoas muscle and along the right lateral conal fascia, indeterminate, cannot rule out metastatic foci. Attention on follow-up studies.  4. Cholelithiasis without evidence of cholecystitis.      11/7/18: CT abd with contrast CT urogram IMPRESSION:   1. Postsurgical changes of hysterectomy, bilateral salpingo-oophorectomy, and iliac lymph node dissection with fluid  collections within the pelvis  extending along the iliac vasculature.  a. The right pelvic sidewall fluid collection is not significantly changed while the left pelvic sidewall fluid collection has enlarged  in comparison to the 11/6/2018 CT. The differential remains a seroma versus a lymphocele.  b. No evidence of contrast extravasation to suggest a ureteral injury.  c. Redemonstration of soft tissue nodular thickening along the right lateral conal fascia and anteriorly to the right psoas muscle and right psoas muscle. Attention on follow-up is recommended.  2. Cholelithiasis without evidence of cholecystitis.     Plan: adjuvant therapy to include 6 cycles of taxol and carboplatin; would not add radiation as all lymph nodes are negative, she does not have LVSI or deep invasion and this is a low grade tumor. Rx percocet 5 tabs due to continued abdominal pain. Will not prescribe any more narcotics and patient is aware of this.     11/16/18: Cycle #1 Paclitaxel/Carboplatin.   35.  12/6/18: Cycle #2 Paclitaxel/Carboplatin.  8.  12/27/2018: Cycle #3 T/C.  7.  1/8/19: US lower extremity: IMPRESSION: No evidence of deep venous thrombosis in either lower extremity.  1/9/2019: CT abd/pelvis IMPRESSION:   1. Minimal change in a right pelvic sidewall collection and decreased size of a left pelvic sidewall collection, which are most consistent with lymphoceles.  2. No suspicious pelvic lymphadenopathy.      1/21/19 Cycle #4 Paclitaxel/Carboplatin.  8.      1/2019 Presented with pain in LE:  US IMPRESSION: No evidence of deep venous thrombosis in either lower extremity.     1/2019 CT:IMPRESSION:   1. Minimal change in a right pelvic sidewall collection and decreased size of a left pelvic sidewall collection, which are most consistent with lymphoceles.  2. No suspicious pelvic lymphadenopathy.      2/14/19: Cycle #5 Paclitaxel/Carboplatin.  9.  3/7/19: Cycle #6 Paclitaxel/Carboplatin.  9.     4/4/2019: CT CAP IMPRESSION:  "  1. Significant decrease in the right pelvic sidewall collection, with resolved left pelvic sidewall collection. These are most consistent with lymphoceles.  2. No suspicious pelvic lymphadenopathy.  3. No suspicious abnormality in the chest.     Peritoneum / Retroperitoneum: Interval significant decrease in the right pelvic sidewall fluid collection now measuring 68 x 34 mm, previously 84 x 37 mm. The left pelvic sidewall fluid collection seen on prior examination has resolved.     Start surveillance; will alternate between NP and MD.  Per Dr. De Souza, do not need to follow  \"as was acutely elevated post op and have since been normal.\"    9/1/2020:  pending.       Today she comes to clinic feeling well and denies any concerns for her visit. She did see a new PCP yesterday and was restarted on Wellbutrin and will be seeing a therapist again. She denies any vaginal bleeding, no changes in her bowel or bladder habits, no nausea/emesis, no lower extremity edema, and no difficulties eating or sleeping. She denies any abdominal discomfort/bloating, no fevers or chills, and no chest pain or shortness of breath. She is sexually active and uses a lubricant; she denies dryness at other times. She will be moving out of her sober house tomorrow and moving in with her boyfriend [facility] Her mammogram is due next month.         Review of Systems:    Systemic           no weight changes; no fever; no chills; no night sweats; no appetite changes  Skin           no rashes, or lesions  Eye           no irritation; no changes in vision  Deny-Laryngeal           no dysphagia; no hoarseness   Pulmonary    no cough; no shortness of breath  Cardiovascular    no chest pain; no palpitations  Gastrointestinal    no diarrhea; no constipation; no abdominal pain; no changes in bowel habits; no blood in stool  Genitourinary   no urinary frequency; no urinary urgency; no dysuria; no pain; no abnormal vaginal discharge; no " abnormal vaginal bleeding  Breast    no breast discharge; no breast changes; no breast pain  Musculoskeletal    no myalgias; no arthralgias; no back pain  Psychiatric           + depressed mood; no anxiety    Hematologic               no tender lymph nodes; no noticeable swellings or lumps   Endocrine    no hot flashes; no heat/cold intolerance         Neurological   no tremor; no numbness and tingling; no headaches; no difficulty sleeping      Past Medical History:    Past Medical History:   Diagnosis Date     Chickenpox     as a child     Depression      Endometrial cancer (H)      Hypertension     since her 30's     Infertility, female      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)      PCOS (polycystic ovarian syndrome)      STD (female)     in her 20's         Past Surgical History:    Past Surgical History:   Procedure Laterality Date     CYSTOSCOPY N/A 10/24/2018    Procedure: Cystoscopy;  Surgeon: Linh De Souza MD;  Location: [facility] OR     DAVINCI HYSTERECTOMY TOTAL, BILATERAL SALPINGO-OOPHORECTOMY, NODE DISSECTION, COMBINED N/A 10/24/2018    Procedure: DaVinci Assisted Total Laparoscopic Hysterectomy, Bilateral Salpingo-Oophorectomy, lymph node dissection;  Surgeon: Linh De Souza MD;  Location: [facility] OR     EXCISE LESION VULVA Left 10/24/2018    Procedure: Excision of Left Vulvar Lesion;  Surgeon: Linh De Souza MD;  Location: [facility] OR         Health Maintenance Due   Topic Date Due     PREVENTIVE CARE VISIT  1975     DEPRESSION ACTION PLAN  1975     PHQ-9  1975     HIV SCREENING  01/20/1992     PNEUMOCOCCAL IMMUNIZATION 19-64 HIGHEST RISK (1 of 3 - PCV13) 01/20/1996     PAP  01/20/1998     INFLUENZA VACCINE (1) 09/01/2020       Current Medications:     Current Outpatient Medications   Medication Sig Dispense Refill     buPROPion (WELLBUTRIN XL) 150 MG 24 hr tablet        Multiple Vitamin (MULTIVITAMIN  S) CAPS Take 1 capsule by mouth       pramipexole (MIRAPEX) 0.125 MG tablet Take  0.125 mg by mouth At Bedtime       propranolol (INDERAL) 10 MG tablet Take 20 mg by mouth every morning        Sharps Container (SHARPS-A-GATOR LOCKING BRACKET) [facility] CPAP machine for home use at pressure: 5-16 CM H20 , Heated humidifier x 1, Humidifier chamber x 1,  Nasal mask with cushion x 1, Heated tubing x 1, Headgear x 1, Filters: Disposable x 1pk & Reusable x 1pk, Length of Need: 99 months, Frequency of use: Daily       spironolactone (ALDACTONE) 50 MG tablet Take 50 mg by mouth daily        gabapentin (NEURONTIN) 600 MG tablet Take 600 mg by mouth as needed 600mg in AM, 600mg in the afternoon, 900mg in the evening       loratadine (CLARITIN) 10 MG tablet Take 10 mg by mouth       omeprazole (PRILOSEC) 40 MG DR capsule Take 40 mg by mouth           Allergies:        Allergies   Allergen Reactions     Amoxicillin      Penicillin G         Social History:     Social History     Tobacco Use     Smoking status: Former Smoker     Packs/day: 0.50     Types: Cigarettes     Smokeless tobacco: Never Used   Substance Use Topics     Alcohol use: No     Comment: 11 months sober        History   Drug Use No     Comment: 11 months sober. lives at sober housing         Family History:     The patient's family history is notable for:    No family history on file.      Physical Exam:     /80   Pulse 72   Temp 98.2  F (36.8  C) (Oral)   Resp 18   Wt 107.5 kg (237 lb)   SpO2 97%   BMI 39.73 kg/m    Body mass index is 39.73 kg/m .    General Appearance: healthy and alert, no distress     HEENT: no thyromegaly, no palpable nodules or masses        Cardiovascular: regular rate and rhythm, no gallops, rubs or murmurs     Respiratory: lungs clear, no rales, rhonchi or wheezes, normal diaphragmatic excursion    Musculoskeletal: extremities non tender and without edema    Skin: no lesions or rashes     Neurological: normal gait, no gross defects     Psychiatric: appropriate mood and affect                                Hematological: normal cervical, supraclavicular and inguinal lymph nodes     Gastrointestinal:       abdomen soft, non-tender, non-distended, no organomegaly or masses    Genitourinary: External genitalia and urethral meatus appears normal.  Vagina is smooth without nodularity or masses.  Cervix surgically absent.  Bimanual exam reveal no masses, nodularity or fullness.  Recto-vaginal exam confirms these findings.      Assessment:    Meagan Morales is a 45 year old woman with a diagnosis of stage IIIA grade 1 endometrioid adenocarcinoma. She completed chemotherapy 3/2019.  She is here today for a surveillance visit.     15 minutes were spent with this patient, over 50% of that time was spent in symptom management, treatment planning and in counseling and coordination of care.      Plan:     1.)        Patient to RTC in 3 months for her next surveillance visit and ; today's is pending. Will not follow  after today given it was not done/elevated prior to surgery and only minimally elevated afterwards. Reviewed recommendations from [facility] not to perform surveillance pap smears in women diagnosed with endometrial cancer as this does not improve detection of local recurrence. Reviewed signs and symptoms for when she should contact the clinic or seek additional care.  Patient to contact the clinic with any questions or concerns in the interim.     2.) Genetic risk factors were assessed and her MMR proteins were intact.    3.) Labs and/or tests ordered include:  None.     4.) Health maintenance issues addressed today include annual health maintenance and non-gynecologic issues with PCP.    CORTES Gant, WHNP-BC, ANP-BC  Women's Health Nurse Practitioner  Adult Nurse Pracitioner  Division of Gynecologic Oncology    CC  Patient Care Team:  Aundrea Max MD as PCP - General  Shoshana David APRN CNP as Assigned PCP  HEMANTH SAMANIEGO

## 2020-11-25 PROBLEM — R97.1 ELEVATED CA-125: Status: RESOLVED | Noted: 2018-12-26 | Resolved: 2020-11-25

## 2020-11-25 NOTE — PROGRESS NOTES
"Meagan Morales is a 45 year old female who is being evaluated via a billable telephone visit.      The patient has been notified of following:     \"This telephone visit will be conducted via a call between you and your physician/provider. We have found that certain health care needs can be provided without the need for a physical exam.  This service lets us provide the care you need with a short phone conversation.  If a prescription is necessary we can send it directly to your pharmacy.  If lab work is needed we can place an order for that and you can then stop by our lab to have the test done at a later time.    Telephone visits are billed at different rates depending on your insurance coverage. During this emergency period, for some insurers they may be billed the same as an in-person visit.  Please reach out to your insurance provider with any questions.    If during the course of the call the physician/provider feels a telephone visit is not appropriate, you will not be charged for this service.\"    Patient has given verbal consent for Telephone visit?  Yes    What phone number would you like to be contacted at? [phone]    How would you like to obtain your AVS? Jocelyn Espinoza, ROHIT on 2020 at 3:02 PM                    Follow Up Notes on Referred Patient    Date: 2020       Dr. Linh De Souza MD  [address] [place] [id number]       RE: Meagan Morales  : 1975  VY: 2020    Dear Dr. Linh De Souza:    Meagan Morales is a 45 year old woman with a history of stage IIIA grade 1 endometrioid adenocarcinoma. She completed chemotherapy 3/2019.  She is here today for a surveillance visit. In light of the recent COVID19 pandemic, and in accordance with our group and national guidelines this patients care has been reviewed and it is felt that presenting for her visit would be more likely to increase rather than decrease her relative risks.  Therefore this visit was " conducted by phone.     Oncology History:  Meagan originally presented to her clinic due to continuous vaginal bleeding, very light, 6/2018. She thought it was just her menses being continuous. US was done which found thickened endometrial lining. IUD was placed. Second US was done and IUD did not affect endometrial lining and IUD was malpositioned. She continued to have spotting. IUD was removed. Also of note, ovarian cysts were noted on second US. Subsequent endometrial biopsy was done which revealed grade 1 endometrial cancer. She has always had irregular menses and has never been able to get pregnant. Patient's  first and only pap smear on 10/10/2017. Had mammogram in 20's. No history of prior colonoscopy. Started Wellbutrin this last year, mood has improved. Prior cigarette smoker, quit 2 months ago (8/2018) and switched to vape. Started again smoking cigarettes again. History of drug use, has been sober since ~ 11/2017. History of meth and alcohol abuse. Has never been able to get pregnant and dose not desire children.     6/29/18: pelvic US IMPRESSION:  Peripheral follicular arrangement high within the ovaries suggesting polycystic ovarian syndrome. Slightly thickened heterogeneous endometrium raising a concern of potential hyperplastic change.  9/17/18: pelvic US   1. Uterus is not normal in appearance. The endometrium appears thickened and vascular, and the IUD appears to be malpositioned.  Clinical correlation is recommended.    2. Bilateral complex ovarian cysts are noted.  Follow up ultrasound in 4-6 weeks is recommended, consider saline infusion sonohysterogram for further imaging of the endometrium.      9/17/18: ENDOMETRIUM, BIOPSY:  1. FIGO Grade 1 (well differentiated) endometrioid carcinoma of the endometrium  2. DNA mismatch repair enzyme immunohistochemistry studies:     All intact (see Amendment note and synoptic reporting)     10/24/2018: DaVinci Assisted Total Laparoscopic Hysterectomy,  Bilateral Salpingo-Oophorectomy, lymph node dissection, Excision of Left Vulvar Lesion  Pelvic Washing:   Rare atypical cells present.  MMR proteins intact.     ORIGINAL REPORT:     SPECIMEN(S):   A: Left vulvar lesion   B: Left pelvic sentinel lymph node   C: Right pelvic sentinel lymph node   D: Uterus, cervix, bilateral ovaries and fallopian tubes   E: Portion of left ovary   F: Para-aortic lymph nodes   G: Left pelvic lymph nodes   H: Right pelvic lymph nodes     FINAL DIAGNOSIS:   A. LEFT VULVAR LESION, BIOPSY:   - Intradermal nevus     B. LEFT PELVIC SENTINEL LYMPH NODE, EXCISION:   - One reactive lymph node, negative for malignancy (0/1)     C. RIGHT PELVIC SENTINEL LYMPH NODE, EXCISION:   - Six reactive lymph nodes, negative for malignancy (0/6)     D. UTERUS, CERVIX, BILATERAL OVARIES AND FALLOPIAN TUBES, HYSTERECTOMY WITH BILATERAL SALPINGO-OOPHORECTOMY:   - Endometrial endometrioid adenocarcinoma, FIGO grade 1   - Atypical endometrial hyperplasia   - Myometrium with no significant histologic abnormality   - Chronic cervicitis with microglandular hyperplasia   - Uterine serosal fibrous adhesions   - Ovaries with cortical hyperplasia   - Metastatic endometrial adenocarcinoma to the left fallopian tube   - Right fallopian tube with no significant histologic abnormality     E. PORTION OF LEFT OVARY, EXCISION:   - Benign cortical inclusion cysts     F. PARA-AORTIC LYMPH NODES, EXCISION:   - Four reactive lymph nodes, negative for malignancy (0/4)   - Benign glandular inclusions consistent with endosalpingiosis     G. LEFT PELVIC LYMPH NODES, EXCISION:   - Eight reactive lymph nodes, negative for malignancy (0/8)     H. RIGHT PELVIC LYMPH NODES, EXCISION:   - Nine reactive lymph nodes, negative for malignancy (0/9)   - Benign glandular inclusions consistent with endosalpingiosis         11/6/18: CT ap IMPRESSION:   1. Surgical changes of hysterectomy, bilateral salpingo-oophorectomy, and pelvic lymph node  dissection with extensive fat stranding and fluid within the low pelvis extending superiorly along the iliac vasculature, right greater than left, potentially within normal postsurgical limits, however, slightly more than expected for postsurgical change.   1a. The ureters are not well evaluated on this single phase exam and there is no hydronephrosis or hydroureter, however, the amount of fluid in the pelvis does raise the question of ureteral injury. If there is clinical concern, consider obtaining a CT urogram.  1b. An area of irregular rim enhancement in the low pelvis may represent a normal postsurgical vaginal cuff, though, again, this is slightly more conspicuous than usual and dehiscence or a developing abscess may have a similar appearance.  2. 6.8 x 4.1 x 5.6 cm right pelvic sidewall seroma versus lymphocele.  3. Soft tissue nodular thickening anteriorly to the right psoas muscle and along the right lateral conal fascia, indeterminate, cannot rule out metastatic foci. Attention on follow-up studies.  4. Cholelithiasis without evidence of cholecystitis.      11/7/18: CT abd with contrast CT urogram IMPRESSION:   1. Postsurgical changes of hysterectomy, bilateral salpingo-oophorectomy, and iliac lymph node dissection with fluid  collections within the pelvis extending along the iliac vasculature.  a. The right pelvic sidewall fluid collection is not significantly changed while the left pelvic sidewall fluid collection has enlarged  in comparison to the 11/6/2018 CT. The differential remains a seroma versus a lymphocele.  b. No evidence of contrast extravasation to suggest a ureteral injury.  c. Redemonstration of soft tissue nodular thickening along the right lateral conal fascia and anteriorly to the right psoas muscle and right psoas muscle. Attention on follow-up is recommended.  2. Cholelithiasis without evidence of cholecystitis.     Plan: adjuvant therapy to include 6 cycles of taxol and carboplatin;  "would not add radiation as all lymph nodes are negative, she does not have LVSI or deep invasion and this is a low grade tumor. Rx percocet 5 tabs due to continued abdominal pain. Will not prescribe any more narcotics and patient is aware of this.     11/16/18: Cycle #1 Paclitaxel/Carboplatin.   35.  12/6/18: Cycle #2 Paclitaxel/Carboplatin.  8.  12/27/2018: Cycle #3 T/C.  7.  1/8/19: US lower extremity: IMPRESSION: No evidence of deep venous thrombosis in either lower extremity.  1/9/2019: CT abd/pelvis IMPRESSION:   1. Minimal change in a right pelvic sidewall collection and decreased size of a left pelvic sidewall collection, which are most consistent with lymphoceles.  2. No suspicious pelvic lymphadenopathy.      1/21/19 Cycle #4 Paclitaxel/Carboplatin.  8.      1/2019 Presented with pain in LE:  US IMPRESSION: No evidence of deep venous thrombosis in either lower extremity.     1/2019 CT:IMPRESSION:   1. Minimal change in a right pelvic sidewall collection and decreased size of a left pelvic sidewall collection, which are most consistent with lymphoceles.  2. No suspicious pelvic lymphadenopathy.      2/14/19: Cycle #5 Paclitaxel/Carboplatin.  9.  3/7/19: Cycle #6 Paclitaxel/Carboplatin.  9.     4/4/2019: CT CAP IMPRESSION:   1. Significant decrease in the right pelvic sidewall collection, with resolved left pelvic sidewall collection. These are most consistent with lymphoceles.  2. No suspicious pelvic lymphadenopathy.  3. No suspicious abnormality in the chest.     Peritoneum / Retroperitoneum: Interval significant decrease in the right pelvic sidewall fluid collection now measuring 68 x 34 mm, previously 84 x 37 mm. The left pelvic sidewall fluid collection seen on prior examination has resolved.     Start surveillance; will alternate between NP and MD.  Per Dr. De Souza, do not need to follow  \"as was acutely elevated post op and have since been " "normal.\"     9/1/2020:  11.          Today she reports she is doing well. She denies any vaginal bleeding, no changes in her bowel or bladder habits, no nausea/emesis, no lower extremity edema, and no difficulties eating or sleeping. She denies any abdominal discomfort/bloating, no fevers or chills, and no chest pain or shortness of breath. She has restarted seeing a therapist and is working on her mental health. She is current with seeing her PCP for her annual exam and her mammogram is scheduled for Feb. She does have some periodic left leg swelling which appears at the end of the day if she has been more active. It resolves by morning. She previously had seen lymphedema therapy. She has not been as active in the past few months as she previously was and states that when she was more active she did not have as much swelling. She is sexually active and uses a water based lubricant. She has been using the organic coconut oil to her vulva and has noticed an improvement in her dryness; she notices the dryness if she does not use the coconut oil. She got engaged last Saturday and is very excited about that; given covid she states she is ok with a longer engagement.       Viktoria for restless leg prn    Review of Systems:    Systemic           no weight changes; no fever; no chills; no night sweats; no appetite changes  Skin           no rashes, or lesions  Eye           no irritation; no changes in vision  Deny-Laryngeal           no dysphagia; no hoarseness   Pulmonary    no cough; no shortness of breath  Cardiovascular    no chest pain; no palpitations  Gastrointestinal    no diarrhea; no constipation; no abdominal pain; no changes in bowel habits; no blood in stool  Genitourinary   no urinary frequency; no urinary urgency; no dysuria; no pain; no abnormal vaginal discharge; no abnormal vaginal bleeding  Breast    no breast discharge; no breast changes; no breast pain  Musculoskeletal    no myalgias; no " arthralgias; no back pain  Psychiatric           + depressed mood; no anxiety    Hematologic              no tender lymph nodes; no noticeable swellings or lumps   Endocrine    no hot flashes; no heat/cold intolerance         Neurological   no tremor; no numbness and tingling; no headaches; no difficulty sleeping      Past Medical History:    Past Medical History:   Diagnosis Date     Chickenpox     as a child     Depression      Endometrial cancer (H)      Hypertension     since her 30's     Infertility, female      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)      PCOS (polycystic ovarian syndrome)      STD (female)     in her 20's         Past Surgical History:    Past Surgical History:   Procedure Laterality Date     CYSTOSCOPY N/A 10/24/2018    Procedure: Cystoscopy;  Surgeon: Linh De Souza MD;  Location: [facility] OR     DAVINCI HYSTERECTOMY TOTAL, BILATERAL SALPINGO-OOPHORECTOMY, NODE DISSECTION, COMBINED N/A 10/24/2018    Procedure: DaVinci Assisted Total Laparoscopic Hysterectomy, Bilateral Salpingo-Oophorectomy, lymph node dissection;  Surgeon: Linh De Souza MD;  Location: [facility] OR     EXCISE LESION VULVA Left 10/24/2018    Procedure: Excision of Left Vulvar Lesion;  Surgeon: Linh De Souza MD;  Location: [facility] OR         Health Maintenance Due   Topic Date Due     PREVENTIVE CARE VISIT  1975     DEPRESSION ACTION PLAN  1975     PHQ-9  1975     Pneumococcal Vaccine: Pediatrics (0 to 5 Years) and At-Risk Patients (6 to 64 Years) (1 of 3 - PCV13) 01/20/1983     HIV SCREENING  01/20/1992     HEPATITIS C SCREENING  01/20/1995     PAP  01/20/1998     INFLUENZA VACCINE (1) 09/01/2020     DTAP/TDAP/TD IMMUNIZATION (2 - Td) 10/26/2020       Current Medications:     Current Outpatient Medications   Medication Sig Dispense Refill     buPROPion (WELLBUTRIN XL) 150 MG 24 hr tablet        gabapentin (NEURONTIN) 600 MG tablet Take 600 mg by mouth as needed 600mg in AM, 600mg in the afternoon, 900mg in  the evening       loratadine (CLARITIN) 10 MG tablet Take 10 mg by mouth       Multiple Vitamin (MULTIVITAMIN  S) CAPS Take 1 capsule by mouth       omeprazole (PRILOSEC) 40 MG DR capsule Take 40 mg by mouth       pramipexole (MIRAPEX) 0.125 MG tablet Take 0.125 mg by mouth At Bedtime       propranolol (INDERAL) 10 MG tablet Take 20 mg by mouth every morning        Sharps Container (SHARPS-A-GATOR LOCKING BRACKET) [facility] [place] for [place] use at pressure: 5-16 CM H20 , Heated humidifier x 1, Humidifier chamber x 1,  Nasal mask with cushion x 1, Heated tubing x 1, Headgear x 1, Filters: Disposable x 1pk & Reusable x 1pk, Length of Need: 99 months, Frequency of use: Daily       spironolactone (ALDACTONE) 50 MG tablet Take 50 mg by mouth daily            Allergies:        Allergies   Allergen Reactions     Amoxicillin      Penicillin G         Social History:     Social History     Tobacco Use     Smoking status: Former Smoker     Packs/day: 0.50     Types: Cigarettes     Smokeless tobacco: Never Used   Substance Use Topics     Alcohol use: No     Comment: 11 months sober        History   Drug Use No     Comment: 11 months sober. lives at sober housing         Family History:     The patient's family history is notable for:    No family history on file.      Physical Exam:     There were no vitals taken for this visit.  There is no height or weight on file to calculate BMI.    Respiratory: No audible wheeze, cough.      Psychiatric: appropriate mood and affect. Mentation appears normal, affect normal/bright, judgement and insight intact, normal speech       The rest of a comprehensive physical examination is deferred due to PHE (public health emergency) phone visit restrictions.        Assessment:    Meagan Morales is a 45 year old woman with a history of stage IIIA grade 1 endometrioid adenocarcinoma. She completed chemotherapy 3/2019.  She is here today for a surveillance visit. In light of the recent COVID19  pandemic, and in accordance with our group and national guidelines this patients care has been reviewed and it is felt that presenting for her visit would be more likely to increase rather than decrease her relative risks.  Therefore this visit was conducted by phone.     15 minutes were spent with this patient by phone. Over 50% of that time was spent in symptom management, treatment planning and in counseling and coordination of care. See rooming note for consent.         Plan:     1.)       Patient to RTC in 3 months for her next surveillance visit; will plan for an in person visit COVID permitting. She will be 2 years post treatment at that visit and can then extend her surveillance to every 6 months x 3 years. Reviewed recommendations from [facility] not to perform surveillance pap smears in women diagnosed with endometrial cancer as this does not improve detection of local recurrence. Reviewed signs and symptoms for when she should contact the clinic or seek additional care. Patient to contact the clinic with any questions or concerns in the interim.  Answered all of her questions to the best of my ability.     2.) Genetic risk factors were assessed and her MMR proteins were intact/normal.      3.) Labs and/or tests ordered include:  None.     4.) Health maintenance issues addressed today include annual health maintenance and non-gynecologic issues with PCP.    CORTES Gant, WHNP-BC, ANP-BC  Women's Health Nurse Practitioner  Adult Nurse Pracitioner  Division of Gynecologic Oncology          CC  Patient Care Team:  Aundrea Max MD as PCP - Shoshana Decker APRN CNP as Assigned PCP  Virgen Gallagher APRN CNP as Assigned OBGYN Provider  Linh Samaniego MD as Assigned Cancer Care Provider  LINH SAMANIEGO

## 2020-12-01 ENCOUNTER — VIRTUAL VISIT (OUTPATIENT)
Dept: ONCOLOGY | Facility: CLINIC | Age: 45
End: 2020-12-01
Attending: NURSE PRACTITIONER
Payer: COMMERCIAL

## 2020-12-01 DIAGNOSIS — C54.1 ENDOMETRIAL CANCER (H): Primary | ICD-10-CM

## 2020-12-01 PROCEDURE — 999N001193 HC VIDEO/TELEPHONE VISIT; NO CHARGE

## 2020-12-01 PROCEDURE — 99213 OFFICE O/P EST LOW 20 MIN: CPT | Mod: 95 | Performed by: NURSE PRACTITIONER

## 2020-12-01 NOTE — LETTER
"    2020         RE: Meagan Morales  [address] [facility] [address]        Dear Colleague,    Thank you for referring your patient, Meagan Morales, to the [facility]. Please see a copy of my visit note below.    Meagan Morales is a 45 year old female who is being evaluated via a billable telephone visit.      The patient has been notified of following:     \"This telephone visit will be conducted via a call between you and your physician/provider. We have found that certain health care needs can be provided without the need for a physical exam.  This service lets us provide the care you need with a short phone conversation.  If a prescription is necessary we can send it directly to your pharmacy.  If lab work is needed we can place an order for that and you can then stop by our lab to have the test done at a later time.    Telephone visits are billed at different rates depending on your insurance coverage. During this emergency period, for some insurers they may be billed the same as an in-person visit.  Please reach out to your insurance provider with any questions.    If during the course of the call the physician/provider feels a telephone visit is not appropriate, you will not be charged for this service.\"    Patient has given verbal consent for Telephone visit?  Yes    What phone number would you like to be contacted at? [phone]    How would you like to obtain your AVS? Jocelyn Espinoza, CMA on 2020 at 3:02 PM                    Follow Up Notes on Referred Patient    Date: 2020       Dr. Linh De Souza MD  [address] [place] [id number]       RE: Meagan Morales  : 1975  VY: 2020    Dear Dr. Linh De Souza:    Meagan Morales is a 45 year old woman with a history of stage IIIA grade 1 endometrioid adenocarcinoma. She completed chemotherapy 3/2019.  She is here today for a surveillance visit. In light of the recent COVID19 " pandemic, and in accordance with our group and national guidelines this patients care has been reviewed and it is felt that presenting for her visit would be more likely to increase rather than decrease her relative risks.  Therefore this visit was conducted by phone.     Oncology History:  Meagan originally presented to her clinic due to continuous vaginal bleeding, very light, 6/2018. She thought it was just her menses being continuous. US was done which found thickened endometrial lining. IUD was placed. Second US was done and IUD did not affect endometrial lining and IUD was malpositioned. She continued to have spotting. IUD was removed. Also of note, ovarian cysts were noted on second US. Subsequent endometrial biopsy was done which revealed grade 1 endometrial cancer. She has always had irregular menses and has never been able to get pregnant. Patient's  first and only pap smear on 10/10/2017. Had mammogram in 20's. No history of prior colonoscopy. Started Wellbutrin this last year, mood has improved. Prior cigarette smoker, quit 2 months ago (8/2018) and switched to vape. Started again smoking cigarettes again. History of drug use, has been sober since ~ 11/2017. History of meth and alcohol abuse. Has never been able to get pregnant and dose not desire children.     6/29/18: pelvic US IMPRESSION:  Peripheral follicular arrangement high within the ovaries suggesting polycystic ovarian syndrome. Slightly thickened heterogeneous endometrium raising a concern of potential hyperplastic change.  9/17/18: pelvic US   1. Uterus is not normal in appearance. The endometrium appears thickened and vascular, and the IUD appears to be malpositioned.  Clinical correlation is recommended.    2. Bilateral complex ovarian cysts are noted.  Follow up ultrasound in 4-6 weeks is recommended, consider saline infusion sonohysterogram for further imaging of the endometrium.      9/17/18: ENDOMETRIUM, BIOPSY:  1. FIGO Grade 1 (well  differentiated) endometrioid carcinoma of the endometrium  2. DNA mismatch repair enzyme immunohistochemistry studies:     All intact (see Amendment note and synoptic reporting)     10/24/2018: DaVinci Assisted Total Laparoscopic Hysterectomy, Bilateral Salpingo-Oophorectomy, lymph node dissection, Excision of Left Vulvar Lesion  Pelvic Washing:   Rare atypical cells present.  MMR proteins intact.     ORIGINAL REPORT:     SPECIMEN(S):   A: Left vulvar lesion   B: Left pelvic sentinel lymph node   C: Right pelvic sentinel lymph node   D: Uterus, cervix, bilateral ovaries and fallopian tubes   E: Portion of left ovary   F: Para-aortic lymph nodes   G: Left pelvic lymph nodes   H: Right pelvic lymph nodes     FINAL DIAGNOSIS:   A. LEFT VULVAR LESION, BIOPSY:   - Intradermal nevus     B. LEFT PELVIC SENTINEL LYMPH NODE, EXCISION:   - One reactive lymph node, negative for malignancy (0/1)     C. RIGHT PELVIC SENTINEL LYMPH NODE, EXCISION:   - Six reactive lymph nodes, negative for malignancy (0/6)     D. UTERUS, CERVIX, BILATERAL OVARIES AND FALLOPIAN TUBES, HYSTERECTOMY WITH BILATERAL SALPINGO-OOPHORECTOMY:   - Endometrial endometrioid adenocarcinoma, FIGO grade 1   - Atypical endometrial hyperplasia   - Myometrium with no significant histologic abnormality   - Chronic cervicitis with microglandular hyperplasia   - Uterine serosal fibrous adhesions   - Ovaries with cortical hyperplasia   - Metastatic endometrial adenocarcinoma to the left fallopian tube   - Right fallopian tube with no significant histologic abnormality     E. PORTION OF LEFT OVARY, EXCISION:   - Benign cortical inclusion cysts     F. PARA-AORTIC LYMPH NODES, EXCISION:   - Four reactive lymph nodes, negative for malignancy (0/4)   - Benign glandular inclusions consistent with endosalpingiosis     G. LEFT PELVIC LYMPH NODES, EXCISION:   - Eight reactive lymph nodes, negative for malignancy (0/8)     H. RIGHT PELVIC LYMPH NODES, EXCISION:   - Nine  reactive lymph nodes, negative for malignancy (0/9)   - Benign glandular inclusions consistent with endosalpingiosis         11/6/18: CT ap IMPRESSION:   1. Surgical changes of hysterectomy, bilateral salpingo-oophorectomy, and pelvic lymph node dissection with extensive fat stranding and fluid within the low pelvis extending superiorly along the iliac vasculature, right greater than left, potentially within normal postsurgical limits, however, slightly more than expected for postsurgical change.   1a. The ureters are not well evaluated on this single phase exam and there is no hydronephrosis or hydroureter, however, the amount of fluid in the pelvis does raise the question of ureteral injury. If there is clinical concern, consider obtaining a CT urogram.  1b. An area of irregular rim enhancement in the low pelvis may represent a normal postsurgical vaginal cuff, though, again, this is slightly more conspicuous than usual and dehiscence or a developing abscess may have a similar appearance.  2. 6.8 x 4.1 x 5.6 cm right pelvic sidewall seroma versus lymphocele.  3. Soft tissue nodular thickening anteriorly to the right psoas muscle and along the right lateral conal fascia, indeterminate, cannot rule out metastatic foci. Attention on follow-up studies.  4. Cholelithiasis without evidence of cholecystitis.      11/7/18: CT abd with contrast CT urogram IMPRESSION:   1. Postsurgical changes of hysterectomy, bilateral salpingo-oophorectomy, and iliac lymph node dissection with fluid  collections within the pelvis extending along the iliac vasculature.  a. The right pelvic sidewall fluid collection is not significantly changed while the left pelvic sidewall fluid collection has enlarged  in comparison to the 11/6/2018 CT. The differential remains a seroma versus a lymphocele.  b. No evidence of contrast extravasation to suggest a ureteral injury.  c. Redemonstration of soft tissue nodular thickening along the right lateral  conal fascia and anteriorly to the right psoas muscle and right psoas muscle. Attention on follow-up is recommended.  2. Cholelithiasis without evidence of cholecystitis.     Plan: adjuvant therapy to include 6 cycles of taxol and carboplatin; would not add radiation as all lymph nodes are negative, she does not have LVSI or deep invasion and this is a low grade tumor. Rx percocet 5 tabs due to continued abdominal pain. Will not prescribe any more narcotics and patient is aware of this.     11/16/18: Cycle #1 Paclitaxel/Carboplatin.   35.  12/6/18: Cycle #2 Paclitaxel/Carboplatin.  8.  12/27/2018: Cycle #3 T/C.  7.  1/8/19: US lower extremity: IMPRESSION: No evidence of deep venous thrombosis in either lower extremity.  1/9/2019: CT abd/pelvis IMPRESSION:   1. Minimal change in a right pelvic sidewall collection and decreased size of a left pelvic sidewall collection, which are most consistent with lymphoceles.  2. No suspicious pelvic lymphadenopathy.      1/21/19 Cycle #4 Paclitaxel/Carboplatin.  8.      1/2019 Presented with pain in LE:  US IMPRESSION: No evidence of deep venous thrombosis in either lower extremity.     1/2019 CT:IMPRESSION:   1. Minimal change in a right pelvic sidewall collection and decreased size of a left pelvic sidewall collection, which are most consistent with lymphoceles.  2. No suspicious pelvic lymphadenopathy.      2/14/19: Cycle #5 Paclitaxel/Carboplatin.  9.  3/7/19: Cycle #6 Paclitaxel/Carboplatin.  9.     4/4/2019: CT CAP IMPRESSION:   1. Significant decrease in the right pelvic sidewall collection, with resolved left pelvic sidewall collection. These are most consistent with lymphoceles.  2. No suspicious pelvic lymphadenopathy.  3. No suspicious abnormality in the chest.     Peritoneum / Retroperitoneum: Interval significant decrease in the right pelvic sidewall fluid collection now measuring 68 x 34 mm, previously 84 x 37 mm. The left pelvic  "sidewall fluid collection seen on prior examination has resolved.     Start surveillance; will alternate between NP and MD.  Per Dr. De Souza, do not need to follow  \"as was acutely elevated post op and have since been normal.\"     9/1/2020:  11.          Today she reports she is doing well. She denies any vaginal bleeding, no changes in her bowel or bladder habits, no nausea/emesis, no lower extremity edema, and no difficulties eating or sleeping. She denies any abdominal discomfort/bloating, no fevers or chills, and no chest pain or shortness of breath. She has restarted seeing a therapist and is working on her mental health. She is current with seeing her PCP for her annual exam and her mammogram is scheduled for Feb. She does have some periodic left leg swelling which appears at the end of the day if she has been more active. It resolves by morning. She previously had seen lymphedema therapy. She has not been as active in the past few months as she previously was and states that when she was more active she did not have as much swelling. She is sexually active and uses a water based lubricant. She has been using the organic coconut oil to her vulva and has noticed an improvement in her dryness; she notices the dryness if she does not use the coconut oil. She got engaged last Saturday and is very excited about that; given covid she states she is ok with a longer engagement.       Viktoria for restless leg prn    Review of Systems:    Systemic           no weight changes; no fever; no chills; no night sweats; no appetite changes  Skin           no rashes, or lesions  Eye           no irritation; no changes in vision  Deny-Laryngeal           no dysphagia; no hoarseness   Pulmonary    no cough; no shortness of breath  Cardiovascular    no chest pain; no palpitations  Gastrointestinal    no diarrhea; no constipation; no abdominal pain; no changes in bowel habits; no blood in stool  Genitourinary   no urinary " frequency; no urinary urgency; no dysuria; no pain; no abnormal vaginal discharge; no abnormal vaginal bleeding  Breast    no breast discharge; no breast changes; no breast pain  Musculoskeletal    no myalgias; no arthralgias; no back pain  Psychiatric           + depressed mood; no anxiety    Hematologic              no tender lymph nodes; no noticeable swellings or lumps   Endocrine    no hot flashes; no heat/cold intolerance         Neurological   no tremor; no numbness and tingling; no headaches; no difficulty sleeping      Past Medical History:    Past Medical History:   Diagnosis Date     Chickenpox     as a child     Depression      Endometrial cancer (H)      Hypertension     since her 30's     Infertility, female      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)      PCOS (polycystic ovarian syndrome)      STD (female)     in her 20's         Past Surgical History:    Past Surgical History:   Procedure Laterality Date     CYSTOSCOPY N/A 10/24/2018    Procedure: Cystoscopy;  Surgeon: Linh De Souza MD;  Location: [facility] OR     DAVINCI HYSTERECTOMY TOTAL, BILATERAL SALPINGO-OOPHORECTOMY, NODE DISSECTION, COMBINED N/A 10/24/2018    Procedure: DaVinci Assisted Total Laparoscopic Hysterectomy, Bilateral Salpingo-Oophorectomy, lymph node dissection;  Surgeon: Linh De Souza MD;  Location: [facility] OR     EXCISE LESION VULVA Left 10/24/2018    Procedure: Excision of Left Vulvar Lesion;  Surgeon: Linh De Souza MD;  Location: [facility] OR         Health Maintenance Due   Topic Date Due     PREVENTIVE CARE VISIT  1975     DEPRESSION ACTION PLAN  1975     PHQ-9  1975     Pneumococcal Vaccine: Pediatrics (0 to 5 Years) and At-Risk Patients (6 to 64 Years) (1 of 3 - PCV13) 01/20/1983     HIV SCREENING  01/20/1992     HEPATITIS C SCREENING  01/20/1995     PAP  01/20/1998     INFLUENZA VACCINE (1) 09/01/2020     DTAP/TDAP/TD IMMUNIZATION (2 - Td) 10/26/2020       Current Medications:     Current Outpatient  Medications   Medication Sig Dispense Refill     buPROPion (WELLBUTRIN XL) 150 MG 24 hr tablet        gabapentin (NEURONTIN) 600 MG tablet Take 600 mg by mouth as needed 600mg in AM, 600mg in the afternoon, 900mg in the evening       loratadine (CLARITIN) 10 MG tablet Take 10 mg by mouth       Multiple Vitamin (MULTIVITAMIN  S) CAPS Take 1 capsule by mouth       omeprazole (PRILOSEC) 40 MG DR capsule Take 40 mg by mouth       pramipexole (MIRAPEX) 0.125 MG tablet Take 0.125 mg by mouth At Bedtime       propranolol (INDERAL) 10 MG tablet Take 20 mg by mouth every morning        Sharps Container (SHARPS-A-GATOR LOCKING BRACKET) [facility] CPAP machine for home use at pressure: 5-16 CM H20 , Heated humidifier x 1, Humidifier chamber x 1,  Nasal mask with cushion x 1, Heated tubing x 1, Headgear x 1, Filters: Disposable x 1pk & Reusable x 1pk, Length of Need: 99 months, Frequency of use: Daily       spironolactone (ALDACTONE) 50 MG tablet Take 50 mg by mouth daily            Allergies:        Allergies   Allergen Reactions     Amoxicillin      Penicillin G         Social History:     Social History     Tobacco Use     Smoking status: Former Smoker     Packs/day: 0.50     Types: Cigarettes     Smokeless tobacco: Never Used   Substance Use Topics     Alcohol use: No     Comment: 11 months sober        History   Drug Use No     Comment: 11 months sober. lives at sober housing         Family History:     The patient's family history is notable for:    No family history on file.      Physical Exam:     There were no vitals taken for this visit.  There is no height or weight on file to calculate BMI.    Respiratory: No audible wheeze, cough.      Psychiatric: appropriate mood and affect. Mentation appears normal, affect normal/bright, judgement and insight intact, normal speech       The rest of a comprehensive physical examination is deferred due to PHE (public health emergency) phone visit  restrictions.        Assessment:    Meagan Morales is a 45 year old woman with a history of stage IIIA grade 1 endometrioid adenocarcinoma. She completed chemotherapy 3/2019.  She is here today for a surveillance visit. In light of the recent COVID19 pandemic, and in accordance with our group and national guidelines this patients care has been reviewed and it is felt that presenting for her visit would be more likely to increase rather than decrease her relative risks.  Therefore this visit was conducted by phone.     15 minutes were spent with this patient by phone. Over 50% of that time was spent in symptom management, treatment planning and in counseling and coordination of care. See rooming note for consent.         Plan:     1.)       Patient to RTC in 3 months for her next surveillance visit; will plan for an in person visit COVID permitting. She will be 2 years post treatment at that visit and can then extend her surveillance to every 6 months x 3 years. Reviewed recommendations from [facility] not to perform surveillance pap smears in women diagnosed with endometrial cancer as this does not improve detection of local recurrence. Reviewed signs and symptoms for when she should contact the clinic or seek additional care. Patient to contact the clinic with any questions or concerns in the interim.  Answered all of her questions to the best of my ability.     2.) Genetic risk factors were assessed and her MMR proteins were intact/normal.      3.) Labs and/or tests ordered include:  None.     4.) Health maintenance issues addressed today include annual health maintenance and non-gynecologic issues with PCP.    CORTES Gant, WHNP-BC, ANP-BC  Women's Health Nurse Practitioner  Adult Nurse Pracitioner  Division of Gynecologic Oncology      CC  Patient Care Team:  Aundrea Max MD as PCP - General  Shoshana David APRN CNP as Assigned PCP  Linh De Souza MD as Assigned Cancer Care Provider

## 2020-12-14 ENCOUNTER — HEALTH MAINTENANCE LETTER (OUTPATIENT)
Age: 45
End: 2020-12-14

## 2020-12-26 ENCOUNTER — TRANSFERRED RECORDS (OUTPATIENT)
Dept: HEALTH INFORMATION MANAGEMENT | Facility: CLINIC | Age: 45
End: 2020-12-26

## 2021-01-07 ENCOUNTER — TRANSFERRED RECORDS (OUTPATIENT)
Dept: HEALTH INFORMATION MANAGEMENT | Facility: CLINIC | Age: 46
End: 2021-01-07

## 2021-01-11 ENCOUNTER — TRANSFERRED RECORDS (OUTPATIENT)
Dept: HEALTH INFORMATION MANAGEMENT | Facility: CLINIC | Age: 46
End: 2021-01-11

## 2021-01-14 ENCOUNTER — MEDICAL CORRESPONDENCE (OUTPATIENT)
Dept: HEALTH INFORMATION MANAGEMENT | Facility: CLINIC | Age: 46
End: 2021-01-14

## 2021-01-18 ENCOUNTER — TRANSCRIBE ORDERS (OUTPATIENT)
Dept: OTHER | Age: 46
End: 2021-01-18

## 2021-01-18 DIAGNOSIS — G25.5 OTHER CHOREA: Primary | ICD-10-CM

## 2021-01-20 NOTE — TELEPHONE ENCOUNTER
RECORDS RECEIVED FROM: External   Date of Appt: 1/29/21   NOTES (FOR ALL VISITS) STATUS DETAILS   OFFICE NOTE from referring provider Received Dr Parmjit Herrera @ [facility]:  1/11/21 12/23/20   OFFICE NOTE from other specialist N/A    DISCHARGE SUMMARY from hospital N/A    DISCHARGE REPORT from the ER N/A    OPERATIVE REPORT N/A    MEDICATION LIST Received    IMAGING  (FOR ALL VISITS)     EMG N/A    EEG N/A    LUMBAR PUNCTURE N/A    MARTÍN SCAN N/A    DEXA SCAN *NEUROSURGERY* N/A    ULTRASOUND (CAROTID BILAT) *VASCULAR* N/A    MRI (HEAD, NECK, SPINE) Received [facility]:  MRI Brain 1/7/21  MRI 2Brain 12/23/20   XRAY (SPINE) *NEUROSURGERY* N/A    CT (HEAD, NECK, SPINE) N/A       Action 1/20/21 MV 2.35pm   Action Taken Imaging request faxed to [facility] for:  MRI Brain 1/7/21  MRI 2Brain 12/23/20     Action 1.25.21 sv    Action Taken Received disc from [facility]   MRI Brain 1/7/21  MRI 2Brain 12/23/20  Sent to 4th floor for uploading

## 2021-01-29 ENCOUNTER — PRE VISIT (OUTPATIENT)
Dept: NEUROLOGY | Facility: CLINIC | Age: 46
End: 2021-01-29

## 2021-01-29 ENCOUNTER — OFFICE VISIT (OUTPATIENT)
Dept: NEUROLOGY | Facility: CLINIC | Age: 46
End: 2021-01-29
Attending: PSYCHIATRY & NEUROLOGY
Payer: COMMERCIAL

## 2021-01-29 VITALS
WEIGHT: 230 LBS | RESPIRATION RATE: 16 BRPM | HEART RATE: 66 BPM | OXYGEN SATURATION: 99 % | BODY MASS INDEX: 38.55 KG/M2 | DIASTOLIC BLOOD PRESSURE: 85 MMHG | SYSTOLIC BLOOD PRESSURE: 128 MMHG

## 2021-01-29 DIAGNOSIS — E66.01 MORBID OBESITY (H): ICD-10-CM

## 2021-01-29 DIAGNOSIS — G25.5 CHOREA: ICD-10-CM

## 2021-01-29 DIAGNOSIS — G25.5 CHOREA: Primary | ICD-10-CM

## 2021-01-29 LAB
AMMONIA PLAS-SCNC: 25 UMOL/L (ref 10–50)
BASOPHILS # BLD AUTO: 0 10E9/L (ref 0–0.2)
BASOPHILS NFR BLD AUTO: 0.5 %
DIFFERENTIAL METHOD BLD: NORMAL
EOSINOPHIL # BLD AUTO: 0.3 10E9/L (ref 0–0.7)
EOSINOPHIL NFR BLD AUTO: 4.7 %
ERYTHROCYTE [DISTWIDTH] IN BLOOD BY AUTOMATED COUNT: 11.9 % (ref 10–15)
HCT VFR BLD AUTO: 42.8 % (ref 35–47)
HGB BLD-MCNC: 13.9 G/DL (ref 11.7–15.7)
IMM GRANULOCYTES # BLD: 0 10E9/L (ref 0–0.4)
IMM GRANULOCYTES NFR BLD: 0.2 %
LACTATE BLD-SCNC: 0.8 MMOL/L (ref 0.7–2)
LYMPHOCYTES # BLD AUTO: 2 10E9/L (ref 0.8–5.3)
LYMPHOCYTES NFR BLD AUTO: 36.8 %
MCH RBC QN AUTO: 29.8 PG (ref 26.5–33)
MCHC RBC AUTO-ENTMCNC: 32.5 G/DL (ref 31.5–36.5)
MCV RBC AUTO: 92 FL (ref 78–100)
MISCELLANEOUS TEST: NORMAL
MISCELLANEOUS TEST: NORMAL
MONOCYTES # BLD AUTO: 0.3 10E9/L (ref 0–1.3)
MONOCYTES NFR BLD AUTO: 5.8 %
NEUTROPHILS # BLD AUTO: 2.9 10E9/L (ref 1.6–8.3)
NEUTROPHILS NFR BLD AUTO: 52 %
NRBC # BLD AUTO: 0 10*3/UL
NRBC BLD AUTO-RTO: 0 /100
PLATELET # BLD AUTO: 258 10E9/L (ref 150–450)
RBC # BLD AUTO: 4.67 10E12/L (ref 3.8–5.2)
RETICS # AUTO: 80.8 10E9/L (ref 25–95)
RETICS/RBC NFR AUTO: 1.7 % (ref 0.5–2)
WBC # BLD AUTO: 5.5 10E9/L (ref 4–11)

## 2021-01-29 PROCEDURE — 86147 CARDIOLIPIN ANTIBODY EA IG: CPT | Mod: 90 | Performed by: PATHOLOGY

## 2021-01-29 PROCEDURE — 99000 SPECIMEN HANDLING OFFICE-LAB: CPT | Performed by: PATHOLOGY

## 2021-01-29 PROCEDURE — 85060 BLOOD SMEAR INTERPRETATION: CPT | Performed by: PATHOLOGY

## 2021-01-29 PROCEDURE — 84999 UNLISTED CHEMISTRY PROCEDURE: CPT | Mod: 90 | Performed by: PATHOLOGY

## 2021-01-29 PROCEDURE — 82140 ASSAY OF AMMONIA: CPT | Performed by: PATHOLOGY

## 2021-01-29 PROCEDURE — 36415 COLL VENOUS BLD VENIPUNCTURE: CPT | Performed by: PATHOLOGY

## 2021-01-29 PROCEDURE — 85045 AUTOMATED RETICULOCYTE COUNT: CPT | Performed by: PATHOLOGY

## 2021-01-29 PROCEDURE — 83605 ASSAY OF LACTIC ACID: CPT | Performed by: PATHOLOGY

## 2021-01-29 PROCEDURE — 85025 COMPLETE CBC W/AUTO DIFF WBC: CPT | Performed by: PATHOLOGY

## 2021-01-29 PROCEDURE — 86341 ISLET CELL ANTIBODY: CPT | Mod: 90 | Performed by: PATHOLOGY

## 2021-01-29 RX ORDER — ESCITALOPRAM OXALATE 10 MG/1
10 TABLET ORAL EVERY MORNING
COMMUNITY
Start: 2021-01-17 | End: 2021-01-29

## 2021-01-29 NOTE — NURSING NOTE
Chief Complaint   Patient presents with     Consult     P NEW MOVEMENT DISORDER     Ajith Christensen
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
## 2021-01-29 NOTE — PATIENT INSTRUCTIONS
Plan:   - Blood work up  - MRI c spine  - If above workup is negative, will consider genetic testing.  -MRI brain follow-up study scheduled by Dr. Herrera for reevaluation of above abnormal findings.  - Stay sober  - Continue seeing therapist     Patient to return in 5 weeks, for virtual visit, 30 minutes.
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
## 2021-02-01 LAB
CARDIOLIPIN ANTIBODY IGG: <1.6 GPL-U/ML (ref 0–19.9)
CARDIOLIPIN ANTIBODY IGM: <0.2 MPL-U/ML (ref 0–19.9)
COPATH REPORT: NORMAL
GAD65 AB SER IA-ACNC: <5 IU/ML (ref 0–5)

## 2021-02-04 LAB
RESULT: NORMAL
SEND OUTS MISC TEST CODE: NORMAL
SEND OUTS MISC TEST SPECIMEN: NORMAL
TEST NAME: NORMAL

## 2021-02-19 ENCOUNTER — ONCOLOGY VISIT (OUTPATIENT)
Dept: ONCOLOGY | Facility: CLINIC | Age: 46
End: 2021-02-19
Attending: NURSE PRACTITIONER
Payer: COMMERCIAL

## 2021-02-19 VITALS
BODY MASS INDEX: 39.17 KG/M2 | OXYGEN SATURATION: 99 % | WEIGHT: 233.7 LBS | TEMPERATURE: 97.9 F | RESPIRATION RATE: 16 BRPM | SYSTOLIC BLOOD PRESSURE: 123 MMHG | HEART RATE: 64 BPM | DIASTOLIC BLOOD PRESSURE: 84 MMHG

## 2021-02-19 DIAGNOSIS — C54.1 ENDOMETRIAL CANCER (H): Primary | ICD-10-CM

## 2021-02-19 PROCEDURE — 99213 OFFICE O/P EST LOW 20 MIN: CPT | Performed by: NURSE PRACTITIONER

## 2021-02-19 PROCEDURE — G0463 HOSPITAL OUTPT CLINIC VISIT: HCPCS

## 2021-02-19 ASSESSMENT — PAIN SCALES - GENERAL: PAINLEVEL: NO PAIN (0)

## 2021-02-19 NOTE — PROGRESS NOTES
Follow Up Notes on Referred Patient    Date: 2021    RE: Meagan Morales  : 1975  VY: 2021        Meagan Morales is a 46 year old woman with a history of stage IIIA grade 1 endometrioid adenocarcinoma. She completed chemotherapy 3/2019.  She is here today for a surveillance visit.      Oncology History:  Meagan originally presented to her clinic due to continuous vaginal bleeding, very light, 2018. She thought it was just her menses being continuous. US was done which found thickened endometrial lining. IUD was placed. Second US was done and IUD did not affect endometrial lining and IUD was malpositioned. She continued to have spotting. IUD was removed. Also of note, ovarian cysts were noted on second US. Subsequent endometrial biopsy was done which revealed grade 1 endometrial cancer. She has always had irregular menses and has never been able to get pregnant. Patient's  first and only pap smear on 10/10/2017. Had mammogram in 's. No history of prior colonoscopy. Started Wellbutrin this last year, mood has improved. Prior cigarette smoker, quit 2 months ago (2018) and switched to vape. Started again smoking cigarettes again. 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 Has never been able to get pregnant and dose not desire children.     18: pelvic US IMPRESSION:  Peripheral follicular arrangement high within the ovaries suggesting polycystic ovarian syndrome. Slightly thickened heterogeneous endometrium raising a concern of potential hyperplastic change.  18: pelvic US   1. Uterus is not normal in appearance. The endometrium appears thickened and vascular, and the IUD appears to be malpositioned.  Clinical correlation is recommended.    2. Bilateral complex ovarian cysts are noted.  Follow up ultrasound in 4-6 weeks is recommended, consider saline infusion sonohysterogram for further imaging of the endometrium.      18:  ENDOMETRIUM, BIOPSY:  1. FIGO Grade 1 (well differentiated) endometrioid carcinoma of the endometrium  2. DNA mismatch repair enzyme immunohistochemistry studies:     All intact (see Amendment note and synoptic reporting)     10/24/2018: DaVinci Assisted Total Laparoscopic Hysterectomy, Bilateral Salpingo-Oophorectomy, lymph node dissection, Excision of Left Vulvar Lesion  Pelvic Washing:   Rare atypical cells present.  MMR proteins intact.     ORIGINAL REPORT:     SPECIMEN(S):   A: Left vulvar lesion   B: Left pelvic sentinel lymph node   C: Right pelvic sentinel lymph node   D: Uterus, cervix, bilateral ovaries and fallopian tubes   E: Portion of left ovary   F: Para-aortic lymph nodes   G: Left pelvic lymph nodes   H: Right pelvic lymph nodes     FINAL DIAGNOSIS:   A. LEFT VULVAR LESION, BIOPSY:   - Intradermal nevus     B. LEFT PELVIC SENTINEL LYMPH NODE, EXCISION:   - One reactive lymph node, negative for malignancy (0/1)     C. RIGHT PELVIC SENTINEL LYMPH NODE, EXCISION:   - Six reactive lymph nodes, negative for malignancy (0/6)     D. UTERUS, CERVIX, BILATERAL OVARIES AND FALLOPIAN TUBES, HYSTERECTOMY WITH BILATERAL SALPINGO-OOPHORECTOMY:   - Endometrial endometrioid adenocarcinoma, FIGO grade 1   - Atypical endometrial hyperplasia   - Myometrium with no significant histologic abnormality   - Chronic cervicitis with microglandular hyperplasia   - Uterine serosal fibrous adhesions   - Ovaries with cortical hyperplasia   - Metastatic endometrial adenocarcinoma to the left fallopian tube   - Right fallopian tube with no significant histologic abnormality     E. PORTION OF LEFT OVARY, EXCISION:   - Benign cortical inclusion cysts     F. PARA-AORTIC LYMPH NODES, EXCISION:   - Four reactive lymph nodes, negative for malignancy (0/4)   - Benign glandular inclusions consistent with endosalpingiosis     G. LEFT PELVIC LYMPH NODES, EXCISION:   - Eight reactive lymph nodes, negative for malignancy (0/8)     H. RIGHT  PELVIC LYMPH NODES, EXCISION:   - Nine reactive lymph nodes, negative for malignancy (0/9)   - Benign glandular inclusions consistent with endosalpingiosis         11/6/18: CT ap IMPRESSION:   1. Surgical changes of hysterectomy, bilateral salpingo-oophorectomy, and pelvic lymph node dissection with extensive fat stranding and fluid within the low pelvis extending superiorly along the iliac vasculature, right greater than left, potentially within normal postsurgical limits, however, slightly more than expected for postsurgical change.   1a. The ureters are not well evaluated on this single phase exam and there is no hydronephrosis or hydroureter, however, the amount of fluid in the pelvis does raise the question of ureteral injury. If there is clinical concern, consider obtaining a CT urogram.  1b. An area of irregular rim enhancement in the low pelvis may represent a normal postsurgical vaginal cuff, though, again, this is slightly more conspicuous than usual and dehiscence or a developing abscess may have a similar appearance.  2. 6.8 x 4.1 x 5.6 cm right pelvic sidewall seroma versus lymphocele.  3. Soft tissue nodular thickening anteriorly to the right psoas muscle and along the right lateral conal fascia, indeterminate, cannot rule out metastatic foci. Attention on follow-up studies.  4. Cholelithiasis without evidence of cholecystitis.      11/7/18: CT abd with contrast CT urogram IMPRESSION:   1. Postsurgical changes of hysterectomy, bilateral salpingo-oophorectomy, and iliac lymph node dissection with fluid  collections within the pelvis extending along the iliac vasculature.  a. The right pelvic sidewall fluid collection is not significantly changed while the left pelvic sidewall fluid collection has enlarged  in comparison to the 11/6/2018 CT. The differential remains a seroma versus a lymphocele.  b. No evidence of contrast extravasation to suggest a ureteral injury.  c. Redemonstration of soft tissue  nodular thickening along the right lateral conal fascia and anteriorly to the right psoas muscle and right psoas muscle. Attention on follow-up is recommended.  2. Cholelithiasis without evidence of cholecystitis.     Plan: adjuvant therapy to include 6 cycles of taxol and carboplatin; would not add radiation as all lymph nodes are negative, she does not have LVSI or deep invasion and this is a low grade tumor. Rx percocet 5 tabs due to continued abdominal pain. Will not prescribe any more narcotics and patient is aware of this.     11/16/18: Cycle #1 Paclitaxel/Carboplatin.   35.  12/6/18: Cycle #2 Paclitaxel/Carboplatin.  8.  12/27/2018: Cycle #3 T/C.  7.  1/8/19: US lower extremity: IMPRESSION: No evidence of deep venous thrombosis in either lower extremity.  1/9/2019: CT abd/pelvis IMPRESSION:   1. Minimal change in a right pelvic sidewall collection and decreased size of a left pelvic sidewall collection, which are most consistent with lymphoceles.  2. No suspicious pelvic lymphadenopathy.      1/21/19 Cycle #4 Paclitaxel/Carboplatin.  8.      1/2019 Presented with pain in LE:  US IMPRESSION: No evidence of deep venous thrombosis in either lower extremity.     1/2019 CT:IMPRESSION:   1. Minimal change in a right pelvic sidewall collection and decreased size of a left pelvic sidewall collection, which are most consistent with lymphoceles.  2. No suspicious pelvic lymphadenopathy.      2/14/19: Cycle #5 Paclitaxel/Carboplatin.  9.  3/7/19: Cycle #6 Paclitaxel/Carboplatin.  9.     4/4/2019: CT CAP IMPRESSION:   1. Significant decrease in the right pelvic sidewall collection, with resolved left pelvic sidewall collection. These are most consistent with lymphoceles.  2. No suspicious pelvic lymphadenopathy.  3. No suspicious abnormality in the chest.     Peritoneum / Retroperitoneum: Interval significant decrease in the right pelvic sidewall fluid collection now measuring 68 x 34  "mm, previously 84 x 37 mm. The left pelvic sidewall fluid collection seen on prior examination has resolved.     Start surveillance; will alternate between NP and MD.  Per Dr. De Souza, do not need to follow  \"as was acutely elevated post op and have since been normal.\"     9/1/2020:  11.      Today she comes to clinic feeling well. She denies any vaginal bleeding, no changes in her bowel or bladder habits, no nausea/emesis, no lower extremity edema, and no difficulties eating or sleeping (she is not using her CPAP). She denies any abdominal discomfort/bloating, no fevers or chills, and no chest pain or shortness of breath. She is due for her annual exam but had her mammogram recently. She is sexually active and uses a lubricant. She does notice vulvar dryness but has not yet consistently tried the organic coconut oil. She stopped her Gabapentin. She is in the process of finding a new psychologist as her insurance changed. She is working from home currently.         Review of Systems:    Systemic           no weight changes; no fever; no chills; no night sweats; no appetite changes  Skin           no rashes, or lesions  Eye           no irritation; no changes in vision  Deny-Laryngeal           no dysphagia; no hoarseness   Pulmonary    no cough; no shortness of breath  Cardiovascular    no chest pain; no palpitations  Gastrointestinal    no diarrhea; no constipation; no abdominal pain; no changes in bowel habits; no blood in stool  Genitourinary   no urinary frequency; no urinary urgency; no dysuria; no pain; no abnormal vaginal discharge; no abnormal vaginal bleeding  Breast    no breast discharge; no breast changes; no breast pain  Musculoskeletal    no myalgias; no arthralgias; no back pain  Psychiatric           no depressed mood; no anxiety    Hematologic              no tender lymph nodes; no noticeable swellings or lumps   Endocrine    no hot flashes; no heat/cold intolerance       "   Neurological   no tremor; no numbness and tingling; no headaches; no difficulty  sleeping      Past Medical History:    Past Medical History:   Diagnosis Date     Chickenpox     as a child     Depression      Endometrial cancer (H)      Hypertension     since her 30's     Infertility, female      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)      PCOS (polycystic ovarian syndrome)      STD (female)     in her 20's         Past Surgical History:    Past Surgical History:   Procedure Laterality Date     CYSTOSCOPY N/A 10/24/2018    Procedure: Cystoscopy;  Surgeon: Linh De Souza MD;  Location: [facility] OR     DAVINCI HYSTERECTOMY TOTAL, BILATERAL SALPINGO-OOPHORECTOMY, NODE DISSECTION, COMBINED N/A 10/24/2018    Procedure: DaVinci Assisted Total Laparoscopic Hysterectomy, Bilateral Salpingo-Oophorectomy, lymph node dissection;  Surgeon: Linh De Souza MD;  Location: [facility] OR     EXCISE LESION VULVA Left 10/24/2018    Procedure: Excision of Left Vulvar Lesion;  Surgeon: Linh De Souza MD;  Location: [facility] OR         Health Maintenance Due   Topic Date Due     PREVENTIVE CARE VISIT  1975     DEPRESSION ACTION PLAN  1975     PHQ-9  1975     Pneumococcal Vaccine: Pediatrics (0 to 5 Years) and At-Risk Patients (6 to 64 Years) (1 of 4 - PCV13) 01/20/1983     HIV SCREENING  01/20/1992     HEPATITIS C SCREENING  01/20/1995     PAP  01/20/1998     INFLUENZA VACCINE (1) 09/01/2020     DTAP/TDAP/TD IMMUNIZATION (2 - Td) 10/26/2020       Current Medications:     Current Outpatient Medications   Medication Sig Dispense Refill     Multiple Vitamin (MULTIVITAMIN  S) CAPS Take 1 capsule by mouth       pramipexole (MIRAPEX) 0.125 MG tablet Take 0.125 mg by mouth 2 times daily       Sharps Container (SHARPS-A-GATOR LOCKING BRACKET) [facility] CPAP machine for home use at pressure: 5-16 CM H20 , Heated humidifier x 1, Humidifier chamber x 1,  Nasal mask with cushion x 1, Heated tubing x 1, Headgear x 1, Filters: Disposable  x 1pk & Reusable x 1pk, Length of Need: 99 months, Frequency of use: Daily       spironolactone (ALDACTONE) 50 MG tablet Take 50 mg by mouth daily            Allergies:        Allergies   Allergen Reactions     Amoxicillin      Penicillin G         Social History:     Social History     Tobacco Use     Smoking status: Former Smoker     Packs/day: 0.50     Types: Cigarettes     Smokeless tobacco: Never Used   Substance Use Topics     Alcohol use: No     Comment: 11 months sober        History   Drug Use No     Comment: 11 months sober. lives at sober housing         Family History:     The patient's family history is notable for:    No family history on file.      Physical Exam:     /84 (BP Location: Right arm, Patient Position: Sitting, Cuff Size: Adult Large)   Pulse 64   Temp 97.9  F (36.6  C) (Oral)   Resp 16   Wt 106 kg (233 lb 11.2 oz)   SpO2 99%   BMI 39.17 kg/m    Body mass index is 39.17 kg/m .    General Appearance: healthy and alert, no distress     HEENT: no thyromegaly, no palpable nodules or masses        Cardiovascular: regular rate and rhythm, no gallops, rubs or murmurs     Respiratory: lungs clear, no rales, rhonchi or wheezes, normal diaphragmatic excursion    Musculoskeletal: extremities non tender and without edema    Skin: no lesions or rashes     Neurological: normal gait, no gross defects     Psychiatric: appropriate mood and affect                               Hematological: normal cervical, supraclavicular and inguinal lymph nodes     Gastrointestinal:       abdomen soft, non-tender, non-distended, no organomegaly or masses    Genitourinary: External genitalia and urethral meatus appears normal.  Vagina is smooth without nodularity or masses.  Cervix surgically absent.  Bimanual exam reveal no masses, nodularity or fullness.  Recto-vaginal exam confirms these findings.      Assessment:    Meagan Morales is a 46 year old woman with a history of stage IIIA grade 1 endometrioid  adenocarcinoma. She completed chemotherapy 3/2019.  She is here today for a surveillance visit.     20 minutes spent on the date of the encounter doing chart review, history and exam, documentation and further activities as noted above      Plan:     1.)        She is now 2 years post treatment and can extend her surveillance to every 6 months x 3 years. Will not be following a . Reviewed recommendations from [facility] not to perform surveillance pap smears in women diagnosed with endometrial cancer as this does not improve detection of local recurrence. Reviewed signs and symptoms for when she should contact the clinic or seek additional care. Patient to contact the clinic with any questions or concerns in the interim.  Answered all of her questions to the best of my ability.     2.) Genetic risk factors were assessed and she has intact/normal MMR proteins.     3.) Labs and/or tests ordered include:  None.      4.) Health maintenance issues addressed today include annual health maintenance and non-gynecologic issues with PCP.    CORTES Gant, WHNP-BC, ANP-BC  Women's Health Nurse Practitioner  Adult Nurse Practitioner  Division of Gynecologic Oncology          CC  Patient Care Team:  Aundrea Max MD as PCP - General  Shoshana David APRN CNP as Assigned PCP  Virgen Gallagher APRN CNP as Assigned OBGYN Provider  Linh De Souza MD as Assigned Cancer Care Provider

## 2021-02-19 NOTE — LETTER
2021         RE: Meagan Morales  [address]  [address]  [facility] [address]        Dear Colleague,    Thank you for referring your patient, Meagan Morales, to the [facility]. Please see a copy of my visit note below.                Follow Up Notes on Referred Patient    Date: 2021    RE: Meagan Morales  : 1975  VY: 2021        Meagan Morales is a 46 year old woman with a history of stage IIIA grade 1 endometrioid adenocarcinoma. She completed chemotherapy 3/2019.  She is here today for a surveillance visit.      Oncology History:  Meagan originally presented to her clinic due to continuous vaginal bleeding, very light, 2018. She thought it was just her menses being continuous. US was done which found thickened endometrial lining. IUD was placed. Second US was done and IUD did not affect endometrial lining and IUD was malpositioned. She continued to have spotting. IUD was removed. Also of note, ovarian cysts were noted on second US. Subsequent endometrial biopsy was done which revealed grade 1 endometrial cancer. She has always had irregular menses and has never been able to get pregnant. Patient's  first and only pap smear on 10/10/2017. Had mammogram in . No history of prior colonoscopy. Started Wellbutrin this last year, mood has improved. Prior cigarette smoker, quit 2 months ago (2018) and switched to vape. Started again smoking cigarettes again. 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 Has never been able to get pregnant and dose not desire children.     18: pelvic US IMPRESSION:  Peripheral follicular arrangement high within the ovaries suggesting polycystic ovarian syndrome. Slightly thickened heterogeneous endometrium raising a concern of potential hyperplastic change.  18: pelvic US   1. Uterus is not normal in appearance. The endometrium appears thickened and vascular, and the IUD appears  to be malpositioned.  Clinical correlation is recommended.    2. Bilateral complex ovarian cysts are noted.  Follow up ultrasound in 4-6 weeks is recommended, consider saline infusion sonohysterogram for further imaging of the endometrium.      9/17/18: ENDOMETRIUM, BIOPSY:  1. FIGO Grade 1 (well differentiated) endometrioid carcinoma of the endometrium  2. DNA mismatch repair enzyme immunohistochemistry studies:     All intact (see Amendment note and synoptic reporting)     10/24/2018: DaVinci Assisted Total Laparoscopic Hysterectomy, Bilateral Salpingo-Oophorectomy, lymph node dissection, Excision of Left Vulvar Lesion  Pelvic Washing:   Rare atypical cells present.  MMR proteins intact.     ORIGINAL REPORT:     SPECIMEN(S):   A: Left vulvar lesion   B: Left pelvic sentinel lymph node   C: Right pelvic sentinel lymph node   D: Uterus, cervix, bilateral ovaries and fallopian tubes   E: Portion of left ovary   F: Para-aortic lymph nodes   G: Left pelvic lymph nodes   H: Right pelvic lymph nodes     FINAL DIAGNOSIS:   A. LEFT VULVAR LESION, BIOPSY:   - Intradermal nevus     B. LEFT PELVIC SENTINEL LYMPH NODE, EXCISION:   - One reactive lymph node, negative for malignancy (0/1)     C. RIGHT PELVIC SENTINEL LYMPH NODE, EXCISION:   - Six reactive lymph nodes, negative for malignancy (0/6)     D. UTERUS, CERVIX, BILATERAL OVARIES AND FALLOPIAN TUBES, HYSTERECTOMY WITH BILATERAL SALPINGO-OOPHORECTOMY:   - Endometrial endometrioid adenocarcinoma, FIGO grade 1   - Atypical endometrial hyperplasia   - Myometrium with no significant histologic abnormality   - Chronic cervicitis with microglandular hyperplasia   - Uterine serosal fibrous adhesions   - Ovaries with cortical hyperplasia   - Metastatic endometrial adenocarcinoma to the left fallopian tube   - Right fallopian tube with no significant histologic abnormality     E. PORTION OF LEFT OVARY, EXCISION:   - Benign cortical inclusion cysts     F. PARA-AORTIC LYMPH NODES,  EXCISION:   - Four reactive lymph nodes, negative for malignancy (0/4)   - Benign glandular inclusions consistent with endosalpingiosis     G. LEFT PELVIC LYMPH NODES, EXCISION:   - Eight reactive lymph nodes, negative for malignancy (0/8)     H. RIGHT PELVIC LYMPH NODES, EXCISION:   - Nine reactive lymph nodes, negative for malignancy (0/9)   - Benign glandular inclusions consistent with endosalpingiosis         11/6/18: CT ap IMPRESSION:   1. Surgical changes of hysterectomy, bilateral salpingo-oophorectomy, and pelvic lymph node dissection with extensive fat stranding and fluid within the low pelvis extending superiorly along the iliac vasculature, right greater than left, potentially within normal postsurgical limits, however, slightly more than expected for postsurgical change.   1a. The ureters are not well evaluated on this single phase exam and there is no hydronephrosis or hydroureter, however, the amount of fluid in the pelvis does raise the question of ureteral injury. If there is clinical concern, consider obtaining a CT urogram.  1b. An area of irregular rim enhancement in the low pelvis may represent a normal postsurgical vaginal cuff, though, again, this is slightly more conspicuous than usual and dehiscence or a developing abscess may have a similar appearance.  2. 6.8 x 4.1 x 5.6 cm right pelvic sidewall seroma versus lymphocele.  3. Soft tissue nodular thickening anteriorly to the right psoas muscle and along the right lateral conal fascia, indeterminate, cannot rule out metastatic foci. Attention on follow-up studies.  4. Cholelithiasis without evidence of cholecystitis.      11/7/18: CT abd with contrast CT urogram IMPRESSION:   1. Postsurgical changes of hysterectomy, bilateral salpingo-oophorectomy, and iliac lymph node dissection with fluid  collections within the pelvis extending along the iliac vasculature.  a. The right pelvic sidewall fluid collection is not significantly changed while the  left pelvic sidewall fluid collection has enlarged  in comparison to the 11/6/2018 CT. The differential remains a seroma versus a lymphocele.  b. No evidence of contrast extravasation to suggest a ureteral injury.  c. Redemonstration of soft tissue nodular thickening along the right lateral conal fascia and anteriorly to the right psoas muscle and right psoas muscle. Attention on follow-up is recommended.  2. Cholelithiasis without evidence of cholecystitis.     Plan: adjuvant therapy to include 6 cycles of taxol and carboplatin; would not add radiation as all lymph nodes are negative, she does not have LVSI or deep invasion and this is a low grade tumor. Rx percocet 5 tabs due to continued abdominal pain. Will not prescribe any more narcotics and patient is aware of this.     11/16/18: Cycle #1 Paclitaxel/Carboplatin.   35.  12/6/18: Cycle #2 Paclitaxel/Carboplatin.  8.  12/27/2018: Cycle #3 T/C.  7.  1/8/19: US lower extremity: IMPRESSION: No evidence of deep venous thrombosis in either lower extremity.  1/9/2019: CT abd/pelvis IMPRESSION:   1. Minimal change in a right pelvic sidewall collection and decreased size of a left pelvic sidewall collection, which are most consistent with lymphoceles.  2. No suspicious pelvic lymphadenopathy.      1/21/19 Cycle #4 Paclitaxel/Carboplatin.  8.      1/2019 Presented with pain in LE:  US IMPRESSION: No evidence of deep venous thrombosis in either lower extremity.     1/2019 CT:IMPRESSION:   1. Minimal change in a right pelvic sidewall collection and decreased size of a left pelvic sidewall collection, which are most consistent with lymphoceles.  2. No suspicious pelvic lymphadenopathy.      2/14/19: Cycle #5 Paclitaxel/Carboplatin.  9.  3/7/19: Cycle #6 Paclitaxel/Carboplatin.  9.     4/4/2019: CT CAP IMPRESSION:   1. Significant decrease in the right pelvic sidewall collection, with resolved left pelvic sidewall collection. These are  "most consistent with lymphoceles.  2. No suspicious pelvic lymphadenopathy.  3. No suspicious abnormality in the chest.     Peritoneum / Retroperitoneum: Interval significant decrease in the right pelvic sidewall fluid collection now measuring 68 x 34 mm, previously 84 x 37 mm. The left pelvic sidewall fluid collection seen on prior examination has resolved.     Start surveillance; will alternate between NP and MD.  Per Dr. De Souza, do not need to follow  \"as was acutely elevated post op and have since been normal.\"     9/1/2020:  11.      Today she comes to clinic feeling well. She denies any vaginal bleeding, no changes in her bowel or bladder habits, no nausea/emesis, no lower extremity edema, and no difficulties eating or sleeping (she is not using her CPAP). She denies any abdominal discomfort/bloating, no fevers or chills, and no chest pain or shortness of breath. She is due for her annual exam but had her mammogram recently. She is sexually active and uses a lubricant. She does notice vulvar dryness but has not yet consistently tried the organic coconut oil. She stopped her Gabapentin. She is in the process of finding a new psychologist as her insurance changed. She is working from home currently.         Review of Systems:    Systemic           no weight changes; no fever; no chills; no night sweats; no appetite changes  Skin           no rashes, or lesions  Eye           no irritation; no changes in vision  Deny-Laryngeal           no dysphagia; no hoarseness   Pulmonary    no cough; no shortness of breath  Cardiovascular    no chest pain; no palpitations  Gastrointestinal    no diarrhea; no constipation; no abdominal pain; no changes in bowel habits; no blood in stool  Genitourinary   no urinary frequency; no urinary urgency; no dysuria; no pain; no abnormal vaginal discharge; no abnormal vaginal bleeding  Breast    no breast discharge; no breast changes; no breast pain  Musculoskeletal    no " myalgias; no arthralgias; no back pain  Psychiatric           no depressed mood; no anxiety    Hematologic              no tender lymph nodes; no noticeable swellings or lumps   Endocrine    no hot flashes; no heat/cold intolerance         Neurological   no tremor; no numbness and tingling; no headaches; no difficulty  sleeping      Past Medical History:    Past Medical History:   Diagnosis Date     Chickenpox     as a child     Depression      Endometrial cancer (H)      Hypertension     since her 30's     Infertility, female      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)      PCOS (polycystic ovarian syndrome)      STD (female)     in her 20's         Past Surgical History:    Past Surgical History:   Procedure Laterality Date     CYSTOSCOPY N/A 10/24/2018    Procedure: Cystoscopy;  Surgeon: Linh De Souza MD;  Location: [facility] OR     DAVINCI HYSTERECTOMY TOTAL, BILATERAL SALPINGO-OOPHORECTOMY, NODE DISSECTION, COMBINED N/A 10/24/2018    Procedure: DaVinci Assisted Total Laparoscopic Hysterectomy, Bilateral Salpingo-Oophorectomy, lymph node dissection;  Surgeon: Linh De Souza MD;  Location: [facility] OR     EXCISE LESION VULVA Left 10/24/2018    Procedure: Excision of Left Vulvar Lesion;  Surgeon: Linh De Souza MD;  Location: [facility] OR         Health Maintenance Due   Topic Date Due     PREVENTIVE CARE VISIT  1975     DEPRESSION ACTION PLAN  1975     PHQ-9  1975     Pneumococcal Vaccine: Pediatrics (0 to 5 Years) and At-Risk Patients (6 to 64 Years) (1 of 4 - PCV13) 01/20/1983     HIV SCREENING  01/20/1992     HEPATITIS C SCREENING  01/20/1995     PAP  01/20/1998     INFLUENZA VACCINE (1) 09/01/2020     DTAP/TDAP/TD IMMUNIZATION (2 - Td) 10/26/2020       Current Medications:     Current Outpatient Medications   Medication Sig Dispense Refill     Multiple Vitamin (MULTIVITAMIN  S) CAPS Take 1 capsule by mouth       pramipexole (MIRAPEX) 0.125 MG tablet Take 0.125 mg by mouth 2 times daily        Sharps Container (SHARPS-A-GATOR LOCKING BRACKET) [facility] CPAP machine for home use at pressure: 5-16 CM H20 , Heated humidifier x 1, Humidifier chamber x 1,  Nasal mask with cushion x 1, Heated tubing x 1, Headgear x 1, Filters: Disposable x 1pk & Reusable x 1pk, Length of Need: 99 months, Frequency of use: Daily       spironolactone (ALDACTONE) 50 MG tablet Take 50 mg by mouth daily            Allergies:        Allergies   Allergen Reactions     Amoxicillin      Penicillin G         Social History:     Social History     Tobacco Use     Smoking status: Former Smoker     Packs/day: 0.50     Types: Cigarettes     Smokeless tobacco: Never Used   Substance Use Topics     Alcohol use: No     Comment: 11 months sober        History   Drug Use No     Comment: 11 months sober. lives at sober housing         Family History:     The patient's family history is notable for:    No family history on file.      Physical Exam:     /84 (BP Location: Right arm, Patient Position: Sitting, Cuff Size: Adult Large)   Pulse 64   Temp 97.9  F (36.6  C) (Oral)   Resp 16   Wt 106 kg (233 lb 11.2 oz)   SpO2 99%   BMI 39.17 kg/m    Body mass index is 39.17 kg/m .    General Appearance: healthy and alert, no distress     HEENT: no thyromegaly, no palpable nodules or masses        Cardiovascular: regular rate and rhythm, no gallops, rubs or murmurs     Respiratory: lungs clear, no rales, rhonchi or wheezes, normal diaphragmatic excursion    Musculoskeletal: extremities non tender and without edema    Skin: no lesions or rashes     Neurological: normal gait, no gross defects     Psychiatric: appropriate mood and affect                               Hematological: normal cervical, supraclavicular and inguinal lymph nodes     Gastrointestinal:       abdomen soft, non-tender, non-distended, no organomegaly or masses    Genitourinary: External genitalia and urethral meatus appears normal.  Vagina is smooth without nodularity or masses.   Cervix surgically absent.  Bimanual exam reveal no masses, nodularity or fullness.  Recto-vaginal exam confirms these findings.      Assessment:    Meagan Morales is a 46 year old woman with a history of stage IIIA grade 1 endometrioid adenocarcinoma. She completed chemotherapy 3/2019.  She is here today for a surveillance visit.     20 minutes spent on the date of the encounter doing chart review, history and exam, documentation and further activities as noted above      Plan:     1.)        She is now 2 years post treatment and can extend her surveillance to every 6 months x 3 years. Will not be following a . Reviewed recommendations from [facility] not to perform surveillance pap smears in women diagnosed with endometrial cancer as this does not improve detection of local recurrence. Reviewed signs and symptoms for when she should contact the clinic or seek additional care. Patient to contact the clinic with any questions or concerns in the interim.  Answered all of her questions to the best of my ability.     2.) Genetic risk factors were assessed and she has intact/normal MMR proteins.     3.) Labs and/or tests ordered include:  None.      4.) Health maintenance issues addressed today include annual health maintenance and non-gynecologic issues with PCP.    CORTES Gant, WHNP-BC, ANP-BC  Women's Health Nurse Practitioner  Adult Nurse Practitioner  Division of Gynecologic Oncology        CC  Patient Care Team:  Aundrea Max MD as PCP - General  Shoshana David APRN CNP as Assigned PCP  Virgen Gallagher APRN CNP as Assigned OBGYN Provider  Linh De Souza MD as Assigned Cancer Care Provider        Again, thank you for allowing me to participate in the care of your patient.        Sincerely,        CORTES Prater CNP

## 2021-02-19 NOTE — NURSING NOTE
"Oncology Rooming Note    February 19, 2021 8:11 AM   Meagan Morales is a 46 year old female who presents for:    No chief complaint on file.    Initial Vitals: /84 (BP Location: Right arm, Patient Position: Sitting, Cuff Size: Adult Large)   Pulse 64   Temp 97.9  F (36.6  C) (Oral)   Resp 16   Wt 106 kg (233 lb 11.2 oz)   SpO2 99%   BMI 39.17 kg/m   Estimated body mass index is 39.17 kg/m  as calculated from the following:    Height as of 2/11/20: 1.645 m (5' 4.76\").    Weight as of this encounter: 106 kg (233 lb 11.2 oz). Body surface area is 2.2 meters squared.  No Pain (0) Comment: Data Unavailable   No LMP recorded. Patient has had a hysterectomy.  Allergies reviewed: Yes  Medications reviewed: Yes    Medications: Medication refills not needed today.  Pharmacy name entered into Independent IP: [id number], [place] - [address] [place] [address] AT [facility] [address]    Clinical concerns: No new concerns       Tereza Kapoor CMA                "

## 2021-03-04 ENCOUNTER — VIRTUAL VISIT (OUTPATIENT)
Dept: NEUROLOGY | Facility: CLINIC | Age: 46
End: 2021-03-04
Payer: COMMERCIAL

## 2021-03-04 DIAGNOSIS — G25.5 CHOREA: Primary | ICD-10-CM

## 2021-03-04 PROCEDURE — 99215 OFFICE O/P EST HI 40 MIN: CPT | Mod: 95 | Performed by: PSYCHIATRY & NEUROLOGY

## 2021-03-04 PROCEDURE — 99417 PROLNG OP E/M EACH 15 MIN: CPT | Performed by: PSYCHIATRY & NEUROLOGY

## 2021-03-04 RX ORDER — CARBIDOPA AND LEVODOPA 25; 100 MG/1; MG/1
TABLET ORAL
Qty: 270 TABLET | Refills: 1 | Status: SHIPPED | OUTPATIENT
Start: 2021-03-04 | End: 2022-04-25

## 2021-03-04 NOTE — PROGRESS NOTES
"MOVEMENT DISORDERS TELEMEDICINE (video and/or telephone) VISIT:     PATIENT: Meagan Morales    : 1975    DATE: 21    REASON FOR VISIT: continued evaluation of choreoathetoid movements    After a review of the patient s situation, this visit was changed from an in-person visit to a Telemedicine visit to reduce the risk of COVID-19 exposure. The patient is being evaluated via a billable Telemedicine visit.    Ms. Morales is a 46 year old R handed female that presents via Telemedicine for follow up for continued evaluation of choreoathetoid movements.      The patient's last visit was on 21 as a new patient for evaluation of chorea-like movements. A workup was started at this time.     History obtained from patient. Patient reports she is \"doing okay.\" Nothing has changed with movements. The movements continue to be prevalent during times she multitasks, such as working, having a conversation while cooking. These movements affects her self esteem as she notices the movements when other people become aware of them. She stopped her Gabapentin. She is in the process of finding a new psychologist as her insurance changed and the [facility] is not in her network. She is working from home currently. She is not using her CPAP.     She has been sober for 3 years.     I have reviewed and updated the patient's Past Medical History, Social History, Family History and Medication List.    PMH: history of stage IIIA grade 1 endometrioid adenocarcinoma s/p chemotherapy 3/2019 (6 cycles of taxol and carboplatin)  PSH: hysterectomy, bilateral salpingo-oophorectomy, and pelvic lymph node dissection   FH: unchanged  SH: Prior cigarette smoker, quit 2018 and switched to vape. Started smoking cigarettes again. History of drug use, has been sober since ~ 2017. History of meth and alcohol abuse    Medications:  Current Outpatient Medications   Medication Sig Dispense Refill     Multiple Vitamin (MULTIVITAMIN  S) CAPS Take 1 " capsule by mouth       pramipexole (MIRAPEX) 0.125 MG tablet Take 0.125 mg by mouth 2 times daily       Sharps Container (SHARPS-A-GATOR LOCKING BRACKET) [facility] [place] for home use at pressure: 5-16 CM H20 , Heated humidifier x 1, Humidifier chamber x 1,  Nasal mask with cushion x 1, Heated tubing x 1, Headgear x 1, Filters: Disposable x 1pk & Reusable x 1pk, Length of Need: 99 months, Frequency of use: Daily       spironolactone (ALDACTONE) 50 MG tablet Take 50 mg by mouth daily         Movement Disorder-related Medications                   AM PM      Pramipexole 0.125 mg 2 2                                  Allergies:  Amoxicillin and Penicillin g    Physical Exam:  GENERAL: alert, active, attentive, appropriately groomed   HEENT: normocephalic, eyes open with no discharge  CHEST: non labored breathing   PSYCH: mood stable     Neurologic Exam:  MENTAL STATUS: Alert and oriented to person, place, time, and situation. Follows commands. Recent and remote memory intact. Attention span and concentration intact. Fund of knowledge intact to current events.  Able to recall current president. Able to spell WORLD backwards. Able to name an object and describe its function.  SPEECH: Fluent, intact comprehension and articulation  CN: visual fields intact, EOMIB,  no nystagmus, normal saccades, facial movement symmetric, hearing grossly intact to conversation, tongue protrudes midline   MOTOR: Moves all extremities equally against gravity without difficulty  Involuntary movements:  Hyperkinetic movements involving head, mouth, jaw  Agility: Normal finger tapping and toe tapping b/l  COORDINATION: no dysmetria with FTN  GAIT: able to rise unassisted from a seated position, normal arm swing and normal stride length, no en bloc turns, no ataxia    Data Reviewed: I have personally reviewed the tests/studies below.   - Levels for Lactic acid, cardiolipin Abs, Ammonia, blood smears are normal/unremarkable. Reticulocyte count is  normal. Unremarkable CBC.  - [place] autoimmune encephalopathy panel, [place] movement disorders paraneoplastic panel (MDS2), [place] GAD65 antibody (< 5.0 international unit(s)/mL): revealing for Glutamic Acid Decarboxylase (0.05 nmol/L) consistent with predisposition to thyrogastric disorders, including thyroiditis, pernicious anemia, and type 1 diabetes, but has low specificity for autoimmune encephalopathy. GAD65 antibody values less than 2.00 nM have a lower positive predictive value for neurological autoimmunity than values of 20.0 nM and higher.    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 We discussed continued workup to determine an etiology of these movements. She expressed interest in treating these movements symptomatically despite not knowing the etiology. Plan is detailed below.     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 As an active meth user, these movements were consistent. When she began to use meth on/off, the movements would develop when she would come done from her high about 1-2 days later. The patient states she is unaware of these movements but becomes aware of the movements when she observes other's reactions to her movements. She reports stopping meth for some time and not having any abnormal movements. She relapsed at 37 years old and movements returned and have been persistent since. She denies premonitory urges, a  buildup feeling prior to a movement and she is unable to suppress them. She denies behavior changes, hallucinations, or odd behavior. No help with propranolol to improve anxiety. No improvement with pramipexole. Etiology remains unclear at this time though the differentials include an autoimmune process, antiphospholipid antibody syndrome, mitochondrial process, neuroacanthocytosis, [place] disease, stiff person syndrome, an inflammatory cause related to an autoimmune condition (I.e. SLE), rare complication of methamphetamine abuse/toxicity (central dopaminergic effects of methamphetamine). Another possibility would be benign hereditary chorea. Of note, Yehuda disease, thyroid abnormalities have been ruled out. [place] autoimmune encephalopathy panel, [place] movement disorders paraneoplastic panel (MDS2), [place] GAD65 antibody unremarkable. Further workup is necessary and described below.     - Mild sleep apnea and severe periodic limb movements: Not compliant with CPAP.  - Restless leg syndrome: takes pramipexole which improves her restless leg syndrome symptoms. Didn't respond to gabapentin. Managed by Dr. Herrera.   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  This is being followed/managed by Dr. Herrera.    Plan:   - Trial CD/LD to goal dose of 3 tabs TID, titration schedule provided   - Schedule MRI Cspine w/ w/o contrast  - Needs to figure insurance prior to further workup and starting medication.   - Genetic referral to evaluate for possible genetic etiology of chorea  - Based on above results, will consider a lumbar puncture to evaluate for evidence of inflammation. If genetic causes are ruled out, we could consider autoimmune chorea and consider treatment with high dose IV or oral steroids. Consider Rheumatology referral in pIan. The article on  autoimmune chorea by O'Suzanna revealed that some of the patients did not have serological markers of inflammation. There may be reason to consider pulse Solu-Medrol even if workup is negative.   - MRI brain follow-up study scheduled by Dr. Herrera for reevaluation of above abnormal findings.  - Encouraged to stay sober.  - Continue seeing therapist.     Patient to return in 5 weeks, for virtual visit, 30 minutes, after genetic evaluation and MRI c-spine.       I have reviewed the note as documented above.  This accurately captures the substance of my conversation with the patient.    78 minutes spent on date of encounter doing chart reviews and exam and documentation and further activities as noted above.     Contact time:  Start: 03/04/2021 07:35 am   Stop: 03/04/2021 07:55 am    Brooke Estes DO, MA   of Neurology   [facility]

## 2021-03-04 NOTE — LETTER
"3/4/2021       RE: Meagan Morales  [address]  [address]  [facility] [address]     Dear Colleague,    Thank you for referring your patient, Meagan Morales, to the [facility] [place] [facility]. Please see a copy of my visit note below.    MOVEMENT DISORDERS TELEMEDICINE (video and/or telephone) VISIT:     PATIENT: Meagan Morales    : 1975    DATE: 21    REASON FOR VISIT: continued evaluation of choreoathetoid movements    After a review of the patient s situation, this visit was changed from an in-person visit to a Telemedicine visit to reduce the risk of COVID-19 exposure. The patient is being evaluated via a billable Telemedicine visit.    Ms. Morales is a 46 year old R handed female that presents via Telemedicine for follow up for continued evaluation of choreoathetoid movements.      The patient's last visit was on 21 as a new patient for evaluation of chorea-like movements. A workup was started at this time.     History obtained from patient. Patient reports she is \"doing okay.\" Nothing has changed with movements. The movements continue to be prevalent during times she multitasks, such as working, having a conversation while cooking. These movements affects her self esteem as she notices the movements when other people become aware of them. She stopped her Gabapentin. She is in the process of finding a new psychologist as her insurance changed and the [facility] is not in her network. She is working from home currently. She is not using her CPAP.     She has been sober for 3 years.     I have reviewed and updated the patient's Past Medical History, Social History, Family History and Medication List.    PMH: history of stage IIIA grade 1 endometrioid adenocarcinoma s/p chemotherapy 3/2019 (6 cycles of taxol and carboplatin)  PSH: hysterectomy, bilateral salpingo-oophorectomy, and pelvic lymph node dissection   FH: " unchanged  SH: Prior cigarette smoker, quit 8/2018 and switched to vape. Started smoking cigarettes again. History of drug use, has been sober since ~ 11/2017. History of meth and alcohol abuse    Medications:  Current Outpatient Medications   Medication Sig Dispense Refill     Multiple Vitamin (MULTIVITAMIN  S) CAPS Take 1 capsule by mouth       pramipexole (MIRAPEX) 0.125 MG tablet Take 0.125 mg by mouth 2 times daily       Sharps Container (SHARPS-A-GATOR LOCKING BRACKET) [facility] [place] for [place] use at pressure: 5-16 CM H20 , Heated humidifier x 1, Humidifier chamber x 1,  Nasal mask with cushion x 1, Heated tubing x 1, Headgear x 1, Filters: Disposable x 1pk & Reusable x 1pk, Length of Need: 99 months, Frequency of use: Daily       spironolactone (ALDACTONE) 50 MG tablet Take 50 mg by mouth daily         Movement Disorder-related Medications                   AM PM      Pramipexole 0.125 mg 2 2                                  Allergies:  Amoxicillin and Penicillin g    Physical Exam:  GENERAL: alert, active, attentive, appropriately groomed   HEENT: normocephalic, eyes open with no discharge  CHEST: non labored breathing   PSYCH: mood stable     Neurologic Exam:  MENTAL STATUS: Alert and oriented to person, place, time, and situation. Follows commands. Recent and remote memory intact. Attention span and concentration intact. Fund of knowledge intact to current events.  Able to recall current president. Able to spell WORLD backwards. Able to name an object and describe its function.  SPEECH: Fluent, intact comprehension and articulation  CN: visual fields intact, EOMIB,  no nystagmus, normal saccades, facial movement symmetric, hearing grossly intact to conversation, tongue protrudes midline   MOTOR: Moves all extremities equally against gravity without difficulty  Involuntary movements:  Hyperkinetic movements involving head, mouth, jaw  Agility: Normal finger tapping and toe tapping b/l  COORDINATION: no  dysmetria with FTN  GAIT: able to rise unassisted from a seated position, normal arm swing and normal stride length, no en bloc turns, no ataxia    Data Reviewed: I have personally reviewed the tests/studies below.   - Levels for Lactic acid, cardiolipin Abs, Ammonia, blood smears are normal/unremarkable. Reticulocyte count is normal. Unremarkable CBC.  - [place] autoimmune encephalopathy panel, [place] movement disorders paraneoplastic panel (MDS2), [place] GAD65 antibody (< 5.0 international unit(s)/mL): revealing for Glutamic Acid Decarboxylase (0.05 nmol/L) consistent with predisposition to thyrogastric disorders, including thyroiditis, pernicious anemia, and type 1 diabetes, but has low specificity for autoimmune encephalopathy. GAD65 antibody values less than 2.00 nM have a lower positive predictive value for neurological autoimmunity than values of 20.0 nM and higher.    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 We discussed continued workup to determine an etiology of these movements. She expressed interest in treating these movements symptomatically despite not knowing the etiology. Plan is detailed below.     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 As an active meth user, these movements were consistent. When she began to use meth on/off, the movements would develop when she would come done from her high about 1-2 days later. The  patient states she is unaware of these movements but becomes aware of the movements when she observes other's reactions to her movements. She reports stopping meth for some time and not having any abnormal movements. She relapsed at 37 years old and movements returned and have been persistent since. She denies premonitory urges, a buildup feeling prior to a movement and she is unable to suppress them. She denies behavior changes, hallucinations, or odd behavior. No help with propranolol to improve anxiety. No improvement with pramipexole. Etiology remains unclear at this time though the differentials include an autoimmune process, antiphospholipid antibody syndrome, mitochondrial process, neuroacanthocytosis, [place] disease, stiff person syndrome, an inflammatory cause related to an autoimmune condition (I.e. SLE), rare complication of methamphetamine abuse/toxicity (central dopaminergic effects of methamphetamine). Another possibility would be benign hereditary chorea. Of note, Yehuda disease, thyroid abnormalities have been ruled out. [place] autoimmune encephalopathy panel, [place] movement disorders paraneoplastic panel (MDS2), [place] GAD65 antibody unremarkable. Further workup is necessary and described below.     - Mild sleep apnea and severe periodic limb movements: Not compliant with CPAP.  - Restless leg syndrome: takes pramipexole which improves her restless leg syndrome symptoms. Didn't respond to gabapentin. Managed by Dr. Herrera.   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  This is being followed/managed by Dr. Herrera.    Plan:   - Trial CD/LD to goal dose of 3 tabs TID, titration schedule provided   - Schedule MRI Cspine w/ w/o contrast  - Needs to figure insurance prior to further workup and starting medication.   - Genetic referral to  evaluate for possible genetic etiology of chorea  - Based on above results, will consider a lumbar puncture to evaluate for evidence of inflammation. If genetic causes are ruled out, we could consider autoimmune chorea and consider treatment with high dose IV or oral steroids. Consider Rheumatology referral in pIan. The article on autoimmune chorea by Mauricio revealed that some of the patients did not have serological markers of inflammation. There may be reason to consider pulse Solu-Medrol even if workup is negative.   - MRI brain follow-up study scheduled by Dr. Herrera for reevaluation of above abnormal findings.  - Encouraged to stay sober.  - Continue seeing therapist.     Patient to return in 5 weeks, for virtual visit, 30 minutes, after genetic evaluation and MRI c-spine.       I have reviewed the note as documented above.  This accurately captures the substance of my conversation with the patient.    78 minutes spent on date of encounter doing chart reviews and exam and documentation and further activities as noted above.     Contact time:  Start: 03/04/2021 07:35 am   Stop: 03/04/2021 07:55 am    Brooke Estes DO, MA   of Neurology   [facility]      Meagan is a 46 year old who is being evaluated via a billable video visit.      How would you like to obtain your AVS? PetMDhart, email   If the video visit is dropped, the invitation should be resent by: Send to e-mail at: [email or web address]  Will anyone else be joining your video visit? No      Video Start/End Time:  Start: 03/04/2021 07:35 am   Stop: 03/04/2021 07:55 am    Video-Visit Details    Type of service:  Video Visit    Originating Location (pt. Location): Home    Distant Location (provider location):  [facility] [place]     Platform used for Video Visit: Madison Buenrostro, EMT

## 2021-03-04 NOTE — PROGRESS NOTES
Meagan is a 46 year old who is being evaluated via a billable video visit.      How would you like to obtain your AVS? MyChart, email   If the video visit is dropped, the invitation should be resent by: Send to e-mail at: [email or web address]  Will anyone else be joining your video visit? No      Video Start/End Time:  Start: 03/04/2021 07:35 am   Stop: 03/04/2021 07:55 am    Video-Visit Details    Type of service:  Video Visit    Originating Location (pt. Location): Home    Distant Location (provider location):  [facility]     Platform used for Video Visit: Madison Buenrostro, EMT

## 2021-03-08 NOTE — PATIENT INSTRUCTIONS
Today you spoke with Dr. Estes. We discussed continued workup to determine an etiology of these chorea-like movements. You expressed interest in treating these movements symptomatically despite not knowing the etiology. The plan we discussed is detailed below.     Plan:   - Start Sinemet (carbidopa/levodopa) 25/100 mg IR to goal dose of 3 tabs three times a day   - Common Side Effects discussed including: dizziness, nausea, constipation   - Take Sinemet one hour before a meal time or two hours after a meal     - Use the following schedule to start Sinemet  Sinemet (CD/LD) 25/100 mg IR AM Noon PM   Week 1                         0.5 tab 0.5 tab 0.5 tab   Week 2             1 1 1   Week 3                     1.5 1.5 1.5   Week 4 2 2 2   Week 5 2.5 2.5 2.5   Week 6 3 3 3    - If nausea or vomiting should occur, do not discontinue taking your medication and try taking with crackers or a non-protein snack (cracker, fruit).   - Take with ginger-maria isabel (real ginger) to help with nausea.   - May stop at dose that improves excess movements.  - Schedule MRI Cervical spine.  - Genetic testing to evaluate for etiology of chorea.  - You indicated that you need to figure insurance prior to continuing with the above workup and starting medication.     Patient to return in 5 weeks, for virtual visit, 30 minutes, after genetic evaluation and MRI c-spine.

## 2021-03-26 ENCOUNTER — TELEPHONE (OUTPATIENT)
Dept: NEUROLOGY | Facility: CLINIC | Age: 46
End: 2021-03-26

## 2021-03-26 NOTE — TELEPHONE ENCOUNTER
GENETIC COUNSELING-Neurology  I was scheduled to meet with Meagan today- she is still trying to change her insurance and the visit was cancelled at her request.    Deejay Uribe MS, [facility]  Certified Genetic Counselor

## 2021-04-18 ENCOUNTER — HEALTH MAINTENANCE LETTER (OUTPATIENT)
Age: 46
End: 2021-04-18

## 2021-08-20 ENCOUNTER — ONCOLOGY VISIT (OUTPATIENT)
Dept: ONCOLOGY | Facility: CLINIC | Age: 46
End: 2021-08-20
Attending: NURSE PRACTITIONER
Payer: COMMERCIAL

## 2021-08-20 VITALS
WEIGHT: 229.2 LBS | BODY MASS INDEX: 38.42 KG/M2 | OXYGEN SATURATION: 97 % | SYSTOLIC BLOOD PRESSURE: 128 MMHG | TEMPERATURE: 98.6 F | DIASTOLIC BLOOD PRESSURE: 86 MMHG | HEART RATE: 72 BPM

## 2021-08-20 DIAGNOSIS — C54.1 ENDOMETRIAL CANCER (H): Primary | ICD-10-CM

## 2021-08-20 PROCEDURE — 99215 OFFICE O/P EST HI 40 MIN: CPT | Performed by: NURSE PRACTITIONER

## 2021-08-20 PROCEDURE — G0463 HOSPITAL OUTPT CLINIC VISIT: HCPCS

## 2021-08-20 RX ORDER — EMTRICITABINE AND TENOFOVIR DISOPROXIL FUMARATE 200; 300 MG/1; MG/1
TABLET, FILM COATED ORAL
COMMUNITY
Start: 2021-05-12

## 2021-08-20 ASSESSMENT — PAIN SCALES - GENERAL: PAINLEVEL: MILD PAIN (2)

## 2021-08-20 NOTE — LETTER
2021          RE: Meagan Morales  [address]  [address]  [facility] [address]        Dear Colleague,    Thank you for referring your patient, Meagan Morales, to the [facility]. Please see a copy of my visit note below.                Follow Up Notes on Referred Patient    Date: 2021      RE: Meagan Morales  : 1975  VY: 2021        Meagan Morales is a 46 year old woman with a history of stage IIIA grade 1 endometrioid adenocarcinoma. She completed chemotherapy 3/2019.  She is here today for a surveillance visit.      Oncology History:  Meagan originally presented to her clinic due to continuous vaginal bleeding, very light, 2018. She thought it was just her menses being continuous. US was done which found thickened endometrial lining. IUD was placed. Second US was done and IUD did not affect endometrial lining and IUD was malpositioned. She continued to have spotting. IUD was removed. Also of note, ovarian cysts were noted on second US. Subsequent endometrial biopsy was done which revealed grade 1 endometrial cancer. She has always had irregular menses and has never been able to get pregnant. Patient's  first and only pap smear on 10/10/2017. Had mammogram in . No history of prior colonoscopy. Started Wellbutrin this last year, mood has improved. Prior cigarette smoker, quit 2 months ago (2018) and switched to vape. Started again smoking cigarettes again. 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 Has never been able to get pregnant and dose not desire children.     18: pelvic US IMPRESSION:  Peripheral follicular arrangement high within the ovaries suggesting polycystic ovarian syndrome. Slightly thickened heterogeneous endometrium raising a concern of potential hyperplastic change.  18: pelvic US   1. Uterus is not normal in appearance. The endometrium appears thickened and vascular, and the IUD  appears to be malpositioned.  Clinical correlation is recommended.    2. Bilateral complex ovarian cysts are noted.  Follow up ultrasound in 4-6 weeks is recommended, consider saline infusion sonohysterogram for further imaging of the endometrium.      9/17/18: ENDOMETRIUM, BIOPSY:  1. FIGO Grade 1 (well differentiated) endometrioid carcinoma of the endometrium  2. DNA mismatch repair enzyme immunohistochemistry studies:     All intact (see Amendment note and synoptic reporting)     10/24/2018: DaVinci Assisted Total Laparoscopic Hysterectomy, Bilateral Salpingo-Oophorectomy, lymph node dissection, Excision of Left Vulvar Lesion  Pelvic Washing:   Rare atypical cells present.  MMR proteins intact.     ORIGINAL REPORT:     SPECIMEN(S):   A: Left vulvar lesion   B: Left pelvic sentinel lymph node   C: Right pelvic sentinel lymph node   D: Uterus, cervix, bilateral ovaries and fallopian tubes   E: Portion of left ovary   F: Para-aortic lymph nodes   G: Left pelvic lymph nodes   H: Right pelvic lymph nodes     FINAL DIAGNOSIS:   A. LEFT VULVAR LESION, BIOPSY:   - Intradermal nevus     B. LEFT PELVIC SENTINEL LYMPH NODE, EXCISION:   - One reactive lymph node, negative for malignancy (0/1)     C. RIGHT PELVIC SENTINEL LYMPH NODE, EXCISION:   - Six reactive lymph nodes, negative for malignancy (0/6)     D. UTERUS, CERVIX, BILATERAL OVARIES AND FALLOPIAN TUBES, HYSTERECTOMY WITH BILATERAL SALPINGO-OOPHORECTOMY:   - Endometrial endometrioid adenocarcinoma, FIGO grade 1   - Atypical endometrial hyperplasia   - Myometrium with no significant histologic abnormality   - Chronic cervicitis with microglandular hyperplasia   - Uterine serosal fibrous adhesions   - Ovaries with cortical hyperplasia   - Metastatic endometrial adenocarcinoma to the left fallopian tube   - Right fallopian tube with no significant histologic abnormality     E. PORTION OF LEFT OVARY, EXCISION:   - Benign cortical inclusion cysts     F. PARA-AORTIC LYMPH  NODES, EXCISION:   - Four reactive lymph nodes, negative for malignancy (0/4)   - Benign glandular inclusions consistent with endosalpingiosis     G. LEFT PELVIC LYMPH NODES, EXCISION:   - Eight reactive lymph nodes, negative for malignancy (0/8)     H. RIGHT PELVIC LYMPH NODES, EXCISION:   - Nine reactive lymph nodes, negative for malignancy (0/9)   - Benign glandular inclusions consistent with endosalpingiosis         11/6/18: CT ap IMPRESSION:   1. Surgical changes of hysterectomy, bilateral salpingo-oophorectomy, and pelvic lymph node dissection with extensive fat stranding and fluid within the low pelvis extending superiorly along the iliac vasculature, right greater than left, potentially within normal postsurgical limits, however, slightly more than expected for postsurgical change.   1a. The ureters are not well evaluated on this single phase exam and there is no hydronephrosis or hydroureter, however, the amount of fluid in the pelvis does raise the question of ureteral injury. If there is clinical concern, consider obtaining a CT urogram.  1b. An area of irregular rim enhancement in the low pelvis may represent a normal postsurgical vaginal cuff, though, again, this is slightly more conspicuous than usual and dehiscence or a developing abscess may have a similar appearance.  2. 6.8 x 4.1 x 5.6 cm right pelvic sidewall seroma versus lymphocele.  3. Soft tissue nodular thickening anteriorly to the right psoas muscle and along the right lateral conal fascia, indeterminate, cannot rule out metastatic foci. Attention on follow-up studies.  4. Cholelithiasis without evidence of cholecystitis.      11/7/18: CT abd with contrast CT urogram IMPRESSION:   1. Postsurgical changes of hysterectomy, bilateral salpingo-oophorectomy, and iliac lymph node dissection with fluid  collections within the pelvis extending along the iliac vasculature.  a. The right pelvic sidewall fluid collection is not significantly changed  while the left pelvic sidewall fluid collection has enlarged  in comparison to the 11/6/2018 CT. The differential remains a seroma versus a lymphocele.  b. No evidence of contrast extravasation to suggest a ureteral injury.  c. Redemonstration of soft tissue nodular thickening along the right lateral conal fascia and anteriorly to the right psoas muscle and right psoas muscle. Attention on follow-up is recommended.  2. Cholelithiasis without evidence of cholecystitis.     Plan: adjuvant therapy to include 6 cycles of taxol and carboplatin; would not add radiation as all lymph nodes are negative, she does not have LVSI or deep invasion and this is a low grade tumor. Rx percocet 5 tabs due to continued abdominal pain. Will not prescribe any more narcotics and patient is aware of this.     11/16/18: Cycle #1 Paclitaxel/Carboplatin.   35.  12/6/18: Cycle #2 Paclitaxel/Carboplatin.  8.  12/27/2018: Cycle #3 T/C.  7.  1/8/19: US lower extremity: IMPRESSION: No evidence of deep venous thrombosis in either lower extremity.  1/9/2019: CT abd/pelvis IMPRESSION:   1. Minimal change in a right pelvic sidewall collection and decreased size of a left pelvic sidewall collection, which are most consistent with lymphoceles.  2. No suspicious pelvic lymphadenopathy.      1/21/19 Cycle #4 Paclitaxel/Carboplatin.  8.      1/2019 Presented with pain in LE:  US IMPRESSION: No evidence of deep venous thrombosis in either lower extremity.     1/2019 CT:IMPRESSION:   1. Minimal change in a right pelvic sidewall collection and decreased size of a left pelvic sidewall collection, which are most consistent with lymphoceles.  2. No suspicious pelvic lymphadenopathy.      2/14/19: Cycle #5 Paclitaxel/Carboplatin.  9.  3/7/19: Cycle #6 Paclitaxel/Carboplatin.  9.     4/4/2019: CT CAP IMPRESSION:   1. Significant decrease in the right pelvic sidewall collection, with resolved left pelvic sidewall collection.  "These are most consistent with lymphoceles.  2. No suspicious pelvic lymphadenopathy.  3. No suspicious abnormality in the chest.     Peritoneum / Retroperitoneum: Interval significant decrease in the right pelvic sidewall fluid collection now measuring 68 x 34 mm, previously 84 x 37 mm. The left pelvic sidewall fluid collection seen on prior examination has resolved.     Start surveillance; will alternate between NP and MD.  Per Dr. De Souza, do not need to follow  \"as was acutely elevated post op and have since been normal.\"     9/1/2020:  11.          Today she comes to clinic feeling well. She states she did see her PCP about 3 weeks ago for abdominal discomfort; she reports eating chicken salad around 6 pm on a Saturday and then around 11 pm that night having abdominal discomfort which continued in to the next day. She also had a temp that weekend of 102 so she went to her PCP Monday. She reports she was starting to feel better throughout that week and did not get any imaging. She states that over the past 2 weeks she has not had any discomfort and she is having regular BMs as well as passing gas. She is in the process of planning her wedding for next April. She is current with her annual exam and mammogram. She is sexually active about once a month and does use a lubricant. She does notice vulvar dryness but has not been using anything but is aware of using organic coconut oil. She states when she got the reminder of her visit today she did have some feelings about her cancer and thinking back on what she has gong through in her cancer treatment. She has started PEP.       Review of Systems:    Systemic           no weight changes; no fever; no chills; no night sweats; no appetite changes  Skin           no rashes, or lesions  Eye           no irritation; no changes in vision  Deny-Laryngeal           no dysphagia; no hoarseness   Pulmonary    no cough; no shortness of breath  Cardiovascular    no " chest pain; no palpitations  Gastrointestinal    no diarrhea; no constipation; no abdominal pain; no changes in bowel habits; no blood in stool  Genitourinary   no urinary frequency; no urinary urgency; no dysuria; no pain; no abnormal vaginal discharge; no abnormal vaginal bleeding  Breast    no breast discharge; no breast changes; no breast pain  Musculoskeletal    no myalgias; no arthralgias; no back pain  Psychiatric           no depressed mood; no anxiety    Hematologic              no tender lymph nodes; no noticeable swellings or lumps   Endocrine    no hot flashes; no heat/cold intolerance         Neurological   no tremor; no numbness and tingling; no headaches; no difficultysleeping      Past Medical History:    Past Medical History:   Diagnosis Date     Chickenpox     as a child     Depression      Endometrial cancer (H)      Hypertension     since her 30's     Infertility, female      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)      PCOS (polycystic ovarian syndrome)      STD (female)     in her 20's         Past Surgical History:    Past Surgical History:   Procedure Laterality Date     CYSTOSCOPY N/A 10/24/2018    Procedure: Cystoscopy;  Surgeon: Linh De Souza MD;  Location: [facility] OR     DAVINCI HYSTERECTOMY TOTAL, BILATERAL SALPINGO-OOPHORECTOMY, NODE DISSECTION, COMBINED N/A 10/24/2018    Procedure: DaVinci Assisted Total Laparoscopic Hysterectomy, Bilateral Salpingo-Oophorectomy, lymph node dissection;  Surgeon: Linh De Souza MD;  Location: [facility] OR     EXCISE LESION VULVA Left 10/24/2018    Procedure: Excision of Left Vulvar Lesion;  Surgeon: Linh De Souza MD;  Location: [facility] OR         Health Maintenance Due   Topic Date Due     PREVENTIVE CARE VISIT  Never done     DEPRESSION ACTION PLAN  Never done     PHQ-9  Never done     Pneumococcal Vaccine: Pediatrics (0 to 5 Years) and At-Risk Patients (6 to 64 Years) (1 of 4 - PCV13) Never done     COVID-19 Vaccine (1) Never done     HIV SCREENING   Never done     HEPATITIS C SCREENING  Never done     PAP  Never done       Current Medications:     Current Outpatient Medications   Medication Sig Dispense Refill     carbidopa-levodopa (SINEMET)  MG tablet Wk 1 take 0.5 tabs three times a day (TID). Wk 2 1 tab TID. Wk 3 1.5 tabs TID. Wk 4 2 tabs TID. Wk 5 2.5 tabs TID. Wk 5 3 tabs TID. Stop at dose that improves symptoms. 270 tablet 1     emtricitabine-tenofovir (TRUVADA) 200-300 MG per tablet TAKE 1 TABLET BY MOUTH EVERY DAY. NO REFILLS UNLESS OFFICE VISIT WITH HIV TESTING COMPLETED       Multiple Vitamin (MULTIVITAMIN  S) CAPS Take 1 capsule by mouth       pramipexole (MIRAPEX) 0.125 MG tablet Take 0.125 mg by mouth 2 times daily       Sharps Container (SHARPS-A-GATOR LOCKING BRACKET) [facility] CPAP machine for home use at pressure: 5-16 CM H20 , Heated humidifier x 1, Humidifier chamber x 1,  Nasal mask with cushion x 1, Heated tubing x 1, Headgear x 1, Filters: Disposable x 1pk & Reusable x 1pk, Length of Need: 99 months, Frequency of use: Daily       spironolactone (ALDACTONE) 50 MG tablet Take 50 mg by mouth daily            Allergies:        Allergies   Allergen Reactions     Amoxicillin      Penicillin G         Social History:     Social History     Tobacco Use     Smoking status: Former Smoker     Packs/day: 0.50     Types: Cigarettes     Smokeless tobacco: Never Used   Substance Use Topics     Alcohol use: No     Comment: 11 months sober        History   Drug Use No     Comment: 11 months sober. lives at sober housing         Family History:     The patient's family history is notable for:    No family history on file.      Physical Exam:     /86   Pulse 72   Temp 98.6  F (37  C) (Oral)   Wt 104 kg (229 lb 3.2 oz)   SpO2 97%   BMI 38.42 kg/m    Body mass index is 38.42 kg/m .    General Appearance: healthy and alert, no distress     HEENT: no thyromegaly, no palpable nodules or masses        Cardiovascular: regular rate and rhythm, no  gallops, rubs or murmurs     Respiratory: lungs clear, no rales, rhonchi or wheezes, normal diaphragmatic excursion    Musculoskeletal: extremities non tender and without edema    Skin: no lesions or rashes     Neurological: normal gait, no gross defects     Psychiatric: appropriate mood and affect                               Hematological: normal cervical, supraclavicular and inguinal lymph nodes     Gastrointestinal:       abdomen soft, non-tender, non-distended, no organomegaly or masses    Genitourinary: External genitalia and urethral meatus appears normal.  Vagina is smooth without nodularity or masses.  Cervix surgically absent.  Bimanual exam reveal no masses, nodularity or fullness.  Recto-vaginal exam confirms these findings.      Assessment:    Meagan Morales is a 46 year old woman with a history of stage IIIA grade 1 endometrioid adenocarcinoma. She completed chemotherapy 3/2019.  She is here today for a surveillance visit.     40 minutes spent on the date of the encounter doing chart review, history and exam, documentation and further activities as noted above      Plan:     1.)        Patient to RTC in 6 months for her next surveillance visit. Reviewed recommendations from [facility] not to perform surveillance pap smears in women diagnosed with endometrial cancer as this does not improve detection of local recurrence. Reviewed signs and symptoms for when she should contact the clinic or seek additional care. Patient to contact the clinic with any questions or concerns in the interim.  Answered all of her questions to the best of my ability. Encouraged her to do the imaging if her symptoms return; as well as see her PCP/go to the ED if she spikes another fever. Reviewed her visit note and lab results from 8/2 and she was negative for Covid.     2.) Genetic risk factors were assessed and her MMR proteins were intact/normal.     3.) Labs and/or tests ordered include:  None.      4.) Health maintenance issues  addressed today include annual health maintenance and non-gynecologic issues with PCP.    CORTES Gant, WHNP-BC, ANP-BC  Women's Health Nurse Practitioner  Adult Nurse Practitioner  Division of Gynecologic Oncology      CC  Patient Care Team:  Aundrea Max MD as PCP - General  Shoshana David APRN CNP as Assigned PCP  Brooke Estes DO as Assigned Neuroscience Provider  Virgen Gallagher APRN CNP as Assigned Cancer Care Provider

## 2021-08-20 NOTE — NURSING NOTE
"Oncology Rooming Note    August 20, 2021 9:39 AM   Meagan Morales is a 46 year old female who presents for:    Chief Complaint   Patient presents with     Oncology Clinic Visit     endometrial cancer     Initial Vitals: /86   Pulse 72   Temp 98.6  F (37  C) (Oral)   Wt 104 kg (229 lb 3.2 oz)   SpO2 97%   BMI 38.42 kg/m   Estimated body mass index is 38.42 kg/m  as calculated from the following:    Height as of 2/11/20: 1.645 m (5' 4.76\").    Weight as of this encounter: 104 kg (229 lb 3.2 oz). Body surface area is 2.18 meters squared.  Mild Pain (2) Comment: Data Unavailable   No LMP recorded. Patient has had a hysterectomy.  Allergies reviewed: Yes  Medications reviewed: Yes    Medications: Medication refills not needed today.  Pharmacy name entered into [employer]: [id number] [place], [place] - [address] [place] [address] AT [facility] [address]    Clinical concerns: none       Aundrea Mullen CMA            "

## 2021-08-20 NOTE — PROGRESS NOTES
Follow Up Notes on Referred Patient    Date: 2021      RE: Meagan Morales  : 1975  VY: 2021        Meagan Morales is a 46 year old woman with a history of stage IIIA grade 1 endometrioid adenocarcinoma. She completed chemotherapy 3/2019.  She is here today for a surveillance visit.      Oncology History:  Meagan originally presented to her clinic due to continuous vaginal bleeding, very light, 2018. She thought it was just her menses being continuous. US was done which found thickened endometrial lining. IUD was placed. Second US was done and IUD did not affect endometrial lining and IUD was malpositioned. She continued to have spotting. IUD was removed. Also of note, ovarian cysts were noted on second US. Subsequent endometrial biopsy was done which revealed grade 1 endometrial cancer. She has always had irregular menses and has never been able to get pregnant. Patient's  first and only pap smear on 10/10/2017. Had mammogram in . No history of prior colonoscopy. Started Wellbutrin this last year, mood has improved. Prior cigarette smoker, quit 2 months ago (2018) and switched to vape. Started again smoking cigarettes again. 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 Has never been able to get pregnant and dose not desire children.     18: pelvic US IMPRESSION:  Peripheral follicular arrangement high within the ovaries suggesting polycystic ovarian syndrome. Slightly thickened heterogeneous endometrium raising a concern of potential hyperplastic change.  18: pelvic US   1. Uterus is not normal in appearance. The endometrium appears thickened and vascular, and the IUD appears to be malpositioned.  Clinical correlation is recommended.    2. Bilateral complex ovarian cysts are noted.  Follow up ultrasound in 4-6 weeks is recommended, consider saline infusion sonohysterogram for further imaging of the endometrium.      18:  ENDOMETRIUM, BIOPSY:  1. FIGO Grade 1 (well differentiated) endometrioid carcinoma of the endometrium  2. DNA mismatch repair enzyme immunohistochemistry studies:     All intact (see Amendment note and synoptic reporting)     10/24/2018: DaVinci Assisted Total Laparoscopic Hysterectomy, Bilateral Salpingo-Oophorectomy, lymph node dissection, Excision of Left Vulvar Lesion  Pelvic Washing:   Rare atypical cells present.  MMR proteins intact.     ORIGINAL REPORT:     SPECIMEN(S):   A: Left vulvar lesion   B: Left pelvic sentinel lymph node   C: Right pelvic sentinel lymph node   D: Uterus, cervix, bilateral ovaries and fallopian tubes   E: Portion of left ovary   F: Para-aortic lymph nodes   G: Left pelvic lymph nodes   H: Right pelvic lymph nodes     FINAL DIAGNOSIS:   A. LEFT VULVAR LESION, BIOPSY:   - Intradermal nevus     B. LEFT PELVIC SENTINEL LYMPH NODE, EXCISION:   - One reactive lymph node, negative for malignancy (0/1)     C. RIGHT PELVIC SENTINEL LYMPH NODE, EXCISION:   - Six reactive lymph nodes, negative for malignancy (0/6)     D. UTERUS, CERVIX, BILATERAL OVARIES AND FALLOPIAN TUBES, HYSTERECTOMY WITH BILATERAL SALPINGO-OOPHORECTOMY:   - Endometrial endometrioid adenocarcinoma, FIGO grade 1   - Atypical endometrial hyperplasia   - Myometrium with no significant histologic abnormality   - Chronic cervicitis with microglandular hyperplasia   - Uterine serosal fibrous adhesions   - Ovaries with cortical hyperplasia   - Metastatic endometrial adenocarcinoma to the left fallopian tube   - Right fallopian tube with no significant histologic abnormality     E. PORTION OF LEFT OVARY, EXCISION:   - Benign cortical inclusion cysts     F. PARA-AORTIC LYMPH NODES, EXCISION:   - Four reactive lymph nodes, negative for malignancy (0/4)   - Benign glandular inclusions consistent with endosalpingiosis     G. LEFT PELVIC LYMPH NODES, EXCISION:   - Eight reactive lymph nodes, negative for malignancy (0/8)     H. RIGHT  PELVIC LYMPH NODES, EXCISION:   - Nine reactive lymph nodes, negative for malignancy (0/9)   - Benign glandular inclusions consistent with endosalpingiosis         11/6/18: CT ap IMPRESSION:   1. Surgical changes of hysterectomy, bilateral salpingo-oophorectomy, and pelvic lymph node dissection with extensive fat stranding and fluid within the low pelvis extending superiorly along the iliac vasculature, right greater than left, potentially within normal postsurgical limits, however, slightly more than expected for postsurgical change.   1a. The ureters are not well evaluated on this single phase exam and there is no hydronephrosis or hydroureter, however, the amount of fluid in the pelvis does raise the question of ureteral injury. If there is clinical concern, consider obtaining a CT urogram.  1b. An area of irregular rim enhancement in the low pelvis may represent a normal postsurgical vaginal cuff, though, again, this is slightly more conspicuous than usual and dehiscence or a developing abscess may have a similar appearance.  2. 6.8 x 4.1 x 5.6 cm right pelvic sidewall seroma versus lymphocele.  3. Soft tissue nodular thickening anteriorly to the right psoas muscle and along the right lateral conal fascia, indeterminate, cannot rule out metastatic foci. Attention on follow-up studies.  4. Cholelithiasis without evidence of cholecystitis.      11/7/18: CT abd with contrast CT urogram IMPRESSION:   1. Postsurgical changes of hysterectomy, bilateral salpingo-oophorectomy, and iliac lymph node dissection with fluid  collections within the pelvis extending along the iliac vasculature.  a. The right pelvic sidewall fluid collection is not significantly changed while the left pelvic sidewall fluid collection has enlarged  in comparison to the 11/6/2018 CT. The differential remains a seroma versus a lymphocele.  b. No evidence of contrast extravasation to suggest a ureteral injury.  c. Redemonstration of soft tissue  nodular thickening along the right lateral conal fascia and anteriorly to the right psoas muscle and right psoas muscle. Attention on follow-up is recommended.  2. Cholelithiasis without evidence of cholecystitis.     Plan: adjuvant therapy to include 6 cycles of taxol and carboplatin; would not add radiation as all lymph nodes are negative, she does not have LVSI or deep invasion and this is a low grade tumor. Rx percocet 5 tabs due to continued abdominal pain. Will not prescribe any more narcotics and patient is aware of this.     11/16/18: Cycle #1 Paclitaxel/Carboplatin.   35.  12/6/18: Cycle #2 Paclitaxel/Carboplatin.  8.  12/27/2018: Cycle #3 T/C.  7.  1/8/19: US lower extremity: IMPRESSION: No evidence of deep venous thrombosis in either lower extremity.  1/9/2019: CT abd/pelvis IMPRESSION:   1. Minimal change in a right pelvic sidewall collection and decreased size of a left pelvic sidewall collection, which are most consistent with lymphoceles.  2. No suspicious pelvic lymphadenopathy.      1/21/19 Cycle #4 Paclitaxel/Carboplatin.  8.      1/2019 Presented with pain in LE:  US IMPRESSION: No evidence of deep venous thrombosis in either lower extremity.     1/2019 CT:IMPRESSION:   1. Minimal change in a right pelvic sidewall collection and decreased size of a left pelvic sidewall collection, which are most consistent with lymphoceles.  2. No suspicious pelvic lymphadenopathy.      2/14/19: Cycle #5 Paclitaxel/Carboplatin.  9.  3/7/19: Cycle #6 Paclitaxel/Carboplatin.  9.     4/4/2019: CT CAP IMPRESSION:   1. Significant decrease in the right pelvic sidewall collection, with resolved left pelvic sidewall collection. These are most consistent with lymphoceles.  2. No suspicious pelvic lymphadenopathy.  3. No suspicious abnormality in the chest.     Peritoneum / Retroperitoneum: Interval significant decrease in the right pelvic sidewall fluid collection now measuring 68 x 34  "mm, previously 84 x 37 mm. The left pelvic sidewall fluid collection seen on prior examination has resolved.     Start surveillance; will alternate between NP and MD.  Per Dr. De Souza, do not need to follow  \"as was acutely elevated post op and have since been normal.\"     9/1/2020:  11.          Today she comes to clinic feeling well. She states she did see her PCP about 3 weeks ago for abdominal discomfort; she reports eating chicken salad around 6 pm on a Saturday and then around 11 pm that night having abdominal discomfort which continued in to the next day. She also had a temp that weekend of 102 so she went to her PCP Monday. She reports she was starting to feel better throughout that week and did not get any imaging. She states that over the past 2 weeks she has not had any discomfort and she is having regular BMs as well as passing gas. She is in the process of planning her wedding for next April. She is current with her annual exam and mammogram. She is sexually active about once a month and does use a lubricant. She does notice vulvar dryness but has not been using anything but is aware of using organic coconut oil. She states when she got the reminder of her visit today she did have some feelings about her cancer and thinking back on what she has gong through in her cancer treatment. She has started PEP.       Review of Systems:    Systemic           no weight changes; no fever; no chills; no night sweats; no appetite changes  Skin           no rashes, or lesions  Eye           no irritation; no changes in vision  Deny-Laryngeal           no dysphagia; no hoarseness   Pulmonary    no cough; no shortness of breath  Cardiovascular    no chest pain; no palpitations  Gastrointestinal    no diarrhea; no constipation; no abdominal pain; no changes in bowel habits; no blood in stool  Genitourinary   no urinary frequency; no urinary urgency; no dysuria; no pain; no abnormal vaginal discharge; no " abnormal vaginal bleeding  Breast    no breast discharge; no breast changes; no breast pain  Musculoskeletal    no myalgias; no arthralgias; no back pain  Psychiatric           no depressed mood; no anxiety    Hematologic              no tender lymph nodes; no noticeable swellings or lumps   Endocrine    no hot flashes; no heat/cold intolerance         Neurological   no tremor; no numbness and tingling; no headaches; no difficultysleeping      Past Medical History:    Past Medical History:   Diagnosis Date     Chickenpox     as a child     Depression      Endometrial cancer (H)      Hypertension     since her 30's     Infertility, female      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)      PCOS (polycystic ovarian syndrome)      STD (female)     in her 20's         Past Surgical History:    Past Surgical History:   Procedure Laterality Date     CYSTOSCOPY N/A 10/24/2018    Procedure: Cystoscopy;  Surgeon: Linh De Souza MD;  Location: [facility] OR     DAVINCI HYSTERECTOMY TOTAL, BILATERAL SALPINGO-OOPHORECTOMY, NODE DISSECTION, COMBINED N/A 10/24/2018    Procedure: DaVinci Assisted Total Laparoscopic Hysterectomy, Bilateral Salpingo-Oophorectomy, lymph node dissection;  Surgeon: Linh De Souza MD;  Location: [facility] OR     EXCISE LESION VULVA Left 10/24/2018    Procedure: Excision of Left Vulvar Lesion;  Surgeon: Linh De Souza MD;  Location: [facility] OR         Health Maintenance Due   Topic Date Due     PREVENTIVE CARE VISIT  Never done     DEPRESSION ACTION PLAN  Never done     PHQ-9  Never done     Pneumococcal Vaccine: Pediatrics (0 to 5 Years) and At-Risk Patients (6 to 64 Years) (1 of 4 - PCV13) Never done     COVID-19 Vaccine (1) Never done     HIV SCREENING  Never done     HEPATITIS C SCREENING  Never done     PAP  Never done       Current Medications:     Current Outpatient Medications   Medication Sig Dispense Refill     carbidopa-levodopa (SINEMET)  MG tablet Wk 1 take 0.5 tabs three times a day (TID).  Wk 2 1 tab TID. Wk 3 1.5 tabs TID. Wk 4 2 tabs TID. Wk 5 2.5 tabs TID. Wk 5 3 tabs TID. Stop at dose that improves symptoms. 270 tablet 1     emtricitabine-tenofovir (TRUVADA) 200-300 MG per tablet TAKE 1 TABLET BY MOUTH EVERY DAY. NO REFILLS UNLESS OFFICE VISIT WITH HIV TESTING COMPLETED       Multiple Vitamin (MULTIVITAMIN  S) CAPS Take 1 capsule by mouth       pramipexole (MIRAPEX) 0.125 MG tablet Take 0.125 mg by mouth 2 times daily       Sharps Container (SHARPS-A-GATOR LOCKING BRACKET) [facility] [place] CPAP machine for home use at pressure: 5-16 CM H20 , Heated humidifier x 1, Humidifier chamber x 1,  Nasal mask with cushion x 1, Heated tubing x 1, Headgear x 1, Filters: Disposable x 1pk & Reusable x 1pk, Length of Need: 99 months, Frequency of use: Daily       spironolactone (ALDACTONE) 50 MG tablet Take 50 mg by mouth daily            Allergies:        Allergies   Allergen Reactions     Amoxicillin      Penicillin G         Social History:     Social History     Tobacco Use     Smoking status: Former Smoker     Packs/day: 0.50     Types: Cigarettes     Smokeless tobacco: Never Used   Substance Use Topics     Alcohol use: No     Comment: 11 months sober        History   Drug Use No     Comment: 11 months sober. lives at sober housing         Family History:     The patient's family history is notable for:    No family history on file.      Physical Exam:     /86   Pulse 72   Temp 98.6  F (37  C) (Oral)   Wt 104 kg (229 lb 3.2 oz)   SpO2 97%   BMI 38.42 kg/m    Body mass index is 38.42 kg/m .    General Appearance: healthy and alert, no distress     HEENT: no thyromegaly, no palpable nodules or masses        Cardiovascular: regular rate and rhythm, no gallops, rubs or murmurs     Respiratory: lungs clear, no rales, rhonchi or wheezes, normal diaphragmatic excursion    Musculoskeletal: extremities non tender and without edema    Skin: no lesions or rashes     Neurological: normal gait, no gross  defects     Psychiatric: appropriate mood and affect                               Hematological: normal cervical, supraclavicular and inguinal lymph nodes     Gastrointestinal:       abdomen soft, non-tender, non-distended, no organomegaly or masses    Genitourinary: External genitalia and urethral meatus appears normal.  Vagina is smooth without nodularity or masses.  Cervix surgically absent.  Bimanual exam reveal no masses, nodularity or fullness.  Recto-vaginal exam confirms these findings.      Assessment:    Meagan Morales is a 46 year old woman with a history of stage IIIA grade 1 endometrioid adenocarcinoma. She completed chemotherapy 3/2019.  She is here today for a surveillance visit.     40 minutes spent on the date of the encounter doing chart review, history and exam, documentation and further activities as noted above      Plan:     1.)        Patient to RTC in 6 months for her next surveillance visit. Reviewed recommendations from [facility] not to perform surveillance pap smears in women diagnosed with endometrial cancer as this does not improve detection of local recurrence. Reviewed signs and symptoms for when she should contact the clinic or seek additional care. Patient to contact the clinic with any questions or concerns in the interim.  Answered all of her questions to the best of my ability. Encouraged her to do the imaging if her symptoms return; as well as see her PCP/go to the ED if she spikes another fever. Reviewed her visit note and lab results from 8/2 and she was negative for Covid.     2.) Genetic risk factors were assessed and her MMR proteins were intact/normal.     3.) Labs and/or tests ordered include:  None.      4.) Health maintenance issues addressed today include annual health maintenance and non-gynecologic issues with PCP.    CORTES Gant, WHNP-BC, ANP-BC  Women's Health Nurse Practitioner  Adult Nurse Practitioner  Division of Gynecologic Oncology          CC  Patient  Care Team:  Aundrea Max MD as PCP - General  Shoshana David APRN CNP as Assigned PCP  Brooke Estes DO as Assigned Neuroscience Provider  Virgen Gallagher APRN CNP as Assigned Cancer Care Provider     Home meds: HCTZ 12.5mg, ramipril 10mg  c/w home medications  monitor BP and adjust meds

## 2021-10-02 ENCOUNTER — HEALTH MAINTENANCE LETTER (OUTPATIENT)
Age: 46
End: 2021-10-02

## 2022-03-02 NOTE — PROGRESS NOTES
Follow Up Notes on Referred Patient    Date: 3/4/2022      RE: Meagan Morales  : 1975  VY: 3/4/2022      Meagan Morales is a 47 year old woman with a history of stage IIIA grade 1 endometrioid adenocarcinoma. She completed chemotherapy 3/2019.  She is here today for a surveillance visit.      Oncology History:  Meagan originally presented to her clinic due to continuous vaginal bleeding, very light, 2018. She thought it was just her menses being continuous. US was done which found thickened endometrial lining. IUD was placed. Second US was done and IUD did not affect endometrial lining and IUD was malpositioned. She continued to have spotting. IUD was removed. Also of note, ovarian cysts were noted on second US. Subsequent endometrial biopsy was done which revealed grade 1 endometrial cancer. She has always had irregular menses and has never been able to get pregnant. Patient's  first and only pap smear on 10/10/2017. Had mammogram in . No history of prior colonoscopy. Started Wellbutrin this last year, mood has improved. Prior cigarette smoker, quit 2 months ago (2018) and switched to vape. Started again smoking cigarettes again. 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 Has never been able to get pregnant and dose not desire children.     18: pelvic US IMPRESSION:  Peripheral follicular arrangement high within the ovaries suggesting polycystic ovarian syndrome. Slightly thickened heterogeneous endometrium raising a concern of potential hyperplastic change.  18: pelvic US   1. Uterus is not normal in appearance. The endometrium appears thickened and vascular, and the IUD appears to be malpositioned.  Clinical correlation is recommended.    2. Bilateral complex ovarian cysts are noted.  Follow up ultrasound in 4-6 weeks is recommended, consider saline infusion sonohysterogram for further imaging of the endometrium.      18:  ENDOMETRIUM, BIOPSY:  1. FIGO Grade 1 (well differentiated) endometrioid carcinoma of the endometrium  2. DNA mismatch repair enzyme immunohistochemistry studies:     All intact (see Amendment note and synoptic reporting)     10/24/2018: DaVinci Assisted Total Laparoscopic Hysterectomy, Bilateral Salpingo-Oophorectomy, lymph node dissection, Excision of Left Vulvar Lesion  Pelvic Washing:   Rare atypical cells present.  MMR proteins intact.     ORIGINAL REPORT:     SPECIMEN(S):   A: Left vulvar lesion   B: Left pelvic sentinel lymph node   C: Right pelvic sentinel lymph node   D: Uterus, cervix, bilateral ovaries and fallopian tubes   E: Portion of left ovary   F: Para-aortic lymph nodes   G: Left pelvic lymph nodes   H: Right pelvic lymph nodes     FINAL DIAGNOSIS:   A. LEFT VULVAR LESION, BIOPSY:   - Intradermal nevus     B. LEFT PELVIC SENTINEL LYMPH NODE, EXCISION:   - One reactive lymph node, negative for malignancy (0/1)     C. RIGHT PELVIC SENTINEL LYMPH NODE, EXCISION:   - Six reactive lymph nodes, negative for malignancy (0/6)     D. UTERUS, CERVIX, BILATERAL OVARIES AND FALLOPIAN TUBES, HYSTERECTOMY WITH BILATERAL SALPINGO-OOPHORECTOMY:   - Endometrial endometrioid adenocarcinoma, FIGO grade 1   - Atypical endometrial hyperplasia   - Myometrium with no significant histologic abnormality   - Chronic cervicitis with microglandular hyperplasia   - Uterine serosal fibrous adhesions   - Ovaries with cortical hyperplasia   - Metastatic endometrial adenocarcinoma to the left fallopian tube   - Right fallopian tube with no significant histologic abnormality     E. PORTION OF LEFT OVARY, EXCISION:   - Benign cortical inclusion cysts     F. PARA-AORTIC LYMPH NODES, EXCISION:   - Four reactive lymph nodes, negative for malignancy (0/4)   - Benign glandular inclusions consistent with endosalpingiosis     G. LEFT PELVIC LYMPH NODES, EXCISION:   - Eight reactive lymph nodes, negative for malignancy (0/8)     H. RIGHT  PELVIC LYMPH NODES, EXCISION:   - Nine reactive lymph nodes, negative for malignancy (0/9)   - Benign glandular inclusions consistent with endosalpingiosis         11/6/18: CT ap IMPRESSION:   1. Surgical changes of hysterectomy, bilateral salpingo-oophorectomy, and pelvic lymph node dissection with extensive fat stranding and fluid within the low pelvis extending superiorly along the iliac vasculature, right greater than left, potentially within normal postsurgical limits, however, slightly more than expected for postsurgical change.   1a. The ureters are not well evaluated on this single phase exam and there is no hydronephrosis or hydroureter, however, the amount of fluid in the pelvis does raise the question of ureteral injury. If there is clinical concern, consider obtaining a CT urogram.  1b. An area of irregular rim enhancement in the low pelvis may represent a normal postsurgical vaginal cuff, though, again, this is slightly more conspicuous than usual and dehiscence or a developing abscess may have a similar appearance.  2. 6.8 x 4.1 x 5.6 cm right pelvic sidewall seroma versus lymphocele.  3. Soft tissue nodular thickening anteriorly to the right psoas muscle and along the right lateral conal fascia, indeterminate, cannot rule out metastatic foci. Attention on follow-up studies.  4. Cholelithiasis without evidence of cholecystitis.      11/7/18: CT abd with contrast CT urogram IMPRESSION:   1. Postsurgical changes of hysterectomy, bilateral salpingo-oophorectomy, and iliac lymph node dissection with fluid  collections within the pelvis extending along the iliac vasculature.  a. The right pelvic sidewall fluid collection is not significantly changed while the left pelvic sidewall fluid collection has enlarged  in comparison to the 11/6/2018 CT. The differential remains a seroma versus a lymphocele.  b. No evidence of contrast extravasation to suggest a ureteral injury.  c. Redemonstration of soft tissue  nodular thickening along the right lateral conal fascia and anteriorly to the right psoas muscle and right psoas muscle. Attention on follow-up is recommended.  2. Cholelithiasis without evidence of cholecystitis.     Plan: adjuvant therapy to include 6 cycles of taxol and carboplatin; would not add radiation as all lymph nodes are negative, she does not have LVSI or deep invasion and this is a low grade tumor. Rx percocet 5 tabs due to continued abdominal pain. Will not prescribe any more narcotics and patient is aware of this.     11/16/18: Cycle #1 Paclitaxel/Carboplatin.   35.  12/6/18: Cycle #2 Paclitaxel/Carboplatin.  8.  12/27/2018: Cycle #3 T/C.  7.  1/8/19: US lower extremity: IMPRESSION: No evidence of deep venous thrombosis in either lower extremity.  1/9/2019: CT abd/pelvis IMPRESSION:   1. Minimal change in a right pelvic sidewall collection and decreased size of a left pelvic sidewall collection, which are most consistent with lymphoceles.  2. No suspicious pelvic lymphadenopathy.      1/21/19 Cycle #4 Paclitaxel/Carboplatin.  8.      1/2019 Presented with pain in LE:  US IMPRESSION: No evidence of deep venous thrombosis in either lower extremity.     1/2019 CT:IMPRESSION:   1. Minimal change in a right pelvic sidewall collection and decreased size of a left pelvic sidewall collection, which are most consistent with lymphoceles.  2. No suspicious pelvic lymphadenopathy.      2/14/19: Cycle #5 Paclitaxel/Carboplatin.  9.  3/7/19: Cycle #6 Paclitaxel/Carboplatin.  9.     4/4/2019: CT CAP IMPRESSION:   1. Significant decrease in the right pelvic sidewall collection, with resolved left pelvic sidewall collection. These are most consistent with lymphoceles.  2. No suspicious pelvic lymphadenopathy.  3. No suspicious abnormality in the chest.     Peritoneum / Retroperitoneum: Interval significant decrease in the right pelvic sidewall fluid collection now measuring 68 x 34  "mm, previously 84 x 37 mm. The left pelvic sidewall fluid collection seen on prior examination has resolved.     Start surveillance; will alternate between NP and MD.  Per Dr. De Souza, do not need to follow  \"as was acutely elevated post op and have since been normal.\"     9/1/2020:  11.        Today she comes to clinic feeling well and denies any concerns for her visit. She denies any vaginal bleeding, no changes in her bowel or bladder habits, no nausea/emesis, no lower extremity edema, and no difficulties eating or sleeping. She denies any abdominal discomfort/bloating, no fevers or chills, and no chest pain or shortness of breath. She is due to see her PCP for her annual exam in May, she recently turned 47 and has not had any colon cancer screening as of yet; her mammogram is due now. She recently saw her PCP regarding weight loss and management of her Trulicity. She is currently not sexually active for the past 9 months as she is waiting until after her wedding 4/30; she does endorse dryness and is aware of using coconut oil. She did have RUQ discomfort since her last visit and was diagnosed with gallstones. She also was diagnosed with a benign brain mass for which she will be getting annual brain MR for the next 5 years.         Review of Systems:    Systemic           no weight changes; no fever; no chills; no night sweats; no appetite changes  Skin           no rashes, or lesions  Eye           no irritation; no changes in vision  Deny-Laryngeal           no dysphagia; no hoarseness   Pulmonary    no cough; no shortness of breath  Cardiovascular    no chest pain; no palpitations  Gastrointestinal    no diarrhea; no constipation; no abdominal pain; no changes in bowel habits; no blood in stool  Genitourinary   no urinary frequency; no urinary urgency; no dysuria; no pain; no abnormal vaginal discharge; no abnormal vaginal bleeding  Breast    no breast discharge; no breast changes; no breast " pain  Musculoskeletal    no myalgias; no arthralgias; no back pain  Psychiatric           no depressed mood; no anxiety    Hematologic              no tender lymph nodes; no noticeable swellings or lumps   Endocrine    no hot flashes; no heat/cold intolerance         Neurological   no tremor; no numbness and tingling; no headaches; no difficulty sleeping      Past Medical History:    Past Medical History:   Diagnosis Date     Chickenpox     as a child     Depression      Endometrial cancer (H)      Hypertension     since her 30's     Infertility, female      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)      PCOS (polycystic ovarian syndrome)      STD (female)     in her 20's         Past Surgical History:    Past Surgical History:   Procedure Laterality Date     CYSTOSCOPY N/A 10/24/2018    Procedure: Cystoscopy;  Surgeon: Linh De Souza MD;  Location: [facility] OR     DAVINCI HYSTERECTOMY TOTAL, BILATERAL SALPINGO-OOPHORECTOMY, NODE DISSECTION, COMBINED N/A 10/24/2018    Procedure: DaVinci Assisted Total Laparoscopic Hysterectomy, Bilateral Salpingo-Oophorectomy, lymph node dissection;  Surgeon: Linh De Souza MD;  Location: [facility] OR     EXCISE LESION VULVA Left 10/24/2018    Procedure: Excision of Left Vulvar Lesion;  Surgeon: Linh De Souza MD;  Location: [facility] OR         Health Maintenance Due   Topic Date Due     PREVENTIVE CARE VISIT  Never done     DEPRESSION ACTION PLAN  Never done     PHQ-9  Never done     COVID-19 Vaccine (1) Never done     COLORECTAL CANCER SCREENING  Never done     HIV SCREENING  Never done     HEPATITIS C SCREENING  Never done     PAP  Never done     INFLUENZA VACCINE (1) 09/01/2021     LIPID  Never done     MAMMO SCREENING  02/02/2022       Current Medications:     Current Outpatient Medications   Medication Sig Dispense Refill     emtricitabine-tenofovir (TRUVADA) 200-300 MG per tablet TAKE 1 TABLET BY MOUTH EVERY DAY. NO REFILLS UNLESS OFFICE VISIT WITH HIV TESTING COMPLETED        escitalopram (LEXAPRO) 10 MG tablet        Multiple Vitamin (MULTIVITAMIN  S) CAPS Take 1 capsule by mouth       pramipexole (MIRAPEX) 0.125 MG tablet Take 0.125 mg by mouth 2 times daily       Sharps Container (SHARPS-A-GATOR LOCKING BRACKET) [facility] CPAP machine for home use at pressure: 5-16 CM H20 , Heated humidifier x 1, Humidifier chamber x 1,  Nasal mask with cushion x 1, Heated tubing x 1, Headgear x 1, Filters: Disposable x 1pk & Reusable x 1pk, Length of Need: 99 months, Frequency of use: Daily       spironolactone (ALDACTONE) 50 MG tablet Take 50 mg by mouth daily        TRULICITY 1.5 MG/0.5ML pen ADMINISTER 1.5 MG UNDER THE SKIN 1 TIME WEEKLY       carbidopa-levodopa (SINEMET)  MG tablet Wk 1 take 0.5 tabs three times a day (TID). Wk 2 1 tab TID. Wk 3 1.5 tabs TID. Wk 4 2 tabs TID. Wk 5 2.5 tabs TID. Wk 5 3 tabs TID. Stop at dose that improves symptoms. (Patient not taking: Reported on 3/4/2022) 270 tablet 1         Allergies:        Allergies   Allergen Reactions     Amoxicillin      Penicillin G         Social History:     Social History     Tobacco Use     Smoking status: Former Smoker     Packs/day: 0.50     Types: Cigarettes     Smokeless tobacco: Never Used   Substance Use Topics     Alcohol use: No     Comment: 11 months sober        History   Drug Use No     Comment: 11 months sober. lives at sober housing         Family History:     The patient's family history is notable for:    No family history on file.      Physical Exam:     /86   Pulse 67   Temp 97.5  F (36.4  C) (Tympanic)   Resp 18   Wt 109.6 kg (241 lb 11.2 oz)   SpO2 98%   BMI 40.51 kg/m    Body mass index is 40.51 kg/m .    General Appearance: healthy and alert, no distress     HEENT: no thyromegaly, no palpable nodules or masses        Cardiovascular: regular rate and rhythm, no gallops, rubs or murmurs     Respiratory: lungs clear, no rales, rhonchi or wheezes, normal diaphragmatic  excursion    Musculoskeletal: extremities non tender and without edema    Skin: no lesions or rashes     Neurological: normal gait, no gross defects     Psychiatric: appropriate mood and affect                               Hematological: normal cervical, supraclavicular and inguinal lymph nodes     Gastrointestinal:       abdomen soft, non-tender, non-distended, no organomegaly or masses    Genitourinary: External genitalia and urethral meatus appears normal.  Vagina is smooth without nodularity or masses.  Cervix surgically absent  Bimanual exam reveal no masses, nodularity or fullness.  Recto-vaginal exam confirms these findings.      Assessment:    Meagan Morales is a 47 year old woman with a history of stage IIIA grade 1 endometrioid adenocarcinoma. She completed chemotherapy 3/2019.  She is here today for a surveillance visit.     23 minutes spent on the date of the encounter doing chart review, history and exam, documentation and further activities as noted above      Plan:     1.)       Patient to RTC in 6 months for her next surveillance visit. She will continue to have visits every 6 months until 3/2024 and then annually thereafter. Reviewed recommendations from [facility] not to perform surveillance pap smears in women diagnosed with endometrial cancer as this does not improve detection of local recurrence. Reviewed signs and symptoms for when she should contact the clinic or seek additional care. Patient to contact the clinic with any questions or concerns in the interim.  Answered all of her questions to the best of my ability.     2.) Genetic risk factors were assessed and she had intact/normal MMR proteins.      3.) Labs and/or tests ordered include:  None.      4.) Health maintenance issues addressed today include annual health maintenance and non-gynecologic issues with PCP.    CORTES Gant, WHNP-BC, ANP-BC  Women's Health Nurse Practitioner  Adult Nurse Practitioner  Division of Gynecologic  Oncology          CC  Patient Care Team:  Aundrea Max MD as PCP - General  Virgen Gallagher APRN CNP as Assigned Cancer Care Provider  Brooke Estes DO as Assigned Neuroscience Provider

## 2022-03-04 ENCOUNTER — ONCOLOGY VISIT (OUTPATIENT)
Dept: ONCOLOGY | Facility: CLINIC | Age: 47
End: 2022-03-04
Attending: NURSE PRACTITIONER
Payer: COMMERCIAL

## 2022-03-04 VITALS
SYSTOLIC BLOOD PRESSURE: 137 MMHG | OXYGEN SATURATION: 98 % | RESPIRATION RATE: 18 BRPM | HEART RATE: 67 BPM | DIASTOLIC BLOOD PRESSURE: 86 MMHG | TEMPERATURE: 97.5 F | BODY MASS INDEX: 40.51 KG/M2 | WEIGHT: 241.7 LBS

## 2022-03-04 DIAGNOSIS — C54.1 ENDOMETRIAL CANCER (H): Primary | ICD-10-CM

## 2022-03-04 PROCEDURE — 99213 OFFICE O/P EST LOW 20 MIN: CPT | Performed by: NURSE PRACTITIONER

## 2022-03-04 PROCEDURE — G0463 HOSPITAL OUTPT CLINIC VISIT: HCPCS

## 2022-03-04 RX ORDER — ESCITALOPRAM OXALATE 10 MG/1
TABLET ORAL
COMMUNITY
Start: 2022-02-26

## 2022-03-04 RX ORDER — DULAGLUTIDE 1.5 MG/.5ML
INJECTION, SOLUTION SUBCUTANEOUS
COMMUNITY
Start: 2022-02-18

## 2022-03-04 ASSESSMENT — PAIN SCALES - GENERAL: PAINLEVEL: NO PAIN (0)

## 2022-03-04 NOTE — LETTER
3/4/2022         RE: Meagan Morales  [address]  [address]  [facility] [address]        Dear Colleague,    Thank you for referring your patient, Meagan Morales, to the [facility]. Please see a copy of my visit note below.                Follow Up Notes on Referred Patient    Date: 3/4/2022      RE: Meagan Morales  : 1975  VY: 3/4/2022      Meagan Morales is a 47 year old woman with a history of stage IIIA grade 1 endometrioid adenocarcinoma. She completed chemotherapy 3/2019.  She is here today for a surveillance visit.      Oncology History:  Meagan originally presented to her clinic due to continuous vaginal bleeding, very light, 2018. She thought it was just her menses being continuous. US was done which found thickened endometrial lining. IUD was placed. Second US was done and IUD did not affect endometrial lining and IUD was malpositioned. She continued to have spotting. IUD was removed. Also of note, ovarian cysts were noted on second US. Subsequent endometrial biopsy was done which revealed grade 1 endometrial cancer. She has always had irregular menses and has never been able to get pregnant. Patient's  first and only pap smear on 10/10/2017. Had mammogram in . No history of prior colonoscopy. Started Wellbutrin this last year, mood has improved. Prior cigarette smoker, quit 2 months ago (2018) and switched to vape. Started again smoking cigarettes again. 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 Has never been able to get pregnant and dose not desire children.     18: pelvic US IMPRESSION:  Peripheral follicular arrangement high within the ovaries suggesting polycystic ovarian syndrome. Slightly thickened heterogeneous endometrium raising a concern of potential hyperplastic change.  18: pelvic US   1. Uterus is not normal in appearance. The endometrium appears thickened and vascular, and the IUD appears to  be malpositioned.  Clinical correlation is recommended.    2. Bilateral complex ovarian cysts are noted.  Follow up ultrasound in 4-6 weeks is recommended, consider saline infusion sonohysterogram for further imaging of the endometrium.      9/17/18: ENDOMETRIUM, BIOPSY:  1. FIGO Grade 1 (well differentiated) endometrioid carcinoma of the endometrium  2. DNA mismatch repair enzyme immunohistochemistry studies:     All intact (see Amendment note and synoptic reporting)     10/24/2018: DaVinci Assisted Total Laparoscopic Hysterectomy, Bilateral Salpingo-Oophorectomy, lymph node dissection, Excision of Left Vulvar Lesion  Pelvic Washing:   Rare atypical cells present.  MMR proteins intact.     ORIGINAL REPORT:     SPECIMEN(S):   A: Left vulvar lesion   B: Left pelvic sentinel lymph node   C: Right pelvic sentinel lymph node   D: Uterus, cervix, bilateral ovaries and fallopian tubes   E: Portion of left ovary   F: Para-aortic lymph nodes   G: Left pelvic lymph nodes   H: Right pelvic lymph nodes     FINAL DIAGNOSIS:   A. LEFT VULVAR LESION, BIOPSY:   - Intradermal nevus     B. LEFT PELVIC SENTINEL LYMPH NODE, EXCISION:   - One reactive lymph node, negative for malignancy (0/1)     C. RIGHT PELVIC SENTINEL LYMPH NODE, EXCISION:   - Six reactive lymph nodes, negative for malignancy (0/6)     D. UTERUS, CERVIX, BILATERAL OVARIES AND FALLOPIAN TUBES, HYSTERECTOMY WITH BILATERAL SALPINGO-OOPHORECTOMY:   - Endometrial endometrioid adenocarcinoma, FIGO grade 1   - Atypical endometrial hyperplasia   - Myometrium with no significant histologic abnormality   - Chronic cervicitis with microglandular hyperplasia   - Uterine serosal fibrous adhesions   - Ovaries with cortical hyperplasia   - Metastatic endometrial adenocarcinoma to the left fallopian tube   - Right fallopian tube with no significant histologic abnormality     E. PORTION OF LEFT OVARY, EXCISION:   - Benign cortical inclusion cysts     F. PARA-AORTIC LYMPH NODES,  EXCISION:   - Four reactive lymph nodes, negative for malignancy (0/4)   - Benign glandular inclusions consistent with endosalpingiosis     G. LEFT PELVIC LYMPH NODES, EXCISION:   - Eight reactive lymph nodes, negative for malignancy (0/8)     H. RIGHT PELVIC LYMPH NODES, EXCISION:   - Nine reactive lymph nodes, negative for malignancy (0/9)   - Benign glandular inclusions consistent with endosalpingiosis         11/6/18: CT ap IMPRESSION:   1. Surgical changes of hysterectomy, bilateral salpingo-oophorectomy, and pelvic lymph node dissection with extensive fat stranding and fluid within the low pelvis extending superiorly along the iliac vasculature, right greater than left, potentially within normal postsurgical limits, however, slightly more than expected for postsurgical change.   1a. The ureters are not well evaluated on this single phase exam and there is no hydronephrosis or hydroureter, however, the amount of fluid in the pelvis does raise the question of ureteral injury. If there is clinical concern, consider obtaining a CT urogram.  1b. An area of irregular rim enhancement in the low pelvis may represent a normal postsurgical vaginal cuff, though, again, this is slightly more conspicuous than usual and dehiscence or a developing abscess may have a similar appearance.  2. 6.8 x 4.1 x 5.6 cm right pelvic sidewall seroma versus lymphocele.  3. Soft tissue nodular thickening anteriorly to the right psoas muscle and along the right lateral conal fascia, indeterminate, cannot rule out metastatic foci. Attention on follow-up studies.  4. Cholelithiasis without evidence of cholecystitis.      11/7/18: CT abd with contrast CT urogram IMPRESSION:   1. Postsurgical changes of hysterectomy, bilateral salpingo-oophorectomy, and iliac lymph node dissection with fluid  collections within the pelvis extending along the iliac vasculature.  a. The right pelvic sidewall fluid collection is not significantly changed while the  left pelvic sidewall fluid collection has enlarged  in comparison to the 11/6/2018 CT. The differential remains a seroma versus a lymphocele.  b. No evidence of contrast extravasation to suggest a ureteral injury.  c. Redemonstration of soft tissue nodular thickening along the right lateral conal fascia and anteriorly to the right psoas muscle and right psoas muscle. Attention on follow-up is recommended.  2. Cholelithiasis without evidence of cholecystitis.     Plan: adjuvant therapy to include 6 cycles of taxol and carboplatin; would not add radiation as all lymph nodes are negative, she does not have LVSI or deep invasion and this is a low grade tumor. Rx percocet 5 tabs due to continued abdominal pain. Will not prescribe any more narcotics and patient is aware of this.     11/16/18: Cycle #1 Paclitaxel/Carboplatin.   35.  12/6/18: Cycle #2 Paclitaxel/Carboplatin.  8.  12/27/2018: Cycle #3 T/C.  7.  1/8/19: US lower extremity: IMPRESSION: No evidence of deep venous thrombosis in either lower extremity.  1/9/2019: CT abd/pelvis IMPRESSION:   1. Minimal change in a right pelvic sidewall collection and decreased size of a left pelvic sidewall collection, which are most consistent with lymphoceles.  2. No suspicious pelvic lymphadenopathy.      1/21/19 Cycle #4 Paclitaxel/Carboplatin.  8.      1/2019 Presented with pain in LE:  US IMPRESSION: No evidence of deep venous thrombosis in either lower extremity.     1/2019 CT:IMPRESSION:   1. Minimal change in a right pelvic sidewall collection and decreased size of a left pelvic sidewall collection, which are most consistent with lymphoceles.  2. No suspicious pelvic lymphadenopathy.      2/14/19: Cycle #5 Paclitaxel/Carboplatin.  9.  3/7/19: Cycle #6 Paclitaxel/Carboplatin.  9.     4/4/2019: CT CAP IMPRESSION:   1. Significant decrease in the right pelvic sidewall collection, with resolved left pelvic sidewall collection. These are  "most consistent with lymphoceles.  2. No suspicious pelvic lymphadenopathy.  3. No suspicious abnormality in the chest.     Peritoneum / Retroperitoneum: Interval significant decrease in the right pelvic sidewall fluid collection now measuring 68 x 34 mm, previously 84 x 37 mm. The left pelvic sidewall fluid collection seen on prior examination has resolved.     Start surveillance; will alternate between NP and MD.  Per Dr. De Souza, do not need to follow  \"as was acutely elevated post op and have since been normal.\"     9/1/2020:  11.        Today she comes to clinic feeling well and denies any concerns for her visit. She denies any vaginal bleeding, no changes in her bowel or bladder habits, no nausea/emesis, no lower extremity edema, and no difficulties eating or sleeping. She denies any abdominal discomfort/bloating, no fevers or chills, and no chest pain or shortness of breath. She is due to see her PCP for her annual exam in May, she recently turned 47 and has not had any colon cancer screening as of yet; her mammogram is due now. She recently saw her PCP regarding weight loss and management of her Trulicity. She is currently not sexually active for the past 9 months as she is waiting until after her wedding 4/30; she does endorse dryness and is aware of using coconut oil. She did have RUQ discomfort since her last visit and was diagnosed with gallstones. She also was diagnosed with a benign brain mass for which she will be getting annual brain MR for the next 5 years.         Review of Systems:    Systemic           no weight changes; no fever; no chills; no night sweats; no appetite changes  Skin           no rashes, or lesions  Eye           no irritation; no changes in vision  Deny-Laryngeal           no dysphagia; no hoarseness   Pulmonary    no cough; no shortness of breath  Cardiovascular    no chest pain; no palpitations  Gastrointestinal    no diarrhea; no constipation; no abdominal pain; no " changes in bowel habits; no blood in stool  Genitourinary   no urinary frequency; no urinary urgency; no dysuria; no pain; no abnormal vaginal discharge; no abnormal vaginal bleeding  Breast    no breast discharge; no breast changes; no breast pain  Musculoskeletal    no myalgias; no arthralgias; no back pain  Psychiatric           no depressed mood; no anxiety    Hematologic              no tender lymph nodes; no noticeable swellings or lumps   Endocrine    no hot flashes; no heat/cold intolerance         Neurological   no tremor; no numbness and tingling; no headaches; no difficulty sleeping      Past Medical History:    Past Medical History:   Diagnosis Date     Chickenpox     as a child     Depression      Endometrial cancer (H)      Hypertension     since her 30's     Infertility, female      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)      PCOS (polycystic ovarian syndrome)      STD (female)     in her 20's         Past Surgical History:    Past Surgical History:   Procedure Laterality Date     CYSTOSCOPY N/A 10/24/2018    Procedure: Cystoscopy;  Surgeon: Linh De Souza MD;  Location: [facility] OR     DAVINCI HYSTERECTOMY TOTAL, BILATERAL SALPINGO-OOPHORECTOMY, NODE DISSECTION, COMBINED N/A 10/24/2018    Procedure: DaVinci Assisted Total Laparoscopic Hysterectomy, Bilateral Salpingo-Oophorectomy, lymph node dissection;  Surgeon: Linh De Souza MD;  Location: [facility] OR     EXCISE LESION VULVA Left 10/24/2018    Procedure: Excision of Left Vulvar Lesion;  Surgeon: Linh De Souza MD;  Location: [facility] OR         Health Maintenance Due   Topic Date Due     PREVENTIVE CARE VISIT  Never done     DEPRESSION ACTION PLAN  Never done     PHQ-9  Never done     COVID-19 Vaccine (1) Never done     COLORECTAL CANCER SCREENING  Never done     HIV SCREENING  Never done     HEPATITIS C SCREENING  Never done     PAP  Never done     INFLUENZA VACCINE (1) 09/01/2021     LIPID  Never done     MAMMO SCREENING  02/02/2022        Current Medications:     Current Outpatient Medications   Medication Sig Dispense Refill     emtricitabine-tenofovir (TRUVADA) 200-300 MG per tablet TAKE 1 TABLET BY MOUTH EVERY DAY. NO REFILLS UNLESS OFFICE VISIT WITH HIV TESTING COMPLETED       escitalopram (LEXAPRO) 10 MG tablet        Multiple Vitamin (MULTIVITAMIN  S) CAPS Take 1 capsule by mouth       pramipexole (MIRAPEX) 0.125 MG tablet Take 0.125 mg by mouth 2 times daily       Sharps Container (SHARPS-A-GATOR LOCKING BRACKET) [facility] CPAP machine for home use at pressure: 5-16 CM H20 , Heated humidifier x 1, Humidifier chamber x 1,  Nasal mask with cushion x 1, Heated tubing x 1, Headgear x 1, Filters: Disposable x 1pk & Reusable x 1pk, Length of Need: 99 months, Frequency of use: Daily       spironolactone (ALDACTONE) 50 MG tablet Take 50 mg by mouth daily        TRULICITY 1.5 MG/0.5ML pen ADMINISTER 1.5 MG UNDER THE SKIN 1 TIME WEEKLY       carbidopa-levodopa (SINEMET)  MG tablet Wk 1 take 0.5 tabs three times a day (TID). Wk 2 1 tab TID. Wk 3 1.5 tabs TID. Wk 4 2 tabs TID. Wk 5 2.5 tabs TID. Wk 5 3 tabs TID. Stop at dose that improves symptoms. (Patient not taking: Reported on 3/4/2022) 270 tablet 1         Allergies:        Allergies   Allergen Reactions     Amoxicillin      Penicillin G         Social History:     Social History     Tobacco Use     Smoking status: Former Smoker     Packs/day: 0.50     Types: Cigarettes     Smokeless tobacco: Never Used   Substance Use Topics     Alcohol use: No     Comment: 11 months sober        History   Drug Use No     Comment: 11 months sober. lives at sober housing         Family History:     The patient's family history is notable for:    No family history on file.      Physical Exam:     /86   Pulse 67   Temp 97.5  F (36.4  C) (Tympanic)   Resp 18   Wt 109.6 kg (241 lb 11.2 oz)   SpO2 98%   BMI 40.51 kg/m    Body mass index is 40.51 kg/m .    General Appearance: healthy and alert, no  distress     HEENT: no thyromegaly, no palpable nodules or masses        Cardiovascular: regular rate and rhythm, no gallops, rubs or murmurs     Respiratory: lungs clear, no rales, rhonchi or wheezes, normal diaphragmatic excursion    Musculoskeletal: extremities non tender and without edema    Skin: no lesions or rashes     Neurological: normal gait, no gross defects     Psychiatric: appropriate mood and affect                               Hematological: normal cervical, supraclavicular and inguinal lymph nodes     Gastrointestinal:       abdomen soft, non-tender, non-distended, no organomegaly or masses    Genitourinary: External genitalia and urethral meatus appears normal.  Vagina is smooth without nodularity or masses.  Cervix surgically absent  Bimanual exam reveal no masses, nodularity or fullness.  Recto-vaginal exam confirms these findings.      Assessment:    Meagan Morales is a 47 year old woman with a history of stage IIIA grade 1 endometrioid adenocarcinoma. She completed chemotherapy 3/2019.  She is here today for a surveillance visit.     23 minutes spent on the date of the encounter doing chart review, history and exam, documentation and further activities as noted above      Plan:     1.)       Patient to RTC in 6 months for her next surveillance visit. She will continue to have visits every 6 months until 3/2024 and then annually thereafter. Reviewed recommendations from [facility] not to perform surveillance pap smears in women diagnosed with endometrial cancer as this does not improve detection of local recurrence. Reviewed signs and symptoms for when she should contact the clinic or seek additional care. Patient to contact the clinic with any questions or concerns in the interim.  Answered all of her questions to the best of my ability.     2.) Genetic risk factors were assessed and she had intact/normal MMR proteins.      3.) Labs and/or tests ordered include:  None.      4.) Health maintenance  issues addressed today include annual health maintenance and non-gynecologic issues with PCP.    CORTES Gant, WHNP-BC, ANP-BC  Women's Health Nurse Practitioner  Adult Nurse Practitioner  Division of Gynecologic Oncology        CC  Patient Care Team:  Aundrea Max MD as PCP - Virgen Jj APRN CNP as Assigned Cancer Care Provider  Brooke Estes DO as Assigned Neuroscience Provider

## 2022-03-04 NOTE — NURSING NOTE
"Oncology Rooming Note    March 4, 2022 10:24 AM   Meagan Morales is a 47 year old female who presents for:    Chief Complaint   Patient presents with     Oncology Clinic Visit     ENDOMETRIAL CANCER     Initial Vitals: /86   Pulse 67   Temp 97.5  F (36.4  C) (Tympanic)   Resp 18   Wt 109.6 kg (241 lb 11.2 oz)   SpO2 98%   BMI 40.51 kg/m   Estimated body mass index is 40.51 kg/m  as calculated from the following:    Height as of 2/11/20: 1.645 m (5' 4.76\").    Weight as of this encounter: 109.6 kg (241 lb 11.2 oz). Body surface area is 2.24 meters squared.  No Pain (0) Comment: Data Unavailable   No LMP recorded. Patient has had a hysterectomy.  Allergies reviewed: Yes  Medications reviewed: Yes    Medications: Medication refills not needed today.  Pharmacy name entered into betNOW: [facility][id number] [address] AT [facility] [address]    Clinical concerns: NO NEW CONCERNS       Gema Galan CMA            "

## 2022-04-25 DIAGNOSIS — G25.5 CHOREA: ICD-10-CM

## 2022-04-25 NOTE — TELEPHONE ENCOUNTER
Rx Authorization:    Requested Medication/ Dose: Carbidopa/ Levodopa 25-100MG tabs    Date last refill ordered: 3/4/21    Quantity ordered: 270 tabs    # refills: 1    Date of last clinic visit with ordering provider: 3/4/21    Date of next clinic visit with ordering provider: F/U 6 weeks    All pertinent protocol data (lab date/result):     Include pertinent information from patients message:

## 2022-04-25 NOTE — TELEPHONE ENCOUNTER
Meagan got up to 2.5 tabs 3x daily and it helped a lot, however this was hard to maintain so she stopped taking it. Now that she is going out again people have noticed her shaking. She is also getting  on Saturday. She restarted the carbidopa/levodopa a few weeks ago and is up to 2 tablets three times daily now. She does need a refill. She will make an appointment also to follow up with Dr. Estes. At the appointment she would like to discuss long acting carbidopa/levodopa or using higher dose tablets.

## 2022-04-27 RX ORDER — CARBIDOPA AND LEVODOPA 25; 100 MG/1; MG/1
TABLET ORAL
Qty: 270 TABLET | Refills: 1 | Status: SHIPPED | OUTPATIENT
Start: 2022-04-27

## 2022-05-14 ENCOUNTER — HEALTH MAINTENANCE LETTER (OUTPATIENT)
Age: 47
End: 2022-05-14

## 2022-09-03 ENCOUNTER — HEALTH MAINTENANCE LETTER (OUTPATIENT)
Age: 47
End: 2022-09-03

## 2022-09-06 ENCOUNTER — ONCOLOGY VISIT (OUTPATIENT)
Dept: ONCOLOGY | Facility: CLINIC | Age: 47
End: 2022-09-06
Attending: NURSE PRACTITIONER
Payer: COMMERCIAL

## 2022-09-06 DIAGNOSIS — Z53.9 NO SHOW: Primary | ICD-10-CM

## 2022-09-06 NOTE — LETTER
9/6/2022         RE: Meagan Morales  [address]  [address]  [facility] [address]        Dear Colleague,    Thank you for referring your patient, Meagan Morales, to the [facility]. Please see a copy of my visit note below.    Patient did not show for this scheduled visit.       Again, thank you for allowing me to participate in the care of your patient.        Sincerely,        CORTES Prater CNP

## 2023-01-15 ENCOUNTER — HEALTH MAINTENANCE LETTER (OUTPATIENT)
Age: 48
End: 2023-01-15

## 2023-06-03 ENCOUNTER — HEALTH MAINTENANCE LETTER (OUTPATIENT)
Age: 48
End: 2023-06-03

## 2023-11-25 NOTE — LETTER
2019       RE: Meagan Morales  [address] [place] [id number]     Dear Colleague,    Thank you for referring your patient, Meagan Morales, to the [facility]. Please see a copy of my visit note below.                Follow Up Notes on Referred Patient    Date: 2019        RE: Meagan Morales  : 1975  VY: 2019      Meagan Morales is a 44 year old woman with a diagnosis of stage IIIA grade 1 endometrioid adenocarcinoma. She completed chemotherapy 3/2019.  She is here today for a surveillance visit.      Oncology History:  Meagan originally presented to clinic due to continuous vaginal bleeding, very light. She thought it was just her menses being continuous. US was done which found thickened endometrial lining. IUD was placed. Second US was done and IUD did not affect endometrial lining and IUD was malpositioned. Since then, she has noted continuous spotting. IUD was removed. Also of note, ovarian cysts were noted on second US. Subsequent endometrial biopsy was done which revealed grade 1 endometrial cancer. She has always had irregular menses and has never been able to get pregnant. Patient's  first and only pap smear on 10/10/2017. Had mammogram in 20's, will order additional mammogram for baseline. No history of prior colonoscopy. Started Wellbutrin this last year, mood has improved. Prior cigarette smoker, quit 2 months ago and switched to vape. Started again smoking cigarettes again but has not had one for 10 days. 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 No history of prior abdominal surgeries. No history of heart disease, lung disease or diabetes. Has never been able to get pregnant and dose not desire children.     18: pelvic US IMPRESSION:  Peripheral follicular arrangement high within the ovaries suggesting polycystic ovarian syndrome. Slightly thickened heterogeneous endometrium raising a concern of potential  hyperplastic change.  9/17/18: pelvic US   1. Uterus is not normal in appearance. The endometrium appears thickened and vascular, and the IUD appears to be malpositioned.  Clinical correlation is recommended.    2. Bilateral complex ovarian cysts are noted.  Follow up ultrasound in 4-6 weeks is recommended, consider saline infusion sonohysterogram for further imaging of the endometrium.      9/17/18: endometrial biopsy  ENDOMETRIUM, BIOPSY:  1. FIGO Grade 1 (well differentiated) endometrioid carcinoma of      the endometrium  2. DNA mismatch repair enzyme immunohistochemistry studies:     All intact (see Amendment note and synoptic reporting)     10/24/2018: DaVinci Assisted Total Laparoscopic Hysterectomy, Bilateral Salpingo-Oophorectomy, lymph node dissection, Excision of Left Vulvar Lesion  Pelvic Washing:   Rare atypical cells present.     TEST(S) PERFORMED     Test(s) Performed:         - Mismatch Repair Immunohistochemistry (IHC) Testing for Mismatch Repair (MMR) Proteins:           - No loss of nuclear expression of MMR proteins: low probability of microsatellite instability-high (MSI-H)     ORIGINAL REPORT:     SPECIMEN(S):   A: Left vulvar lesion   B: Left pelvic sentinel lymph node   C: Right pelvic sentinel lymph node   D: Uterus, cervix, bilateral ovaries and fallopian tubes   E: Portion of left ovary   F: Para-aortic lymph nodes   G: Left pelvic lymph nodes   H: Right pelvic lymph nodes     FINAL DIAGNOSIS:   A. LEFT VULVAR LESION, BIOPSY:   - Intradermal nevus     B. LEFT PELVIC SENTINEL LYMPH NODE, EXCISION:   - One reactive lymph node, negative for malignancy (0/1)     C. RIGHT PELVIC SENTINEL LYMPH NODE, EXCISION:   - Six reactive lymph nodes, negative for malignancy (0/6)     D. UTERUS, CERVIX, BILATERAL OVARIES AND FALLOPIAN TUBES, HYSTERECTOMY WITH BILATERAL SALPINGO-OOPHORECTOMY:   - Endometrial endometrioid adenocarcinoma, FIGO grade 1   - Atypical endometrial hyperplasia   - Myometrium with no  significant histologic abnormality   - Chronic cervicitis with microglandular hyperplasia   - Uterine serosal fibrous adhesions   - Ovaries with cortical hyperplasia   - Metastatic endometrial adenocarcinoma to the left fallopian tube   - Right fallopian tube with no significant histologic abnormality     E. PORTION OF LEFT OVARY, EXCISION:   - Benign cortical inclusion cysts     F. PARA-AORTIC LYMPH NODES, EXCISION:   - Four reactive lymph nodes, negative for malignancy (0/4)   - Benign glandular inclusions consistent with endosalpingiosis     G. LEFT PELVIC LYMPH NODES, EXCISION:   - Eight reactive lymph nodes, negative for malignancy (0/8)     H. RIGHT PELVIC LYMPH NODES, EXCISION:   - Nine reactive lymph nodes, negative for malignancy (0/9)   - Benign glandular inclusions consistent with endosalpingiosis     COMMENT:   The final diagnosis confirms the interpretation provided intraoperatively. The tumor seen in the left fallopian tube involves the wall (invading the mucosa and submucosa) and appears free floating in the lumen.   Tumor Site:         - Endometrium - Anterior and posterior     Histologic Type:         - Endometrioid carcinoma, NOS     Histologic Grade:         - FIGO grade 1     Tumor Size: 5.7 Centimeters (cm)     Tumor Extent       Myometrial Invasion:           - Not identified       Adenomyosis:           - Not identified       Uterine Serosa Involvement:           - Not identified       Lower Uterine Segment Involvement:           - Not identified       Cervical Stromal Involvement:           - Not identified       Other Tissue / Organ Involvement:           - Left fallopian tube       Peritoneal Ascitic Fluid:           - Results pending     Accessory Findings       Lymphovascular Invasion:           - Not identified     11/6/18: CT ap IMPRESSION:   1. Surgical changes of hysterectomy, bilateral salpingo-oophorectomy, and pelvic lymph node dissection with extensive fat stranding and fluid  within the low pelvis extending superiorly along the iliac vasculature, right greater than left, potentially within normal postsurgical limits, however, slightly more than expected for postsurgical change.   1a. The ureters are not well evaluated on this single phase exam and there is no hydronephrosis or hydroureter, however, the amount of fluid in the pelvis does raise the question of ureteral injury. If there is clinical concern, consider obtaining a CT urogram.  1b. An area of irregular rim enhancement in the low pelvis may represent a normal postsurgical vaginal cuff, though, again, this is slightly more conspicuous than usual and dehiscence or a developing abscess may have a similar appearance.  2. 6.8 x 4.1 x 5.6 cm right pelvic sidewall seroma versus lymphocele.  3. Soft tissue nodular thickening anteriorly to the right psoas muscle and along the right lateral conal fascia, indeterminate, cannot rule out metastatic foci. Attention on follow-up studies.  4. Cholelithiasis without evidence of cholecystitis.      11/7/18: CT abd with contrast CT urogram IMPRESSION:   1. Postsurgical changes of hysterectomy, bilateral salpingo-oophorectomy, and iliac lymph node dissection with fluid  collections within the pelvis extending along the iliac vasculature.  a. The right pelvic sidewall fluid collection is not significantly changed while the left pelvic sidewall fluid collection has enlarged  in comparison to the 11/6/2018 CT. The differential remains a seroma versus a lymphocele.  b. No evidence of contrast extravasation to suggest a ureteral injury.  c. Redemonstration of soft tissue nodular thickening along the right lateral conal fascia and anteriorly to the right psoas muscle and right psoas muscle. Attention on follow-up is recommended.  2. Cholelithiasis without evidence of cholecystitis.     Plan: adjuvant therapy to include 6 cycles of taxol and carboplatin; would not add radiation as all lymph nodes are  negative, she does not have LVSI or deep invasion and this is a low grade tumor. Rx percocet 5 tabs due to continued abdominal pain. Will not prescribe any more narcotics and patient is aware of this.     11/16/18: Cycle #1 Paclitaxel/Carboplatin.   35.  12/6/18: Cycle #2 Paclitaxel/Carboplatin.  8.  12/27/2018: Cycle #3 T/C.  7.  1/8/19: US lower extremity: IMPRESSION: No evidence of deep venous thrombosis in either lower extremity.  1/9/2019: CT abd/pelvis IMPRESSION:   1. Minimal change in a right pelvic sidewall collection and decreased size of a left pelvic sidewall collection, which are most consistent with lymphoceles.  2. No suspicious pelvic lymphadenopathy.      1/21/19 Cycle #4 Paclitaxel/Carboplatin.  8.      1/2019 Presented with pain in LE:  US IMPRESSION: No evidence of deep venous thrombosis in either lower extremity.     1/2019 CT:IMPRESSION:   1. Minimal change in a right pelvic sidewall collection and decreased size of a left pelvic sidewall collection, which are most consistent with lymphoceles.  2. No suspicious pelvic lymphadenopathy.      2/14/19: Cycle #5 Paclitaxel/Carboplatin.  9.  3/7/19: Cycle #6 Paclitaxel/Carboplatin.  9.     4/4/2019: CT CAP IMPRESSION:   1. Significant decrease in the right pelvic sidewall collection, with resolved left pelvic sidewall collection. These are most consistent with lymphoceles.  2. No suspicious pelvic lymphadenopathy.  3. No suspicious abnormality in the chest.     Peritoneum / Retroperitoneum: Interval significant decrease in the right pelvic sidewall fluid collection now measuring 68 x 34 mm, previously 84 x 37 mm. The left pelvic sidewall fluid collection seen on prior examination has resolved.    Start surveillance; will alternate between NP and MD.          Today she comes to clinic feeling well. She denies any vaginal bleeding, no changes in her bowel or bladder habits, no nausea/emesis, no lower extremity edema,  and no difficulties eating or sleeping. She denies any abdominal discomfort/bloating, no fevers or chills, and no chest pain or shortness of breath. She is current with her health screenings with her mammogram due in October. She is intermittently sexually active ahd did use a lubricant. She has been trying to wean off of her Wellbutrin; her psychiatrist is aware and helping with this. She continues to work as the  for the sober house she lives in and she is also working another job. She has been exercising more and has lost some weight. She states the pain she was having in her left thigh has resolved.            Review of Systems:    Systemic           no weight changes; no fever; no chills; no night sweats; no appetite changes  Skin           no rashes, or lesions  Eye           no irritation; no changes in vision  Deny-Laryngeal           no dysphagia; no hoarseness   Pulmonary    no cough; no shortness of breath  Cardiovascular    no chest pain; no palpitations  Gastrointestinal    no diarrhea; no constipation; no abdominal pain; no changes in bowel habits; no blood in stool  Genitourinary   no urinary frequency; no urinary urgency; no dysuria; no pain; no abnormal vaginal discharge; no abnormal vaginal bleeding  Breast    no breast discharge; no breast changes; no breast pain  Musculoskeletal    no myalgias; no arthralgias; no back pain  Psychiatric           no depressed mood; no anxiety    Hematologic               no tender lymph nodes; no noticeable swellings or lumps   Endocrine    no hot flashes; no heat/cold intolerance         Neurological   no tremor; no numbness and tingling; no headaches; no difficulty sleeping      Past Medical History:    Past Medical History:   Diagnosis Date     Chickenpox     as a child     Depression      Endometrial cancer (H)      Hypertension     since her 30's     Infertility, female      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)      PCOS (polycystic ovarian  syndrome)      STD (female)     in her 20's         Past Surgical History:    Past Surgical History:   Procedure Laterality Date     CYSTOSCOPY N/A 10/24/2018    Procedure: Cystoscopy;  Surgeon: Linh De Souza MD;  Location: [facility] OR     DAVINCI HYSTERECTOMY TOTAL, BILATERAL SALPINGO-OOPHORECTOMY, NODE DISSECTION, COMBINED N/A 10/24/2018    Procedure: DaVinci Assisted Total Laparoscopic Hysterectomy, Bilateral Salpingo-Oophorectomy, lymph node dissection;  Surgeon: Linh De Souza MD;  Location: [facility] OR     EXCISE LESION VULVA Left 10/24/2018    Procedure: Excision of Left Vulvar Lesion;  Surgeon: Linh De Souza MD;  Location: [facility] OR         Health Maintenance Due   Topic Date Due     PREVENTIVE CARE VISIT  1975     DEPRESSION ACTION PLAN  1975     PAP  1975     PHQ-9  1975     HIV SCREENING  01/20/1992       Current Medications:     Current Outpatient Medications   Medication Sig Dispense Refill     albuterol (PROAIR HFA/PROVENTIL HFA/VENTOLIN HFA) 108 (90 Base) MCG/ACT inhaler Inhale 1-2 puffs into the lungs       buPROPion (WELLBUTRIN XL) 150 MG 24 hr tablet Take 150 mg by mouth       gabapentin (NEURONTIN) 600 MG tablet Take 600 mg by mouth as needed 600mg in AM, 600mg in the afternoon, 900mg in the evening       ibuprofen (ADVIL/MOTRIN) 600 MG tablet Take 1 tablet (600 mg) by mouth every 6 hours as needed for moderate pain 30 tablet 1     loratadine (CLARITIN) 10 MG tablet Take 10 mg by mouth       Multiple Vitamin (MULTIVITAMIN  S) CAPS Take 1 capsule by mouth       omeprazole (PRILOSEC) 40 MG DR capsule Take 40 mg by mouth       pramipexole (MIRAPEX) 0.125 MG tablet Take 0.125 mg by mouth At Bedtime       propranolol (INDERAL) 10 MG tablet Take 10 mg by mouth At Bedtime       spironolactone (ALDACTONE) 50 MG tablet Take 50 mg by mouth daily            Allergies:        Allergies   Allergen Reactions     Amoxicillin      Penicillin G         Social History:     Social History  "    Tobacco Use     Smoking status: Former Smoker     Packs/day: 0.50     Types: Cigarettes     Smokeless tobacco: Never Used     Tobacco comment: uses vape pen about 3-4 times daily   Substance Use Topics     Alcohol use: No     Comment: 11 months sober        History   Drug Use No     Comment: 11 months sober. lives at sober housing         Family History:       No family history on file.      Physical Exam:     /73   Pulse 74   Temp 97.9  F (36.6  C) (Oral)   Resp 14   Ht 1.645 m (5' 4.75\")   Wt 95.7 kg (211 lb)   SpO2 99%   BMI 35.38 kg/m     Body mass index is 35.38 kg/m .    General Appearance: healthy and alert, no distress     HEENT: no thyromegaly, no palpable nodules or masses        Cardiovascular: regular rate and rhythm, no gallops, rubs or murmurs     Respiratory: lungs clear, no rales, rhonchi or wheezes, normal diaphragmatic excursion    Musculoskeletal: extremities non tender and without edema    Skin: no lesions or rashes     Neurological: normal gait, no gross defects     Psychiatric: appropriate mood and affect                               Hematological: normal cervical, supraclavicular and inguinal lymph nodes     Gastrointestinal:       abdomen soft, non-tender, non-distended, no organomegaly or masses    Genitourinary: External genitalia and urethral meatus appears normal.  Vagina is smooth without nodularity or masses.  Cervix surgically absent.  Bimanual exam reveal no masses, nodularity or fullness.  Recto-vaginal exam confirms these findings.      Assessment:    Meagan Morales is a 44 year old woman with a diagnosis of stage IIIA grade 1 endometrioid adenocarcinoma. She completed chemotherapy 3/2019.  She is here today for a surveillance visit.     20 minutes were spent with this patient, over 50% of that time was spent in symptom management, treatment planning and in counseling and coordination of care.      Plan:     1.)        Patient to RTC in 3 months for her next " surveillance visit; she will see Dr. De Souza and to discuss role of  in her surveillance. Reviewed recommendations from [facility] not to perform surveillance pap smears in women diagnosed with endometrial cancer as this does not improve detection of local recurrence. Reviewed signs and symptoms for when she should contact the clinic or seek additional care. Patient to contact the clinic with any questions or concerns in the interim.     2.) Genetic risk factors were assessed and her MMR proteins were intact.      3.) Labs and/or tests ordered include:  None.      4.) Health maintenance issues addressed today include annual health maintenance and non-gynecologic issues with PCP.    CORTES Gant, WHNP-BC, ANP-BC  Women's Health Nurse Practitioner  Adult Nurse Pracitioner  Division of Gynecologic Oncology    CC  Patient Care Team:  Aundrea Max MD as PCP - General     patient

## 2024-02-17 ENCOUNTER — HEALTH MAINTENANCE LETTER (OUTPATIENT)
Age: 49
End: 2024-02-17

## 2025-03-08 ENCOUNTER — HEALTH MAINTENANCE LETTER (OUTPATIENT)
Age: 50
End: 2025-03-08

## (undated) DEVICE — TUBING IRRIG CYSTO/BLADDER SET 81" LF 2C4040

## (undated) DEVICE — SUCTION MANIFOLD DORNOCH ULTRA CART UL-CL500

## (undated) DEVICE — SPECIMEN TRAP MUCOUS 40ML LUKI C30200A

## (undated) DEVICE — ENDO OBTURATOR ACCESS PORT BLADELESS 8X100MM IAS8-100LP

## (undated) DEVICE — SYR 01ML 27GA 0.5" NDL TBC 309623

## (undated) DEVICE — NDL INSUFFLATION 13GA 120MM C2201

## (undated) DEVICE — DAVINCI OBTURATOR 8MM BLUNT LONG 470009

## (undated) DEVICE — WIPES FOLEY CARE SURESTEP PROVON DFC100

## (undated) DEVICE — PROTECTOR ARM ONE-STEP TRENDELENBURG 40418

## (undated) DEVICE — ESU GROUND PAD ADULT W/CORD E7507

## (undated) DEVICE — NDL BLUNT 18GA 1" W/O FILTER 305181

## (undated) DEVICE — PACK GOWN 3/PK DISP XL SBA32GPFCB

## (undated) DEVICE — SYSTEM CLEARIFY VISUALIZATION 21-345

## (undated) DEVICE — LINEN TOWEL PACK X30 5481

## (undated) DEVICE — DAVINCI HOT SHEARS TIP COVER  400180

## (undated) DEVICE — SUCTION IRR STRYKERFLOW II W/TIP 250-070-520

## (undated) DEVICE — NDL SPINAL 22GA 3.5" QUINCKE 405181

## (undated) DEVICE — BLADE KNIFE SURG 11 371111

## (undated) DEVICE — SU WND CLOSURE VLOC 90 ABS 2-0 UND 9" GS-22 VLOCM2245

## (undated) DEVICE — SYR 10ML LL W/O NDL 302995

## (undated) DEVICE — DAVINCI S NDL DRIVER LARGE 420006

## (undated) DEVICE — DAVINCI XI GRASPER ENDOWRIST PROGRASP 470093

## (undated) DEVICE — SOL NACL 0.9% INJ 1000ML BAG 2B1324X

## (undated) DEVICE — ENDO POUCH UNIVERSAL RETRIEVAL SYSTEM INZII 5MM CD003

## (undated) DEVICE — KIT PATIENT POSITIONING PIGAZZI LATEX FREE 40580

## (undated) DEVICE — DRAPE MAYO STAND 23X54 8337

## (undated) DEVICE — TUBING CONMED AIRSEAL SMOKE EVAC INSUFFLATION ASM-EVAC

## (undated) DEVICE — KOH COLPOTOMIZER OCCLUDER  CPO-6

## (undated) DEVICE — SOL ADH LIQUID BENZOIN SWAB 0.6ML C1544

## (undated) DEVICE — BLADE CLIPPER SGL USE 9680

## (undated) DEVICE — DAVINCI XI DRAPE ARM 470015

## (undated) DEVICE — PREP TECHNI-CARE CHLOROXYLENOL 3% 4OZ BOTTLE C222-4ZWO

## (undated) DEVICE — DAVINCI XI SEAL UNIVERSAL 5-8MM 470361

## (undated) DEVICE — SU VICRYL 3-0 SH 27" UND J416H

## (undated) DEVICE — JELLY LUBRICATING SURGILUBE 2OZ TUBE

## (undated) DEVICE — RETR ELEV / UTERINE MANIPULATOR V-CARE MED CUP 60-6085-201A

## (undated) DEVICE — GLOVE PROTEXIS BLUE W/NEU-THERA 7.0  2D73EB70

## (undated) DEVICE — SU VICRYL 0 TIE 54" J608H

## (undated) DEVICE — GLOVE PROTEXIS W/NEU-THERA 6.5  2D73TE65

## (undated) DEVICE — DAVINCI S FCP BIPOLAR FENESTRATED 420205

## (undated) DEVICE — SU VICRYL 4-0 PS-2 18" UND J496H

## (undated) DEVICE — DAVINCI XI DRAPE COLUMN 470341

## (undated) DEVICE — DRAPE LEGGINGS CLEAR 8430

## (undated) DEVICE — LINEN TOWEL PACK X6 WHITE 5487

## (undated) DEVICE — SYR 30ML LL W/O NDL 302832

## (undated) DEVICE — SU VICRYL 2-0 CT-2 27" UND J269H

## (undated) DEVICE — DRSG PRIMAPORE 02X3" 7133

## (undated) DEVICE — SOL WATER IRRIG 1000ML BOTTLE 2F7114

## (undated) DEVICE — DAVINCI XI MONOPOLAR SCISSORS HOT SHEARS 8MM 470179

## (undated) DEVICE — Device

## (undated) DEVICE — DRAPE SHEET REV FOLD 3/4 9349

## (undated) RX ORDER — GLYCOPYRROLATE 0.2 MG/ML
INJECTION, SOLUTION INTRAMUSCULAR; INTRAVENOUS
Status: DISPENSED
Start: 2018-10-24

## (undated) RX ORDER — HEPARIN SODIUM 5000 [USP'U]/.5ML
INJECTION, SOLUTION INTRAVENOUS; SUBCUTANEOUS
Status: DISPENSED
Start: 2018-10-24

## (undated) RX ORDER — BUPIVACAINE HYDROCHLORIDE 5 MG/ML
INJECTION, SOLUTION EPIDURAL; INTRACAUDAL
Status: DISPENSED
Start: 2018-10-24

## (undated) RX ORDER — FENTANYL CITRATE 50 UG/ML
INJECTION, SOLUTION INTRAMUSCULAR; INTRAVENOUS
Status: DISPENSED
Start: 2018-10-24

## (undated) RX ORDER — OXYCODONE HYDROCHLORIDE 5 MG/1
TABLET ORAL
Status: DISPENSED
Start: 2018-10-24

## (undated) RX ORDER — SODIUM CHLORIDE 9 MG/ML
INJECTION, SOLUTION INTRAVENOUS
Status: DISPENSED
Start: 2019-05-15

## (undated) RX ORDER — LIDOCAINE HYDROCHLORIDE 10 MG/ML
INJECTION, SOLUTION EPIDURAL; INFILTRATION; INTRACAUDAL; PERINEURAL
Status: DISPENSED
Start: 2019-05-15

## (undated) RX ORDER — ACETAMINOPHEN 325 MG/1
TABLET ORAL
Status: DISPENSED
Start: 2018-10-24

## (undated) RX ORDER — HYDROMORPHONE HCL/0.9% NACL/PF 0.2MG/0.2
SYRINGE (ML) INTRAVENOUS
Status: DISPENSED
Start: 2018-10-24

## (undated) RX ORDER — HEPARIN SODIUM 1000 [USP'U]/ML
INJECTION, SOLUTION INTRAVENOUS; SUBCUTANEOUS
Status: DISPENSED
Start: 2018-10-24

## (undated) RX ORDER — ALBUMIN, HUMAN INJ 5% 5 %
SOLUTION INTRAVENOUS
Status: DISPENSED
Start: 2018-10-24

## (undated) RX ORDER — ONDANSETRON 2 MG/ML
INJECTION INTRAMUSCULAR; INTRAVENOUS
Status: DISPENSED
Start: 2018-10-24

## (undated) RX ORDER — GABAPENTIN 300 MG/1
CAPSULE ORAL
Status: DISPENSED
Start: 2018-10-24

## (undated) RX ORDER — LIDOCAINE HYDROCHLORIDE 20 MG/ML
INJECTION, SOLUTION EPIDURAL; INFILTRATION; INTRACAUDAL; PERINEURAL
Status: DISPENSED
Start: 2018-10-24

## (undated) RX ORDER — HYDROMORPHONE HYDROCHLORIDE 1 MG/ML
INJECTION, SOLUTION INTRAMUSCULAR; INTRAVENOUS; SUBCUTANEOUS
Status: DISPENSED
Start: 2018-10-24

## (undated) RX ORDER — PROPOFOL 10 MG/ML
INJECTION, EMULSION INTRAVENOUS
Status: DISPENSED
Start: 2018-10-24

## (undated) RX ORDER — CLINDAMYCIN PHOSPHATE 900 MG/50ML
INJECTION, SOLUTION INTRAVENOUS
Status: DISPENSED
Start: 2019-05-15

## (undated) RX ORDER — CLINDAMYCIN PHOSPHATE 600 MG/50ML
INJECTION, SOLUTION INTRAVENOUS
Status: DISPENSED
Start: 2018-10-24

## (undated) RX ORDER — INDOCYANINE GREEN AND WATER 25 MG
KIT INJECTION
Status: DISPENSED
Start: 2018-10-24

## (undated) RX ORDER — FENTANYL CITRATE 50 UG/ML
INJECTION, SOLUTION INTRAMUSCULAR; INTRAVENOUS
Status: DISPENSED
Start: 2019-05-15

## (undated) RX ORDER — DEXAMETHASONE SODIUM PHOSPHATE 4 MG/ML
INJECTION, SOLUTION INTRA-ARTICULAR; INTRALESIONAL; INTRAMUSCULAR; INTRAVENOUS; SOFT TISSUE
Status: DISPENSED
Start: 2018-10-24

## (undated) RX ORDER — IBUPROFEN 600 MG/1
TABLET, FILM COATED ORAL
Status: DISPENSED
Start: 2018-10-24

## (undated) RX ORDER — PHENAZOPYRIDINE HYDROCHLORIDE 200 MG/1
TABLET, FILM COATED ORAL
Status: DISPENSED
Start: 2018-10-24